# Patient Record
Sex: MALE | Race: WHITE | Employment: OTHER | URBAN - METROPOLITAN AREA
[De-identification: names, ages, dates, MRNs, and addresses within clinical notes are randomized per-mention and may not be internally consistent; named-entity substitution may affect disease eponyms.]

---

## 2017-01-07 RX ORDER — TAMSULOSIN HYDROCHLORIDE 0.4 MG/1
CAPSULE ORAL
Qty: 90 CAP | Refills: 3 | Status: SHIPPED | OUTPATIENT
Start: 2017-01-07 | End: 2017-11-13 | Stop reason: SDUPTHER

## 2017-02-20 ENCOUNTER — OFFICE VISIT (OUTPATIENT)
Dept: INTERNAL MEDICINE CLINIC | Age: 77
End: 2017-02-20

## 2017-02-20 ENCOUNTER — TELEPHONE (OUTPATIENT)
Dept: INTERNAL MEDICINE CLINIC | Age: 77
End: 2017-02-20

## 2017-02-20 VITALS
SYSTOLIC BLOOD PRESSURE: 148 MMHG | DIASTOLIC BLOOD PRESSURE: 74 MMHG | BODY MASS INDEX: 25.97 KG/M2 | TEMPERATURE: 98.9 F | WEIGHT: 181.4 LBS | HEIGHT: 70 IN | HEART RATE: 85 BPM | OXYGEN SATURATION: 94 %

## 2017-02-20 DIAGNOSIS — J40 BRONCHITIS: ICD-10-CM

## 2017-02-20 DIAGNOSIS — J84.10 POSTINFLAMMATORY PULMONARY FIBROSIS (HCC): Primary | ICD-10-CM

## 2017-02-20 DIAGNOSIS — J84.10 INTERSTITIAL PULMONARY FIBROSIS (HCC): Primary | ICD-10-CM

## 2017-02-20 DIAGNOSIS — I10 ESSENTIAL HYPERTENSION: ICD-10-CM

## 2017-02-20 DIAGNOSIS — J40 BRONCHITIS, NOT SPECIFIED AS ACUTE OR CHRONIC: ICD-10-CM

## 2017-02-20 RX ORDER — LEVOFLOXACIN 750 MG/1
750 TABLET ORAL DAILY
Qty: 7 TAB | Refills: 0 | Status: SHIPPED | OUTPATIENT
Start: 2017-02-20 | End: 2017-02-27

## 2017-02-20 RX ORDER — BENZONATATE 200 MG/1
200 CAPSULE ORAL
Qty: 30 CAP | Refills: 0 | Status: SHIPPED | OUTPATIENT
Start: 2017-02-20 | End: 2017-03-10 | Stop reason: SDUPTHER

## 2017-02-20 RX ORDER — PIRFENIDONE 267 MG/1
CAPSULE ORAL
COMMUNITY
Start: 2017-02-17 | End: 2018-05-14

## 2017-02-20 RX ORDER — FLUTICASONE PROPIONATE AND SALMETEROL 50; 500 UG/1; UG/1
POWDER RESPIRATORY (INHALATION)
COMMUNITY
Start: 2017-01-05 | End: 2017-07-10

## 2017-02-20 RX ORDER — AZELASTINE HCL 205.5 UG/1
2 SPRAY NASAL 2 TIMES DAILY
COMMUNITY
Start: 2016-12-03

## 2017-02-20 NOTE — MR AVS SNAPSHOT
Visit Information Date & Time Provider Department Dept. Phone Encounter #  
 2/20/2017 12:00 PM Verona Arciniega, [address] [place] [address],[address] [phone] [id number] Your Appointments 4/11/2017 11:30 AM  
ROUTINE CARE with Verona Arciniega MD  
[facility]) Appt Note: 6 mon f/u  
 [place] [address] [address]  
[address] [facility] [address] Upcoming Health Maintenance Date Due  
 MEDICARE YEARLY EXAM 10/13/2017 GLAUCOMA SCREENING Q2Y 7/14/2018 DTaP/Tdap/Td series (2 - Td) 9/7/2022 COLONOSCOPY 6/17/2024 Allergies as of 2/20/2017  Review Complete On: 2/20/2017 By: Verona Arciniega MD  
  
 Severity Noted Reaction Type Reactions Celexa [Citalopram]  03/19/2010   Side Effect Other (comments) agitation Demerol [Meperidine]  03/01/2010    Unknown (comments) Mold Extracts  03/01/2010    Unknown (comments) Niaspan [Niacin]  03/19/2010   Intolerance Nausea Only Other Medication  03/01/2010    Unknown (comments) Grass smut, standardized mite mix, weed mix, dogs, white pam, white hickory, red mulberry, barley, cottonseed, malt, rice, rye, black pepper, navy bean Percocet [Oxycodone-acetaminophen]  03/01/2010    Unknown (comments) Ragweed  10/24/2013    Runny Nose  
 Sulfa (Sulfonamide Antibiotics)  03/19/2010   Side Effect Nausea Only Current Immunizations  Reviewed on 10/12/2016 Name Date Hepatitis A Vaccine 6/10/1997 Influenza Vaccine 10/6/2014, 10/9/2013 Influenza Vaccine (Quad) PF 9/29/2016, 10/12/2015 Influenza Vaccine Split 10/25/2011 Influenza Vaccine Whole 10/5/2009 Pneumococcal Vaccine (Unspecified Type) 9/7/2012, 11/1/2006 TD Vaccine 7/20/2006 TDAP Vaccine 9/7/2012 Zoster 6/1/2008 Not reviewed this visit You Were Diagnosed With   
  
 Codes Comments Interstitial pulmonary fibrosis ([facility]    -  Primary ICD-10-CM: J84.10 ICD-9-CM: 294 Essential hypertension     ICD-10-CM: I10 
ICD-9-CM: 401.9 Bronchitis     ICD-10-CM: J40 ICD-9-CM: 580 Vitals BP Pulse Temp Height(growth percentile) Weight(growth percentile) SpO2  
 148/74 (BP 1 Location: Right arm, BP Patient Position: Sitting) 85 98.9 °F (37.2 °C) (Oral) 5' 10\" (1.778 m) 181 lb 6.4 oz (82.3 kg) 94% BMI Smoking Status 26.03 kg/m2 Former Smoker BMI and BSA Data Body Mass Index Body Surface Area 26.03 kg/m 2 2.02 m 2 Preferred Pharmacy Pharmacy Name Phone [address] [phone] Your Updated Medication List  
  
   
This list is accurate as of: 2/20/17  1:18 PM.  Always use your most recent med list.  
  
  
  
  
 acetaminophen 500 mg tablet Commonly known as:  TYLENOL Take 1,000 mg by mouth every six (6) hours as needed for Pain or Fever. ADVAIR DISKUS 500-50 mcg/dose diskus inhaler Generic drug:  fluticasone-salmeterol * albuterol 90 mcg/actuation inhaler Commonly known as:  PROVENTIL HFA, VENTOLIN HFA, PROAIR HFA Take 2 Puffs by inhalation every four (4) hours as needed for Wheezing. * albuterol 1.25 mg/3 mL Nebu Commonly known as:  ACCUNEB  
3 mL by Nebulization route every six (6) hours as needed. amoxicillin-clavulanate 875-125 mg per tablet Commonly known as:  AUGMENTIN Take 1 Tab by mouth every twelve (12) hours. aspirin delayed-release 81 mg tablet Take 81 mg by mouth daily. PT INSTRUCTED TO STOP TAKING  6-15-11 BEFORE DOS  
  
 atorvastatin 80 mg tablet Commonly known as:  LIPITOR Take 80 mg by mouth daily. Azelastine 0.15 % (205.5 mcg) nasal spray Commonly known as:  ASTEPRO  
  
 benzonatate 200 mg capsule Commonly known as:  TESSALON Take 1 Cap by mouth three (3) times daily as needed for Cough for up to 7 days.  
  
 cetirizine 10 mg tablet Commonly known as:  ZYRTEC Take 10 mg by mouth nightly. CULTURELLE PO Take 1 Cap by mouth daily. DYMISTA 137-50 mcg/spray [place] Generic drug:  azelastine-fluticasone 1 Spray by Nasal route two (2) times a day. ESBRIET 267 mg Cap capsule Generic drug:  pirfenidone  
  
 finasteride 5 mg tablet Commonly known as:  PROSCAR  
TAKE 1 TABLET EVERY DAY  
  
 glucosamine-chondroitin 750-600 mg Tab Take 1 Tab by mouth two (2) times a day. GRAPE SEED PO Take 650 mg by mouth daily. ibuprofen 200 mg tablet Commonly known as:  MOTRIN Take 200 mg by mouth daily as needed for Pain. * ketoconazole 2 % topical cream  
Commonly known as:  NIZORAL Apply  to affected area daily. Uses on skin lesions every couple of days * ketoconazole 2 % shampoo Commonly known as:  NIZORAL Apply  to affected area two (2) times a week. levoFLOXacin 750 mg tablet Commonly known as:  Tai Vernon Take 1 Tab by mouth daily for 7 days. metoprolol tartrate 25 mg tablet Commonly known as:  LOPRESSOR Take 25 mg by mouth two (2) times a day. PT INSTRUCTED TO TAKE AM DOS PER ANESTHESIA PROTOCOL  
  
 montelukast 10 mg tablet Commonly known as:  SINGULAIR Take 1 Tab by mouth daily (after dinner). NEBULIZER  
by Does Not Apply route. nitroglycerin 0.4 mg SL tablet Commonly known as:  Gerardo Pines 1 Tab by SubLINGual route as needed. omeprazole 40 mg capsule Commonly known as:  PRILOSEC  
TAKE ONE CAPSULE BY MOUTH TWICE A DAY One-A-Day Mens tablet Generic drug:  multivitamin Take 1 Tab by mouth daily. tamsulosin 0.4 mg capsule Commonly known as:  FLOMAX TAKE 1 CAPSULE EVERY NIGHT  
  
 VESIcare 5 mg tablet Generic drug:  solifenacin Take 5 mg by mouth daily. ZETIA 10 mg tablet Generic drug:  ezetimibe Take 10 mg by mouth every morning. * Notice: This list has 4 medication(s) that are the same as other medications prescribed for you. Read the directions carefully, and ask your doctor or other care provider to review them with you. Prescriptions Sent to Pharmacy Refills  
 levoFLOXacin (LEVAQUIN) 750 mg tablet 0 Sig: Take 1 Tab by mouth daily for 7 days. Class: Normal  
 Pharmacy: [facility] [address], [address] [place] [address] RD. Ph #: [phone] Route: Oral  
 benzonatate (TESSALON) 200 mg capsule 0 Sig: Take 1 Cap by mouth three (3) times daily as needed for Cough for up to 7 days. Class: Normal  
 Pharmacy: [facility] [address], [address] [place] [address] RD. Ph #: [phone] Route: Oral  
  
To-Do List   
 02/20/2017 Imaging:  XR CHEST PA LAT Patient Instructions As scheduled Introducing [facility] SERVICES! Dear Ezra Samson: Thank you for requesting a [employer] account. Our records indicate that you already have an active [employer] account. You can access your account anytime at [email or web address]. [email or web address] at work Did you know that you can access your hospital and ER discharge instructions at any time in [employer]? You can also review all of your test results from your hospital stay or ER visit. Additional Information If you have questions, please visit the Frequently Asked Questions section of the [employer] website at [email or web address]. [email or web address] at [email or web address] Remember, [employer] is NOT to be used for urgent needs. For medical emergencies, dial 911. Now available from your iPhone and Android! Please provide this summary of care documentation to your next provider. Your primary care clinician is listed as Shelly Pollock. If you have any questions after today's visit, please call [phone].

## 2017-02-20 NOTE — LETTER
2/20/2017 3:59 PM 
 
Mr. Franklin Reyesnicole [address] [id number] Dear Franklin Altman: 
 
Please find your most recent results below. Resulted Orders XR CHEST PA LAT Narrative EXAM:  XR CHEST PA LAT INDICATION:  Interstitial pulmonary fibrosis. Evaluate for pneumonia. COMPARISON: 5/10/2016. TECHNIQUE: Frontal and lateral chest views. FINDINGS: The cardiomediastinal contours are stable. Sternal wires and 
mediastinal clips are present. There are stable diffuse interstitial markings 
with a peripheral and basilar predominance. There is no new lung opacity or 
pleural effusion. The bones and upper abdomen are stable. Impression IMPRESSION:  
Stable exam with chronic interstitial markings in keeping with pulmonary 
fibrosis. No acute process. RECOMMENDATIONS: 
None. Keep up the good work! Please call me if you have any questions: [phone] Sincerely, 
 
 
Aakash Valencia MD

## 2017-02-20 NOTE — PROGRESS NOTES
HISTORY OF PRESENT ILLNESS  Lu Hammer is a 78 y.o. male. Fever    The history is provided by the patient and spouse. This is a new problem. The current episode started 2 days ago. The maximum temperature noted was 101 - 101.9 F. The temperature was taken using an oral thermometer. Associated symptoms include muscle aches and cough. Pertinent negatives include no chest pain and no shortness of breath.    of note, no fever today in clinic- 98.9 temp  At home has been running around 101.4, 101.9, 100.9, 100  His highest fevers have been around 5 AM   He has been taking tylenol with no improvement   Pt has some chills with this and some mild aches   Pt reports of pain to his R side and ribs with coughing and breathing   He has some chronic cough but this has been worsening with other sx  The cough is keeping him awake at night   Pt has been taking astelin which has improved some of his post nasal drip  Will run rapid flu testing to rule this out   Discussed cough syrup which he can take at night to help with sleep     Pt follows with Dr. Jin Sadler (pulm) for fibrosis  Reviewed notes from 1/05/17: idiopathic pulm fibrosis, dry cough, completed CT scan and reviewed results- consistent with pulmonary fibrosis, starting esbrit medication for treatment, will get PFTs  He plans to start his esbrit tomorrow     He would like to update his tdap at his f/u in April as his daughter is pregnant    Patient Active Problem List    Diagnosis Date Noted    Pneumonia 02/28/2016    Hyperglycemia 02/28/2016    ACP (advance care planning) 10/12/2015    ILD (interstitial lung disease) ([facility] 06/12/2014    Interstitial pulmonary fibrosis ([address] 10/09/2013    HTN (hypertension) 10/09/2013    BPH (benign prostatic hyperplasia) 10/09/2013    A-fib ([address] 10/09/2013    AAA (abdominal aortic aneurysm) ([address] 10/09/2013    BPH (benign prostatic hypertrophy)     GERD (gastroesophageal reflux disease)     CAD (coronary artery disease)     S/P CABG (coronary artery bypass graft) 06/20/2011    Degenerative arthritis of thumb 06/20/2011    Testosterone deficiency 03/24/2011    Hypercholesterolemia 09/29/2010    Chronic bronchitis ([facility] 03/01/2010    Asthma 03/01/2010    Depression 03/01/2010    Arthritis shoulders and spine 03/01/2010     Current Outpatient Prescriptions   Medication Sig Dispense Refill    tamsulosin (FLOMAX) 0.4 mg capsule TAKE 1 CAPSULE EVERY NIGHT 90 Cap 3    amoxicillin-clavulanate (AUGMENTIN) 875-125 mg per tablet Take 1 Tab by mouth every twelve (12) hours. 20 Tab 0    omeprazole (PRILOSEC) 40 mg capsule TAKE ONE CAPSULE BY MOUTH TWICE A  Cap 1    ibuprofen (MOTRIN) 200 mg tablet Take 200 mg by mouth daily as needed for Pain.  azelastine-fluticasone (DYMISTA) 137-50 mcg/spray spry 1 Spray by Nasal route two (2) times a day.  GLUCOSAMINE HCL/CHONDR MIRANDA A NA (GLUCOSAMINE-CHONDROITIN) 750-600 mg tab Take 1 Tab by mouth two (2) times a day.  cetirizine (ZYRTEC) 10 mg tablet Take 10 mg by mouth nightly.  ketoconazole (NIZORAL) 2 % shampoo Apply  to affected area two (2) times a week.  0    VESICARE 5 mg tablet Take 5 mg by mouth daily.  albuterol (ACCUNEB) 1.25 mg/3 mL nebu 3 mL by Nebulization route every six (6) hours as needed. 1 Each 1    GRAPE SEED EXTRACT (GRAPE SEED PO) Take 650 mg by mouth daily.  acetaminophen (TYLENOL) 500 mg tablet Take 1,000 mg by mouth every six (6) hours as needed for Pain or Fever.  ketoconazole (NIZORAL) 2 % topical cream Apply  to affected area daily. Uses on skin lesions every couple of days 15 g 1    finasteride (PROSCAR) 5 mg tablet TAKE 1 TABLET EVERY DAY 90 tablet 2    atorvastatin (LIPITOR) 80 mg tablet Take 80 mg by mouth daily.  montelukast (SINGULAIR) 10 mg tablet Take 1 Tab by mouth daily (after dinner). 90 Tab 3    NEBULIZER by Does Not Apply route.       nitroglycerin (NITROQUICK) 0.4 mg SL tablet 1 Tab by SubLINGual route as needed. 25 Tab prn    albuterol (PROVENTIL HFA, VENTOLIN HFA) 90 mcg/Actuation inhaler Take 2 Puffs by inhalation every four (4) hours as needed for Wheezing. 1 Inhaler 2    LACTOBACILLUS RHAMNOSUS (CULTURELLE PO) Take 1 Cap by mouth daily.  multivitamin (ONE-A-DAY MENS) tablet Take 1 Tab by mouth daily.  metoprolol (LOPRESSOR) 25 mg tablet Take 25 mg by mouth two (2) times a day. PT INSTRUCTED TO TAKE AM DOS PER ANESTHESIA PROTOCOL      ezetimibe (ZETIA) 10 mg tablet Take 10 mg by mouth every morning.  aspirin delayed-release 81 mg tablet Take 81 mg by mouth daily.  PT INSTRUCTED TO STOP TAKING  6-15-11 BEFORE DOS       Past Surgical History   Procedure Laterality Date    Hx tonsil and adenoidectomy  1949    Pr cabg, arterial, three  10/2003    Pr cardiac surg procedure unlist  10/2003     CABGx3 vessel    Hx septoplasty  1962     s/p broken nose 1946    Endoscopy, colon, diagnostic       Dr. Blanca Granados    Hx appendectomy      Hx urological  1990     vasectomy    Hx other surgical      Hx other surgical       hemorrhoids    Hx other surgical  8/2015     basal cell carcinoma     Hx cholecystectomy  11/22/2008     laparoscopic    Hx hernia repair  1948     right    Hx colectomy  12/29/07     sigmoid colectomy for volvulus in [address] Hx heent  while in 20's     submucus resection sinus    Hx tonsillectomy      Hx orthopaedic  June 20, 2011     right hand, thumb surgery, plate inserted    Hx orthopaedic  1946     broken nose    Hx orthopaedic  1962     sub mucous resection    Hx orthopaedic  02/05/15     right hand surgery, ligament repair    Hx colonoscopy  06/17/2014     Repeat in 3 years      Lab Results  Component Value Date/Time   WBC 7.3 06/13/2016 11:55 AM   HGB 12.7 06/13/2016 11:55 AM   HCT 38.5 06/13/2016 11:55 AM   PLATELET 601 78/42/6635 11:55 AM   MCV 94 06/13/2016 11:55 AM       Lab Results  Component Value Date/Time   Cholesterol, total 115 06/13/2016 11:55 AM   Cholesterol, Total 224 07/26/2013 10:58 AM   HDL Cholesterol 40 06/13/2016 11:55 AM   LDL, calculated 56 06/13/2016 11:55 AM   Triglyceride 93 06/13/2016 11:55 AM   CHOL/HDL Ratio 3.4 09/29/2010 08:28 AM       Lab Results  Component Value Date/Time   GFR est AA 74 06/13/2016 11:55 AM   GFR est non-AA 64 06/13/2016 11:55 AM   Creatinine 1.12 06/13/2016 11:55 AM   BUN 15 06/13/2016 11:55 AM   Sodium 140 06/13/2016 11:55 AM   Potassium 5.3 06/13/2016 11:55 AM   Chloride 100 06/13/2016 11:55 AM   CO2 26 06/13/2016 11:55 AM         Review of Systems   Constitutional: Positive for fever. Respiratory: Positive for cough. Negative for shortness of breath. Cardiovascular: Negative for chest pain. Physical Exam   Constitutional: He is oriented to person, place, and time. He appears well-developed and well-nourished. No distress. HENT:   Head: Normocephalic and atraumatic. Right Ear: External ear normal.   Left Ear: External ear normal.   Mouth/Throat: Oropharynx is clear and moist. No oropharyngeal exudate. Eyes: Conjunctivae and EOM are normal. Right eye exhibits no discharge. Left eye exhibits no discharge. Neck: Normal range of motion. Neck supple. Cardiovascular: Normal rate, regular rhythm, normal heart sounds and intact distal pulses. Exam reveals no gallop and no friction rub. No murmur heard. Pulmonary/Chest: Effort normal. No respiratory distress. He has wheezes. He has no rales. He exhibits no tenderness. Course breath sounds, primarily on R side, anteriorly   Musculoskeletal: Normal range of motion. He exhibits no edema, tenderness or deformity. Lymphadenopathy:     He has no cervical adenopathy. Neurological: He is alert and oriented to person, place, and time. He has normal reflexes. Coordination normal.   Skin: Skin is warm and dry. No rash noted. He is not diaphoretic. No erythema. No pallor. Psychiatric: He has a normal mood and affect.  His behavior is normal. ASSESSMENT and PLAN    ICD-10-CM ICD-9-CM    1. Interstitial pulmonary fibrosis ([address]    Follows with pulmonary, Dr. Jefry Barahona for this, will be starting esbriet, most likely tomorrow to help treat this. He is high risk for decompensation with infection   J84.10 515 XR CHEST PA LAT   2. Essential hypertension    Adequately controlled on metoprolol 25mg BID   I10 401.9    3. Bronchitis    Pt is high risk for decompensation and for developing PNA, check chest XR to r/o PNA, will treat with levaquin, tessalon for cough, may need prednisone taper if not making turn for the better. J40 490 XR CHEST PA LAT          Written by Nicole Dougherty, as dictated by Dorita Grajeda MD.    Current diagnosis and concerns discussed with pt at length. Understands risks and benefits or current treatment plan and medications and accepts the treatment and medication with any possible risks.   Pt asks appropriate questions which were answered.   Pt instructed to call with any concerns or problems.

## 2017-02-20 NOTE — TELEPHONE ENCOUNTER
Spoke to [employer] ([employer]). Feliciano Boyd states that pt has been having some consistent fevers reaching up to 101.9. Feliciano Boyd states that pt took tylenol w/ no alleviation. Feliciano Boyd states that pt developed a cough and c/o of umbilical and R flank discomfort. Feliciano Boyd offered and accepted appt for 2/20/17 1200. Feliciano Boyd verbalized understanding of information discussed w/ no further questions at this time.

## 2017-02-20 NOTE — TELEPHONE ENCOUNTER
Indio Maldonado- on [employer]IV[employer] called and states that she is needing a call back in regards to getting an appt for the pt for today as the pt has been running a fever of 101.6 since saturday. Indio Maldonado states that the pt gets a 101 temperature at night and in the morning but then goes down throughout the day. Please call Indio Maldonado to advise if pt can be seen today by Dr. Autumn Franco.

## 2017-03-07 ENCOUNTER — TELEPHONE (OUTPATIENT)
Dept: INTERNAL MEDICINE CLINIC | Age: 77
End: 2017-03-07

## 2017-03-07 RX ORDER — PREDNISONE 20 MG/1
TABLET ORAL
Qty: 12 TAB | Refills: 0 | Status: SHIPPED | OUTPATIENT
Start: 2017-03-07 | End: 2017-07-10

## 2017-03-07 NOTE — TELEPHONE ENCOUNTER
#[phone] pt's wife states pt isn't much better and is requesting the prednisone that was discussed at visit recently. She also states pt needs a refill on the Teslon Pearls? I couldn't find this in chart.   Please call Brittany with any questions

## 2017-03-10 RX ORDER — BENZONATATE 200 MG/1
CAPSULE ORAL
Qty: 30 CAP | Refills: 0 | Status: SHIPPED | OUTPATIENT
Start: 2017-03-10 | End: 2017-07-10

## 2017-03-24 ENCOUNTER — DOCUMENTATION ONLY (OUTPATIENT)
Dept: INTERNAL MEDICINE CLINIC | Age: 77
End: 2017-03-24

## 2017-03-24 NOTE — PROGRESS NOTES
Copy of patient's Advance Medical Directive received and scanned into record. ACP flowsheet updated.

## 2017-03-26 ENCOUNTER — TELEPHONE (OUTPATIENT)
Dept: INTERNAL MEDICINE CLINIC | Age: 77
End: 2017-03-26

## 2017-03-26 NOTE — TELEPHONE ENCOUNTER
Returned page, Saturday march 25 at 20:20. Pt not feeling well, lots of sinus and uri symptoms. Wife recently sick with same. Has pulm fibrosis. Wife requests zpak and prednisone taper for him.   Sent to [facility], [phone]

## 2017-04-11 ENCOUNTER — OFFICE VISIT (OUTPATIENT)
Dept: INTERNAL MEDICINE CLINIC | Age: 77
End: 2017-04-11

## 2017-04-11 VITALS
WEIGHT: 180.4 LBS | HEIGHT: 70 IN | TEMPERATURE: 97.3 F | OXYGEN SATURATION: 95 % | DIASTOLIC BLOOD PRESSURE: 71 MMHG | SYSTOLIC BLOOD PRESSURE: 123 MMHG | HEART RATE: 86 BPM | BODY MASS INDEX: 25.83 KG/M2 | RESPIRATION RATE: 18 BRPM

## 2017-04-11 DIAGNOSIS — I10 ESSENTIAL HYPERTENSION: ICD-10-CM

## 2017-04-11 DIAGNOSIS — J84.10 INTERSTITIAL PULMONARY FIBROSIS (HCC): ICD-10-CM

## 2017-04-11 DIAGNOSIS — I25.10 CORONARY ARTERY DISEASE INVOLVING NATIVE CORONARY ARTERY OF NATIVE HEART WITHOUT ANGINA PECTORIS: ICD-10-CM

## 2017-04-11 DIAGNOSIS — R74.01 ELEVATED ALT MEASUREMENT: ICD-10-CM

## 2017-04-11 DIAGNOSIS — R73.01 IFG (IMPAIRED FASTING GLUCOSE): ICD-10-CM

## 2017-04-11 DIAGNOSIS — I48.91 ATRIAL FIBRILLATION, UNSPECIFIED TYPE (HCC): ICD-10-CM

## 2017-04-11 DIAGNOSIS — N40.0 BENIGN NON-NODULAR PROSTATIC HYPERPLASIA WITHOUT LOWER URINARY TRACT SYMPTOMS: ICD-10-CM

## 2017-04-11 DIAGNOSIS — I71.40 ABDOMINAL AORTIC ANEURYSM (AAA) WITHOUT RUPTURE: ICD-10-CM

## 2017-04-11 DIAGNOSIS — K21.9 GASTROESOPHAGEAL REFLUX DISEASE WITHOUT ESOPHAGITIS: ICD-10-CM

## 2017-04-11 DIAGNOSIS — R73.03 PREDIABETES: ICD-10-CM

## 2017-04-11 DIAGNOSIS — R13.11 ORAL PHASE DYSPHAGIA: ICD-10-CM

## 2017-04-11 DIAGNOSIS — E78.00 HYPERCHOLESTEROLEMIA: Primary | ICD-10-CM

## 2017-04-11 RX ORDER — CEFDINIR 300 MG/1
300 CAPSULE ORAL 2 TIMES DAILY
COMMUNITY
Start: 2017-04-05 | End: 2017-07-10

## 2017-04-11 NOTE — MR AVS SNAPSHOT
Visit Information Date & Time Provider Department Dept. Phone Encounter #  
 4/11/2017 11:30 AM Taty Pineda, Yolanda [address] [place] [address],[address] [phone] [id number] Follow-up Instructions Return in about 6 months (around 10/16/2017). Upcoming Health Maintenance Date Due  
 MEDICARE YEARLY EXAM 10/13/2017 GLAUCOMA SCREENING Q2Y 7/14/2018 DTaP/Tdap/Td series (2 - Td) 9/7/2022 COLONOSCOPY 6/17/2024 Allergies as of 4/11/2017  Review Complete On: 4/11/2017 By: Toñito Lund Severity Noted Reaction Type Reactions Celexa [Citalopram]  03/19/2010   Side Effect Other (comments) agitation Demerol [Meperidine]  03/01/2010    Unknown (comments) Mold Extracts  03/01/2010    Unknown (comments) Niaspan [Niacin]  03/19/2010   Intolerance Nausea Only Other Medication  03/01/2010    Unknown (comments) Grass smut, standardized mite mix, weed mix, dogs, white pam, white hickory, red mulberry, barley, cottonseed, malt, rice, rye, black pepper, navy bean Percocet [Oxycodone-acetaminophen]  03/01/2010    Unknown (comments) Ragweed  10/24/2013    Runny Nose  
 Sulfa (Sulfonamide Antibiotics)  03/19/2010   Side Effect Nausea Only Current Immunizations  Reviewed on 10/12/2016 Name Date Hepatitis A Vaccine 6/10/1997 Influenza Vaccine 10/6/2014, 10/9/2013 Influenza Vaccine (Quad) PF 9/29/2016, 10/12/2015 Influenza Vaccine Split 10/25/2011 Influenza Vaccine Whole 10/5/2009 Pneumococcal Vaccine (Unspecified Type) 9/7/2012, 11/1/2006 TD Vaccine 7/20/2006 TDAP Vaccine 9/7/2012 Zoster 6/1/2008 Not reviewed this visit You Were Diagnosed With   
  
 Codes Comments Hypercholesterolemia    -  Primary ICD-10-CM: E78.00 ICD-9-CM: 272.0 Abdominal aortic aneurysm (AAA) without rupture (HCC)     ICD-10-CM: I71.4 ICD-9-CM: 441.4 Interstitial pulmonary fibrosis (HCC)     ICD-10-CM: J84.10 ICD-9-CM: 809 IFG (impaired fasting glucose)     ICD-10-CM: R73.01 
ICD-9-CM: 790.21 Gastroesophageal reflux disease without esophagitis     ICD-10-CM: K21.9 ICD-9-CM: 530.81 Essential hypertension     ICD-10-CM: I10 
ICD-9-CM: 401.9 Coronary artery disease involving native coronary artery of native heart without angina pectoris     ICD-10-CM: I25.10 ICD-9-CM: 414.01 Benign non-nodular prostatic hyperplasia without lower urinary tract symptoms     ICD-10-CM: N40.0 ICD-9-CM: 600.90 Oral phase dysphagia     ICD-10-CM: R13.11 ICD-9-CM: 787.21 Vitals BP Pulse Temp Resp Height(growth percentile) Weight(growth percentile) 143/77 (BP 1 Location: Left arm, BP Patient Position: Sitting) 86 97.3 °F (36.3 °C) (Oral) 18 5' 10\" (1.778 m) 180 lb 6.4 oz (81.8 kg) SpO2 BMI Smoking Status 95% 25.88 kg/m2 Former Smoker Vitals History BMI and BSA Data Body Mass Index Body Surface Area  
 25.88 kg/m 2 2.01 m 2 Preferred Pharmacy Pharmacy Name Phone [facility] [address]. [phone] Your Updated Medication List  
  
   
This list is accurate as of: 4/11/17 12:07 PM.  Always use your most recent med list.  
  
  
  
  
 acetaminophen 500 mg tablet Commonly known as:  TYLENOL Take 1,000 mg by mouth every six (6) hours as needed for Pain or Fever. ADVAIR DISKUS 500-50 mcg/dose diskus inhaler Generic drug:  fluticasone-salmeterol * albuterol 90 mcg/actuation inhaler Commonly known as:  PROVENTIL HFA, VENTOLIN HFA, PROAIR HFA Take 2 Puffs by inhalation every four (4) hours as needed for Wheezing. * albuterol 1.25 mg/3 mL Nebu Commonly known as:  ACCUNEB  
3 mL by Nebulization route every six (6) hours as needed. amoxicillin-clavulanate 875-125 mg per tablet Commonly known as:  AUGMENTIN Take 1 Tab by mouth every twelve (12) hours. aspirin delayed-release 81 mg tablet Take 81 mg by mouth daily. PT INSTRUCTED TO STOP TAKING  6-15-11 BEFORE DOS  
  
 atorvastatin 80 mg tablet Commonly known as:  LIPITOR Take 80 mg by mouth daily. Azelastine 0.15 % (205.5 mcg) nasal spray Commonly known as:  ASTEPRO  
  
 benzonatate 200 mg capsule Commonly known as:  TESSALON  
TAKE ONE CAPSULE BY MOUTH THREE TIMES A DAY AS NEEDED FOR COUGH  
  
 cefdinir 300 mg capsule Commonly known as:  OMNICEF Take 300 mg by mouth two (2) times a day. cetirizine 10 mg tablet Commonly known as:  ZYRTEC Take 10 mg by mouth nightly. CULTURELLE PO Take 1 Cap by mouth daily. DYMISTA 137-50 mcg/spray Dawson Generic drug:  azelastine-fluticasone 1 Spray by Nasal route two (2) times a day. ESBRIET 267 mg Cap capsule Generic drug:  pirfenidone  
  
 finasteride 5 mg tablet Commonly known as:  PROSCAR  
TAKE 1 TABLET EVERY DAY  
  
 glucosamine-chondroitin 750-600 mg Tab Take 1 Tab by mouth two (2) times a day. GRAPE SEED PO Take 650 mg by mouth daily. ibuprofen 200 mg tablet Commonly known as:  MOTRIN Take 200 mg by mouth daily as needed for Pain. * ketoconazole 2 % topical cream  
Commonly known as:  NIZORAL Apply  to affected area daily. Uses on skin lesions every couple of days * ketoconazole 2 % shampoo Commonly known as:  NIZORAL Apply  to affected area two (2) times a week. metoprolol tartrate 25 mg tablet Commonly known as:  LOPRESSOR Take 25 mg by mouth two (2) times a day. PT INSTRUCTED TO TAKE AM DOS PER ANESTHESIA PROTOCOL  
  
 montelukast 10 mg tablet Commonly known as:  SINGULAIR Take 1 Tab by mouth daily (after dinner). NEBULIZER  
by Does Not Apply route. nitroglycerin 0.4 mg SL tablet Commonly known as:  [place] 1 Tab by SubLINGual route as needed. omeprazole 40 mg capsule Commonly known as:  PRILOSEC  
TAKE ONE CAPSULE BY MOUTH TWICE A DAY One-A-Day Mens tablet Generic drug:  multivitamin Take 1 Tab by mouth daily. predniSONE 20 mg tablet Commonly known as:  Jenae Zeenat 60mg po daily for2 d, 40mg po daily for 2 days, 20mg po daily for 2 days then stop SYMBICORT IN Take  by inhalation. tamsulosin 0.4 mg capsule Commonly known as:  FLOMAX TAKE 1 CAPSULE EVERY NIGHT  
  
 VESIcare 5 mg tablet Generic drug:  solifenacin Take 5 mg by mouth daily. ZETIA 10 mg tablet Generic drug:  ezetimibe Take 10 mg by mouth every morning. * Notice: This list has 4 medication(s) that are the same as other medications prescribed for you. Read the directions carefully, and ask your doctor or other care provider to review them with you. Follow-up Instructions Return in about 6 months (around 10/16/2017). Introducing [facility]! Dear Rian Flores: Thank you for requesting a [facility] account. Our records indicate that you already have an active [facility] account. You can access your account anytime at [email or web address] Did you know that you can access your hospital and ER discharge instructions at any time in [facility]? You can also review all of your test results from your hospital stay or ER visit. Additional Information If you have questions, please visit the Frequently Asked Questions section of the [facility] website at [email or web address] Remember, [facility] is NOT to be used for urgent needs. For medical emergencies, dial 911. Now available from your iPhone and Android! Please provide this summary of care documentation to your next provider. Your primary care clinician is listed as Jovita Lombard. If you have any questions after today's visit, please call [phone].

## 2017-04-11 NOTE — PROGRESS NOTES
HISTORY OF PRESENT ILLNESS  Brett Rojas is a 78 y.o. male. HPI   Last here 2/20/17. Pt is here to f/u on chronic conditions    BP today is 143/77, will repeat this  He is not monitoring his BP at home  Continues metoprolol 25mg BID     Pt previously followed with Dr. Milly Amaya (pulm)  He is now seeing Dr. Louis Blandon (pulm), last saw him 4/05/17  Several changes were made to his medication regimen   Pt completed extended prednisone taper per him, finished last week   Pt is on omnicef currently, 10 day course, will complete this Friday   He is now on astepro and esbriet two tablets TID, no longer on advair  His cough and sleep has been improving with changes  Pt wonders about my recommendations about esbriet-addressed with him  Pt has f/u pending with Dr. Lul Duke proscar, vesicare, and flomax per uro  He follows annually with Dr. Jonnie Phillip ([facility]) in August  He is following pt's PSA levels    Reviewed last labs 6/16  Repeat labs today     Pt has appt pending tomorrow with Dr. Nick Riley (vasc surg)  He follows pt's AAA     Continues prilosec 40mg BID for reflux, works well overall  He tried tapering down but had significant sx with lower dose   He has been having some increased dysphagia and sticking to his throat  Pt has EGD pending with Dr. Iram Tang is stable since last visit     Pt follows with Dr. Nina Trotter (cardio)   Last saw her earlier in 2/17, will get notes     Continues lipitor 80mg, max dose, daily for cholesterol   He also takes zetia daily for this        PREVENTIVE:    Colonoscopy: 6/17/14, Dr. Jordyn Cutler.  repeat 3 years with 2 day prep- due 6/17, will [facility] with [facility], he is not sure he wants this to be completed again together d/t last time  EGD: pending with Dr. Jordyn Cutler  PSA: per Dr. Jonnie Phillip  AAA screen: CT 7/12- 3.2mm, Nick Riley follows, repeat 10/13 and 2/15   Tdap: will check cost at pharmacy  Pneumovax: 9/07/2012  Vkkamko81: 10/13/2015  Zostavax: 6/01/2008  Flu shot: 9/29/2016  A1c: 10/13 6.1, 1/14 5.8, 5/14 5.9, 10/14 6.1, 2/16 6.2, 6/16 6.1    Eye exam: Dr. Charlene Negro, 6/16, cataracts  Lipids: 6/16 LDL 56       Patient Active Problem List    Diagnosis Date Noted    Pneumonia 02/28/2016    Hyperglycemia 02/28/2016    ACP (advance care planning) 10/12/2015    ILD (interstitial lung disease) ([facility] 06/12/2014    Interstitial pulmonary fibrosis ([facility] 10/09/2013    HTN (hypertension) 10/09/2013    BPH (benign prostatic hyperplasia) 10/09/2013    A-fib ([facility] 10/09/2013    AAA (abdominal aortic aneurysm) ([facility] 10/09/2013    BPH (benign prostatic hypertrophy)     GERD (gastroesophageal reflux disease)     CAD (coronary artery disease)     S/P CABG (coronary artery bypass graft) 06/20/2011    Degenerative arthritis of thumb 06/20/2011    Testosterone deficiency 03/24/2011    Hypercholesterolemia 09/29/2010    Chronic bronchitis ([facility] 03/01/2010    Asthma 03/01/2010    Depression 03/01/2010    Arthritis shoulders and spine 03/01/2010     Current Outpatient Prescriptions   Medication Sig Dispense Refill    cefdinir (OMNICEF) 300 mg capsule Take 300 mg by mouth two (2) times a day.  BUDESONIDE/FORMOTEROL FUMARATE (SYMBICORT IN) Take  by inhalation.  benzonatate (TESSALON) 200 mg capsule TAKE ONE CAPSULE BY MOUTH THREE TIMES A DAY AS NEEDED FOR COUGH 30 Cap 0    Azelastine (ASTEPRO) 0.15 % (205.5 mcg) nasal spray       ESBRIET 267 mg cap capsule       tamsulosin (FLOMAX) 0.4 mg capsule TAKE 1 CAPSULE EVERY NIGHT 90 Cap 3    omeprazole (PRILOSEC) 40 mg capsule TAKE ONE CAPSULE BY MOUTH TWICE A  Cap 1    ibuprofen (MOTRIN) 200 mg tablet Take 200 mg by mouth daily as needed for Pain.  GLUCOSAMINE HCL/CHONDR MIRANDA A NA (GLUCOSAMINE-CHONDROITIN) 750-600 mg tab Take 1 Tab by mouth two (2) times a day.  ketoconazole (NIZORAL) 2 % shampoo Apply  to affected area two (2) times a week.  0    VESICARE 5 mg tablet Take 5 mg by mouth daily.       albuterol (ACCUNEB) 1.25 mg/3 mL nebu 3 mL by Nebulization route every six (6) hours as needed. 1 Each 1    GRAPE SEED EXTRACT (GRAPE SEED PO) Take 650 mg by mouth daily.  acetaminophen (TYLENOL) 500 mg tablet Take 1,000 mg by mouth every six (6) hours as needed for Pain or Fever.  ketoconazole (NIZORAL) 2 % topical cream Apply  to affected area daily. Uses on skin lesions every couple of days 15 g 1    finasteride (PROSCAR) 5 mg tablet TAKE 1 TABLET EVERY DAY 90 tablet 2    atorvastatin (LIPITOR) 80 mg tablet Take 80 mg by mouth daily.  montelukast (SINGULAIR) 10 mg tablet Take 1 Tab by mouth daily (after dinner). 90 Tab 3    NEBULIZER by Does Not Apply route.  nitroglycerin (NITROQUICK) 0.4 mg SL tablet 1 Tab by SubLINGual route as needed. 25 Tab prn    albuterol (PROVENTIL HFA, VENTOLIN HFA) 90 mcg/Actuation inhaler Take 2 Puffs by inhalation every four (4) hours as needed for Wheezing. 1 Inhaler 2    LACTOBACILLUS RHAMNOSUS (CULTURELLE PO) Take 1 Cap by mouth daily.  multivitamin (ONE-A-DAY MENS) tablet Take 1 Tab by mouth daily.  metoprolol (LOPRESSOR) 25 mg tablet Take 25 mg by mouth two (2) times a day. PT INSTRUCTED TO TAKE AM DOS PER ANESTHESIA PROTOCOL      ezetimibe (ZETIA) 10 mg tablet Take 10 mg by mouth every morning.  predniSONE (DELTASONE) 20 mg tablet 60mg po daily for2 d, 40mg po daily for 2 days, 20mg po daily for 2 days then stop 12 Tab 0    ADVAIR DISKUS 500-50 mcg/dose diskus inhaler       amoxicillin-clavulanate (AUGMENTIN) 875-125 mg per tablet Take 1 Tab by mouth every twelve (12) hours. 20 Tab 0    azelastine-fluticasone (DYMISTA) 137-50 mcg/spray spry 1 Spray by Nasal route two (2) times a day.  cetirizine (ZYRTEC) 10 mg tablet Take 10 mg by mouth nightly.  aspirin delayed-release 81 mg tablet Take 81 mg by mouth daily.  PT INSTRUCTED TO STOP TAKING  6-15-11 BEFORE DOS       Past Surgical History:   Procedure Laterality Date    CABG, ARTERIAL, THREE  10/2003  CARDIAC SURG PROCEDURE UNLIST  10/2003    CABGx3 vessel    ENDOSCOPY, COLON, DIAGNOSTIC      Dr. Kang Abt    HX APPENDECTOMY      HX CHOLECYSTECTOMY  11/22/2008    laparoscopic    HX COLECTOMY  12/29/07    sigmoid colectomy for volvulus in [address] HX COLONOSCOPY  06/17/2014    Repeat in 3 years    HX HEENT  while in 20's    submucus resection sinus    [address]    right    HX ORTHOPAEDIC  June 20, 2011    right hand, thumb surgery, plate inserted    HX ORTHOPAEDIC  1946    broken nose    HX ORTHOPAEDIC  1962    sub mucous resection    HX ORTHOPAEDIC  02/05/15    right hand surgery, ligament repair    HX OTHER SURGICAL      HX OTHER SURGICAL      hemorrhoids    HX OTHER SURGICAL  8/2015    basal cell carcinoma     HX SEPTOPLASTY  1962    s/p broken nose 1946    HX TONSIL AND ADENOIDECTOMY  1949    HX TONSILLECTOMY      HX UROLOGICAL  1990    vasectomy      Lab Results  Component Value Date/Time   WBC 7.3 06/13/2016 11:55 AM   HGB 12.7 06/13/2016 11:55 AM   HCT 38.5 06/13/2016 11:55 AM   PLATELET 657 15/91/9966 11:55 AM   MCV 94 06/13/2016 11:55 AM       Lab Results  Component Value Date/Time   Cholesterol, total 115 06/13/2016 11:55 AM   HDL Cholesterol 40 06/13/2016 11:55 AM   LDL, calculated 56 06/13/2016 11:55 AM   Triglyceride 93 06/13/2016 11:55 AM   CHOL/HDL Ratio 3.4 09/29/2010 08:28 AM       Lab Results  Component Value Date/Time   GFR est AA 74 06/13/2016 11:55 AM   GFR est non-AA 64 06/13/2016 11:55 AM   Creatinine 1.12 06/13/2016 11:55 AM   BUN 15 06/13/2016 11:55 AM   Sodium 140 06/13/2016 11:55 AM   Potassium 5.3 06/13/2016 11:55 AM   Chloride 100 06/13/2016 11:55 AM   CO2 26 06/13/2016 11:55 AM         Review of Systems   Respiratory: Negative for shortness of breath. Cardiovascular: Negative for chest pain. Physical Exam   Constitutional: He is oriented to person, place, and time. He appears well-developed and well-nourished. No distress.    HENT: Head: Normocephalic and atraumatic. Eyes: Conjunctivae and EOM are normal. Right eye exhibits no discharge. Left eye exhibits no discharge. Neck: Normal range of motion. Neck supple. Cardiovascular: Normal rate, regular rhythm, normal heart sounds and intact distal pulses. Exam reveals no gallop and no friction rub. No murmur heard. Pulmonary/Chest: Effort normal. No respiratory distress. He has no wheezes. He has no rales. He exhibits no tenderness. Basilar crackles, baseline from pulmonary fibrosis   Musculoskeletal: He exhibits no edema, tenderness or deformity. Lymphadenopathy:     He has no cervical adenopathy. Neurological: He is alert and oriented to person, place, and time. He has normal reflexes. Coordination normal.   Skin: Skin is warm and dry. No rash noted. He is not diaphoretic. No erythema. No pallor. Psychiatric: He has a normal mood and affect. His behavior is normal.       ASSESSMENT and PLAN    ICD-10-CM ICD-9-CM    1. Hypercholesterolemia    Due for lipids check, ordered. On maximum dose lipitor 80mg daily. E78.00 272.0 HEMOGLOBIN A1C WITH EAG      METABOLIC PANEL, COMPREHENSIVE      LIPID PANEL      TSH 3RD GENERATION   2. Abdominal aortic aneurysm (AAA) without rupture (HCC)    UTD with Dr. Edvin Castaneda I71.4 441.4 HEMOGLOBIN A1C WITH EAG      METABOLIC PANEL, COMPREHENSIVE      LIPID PANEL      TSH 3RD GENERATION   3. Interstitial pulmonary fibrosis ([facility]    Pt is now seeing Dr. Boy Casanova for this, will get notes for review, he is on esbriet and is finishing prednisone taper, currently improved from last visit. J84.10 515 HEMOGLOBIN A1C WITH EAG      METABOLIC PANEL, COMPREHENSIVE      LIPID PANEL      TSH 3RD GENERATION   4. IFG (impaired fasting glucose)    Check a1c today, diet controlled, wt is stable from last visit. R73.01 790.21    5.  Gastroesophageal reflux disease without esophagitis    Takes prilosec 40mg BID, was unable to taper down, has EGD pending with  [facility] for issues with dysphagia K21.9 530.81 HEMOGLOBIN A1C WITH EAG      METABOLIC PANEL, COMPREHENSIVE      LIPID PANEL      TSH 3RD GENERATION   6. Essential hypertension    Controlled with toprol  I10 401.9 HEMOGLOBIN A1C WITH EAG      METABOLIC PANEL, COMPREHENSIVE      LIPID PANEL      TSH 3RD GENERATION   7. Coronary artery disease involving native coronary artery of native heart without angina pectoris    Saw French Burch in February, will get her notes for review. I25.10 414.01 HEMOGLOBIN A1C WITH EAG      METABOLIC PANEL, COMPREHENSIVE      LIPID PANEL      TSH 3RD GENERATION   8. Benign non-nodular prostatic hyperplasia without lower urinary tract symptoms    Follows with Dr. Herve Rodriguez annually in August   N40.0 600.90    9. Oral phase dysphagia    EGD scheduled   R13.11 787.21    10. Atrial fibrillation, unspecified type [facility])    Follows with Dr. Tari Young, in [place] currently, asymptomatic. I48.91 427.31 HEMOGLOBIN A1C WITH EAG      METABOLIC PANEL, COMPREHENSIVE      LIPID PANEL      TSH 3RD GENERATION   11. Prediabetes    See above R73.03 790.29 HEMOGLOBIN A1C WITH EAG      METABOLIC PANEL, COMPREHENSIVE      LIPID PANEL      TSH 3RD GENERATION          Written by Isabel Burger, as dictated by Carol Gonzales MD.    Current diagnosis and concerns discussed with pt at length. Understands risks and benefits or current treatment plan and medications and accepts the treatment and medication with any possible risks.   Pt asks appropriate questions which were answered.   Pt instructed to call with any concerns or problems. This note will not be viewable in [address]

## 2017-04-11 NOTE — PROGRESS NOTES
1. Have you been to the ER, urgent care clinic since your last visit? Hospitalized since your last visit?no    2. Have you seen or consulted any other health care providers outside of the [address] [place] [address] since your last visit? Include any pap smears or colon screening.  no

## 2017-04-12 LAB
ALBUMIN SERPL-MCNC: 3.3 G/DL (ref 3.5–4.8)
ALBUMIN/GLOB SERPL: 0.9 {RATIO} (ref 1.2–2.2)
ALP SERPL-CCNC: 107 IU/L (ref 39–117)
ALT SERPL-CCNC: 46 IU/L (ref 0–44)
AST SERPL-CCNC: 24 IU/L (ref 0–40)
BILIRUB SERPL-MCNC: 0.4 MG/DL (ref 0–1.2)
BUN SERPL-MCNC: 16 MG/DL (ref 8–27)
BUN/CREAT SERPL: 16 (ref 10–24)
CALCIUM SERPL-MCNC: 8.9 MG/DL (ref 8.6–10.2)
CHLORIDE SERPL-SCNC: 98 MMOL/L (ref 96–106)
CHOLEST SERPL-MCNC: 123 MG/DL (ref 100–199)
CO2 SERPL-SCNC: 24 MMOL/L (ref 18–29)
CREAT SERPL-MCNC: 0.97 MG/DL (ref 0.76–1.27)
EST. AVERAGE GLUCOSE BLD GHB EST-MCNC: 148 MG/DL
GLOBULIN SER CALC-MCNC: 3.6 G/DL (ref 1.5–4.5)
GLUCOSE SERPL-MCNC: 93 MG/DL (ref 65–99)
HBA1C MFR BLD: 6.8 % (ref 4.8–5.6)
HDLC SERPL-MCNC: 39 MG/DL
LDLC SERPL CALC-MCNC: 58 MG/DL (ref 0–99)
POTASSIUM SERPL-SCNC: 4.5 MMOL/L (ref 3.5–5.2)
PROT SERPL-MCNC: 6.9 G/DL (ref 6–8.5)
SODIUM SERPL-SCNC: 138 MMOL/L (ref 134–144)
TRIGL SERPL-MCNC: 128 MG/DL (ref 0–149)
TSH SERPL DL<=0.005 MIU/L-ACNC: 2.39 UIU/ML (ref 0.45–4.5)
VLDLC SERPL CALC-MCNC: 26 MG/DL (ref 5–40)

## 2017-04-12 NOTE — PROGRESS NOTES
a1c up in DM range, previously prediabetic    He has been on steroids a lot which may be playing a role here    Lets give him glucometer, monitor glucose 3 times per week    Repeat a1c, cmp, acute hepatitis panel in 3 months--CHANGE F/U TO 3 MONTHS to eval if truly dm and if needs meds

## 2017-04-13 RX ORDER — LANCETS 28 GAUGE
EACH MISCELLANEOUS
Qty: 50 LANCET | Refills: 2 | Status: SHIPPED | OUTPATIENT
Start: 2017-04-13 | End: 2018-09-17

## 2017-04-13 NOTE — PROGRESS NOTES
Called, spoke to pt. Two pt identifiers confirmed. Pt informed per Dr. Africa Jerome a1c up in DM range, previously prediabetic. Pt informed per Dr. Africa Jerome having been on steroids a lot which may be playing a role here. Pt informed that there will be a glucometer here in the office and for pt to check blood sugar x3wk. Pt states he is familiar w/ the process. Pt would like strips and lancets sent to [facility]. Pt informed to repeat labs and return in 3mo. Pt offered and accepted appt for 7/10/17 1000. Pt verbalized understanding of information discussed w/ no further questions at this time.

## 2017-05-07 RX ORDER — OMEPRAZOLE 40 MG/1
CAPSULE, DELAYED RELEASE ORAL
Qty: 180 CAP | Refills: 0 | Status: SHIPPED | OUTPATIENT
Start: 2017-05-07 | End: 2017-08-05 | Stop reason: SDUPTHER

## 2017-05-09 NOTE — TELEPHONE ENCOUNTER
It looks like when here for his visit you told him 6 months f/u but once labs came back Lavinia told him 3 months. Do you want to keep the October appt if he is coming in every 3 months now?

## 2017-07-03 ENCOUNTER — LAB ONLY (OUTPATIENT)
Dept: INTERNAL MEDICINE CLINIC | Age: 77
End: 2017-07-03

## 2017-07-03 DIAGNOSIS — R74.01 ELEVATED ALT MEASUREMENT: ICD-10-CM

## 2017-07-03 DIAGNOSIS — R73.01 IFG (IMPAIRED FASTING GLUCOSE): ICD-10-CM

## 2017-07-04 LAB
ALBUMIN SERPL-MCNC: 3.8 G/DL (ref 3.5–4.8)
ALBUMIN/GLOB SERPL: 1.1 {RATIO} (ref 1.2–2.2)
ALP SERPL-CCNC: 78 IU/L (ref 39–117)
ALT SERPL-CCNC: 22 IU/L (ref 0–44)
AST SERPL-CCNC: 25 IU/L (ref 0–40)
BILIRUB SERPL-MCNC: 0.5 MG/DL (ref 0–1.2)
BUN SERPL-MCNC: 15 MG/DL (ref 8–27)
BUN/CREAT SERPL: 13 (ref 10–24)
CALCIUM SERPL-MCNC: 9.4 MG/DL (ref 8.6–10.2)
CHLORIDE SERPL-SCNC: 101 MMOL/L (ref 96–106)
CO2 SERPL-SCNC: 26 MMOL/L (ref 18–29)
CREAT SERPL-MCNC: 1.14 MG/DL (ref 0.76–1.27)
EST. AVERAGE GLUCOSE BLD GHB EST-MCNC: 131 MG/DL
GLOBULIN SER CALC-MCNC: 3.4 G/DL (ref 1.5–4.5)
GLUCOSE SERPL-MCNC: 97 MG/DL (ref 65–99)
HAV IGM SERPL QL IA: NEGATIVE
HBA1C MFR BLD: 6.2 % (ref 4.8–5.6)
HBV CORE IGM SERPL QL IA: NEGATIVE
HBV SURFACE AG SERPL QL IA: NEGATIVE
HCV AB S/CO SERPL IA: <0.1 S/CO RATIO (ref 0–0.9)
POTASSIUM SERPL-SCNC: 4.6 MMOL/L (ref 3.5–5.2)
PROT SERPL-MCNC: 7.2 G/DL (ref 6–8.5)
SODIUM SERPL-SCNC: 140 MMOL/L (ref 134–144)

## 2017-07-10 ENCOUNTER — TELEPHONE (OUTPATIENT)
Dept: INTERNAL MEDICINE CLINIC | Age: 77
End: 2017-07-10

## 2017-07-10 ENCOUNTER — OFFICE VISIT (OUTPATIENT)
Dept: INTERNAL MEDICINE CLINIC | Age: 77
End: 2017-07-10

## 2017-07-10 VITALS
SYSTOLIC BLOOD PRESSURE: 124 MMHG | DIASTOLIC BLOOD PRESSURE: 80 MMHG | OXYGEN SATURATION: 95 % | RESPIRATION RATE: 16 BRPM | TEMPERATURE: 98.2 F | BODY MASS INDEX: 26.92 KG/M2 | HEART RATE: 68 BPM | WEIGHT: 188 LBS | HEIGHT: 70 IN

## 2017-07-10 DIAGNOSIS — I71.40 ABDOMINAL AORTIC ANEURYSM (AAA) WITHOUT RUPTURE: ICD-10-CM

## 2017-07-10 DIAGNOSIS — R73.01 IFG (IMPAIRED FASTING GLUCOSE): Primary | ICD-10-CM

## 2017-07-10 DIAGNOSIS — E78.00 HYPERCHOLESTEROLEMIA: ICD-10-CM

## 2017-07-10 DIAGNOSIS — I10 ESSENTIAL HYPERTENSION: ICD-10-CM

## 2017-07-10 DIAGNOSIS — J84.9 ILD (INTERSTITIAL LUNG DISEASE) (HCC): ICD-10-CM

## 2017-07-10 DIAGNOSIS — I25.10 CORONARY ARTERY DISEASE INVOLVING NATIVE CORONARY ARTERY OF NATIVE HEART WITHOUT ANGINA PECTORIS: ICD-10-CM

## 2017-07-10 DIAGNOSIS — I48.91 ATRIAL FIBRILLATION, UNSPECIFIED TYPE (HCC): ICD-10-CM

## 2017-07-10 DIAGNOSIS — K21.9 GASTROESOPHAGEAL REFLUX DISEASE WITHOUT ESOPHAGITIS: ICD-10-CM

## 2017-07-10 DIAGNOSIS — N40.0 BENIGN NON-NODULAR PROSTATIC HYPERPLASIA WITHOUT LOWER URINARY TRACT SYMPTOMS: ICD-10-CM

## 2017-07-10 NOTE — TELEPHONE ENCOUNTER
Called, spoke to pt. Two pt identifiers confirmed. Pt advised to have labs done prior NOV. Pt verbalized understanding of information discussed w/ no further questions at this time.

## 2017-07-10 NOTE — MR AVS SNAPSHOT
Visit Information Date & Time Provider Department Dept. Phone Encounter #  
 7/10/2017 10:00 AM Shania Bajwa, [address]  [id number] Follow-up Instructions Return in about 4 months (around 11/10/2017). Your Appointments 10/16/2017 10:15 AM  
ROUTINE CARE with Shania Bajwa, [address] [facility]) Appt Note: 6 month follow up  
 [facility] [phone] [id number]  
[address] [facility] [address] [place] [address] Upcoming Health Maintenance Date Due INFLUENZA AGE 9 TO ADULT 8/1/2017 MEDICARE YEARLY EXAM 10/13/2017 GLAUCOMA SCREENING Q2Y 7/14/2018 DTaP/Tdap/Td series (2 - Td) 9/7/2022 COLONOSCOPY 6/17/2024 Allergies as of 7/10/2017  Review Complete On: 7/10/2017 By: Shania Bajwa MD  
  
 Severity Noted Reaction Type Reactions Celexa [Citalopram]  03/19/2010   Side Effect Other (comments) agitation Demerol [Meperidine]  03/01/2010    Unknown (comments) Mold Extracts  03/01/2010    Unknown (comments) Niaspan [Niacin]  03/19/2010   Intolerance Nausea Only Other Medication  03/01/2010    Unknown (comments) Grass smut, standardized mite mix, weed mix, dogs, white pam, white hickory, red mulberry, barley, cottonseed, malt, rice, rye, black pepper, navy bean Percocet [Oxycodone-acetaminophen]  03/01/2010    Unknown (comments) Ragweed  10/24/2013    Runny Nose  
 Sulfa (Sulfonamide Antibiotics)  03/19/2010   Side Effect Nausea Only Current Immunizations  Reviewed on 10/12/2016 Name Date Hepatitis A Vaccine 6/10/1997 Influenza Vaccine 10/6/2014, 10/9/2013 Influenza Vaccine (Quad) PF 9/29/2016, 10/12/2015 Influenza Vaccine Split 10/25/2011 Influenza Vaccine Whole 10/5/2009 Pneumococcal Vaccine (Unspecified Type) 9/7/2012, 11/1/2006 TD Vaccine 7/20/2006 TDAP Vaccine 9/7/2012 Zoster 6/1/2008 Not reviewed this visit You Were Diagnosed With   
  
 Codes Comments IFG (impaired fasting glucose)    -  Primary ICD-10-CM: R73.01 
ICD-9-CM: 790.21 Abdominal aortic aneurysm (AAA) without rupture (HCC)     ICD-10-CM: I71.4 ICD-9-CM: 441.4 ILD (interstitial lung disease) ([facility]     ICD-10-CM: J84.9 ICD-9-CM: 949 Atrial fibrillation, unspecified type ([facility]     ICD-10-CM: I48.91 
ICD-9-CM: 427.31 Essential hypertension     ICD-10-CM: I10 
ICD-9-CM: 401.9 Hypercholesterolemia     ICD-10-CM: E78.00 ICD-9-CM: 272.0 Coronary artery disease involving native coronary artery of native heart without angina pectoris     ICD-10-CM: I25.10 ICD-9-CM: 414.01 Benign non-nodular prostatic hyperplasia without lower urinary tract symptoms     ICD-10-CM: N40.0 ICD-9-CM: 600.90 Gastroesophageal reflux disease without esophagitis     ICD-10-CM: K21.9 ICD-9-CM: 530.81 Vitals Smoking Status Former Smoker Preferred Pharmacy Pharmacy Name Phone [facility] [address] [place], [address] [place] [address]. [phone] Your Updated Medication List  
  
   
This list is accurate as of: 7/10/17 10:17 AM.  Always use your most recent med list.  
  
  
  
  
 acetaminophen 500 mg tablet Commonly known as:  TYLENOL Take 1,000 mg by mouth every six (6) hours as needed for Pain or Fever. * albuterol 90 mcg/actuation inhaler Commonly known as:  PROVENTIL HFA, VENTOLIN HFA, PROAIR HFA Take 2 Puffs by inhalation every four (4) hours as needed for Wheezing. * albuterol 1.25 mg/3 mL Nebu Commonly known as:  ACCUNEB  
3 mL by Nebulization route every six (6) hours as needed. aspirin delayed-release 81 mg tablet Take 81 mg by mouth daily. PT INSTRUCTED TO STOP TAKING  6-15-11 BEFORE DOS  
  
 atorvastatin 80 mg tablet Commonly known as:  LIPITOR Take 80 mg by mouth daily. Azelastine 0.15 % (205.5 mcg) nasal spray Commonly known as:  ASTEPRO  
  
 CULTURELLE PO Take 1 Cap by mouth daily. ESBRIET 267 mg Cap capsule Generic drug:  pirfenidone  
  
 finasteride 5 mg tablet Commonly known as:  PROSCAR  
TAKE 1 TABLET EVERY DAY  
  
 glucosamine-chondroitin 750-600 mg Tab Take 1 Tab by mouth two (2) times a day. glucose blood VI test strips strip Commonly known as:  FREESTYLE LITE STRIPS Check blood sugar 3 times weekly. GRAPE SEED PO Take 650 mg by mouth daily. ibuprofen 200 mg tablet Commonly known as:  MOTRIN Take 200 mg by mouth daily as needed for Pain. * ketoconazole 2 % topical cream  
Commonly known as:  NIZORAL Apply  to affected area daily. Uses on skin lesions every couple of days * ketoconazole 2 % shampoo Commonly known as:  NIZORAL Apply  to affected area two (2) times a week. lancets 28 gauge Misc Commonly known as:  FREESTYLE LANCETS Check blood sugar 3 times weekly. metoprolol tartrate 25 mg tablet Commonly known as:  LOPRESSOR Take 25 mg by mouth two (2) times a day. PT INSTRUCTED TO TAKE AM DOS PER ANESTHESIA PROTOCOL  
  
 montelukast 10 mg tablet Commonly known as:  SINGULAIR Take 1 Tab by mouth daily (after dinner). NEBULIZER  
by Does Not Apply route. nitroglycerin 0.4 mg SL tablet Commonly known as:  Muddy Meiers 1 Tab by SubLINGual route as needed. omeprazole 40 mg capsule Commonly known as:  PRILOSEC  
TAKE ONE CAPSULE BY MOUTH TWICE A DAY One-A-Day Mens tablet Generic drug:  multivitamin Take 1 Tab by mouth daily. SYMBICORT IN Take  by inhalation. tamsulosin 0.4 mg capsule Commonly known as:  FLOMAX TAKE 1 CAPSULE EVERY NIGHT  
  
 VESIcare 5 mg tablet Generic drug:  solifenacin Take 5 mg by mouth daily. ZETIA 10 mg tablet Generic drug:  ezetimibe Take 10 mg by mouth every morning. * Notice: This list has 4 medication(s) that are the same as other medications prescribed for you. Read the directions carefully, and ask your doctor or other care provider to review them with you. We Performed the Following AMB POC URINE, MICROALBUMIN, SEMIQUANT (3 RESULTS) [01268 CPT(R)]  DIABETES FOOT EXAM [7 Custom] Follow-up Instructions Return in about 4 months (around 11/10/2017). To-Do List   
 07/10/2017 Lab:  CBC W/O DIFF   
  
 07/11/2017 Lab:  HEMOGLOBIN A1C WITH EAG   
  
 07/11/2017 Lab:  METABOLIC PANEL, BASIC Patient Instructions Set up appointment for eye exam due to blood sugars. Labs prior to follow up Introducing [facility]! Dear Elodia Sapp: Thank you for requesting a Paltalk account. Our records indicate that you already have an active Paltalk account. You can access your account anytime at [email or web address] Did you know that you can access your hospital and ER discharge instructions at any time in Paltalk? You can also review all of your test results from your hospital stay or ER visit. Additional Information If you have questions, please visit the Frequently Asked Questions section of the Paltalk website at [email or web address] Remember, Paltalk is NOT to be used for urgent needs. For medical emergencies, dial 911. Now available from your iPhone and Android! Please provide this summary of care documentation to your next provider. Your primary care clinician is listed as Edith Jiang. If you have any questions after today's visit, please call [phone].

## 2017-07-10 NOTE — PROGRESS NOTES
HISTORY OF PRESENT ILLNESS  Margarito Cohen is a 78 y.o. male. HPI   Last here 4/11/17.  Pt is here to f/u on chronic conditions    BP today is good  He is not monitoring his BP at home  Continues metoprolol 25mg BID     Since last visit, pt has been checking his BS at home  He had been on steroids recurrently which caused his a1c to rise to 6.8  BS at home running around 90s-120s both before and after eating  Discussed if he is placed on steroids to let me know  Advised continuing to monitor this    Pt follows closely with Dr. Shaneka Padron (pulm)  Reviewed most recent notes 6/02/17: f/u on chronic sob and pulmonary fibrosis, increasing esprit to 3 tablets TID, symbicort samples, ordered prescription  Last saw him x 2 weeks, is seeing him q monthly currently  Continues esprit for pulmonary fibrosis per this physician  The esprit causes some burning in his throat and some diarrhea  He also has symbicort daily for breathing and albuterol prn  Breathing is stable, not currently on any abx  Pt has singulair for allergies and the astepro-controlled   Has not seen Dr. Santacruz (allergy) recently     Continues proscar, vesicare, and flomax per uro  He follows annually with Dr. Yumiko Laughlin ([facility]) in August  He is following pt's PSA levels    Wt is up 8 lbs since last visit  Pt tried to gain a little weight  Discussed diet and weight loss   Advised losing a few lbs- 180 to 185 is okay     Reviewed last labs 4/17  Kidney nl, a1c 6.8- diabetes, LFT elevation, lipids good, thyroid nl  Repeat labs reviewed 7/17  LFT normal, hep panel negative, a1c prediabetes     Continues prilosec 40mg BID for reflux, works well overall  He tried tapering down but had significant sx with lower dose   He has been having some increased dysphagia and sticking to his throat  Pt has EGD pending with Dr. Salas Ill      Reviewed notes from Dr. Curt Crawley ([facility]) 4/12/17  Following AAA-stable, ABIs normal in legs, BP good 134/74, come back in one year      Pt follows with Dr. Max Caballero (cardio)   Last saw her earlier in 2/17, will get notes   He has f/u pending in 8/17 with her      Continues lipitor 80mg, max dose, daily for cholesterol   He also takes zetia daily for this        PREVENTIVE:    Colonoscopy: 6/17/14, Dr. William Shaw, repeat 3 years, pending 7/14/17 with Navid  EGD: pending with Dr. William Shaw 7/14/17  PSA: per Dr. Jj Human  AAA screen: CT 7/12- 3.2mm, Cassandra Lorenz follows, repeat 10/13 and 2/15   Tdap: will check cost at pharmacy  Pneumovax: 9/07/2012  [id number]: 10/13/2015  Zostavax: 6/01/2008  Flu shot: 9/29/2016  Microalbumin: 7/17 ordered  Foot exam: 7/10/17  A1c: 10/13 6.1, 1/14 5.8, 5/14 5.9, 10/14 6.1, 2/16 6.2, 6/16 6.1, 4/17 6.8, 7/17 6.2  Eye exam: Dr. Simi Quintero, 6/16, cataracts, due, will schedule  Hepatitis C screen: 4/17 negative  Lipids: 4/17 LDL 58            Patient Active Problem List    Diagnosis Date Noted    Pneumonia 02/28/2016    Hyperglycemia 02/28/2016    ACP (advance care planning) 10/12/2015    ILD (interstitial lung disease) ([facility] 06/12/2014    Interstitial pulmonary fibrosis ([facility] 10/09/2013    HTN (hypertension) 10/09/2013    BPH (benign prostatic hyperplasia) 10/09/2013    A-fib ([address] 10/09/2013    AAA (abdominal aortic aneurysm) ([facility] 10/09/2013    BPH (benign prostatic hypertrophy)     GERD (gastroesophageal reflux disease)     CAD (coronary artery disease)     S/P CABG (coronary artery bypass graft) 06/20/2011    Degenerative arthritis of thumb 06/20/2011    Testosterone deficiency 03/24/2011    Hypercholesterolemia 09/29/2010    Chronic bronchitis ([address] 03/01/2010    Asthma 03/01/2010    Depression 03/01/2010    Arthritis shoulders and spine 03/01/2010     Current Outpatient Prescriptions   Medication Sig Dispense Refill    omeprazole (PRILOSEC) 40 mg capsule TAKE ONE CAPSULE BY MOUTH TWICE A  Cap 0    glucose blood VI test strips (FREESTYLE LITE STRIPS) strip Check blood sugar 3 times weekly.  50 Strip 2    lancets (FREESTYLE LANCETS) 28 gauge misc Check blood sugar 3 times weekly. 50 Lancet 2    cefdinir (OMNICEF) 300 mg capsule Take 300 mg by mouth two (2) times a day.  BUDESONIDE/FORMOTEROL FUMARATE (SYMBICORT IN) Take  by inhalation.  benzonatate (TESSALON) 200 mg capsule TAKE ONE CAPSULE BY MOUTH THREE TIMES A DAY AS NEEDED FOR COUGH 30 Cap 0    predniSONE (DELTASONE) 20 mg tablet 60mg po daily for2 d, 40mg po daily for 2 days, 20mg po daily for 2 days then stop 12 Tab 0    Azelastine (ASTEPRO) 0.15 % (205.5 mcg) nasal spray       ADVAIR DISKUS 500-50 mcg/dose diskus inhaler       ESBRIET 267 mg cap capsule       tamsulosin (FLOMAX) 0.4 mg capsule TAKE 1 CAPSULE EVERY NIGHT 90 Cap 3    amoxicillin-clavulanate (AUGMENTIN) 875-125 mg per tablet Take 1 Tab by mouth every twelve (12) hours. 20 Tab 0    ibuprofen (MOTRIN) 200 mg tablet Take 200 mg by mouth daily as needed for Pain.  azelastine-fluticasone (DYMISTA) 137-50 mcg/spray spry 1 Spray by Nasal route two (2) times a day.  GLUCOSAMINE HCL/CHONDR MIRANDA A NA (GLUCOSAMINE-CHONDROITIN) 750-600 mg tab Take 1 Tab by mouth two (2) times a day.  cetirizine (ZYRTEC) 10 mg tablet Take 10 mg by mouth nightly.  ketoconazole (NIZORAL) 2 % shampoo Apply  to affected area two (2) times a week.  0    VESICARE 5 mg tablet Take 5 mg by mouth daily.  albuterol (ACCUNEB) 1.25 mg/3 mL nebu 3 mL by Nebulization route every six (6) hours as needed. 1 Each 1    GRAPE SEED EXTRACT (GRAPE SEED PO) Take 650 mg by mouth daily.  acetaminophen (TYLENOL) 500 mg tablet Take 1,000 mg by mouth every six (6) hours as needed for Pain or Fever.  ketoconazole (NIZORAL) 2 % topical cream Apply  to affected area daily. Uses on skin lesions every couple of days 15 g 1    finasteride (PROSCAR) 5 mg tablet TAKE 1 TABLET EVERY DAY 90 tablet 2    atorvastatin (LIPITOR) 80 mg tablet Take 80 mg by mouth daily.       montelukast (SINGULAIR) 10 mg tablet Take 1 Tab by mouth daily (after dinner). 90 Tab 3    NEBULIZER by Does Not Apply route.  nitroglycerin (NITROQUICK) 0.4 mg SL tablet 1 Tab by SubLINGual route as needed. 25 Tab prn    albuterol (PROVENTIL HFA, VENTOLIN HFA) 90 mcg/Actuation inhaler Take 2 Puffs by inhalation every four (4) hours as needed for Wheezing. 1 Inhaler 2    LACTOBACILLUS RHAMNOSUS (CULTURELLE PO) Take 1 Cap by mouth daily.  multivitamin (ONE-A-DAY MENS) tablet Take 1 Tab by mouth daily.  metoprolol (LOPRESSOR) 25 mg tablet Take 25 mg by mouth two (2) times a day. PT INSTRUCTED TO TAKE AM DOS PER ANESTHESIA PROTOCOL      ezetimibe (ZETIA) 10 mg tablet Take 10 mg by mouth every morning.  aspirin delayed-release 81 mg tablet Take 81 mg by mouth daily.  PT INSTRUCTED TO STOP TAKING  6-15-11 BEFORE DOS       Past Surgical History:   Procedure Laterality Date    CABG, ARTERIAL, THREE  10/2003    CARDIAC SURG PROCEDURE UNLIST  10/2003    CABGx3 vessel    ENDOSCOPY, COLON, DIAGNOSTIC      Dr. Refugio Ocampo    HX APPENDECTOMY      HX CHOLECYSTECTOMY  11/22/2008    laparoscopic    HX COLECTOMY  12/29/07    sigmoid colectomy for volvulus in [address] HX COLONOSCOPY  06/17/2014    Repeat in 3 years    HX HEENT  while in 20's    submucus resection sinus    [address]    right    HX ORTHOPAEDIC  June 20, 2011    right hand, thumb surgery, plate inserted    HX ORTHOPAEDIC  1946    broken nose    HX ORTHOPAEDIC  1962    sub mucous resection    HX ORTHOPAEDIC  02/05/15    right hand surgery, ligament repair    HX OTHER SURGICAL      HX OTHER SURGICAL      hemorrhoids    HX OTHER SURGICAL  8/2015    basal cell carcinoma     HX SEPTOPLASTY  1962    s/p broken nose 1946    HX TONSIL AND ADENOIDECTOMY  1949    HX TONSILLECTOMY      HX UROLOGICAL  1990    vasectomy      Lab Results  Component Value Date/Time   WBC 7.3 06/13/2016 11:55 AM   HGB 12.7 06/13/2016 11:55 AM   HCT 38.5 06/13/2016 11:55 AM PLATELET 623 54/67/5083 11:55 AM   MCV 94 06/13/2016 11:55 AM     Lab Results  Component Value Date/Time   Cholesterol, total 123 04/11/2017 12:20 PM   HDL Cholesterol 39 04/11/2017 12:20 PM   LDL, calculated 58 04/11/2017 12:20 PM   Triglyceride 128 04/11/2017 12:20 PM   CHOL/HDL Ratio 3.4 09/29/2010 08:28 AM     Lab Results  Component Value Date/Time   GFR est non-AA 63 07/03/2017 08:23 AM   GFR est AA 72 07/03/2017 08:23 AM   Creatinine 1.14 07/03/2017 08:23 AM   BUN 15 07/03/2017 08:23 AM   Sodium 140 07/03/2017 08:23 AM   Potassium 4.6 07/03/2017 08:23 AM   Chloride 101 07/03/2017 08:23 AM   CO2 26 07/03/2017 08:23 AM   Magnesium 2.0 03/01/2016 04:23 AM          Review of Systems   Respiratory: Negative for shortness of breath. Cardiovascular: Negative for chest pain. Physical Exam   Constitutional: He is oriented to person, place, and time. He appears well-developed and well-nourished. No distress. HENT:   Head: Normocephalic and atraumatic. Mouth/Throat: No oropharyngeal exudate. Eyes: Conjunctivae and EOM are normal. Right eye exhibits no discharge. Left eye exhibits no discharge. Neck: Normal range of motion. Neck supple. Cardiovascular: Normal rate, regular rhythm, normal heart sounds and intact distal pulses. Exam reveals no gallop and no friction rub. No murmur heard. Pulmonary/Chest: Effort normal. No respiratory distress. He has no wheezes. He has no rales. He exhibits no tenderness. Faint crackles in bases of lungs   Musculoskeletal: Normal range of motion. He exhibits edema (trace BLE). He exhibits no tenderness or deformity. Lymphadenopathy:     He has no cervical adenopathy. Neurological: He is alert and oriented to person, place, and time. He has normal reflexes. Coordination normal.   Monofilament nl BLE, good peripheral pulses, no ulcers     Skin: Skin is warm and dry. No rash noted. He is not diaphoretic. No erythema. No pallor.    Varicose veins BLE Psychiatric: He has a normal mood and affect. His behavior is normal.       ASSESSMENT and PLAN    ICD-10-CM ICD-9-CM    1. IFG (impaired fasting glucose)    a1c improved, back in prediabetes range, no longer on prednisone which was likely contributing to elevated BS. Continue to monitor glucose, check microalbumin today, get eye exam completed, no medications for now. Discussed to contact office if on long course of prednisone in the future. R73.01 790.21  DIABETES FOOT EXAM      HEMOGLOBIN A1C WITH EAG      METABOLIC PANEL, BASIC      AMB POC URINE, MICROALBUMIN, SEMIQUANT (3 RESULTS)      CBC W/O DIFF   2. Abdominal aortic aneurysm (AAA) without rupture (HCC)    UTD with Dr. Yehuda Arceo   I71.4 441.4 CBC W/O DIFF   3. ILD (interstitial lung disease) ([facility]    Now on esprit, doing well, closely follows with Dr. Shelby Interiano, symbicort replaced advair and is working well    J84.9 515 CBC W/O DIFF   4. Atrial fibrillation, unspecified type [facility])    Follows with Dr. Lakisha Johnson routinely, has appointment pending in August   I48.91 427.31 CBC W/O DIFF   5. Essential hypertension    Controlled on metoprolol   I10 401.9 CBC W/O DIFF   6. Hypercholesterolemia    On max dose lipitor and zetia   E78.00 272.0 CBC W/O DIFF   7. Coronary artery disease involving native coronary artery of native heart without angina pectoris    Will be seeing Dr. Lakisha Johnson in August   I25.10 414.01 CBC W/O DIFF   8. Benign non-nodular prostatic hyperplasia without lower urinary tract symptoms    Stable on vesicare, proscar, and flomax per Dr. Jaramillo Sridhar   N40.0 600.90 CBC W/O DIFF   9. Gastroesophageal reflux disease without esophagitis    On prilosec BID, has EGD pending for later this week K21.9 530.81 CBC W/O DIFF          Written by Iliana Hermosillo, as dictated by Mamie Deleon MD.    Current diagnosis and concerns discussed with pt at length.  Understands risks and benefits or current treatment plan and medications and accepts the treatment and medication with any possible risks.   Pt asks appropriate questions which were answered.   Pt instructed to call with any concerns or problems.

## 2017-07-10 NOTE — TELEPHONE ENCOUNTER
Patient states he needs a call back in reference to lab orders he received today being dated that he needs to get them done now or just by his next appt in November. Please call to advise.  Thank you

## 2017-07-13 ENCOUNTER — ANESTHESIA EVENT (OUTPATIENT)
Dept: ENDOSCOPY | Age: 77
End: 2017-07-13
Payer: MEDICARE

## 2017-07-13 NOTE — ANESTHESIA PREPROCEDURE EVALUATION
Anesthetic History   No history of anesthetic complications            Review of Systems / Medical History  Patient summary reviewed, nursing notes reviewed and pertinent labs reviewed    Pulmonary    COPD (Pulm fibrosis): moderate        Asthma     Comments: Stable Chronic Pulmonary Fibrosis on CXR 2-20-17   Neuro/Psych         Psychiatric history (Depression)     Cardiovascular    Hypertension        Dysrhythmias : atrial fibrillation  CAD (Fair daily activity), CABG (3 vessel in 2003) and hyperlipidemia    Exercise tolerance: >4 METS  Comments: Small AAA    2-2016 EKG: NSR, LAE   GI/Hepatic/Renal     GERD: well controlled          Comments: GERD, Dysphagia      Hx of sigmoid colectomy for volvulus  Hx of hemorrhoids Endo/Other        Arthritis     Other Findings   Comments: BPH  Testosterone defic         Physical Exam    Airway  Mallampati: II  TM Distance: > 6 cm  Neck ROM: normal range of motion   Mouth opening: Normal     Cardiovascular  Regular rate and rhythm,  S1 and S2 normal,  no murmur, click, rub, or gallop  Rhythm: regular  Rate: normal         Dental    Dentition: Bridges and Caps/crowns     Pulmonary  Breath sounds clear to auscultation               Abdominal  GI exam deferred       Other Findings            Anesthetic Plan    ASA: 3  Anesthesia type: MAC          Induction: Intravenous  Anesthetic plan and risks discussed with: Patient

## 2017-07-14 ENCOUNTER — HOSPITAL ENCOUNTER (OUTPATIENT)
Age: 77
Setting detail: OUTPATIENT SURGERY
Discharge: HOME | End: 2017-07-14
Attending: SPECIALIST | Admitting: SPECIALIST
Payer: MEDICARE

## 2017-07-14 ENCOUNTER — ANESTHESIA (OUTPATIENT)
Dept: ENDOSCOPY | Age: 77
End: 2017-07-14
Payer: MEDICARE

## 2017-07-14 VITALS
HEART RATE: 52 BPM | RESPIRATION RATE: 26 BRPM | WEIGHT: 184 LBS | BODY MASS INDEX: 26.34 KG/M2 | DIASTOLIC BLOOD PRESSURE: 80 MMHG | HEIGHT: 70 IN | OXYGEN SATURATION: 96 % | TEMPERATURE: 97.6 F | SYSTOLIC BLOOD PRESSURE: 168 MMHG

## 2017-07-14 PROCEDURE — 74011000250 HC RX REV CODE- 250

## 2017-07-14 PROCEDURE — 88305 TISSUE EXAM BY PATHOLOGIST: CPT | Performed by: SPECIALIST

## 2017-07-14 PROCEDURE — 77030018712 HC DEV BLLN INFL BSC -B: Performed by: SPECIALIST

## 2017-07-14 PROCEDURE — 88342 IMHCHEM/IMCYTCHM 1ST ANTB: CPT | Performed by: SPECIALIST

## 2017-07-14 PROCEDURE — 77030009426 HC FCPS BIOP ENDOSC BSC -B: Performed by: SPECIALIST

## 2017-07-14 PROCEDURE — 76040000007: Performed by: SPECIALIST

## 2017-07-14 PROCEDURE — 77030019988 HC FCPS ENDOSC DISP BSC -B: Performed by: SPECIALIST

## 2017-07-14 PROCEDURE — 74011250636 HC RX REV CODE- 250/636: Performed by: SPECIALIST

## 2017-07-14 PROCEDURE — 76060000032 HC ANESTHESIA 0.5 TO 1 HR: Performed by: SPECIALIST

## 2017-07-14 PROCEDURE — 74011250636 HC RX REV CODE- 250/636

## 2017-07-14 RX ORDER — LIDOCAINE HYDROCHLORIDE 20 MG/ML
INJECTION, SOLUTION EPIDURAL; INFILTRATION; INTRACAUDAL; PERINEURAL AS NEEDED
Status: DISCONTINUED | OUTPATIENT
Start: 2017-07-14 | End: 2017-07-14 | Stop reason: HOSPADM

## 2017-07-14 RX ORDER — MIDAZOLAM HYDROCHLORIDE 1 MG/ML
1-2 INJECTION, SOLUTION INTRAMUSCULAR; INTRAVENOUS
Status: DISCONTINUED | OUTPATIENT
Start: 2017-07-14 | End: 2017-07-14 | Stop reason: HOSPADM

## 2017-07-14 RX ORDER — SODIUM CHLORIDE 0.9 % (FLUSH) 0.9 %
5-10 SYRINGE (ML) INJECTION EVERY 8 HOURS
Status: DISCONTINUED | OUTPATIENT
Start: 2017-07-14 | End: 2017-07-14 | Stop reason: HOSPADM

## 2017-07-14 RX ORDER — SODIUM CHLORIDE 0.9 % (FLUSH) 0.9 %
5-10 SYRINGE (ML) INJECTION AS NEEDED
Status: DISCONTINUED | OUTPATIENT
Start: 2017-07-14 | End: 2017-07-14 | Stop reason: HOSPADM

## 2017-07-14 RX ORDER — SODIUM CHLORIDE 9 MG/ML
50 INJECTION, SOLUTION INTRAVENOUS CONTINUOUS
Status: DISCONTINUED | OUTPATIENT
Start: 2017-07-14 | End: 2017-07-14 | Stop reason: HOSPADM

## 2017-07-14 RX ORDER — PROPOFOL 10 MG/ML
INJECTION, EMULSION INTRAVENOUS AS NEEDED
Status: DISCONTINUED | OUTPATIENT
Start: 2017-07-14 | End: 2017-07-14 | Stop reason: HOSPADM

## 2017-07-14 RX ORDER — DEXTROMETHORPHAN/PSEUDOEPHED 2.5-7.5/.8
1.2 DROPS ORAL
Status: DISCONTINUED | OUTPATIENT
Start: 2017-07-14 | End: 2017-07-14 | Stop reason: HOSPADM

## 2017-07-14 RX ADMIN — PROPOFOL 30 MG: 10 INJECTION, EMULSION INTRAVENOUS at 07:51

## 2017-07-14 RX ADMIN — PROPOFOL 30 MG: 10 INJECTION, EMULSION INTRAVENOUS at 08:15

## 2017-07-14 RX ADMIN — PROPOFOL 30 MG: 10 INJECTION, EMULSION INTRAVENOUS at 08:06

## 2017-07-14 RX ADMIN — SODIUM CHLORIDE 50 ML/HR: 900 INJECTION, SOLUTION INTRAVENOUS at 07:43

## 2017-07-14 RX ADMIN — PROPOFOL 30 MG: 10 INJECTION, EMULSION INTRAVENOUS at 08:09

## 2017-07-14 RX ADMIN — PROPOFOL 30 MG: 10 INJECTION, EMULSION INTRAVENOUS at 07:53

## 2017-07-14 RX ADMIN — PROPOFOL 30 MG: 10 INJECTION, EMULSION INTRAVENOUS at 08:04

## 2017-07-14 RX ADMIN — PROPOFOL 30 MG: 10 INJECTION, EMULSION INTRAVENOUS at 08:01

## 2017-07-14 RX ADMIN — PROPOFOL 30 MG: 10 INJECTION, EMULSION INTRAVENOUS at 07:49

## 2017-07-14 RX ADMIN — PROPOFOL 30 MG: 10 INJECTION, EMULSION INTRAVENOUS at 07:56

## 2017-07-14 RX ADMIN — PROPOFOL 30 MG: 10 INJECTION, EMULSION INTRAVENOUS at 08:02

## 2017-07-14 RX ADMIN — PROPOFOL 30 MG: 10 INJECTION, EMULSION INTRAVENOUS at 08:12

## 2017-07-14 RX ADMIN — PROPOFOL 30 MG: 10 INJECTION, EMULSION INTRAVENOUS at 07:52

## 2017-07-14 RX ADMIN — PROPOFOL 30 MG: 10 INJECTION, EMULSION INTRAVENOUS at 07:57

## 2017-07-14 RX ADMIN — SODIUM CHLORIDE 50 ML/HR: 900 INJECTION, SOLUTION INTRAVENOUS at 08:51

## 2017-07-14 RX ADMIN — PROPOFOL 30 MG: 10 INJECTION, EMULSION INTRAVENOUS at 07:55

## 2017-07-14 RX ADMIN — PROPOFOL 30 MG: 10 INJECTION, EMULSION INTRAVENOUS at 08:17

## 2017-07-14 RX ADMIN — LIDOCAINE HYDROCHLORIDE 20 MG: 20 INJECTION, SOLUTION EPIDURAL; INFILTRATION; INTRACAUDAL; PERINEURAL at 07:49

## 2017-07-14 RX ADMIN — PROPOFOL 30 MG: 10 INJECTION, EMULSION INTRAVENOUS at 08:08

## 2017-07-14 RX ADMIN — PROPOFOL 30 MG: 10 INJECTION, EMULSION INTRAVENOUS at 07:54

## 2017-07-14 RX ADMIN — PROPOFOL 30 MG: 10 INJECTION, EMULSION INTRAVENOUS at 07:50

## 2017-07-14 NOTE — DISCHARGE INSTRUCTIONS
Ilia Marshal  [id number]  1940    COLON / EGD DISCHARGE INSTRUCTIONS  Discomfort:  Sore throat- throat lozenges or warm salt water gargle  Redness at IV site- apply warm compress to area; if redness or soreness persist- contact your physician  There may be a slight amount of blood passed from the rectum  Gaseous discomfort- walking, belching will help relieve any discomfort  You may operate a vehicle for 12 hours  You may engage in an occupation involving machinery or appliances for rest of today  You may drink alcoholic beverages for at least 12 hours  Avoid making any critical decisions for at least 24 hour  DIET:   Regular diet. - however -  remember your colon is empty and a heavy meal will produce gas. Avoid these foods:  vegetables, fried / greasy foods, carbonated drinks for today     ACTIVITY:  You may  resume your normal daily activities it is recommended that you spend the remainder of the day resting -  avoid any strenuous activity. CALL M.D. ANY SIGN OF:   Increasing pain, nausea, vomiting  Abdominal distension (swelling)  New increased bleeding (oral or rectal)  Fever (chills)  Pain in chest area  Bloody discharge from nose or mouth  Shortness of breath    Tylenol as needed for pain. Follow-up Instructions:   Call Dr. Nathanael Bernheim for results of procedure / biopsy in 7 days at telephone #  [phone]  Additional instructions: repeat colonoscopy in 5yrs                  Ilia Araya  [id number]  1940        DISCHARGE SUMMARY from Nurse    The following personal items collected during your admission are returned to you:   Dental Appliance: Dental Appliances: None  Vision: Visual Aid: Glasses (per pt \"in bag\")  Hearing Aid:    Jewelry:    Clothing:    Other Valuables:    Valuables sent to safe:      PATIENT INSTRUCTIONS:    Take Home Medications:      MyChart Activation    Thank you for requesting access to [facility].  Please follow the instructions below to securely access and download your online medical record. [facility] allows you to send messages to your doctor, view your test results, renew your prescriptions, schedule appointments, and more. How Do I Sign Up? 1. In your internet browser, go to [email or web address]  2. Click on the First Time User? Click Here link in the Sign In box. You will be redirect to the New Member Sign Up page. 3. Enter your [facility] Access Code exactly as it appears below. You will not need to use this code after youve completed the sign-up process. If you do not sign up before the expiration date, you must request a new code. [facility] Access Code: Activation code not generated  Current [facility] Status: Active (This is the date your [facility] access code will )    4. Enter the last four digits of your Social Security Number (xxxx) and Date of Birth (mm/dd/yyyy) as indicated and click Submit. You will be taken to the next sign-up page. 5. Create a [facility] ID. This will be your [facility] login ID and cannot be changed, so think of one that is secure and easy to remember. 6. Create a [facility] password. You can change your password at any time. 7. Enter your Password Reset Question and Answer. This can be used at a later time if you forget your password. 8. Enter your e-mail address. You will receive e-mail notification when new information is available in [address] 9. Click Sign Up. You can now view and download portions of your medical record. 10. Click the Download Summary menu link to download a portable copy of your medical information. Additional Information    If you have questions, please visit the Frequently Asked Questions section of the [employer] website at [email or web address] Remember, [facility] is NOT to be used for urgent needs. For medical emergencies, dial 911.

## 2017-07-14 NOTE — PROCEDURES
Colonoscopy Procedure Note    Indications:   Screening colonoscopy    Referring Physician: Elliot Lazaro MD  Anesthesia/Sedation: MAC anesthesia Propofol  Endoscopist:  Dr. Pierre Davis    Procedure in Detail:  Informed consent was obtained for the procedure, including sedation. Risks of perforation, hemorrhage, adverse drug reaction, and aspiration were discussed. The patient was placed in the left lateral decubitus position. Based on the pre-procedure assessment, including review of the patient's medical history, medications, allergies, and review of systems, he had been deemed to be an appropriate candidate for moderate sedation; he was therefore sedated with the medications listed above. The patient was monitored continuously with ECG tracing, pulse oximetry, blood pressure monitoring, and direct observations. A rectal examination was performed. The [id number] was inserted into the rectum and advanced under direct vision to the cecum, which was identified by the ileocecal valve and appendiceal orifice. The quality of the colonic preparation was adequate. A careful inspection was made as the colonoscope was withdrawn, including a retroflexed view of the rectum; findings and interventions are described below. Appropriate photodocumentation was obtained. Findings:     1. Scope advanced to the cecum. 2.  Preparation was adequate. 3.  3 mm polyp in the ascending colon s/p cold forceps removal.  4.  There was normal mucosa otherwise. 5.  Grade 2 internal hemorrhoids. Therapies:  As above    Specimen:  Specimens were collected as described above and sent to pathology. Complications: None were encountered during the procedure. EBL: < 10 ml.     Recommendations:   -f/u path  -repeat colonoscopy in 5 years  Signed By: Tamanna Zeng MD                        July 14, 2017

## 2017-07-14 NOTE — PROGRESS NOTES
The risks and benefits of the bite block have been explained to patient. Patient verbalizes understanding.

## 2017-07-14 NOTE — H&P
Gastroenterology Outpatient History and Physical    Patient: Niya Carson    Physician: Rhett Paloimno MD    Vital Signs: Blood pressure (!) 180/97, pulse 64, temperature 98.1 °F (36.7 °C), resp. rate 20, height 5' 10\" (1.778 m), weight 83.5 kg (184 lb), SpO2 99 %. Allergies: Allergies   Allergen Reactions    Celexa [Citalopram] Other (comments)     agitation    Demerol [Meperidine] Unknown (comments)    Mold Extracts Unknown (comments)    Niaspan [Niacin] Nausea Only    Other Medication Unknown (comments)     Grass smut, standardized mite mix, weed mix, dogs, white pam, white hickory, red mulberry, barley, cottonseed, malt, rice, rye, black pepper, navy bean    Percocet [Oxycodone-Acetaminophen] Unknown (comments)    Ragweed Runny Nose    Sulfa (Sulfonamide Antibiotics) Nausea Only       Chief Complaint: GERD with dysphagia;screening    History of Present Illness: 79 yo WM with persistent GERD with intermittent solid dysphagia and screening.     Justification for Procedure: above    History:  Past Medical History:   Diagnosis Date    [facility]     [facility]    Arthritis shoulders and spine 3/1/2010    Asthma 3/1/2010    Dr. Alvino Marie,  Call    BPH (benign prostatic hypertrophy)     Broken nose 3/1/2010    Bronchitis 02/2017    CAD [facility]onary artery [facility]    basal cell chest    Chronic bronchitis ([facility] 3/1/2010    Contact dermatitis and other eczema, due to unspecified cause     Dr. Elie Zendejas    COPD     Depression 3/1/2010    GERD (gastroesophageal reflux disease)     Hypercholesterolemia 9/29/2010    Hypertension     Pneumonia 3/1/2010    Testosterone deficiency 3/24/2011      Past Surgical History:   Procedure Laterality Date    CABG, ARTERIAL, THREE  10/2003    CARDIAC SURG PROCEDURE UNLIST  10/2003    CABGx3 vessel    ENDOSCOPY, COLON, DIAGNOSTIC      Dr. White Apt 11/22/2008    laparoscopic    HX COLECTOMY  12/29/07    sigmoid colectomy for volvulus [address] HX COLONOSCOPY  06/17/2014    Repeat in 3 years    HX HEENT  while in 19's    submucus resection sinus    [address]    right    HX ORTHOPAEDIC  June 20, 2011    right hand, thumb surgery, plate inserted    HX ORTHOPAEDIC  1946    broken nose    HX ORTHOPAEDIC  1962    sub mucous resection    HX ORTHOPAEDIC  02/05/15    right hand surgery, ligament repair    HX OTHER SURGICAL      HX OTHER SURGICAL      hemorrhoids    HX OTHER SURGICAL  8/2015    basal cell carcinoma     HX SEPTOPLASTY  1962    s/p broken nose 1946    HX TONSIL AND ADENOIDECTOMY  1949    HX TONSILLECTOMY      HX UROLOGICAL  1990    vasectomy      Social History     Social History    Marital status:      Spouse name: N/A    Number of children: N/A    Years of education: N/A     Occupational History    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     Retired [employer]       Social History Main Topics    Smoking status: Former Smoker     Packs/day: 1.00     Years: 15.00     Quit date: 1/1/1970    Smokeless tobacco: Never Used    Alcohol use 1.2 oz/week     2 Glasses of wine per week      Comment: maybe a glass of wine 1-2 a week    Drug use: No    Sexual activity: Yes     Partners: Female     Other Topics Concern    Caffeine Concern No     2 servings a day    Exercise No     Social History Narrative      Family History   Problem Relation Age of Onset    Cancer Mother      spine or abdomen    Stroke Father     Heart Attack Father      46s    Cancer Brother      CLL, lung    Diabetes Brother     Cancer Brother      lung    Cancer Brother      lung    Cancer Brother      lung    Heart Disease Brother     Heart Disease Brother 58     CABG    Other Brother      shingles    Lung Disease Sister     High Cholesterol Son     Lung Disease Daughter      Overactive Airways    No Known Problems Daughter        Medications:   Prior to Admission medications    Medication Sig Start Date End Date Taking? Authorizing Provider   omeprazole (PRILOSEC) 40 mg capsule TAKE ONE CAPSULE BY MOUTH TWICE A DAY 5/7/17  Yes Daisy Schwab MD   BUDESONIDE/FORMOTEROL FUMARATE (SYMBICORT IN) Take  by inhalation. Yes Historical Provider   Azelastine (ASTEPRO) 0.15 % (205.5 mcg) nasal spray  12/3/16  Yes Historical Provider   tamsulosin (FLOMAX) 0.4 mg capsule TAKE 1 CAPSULE EVERY NIGHT 1/7/17  Yes Daisy Schwab MD   ibuprofen (MOTRIN) 200 mg tablet Take 200 mg by mouth daily as needed for Pain. Yes Historical Provider   ketoconazole (NIZORAL) 2 % shampoo Apply  to affected area two (2) times a week. 1/6/16  Yes Historical Provider   VESICARE 5 mg tablet Take 5 mg by mouth daily. 1/2/16  Yes Historical Provider   finasteride (PROSCAR) 5 mg tablet TAKE 1 TABLET EVERY DAY 12/29/14  Yes Daisy Schwab MD   atorvastatin (LIPITOR) 80 mg tablet Take 80 mg by mouth daily. Yes Historical Provider   montelukast (SINGULAIR) 10 mg tablet Take 1 Tab by mouth daily (after dinner). 8/2/12  Yes Taty Garcia MD   albuterol (PROVENTIL HFA, VENTOLIN HFA) 90 mcg/Actuation inhaler Take 2 Puffs by inhalation every four (4) hours as needed for Wheezing. 2/26/11  Yes Edilma Anderson MD   LACTOBACILLUS RHAMNOSUS (CULTURELLE PO) Take 1 Cap by mouth daily. 3/19/10  Yes Historical Provider   multivitamin (ONE-A-DAY MENS) tablet Take 1 Tab by mouth daily. Yes Historical Provider   metoprolol (LOPRESSOR) 25 mg tablet Take 25 mg by mouth two (2) times a day. PT INSTRUCTED TO TAKE AM DOS PER ANESTHESIA PROTOCOL   Yes Historical Provider   ezetimibe (ZETIA) 10 mg tablet Take 10 mg by mouth every morning. 3/1/10  Yes Historical Provider   glucose blood VI test strips (FREESTYLE LITE STRIPS) strip Check blood sugar 3 times weekly. 4/13/17   Daisy Schwab MD   lancets (FREESTYLE LANCETS) 28 gauge misc Check blood sugar 3 times weekly.  4/13/17   MD MARY Leon mg cap capsule  2/17/17   Historical Provider   GLUCOSAMINE HCL/CHONDR MIRANDA A NA (GLUCOSAMINE-CHONDROITIN) 750-600 mg tab Take 1 Tab by mouth two (2) times a day. Historical Provider   albuterol (ACCUNEB) 1.25 mg/3 mL nebu 3 mL by Nebulization route every six (6) hours as needed. 3/4/16   Tory Nevarez MD   GRAPE SEED EXTRACT (GRAPE SEED PO) Take 650 mg by mouth daily. Historical Provider   acetaminophen (TYLENOL) 500 mg tablet Take 1,000 mg by mouth every six (6) hours as needed for Pain or Fever. Historical Provider   ketoconazole (NIZORAL) 2 % topical cream Apply  to affected area daily. Uses on skin lesions every couple of days 10/12/15   Tory Nevarez MD   NEBULIZER by Does Not Apply route. Historical Provider   nitroglycerin (NITROQUICK) 0.4 mg SL tablet 1 Tab by SubLINGual route as needed. 3/25/11   Rufus Reyes MD       Physical Exam:   General: alert, no distress   HEENT: Head: Normocephalic, no lesions, without obvious abnormality.    Heart: regular rate and rhythm, S1, S2 normal, no murmur, click, rub or gallop   Lungs: chest clear, no wheezing, rales, normal symmetric air entry   Abdominal: soft, NT/ND + BS   Neurological: Grossly normal   Extremities: extremities normal, atraumatic, no cyanosis or edema     Findings/Diagnosis: GERD with dysphagia; Screening    Plan of Care/Planned Procedure: EGD with possible dilation and Colonoscopy     Signed By: Rachel Breaux MD     July 14, 2017

## 2017-07-14 NOTE — PROGRESS NOTES
Garima Grewal  1940  [id number]    Situation:  Verbal report received from: Jan Wilson RN   Procedure: Procedure(s):  COLONOSCOPY  ESOPHAGOGASTRODUODENOSCOPY (EGD)  ESOPHAGOGASTRODUODENAL (EGD) BIOPSY  ESOPHAGEAL DILATION  COLON BIOPSY    Background:    Preoperative diagnosis: GERD,DYSPHAGIA  Postoperative diagnosis: EGD - Gastritis, Esophageal Stricture  COLON - colon polyp, hemorrhoids    :  Dr. Kavitha Ewing  Assistant(s): Endoscopy Technician-1: Tori Ziegler  Endoscopy RN-1: Patricia Walls RN    Specimens:   ID Type Source Tests Collected by Time Destination   1 : Gastric bx Preservative Gastric  Page Ziegler MD 7/14/2017 0758 Pathology   2 : Mid Esophagus bx Preservative   Page Ziegler MD 7/14/2017 0759 Pathology   3 : Ascending Colon Polyp bx Preservative Colon, Ascending  Page Ziegler MD 7/14/2017 1901 Pathology     H. Pylori  no    Assessment:  Intra-procedure medications       Anesthesia gave intra-procedure sedation and medications, see anesthesia flow sheet   yes    Intravenous fluids: NS@ KVO     Vital signs stable  yes    Abdominal assessment: round and soft   yes    Recommendation:  Discharge patient per MD order  yes  Return to floor  Na  Family or Friend   yes  Permission to share finding with family or friend  yes

## 2017-07-14 NOTE — PROCEDURES
Esophagogastroduodenoscopy Procedure Note      Margarito Cohen  1940  [id number]    Indication:GERD wtih dysphagia    Endoscopist: Pavel Billy MD    Referring Provider:  Eliane Tang MD    Sedation:  MAC anesthesia Propofol    Procedure Details:  After infomed consent was obtained for the procedure, with all risks and benefits of procedure explained the patient was taken to the endoscopy suite and placed in the left lateral decubitus position. Following sequential administration of sedation as per above, the endoscope was inserted into the mouth and advanced under direct vision to second portion of the duodenum. A careful inspection was made as the gastroscope was withdrawn, including a retroflexed view of the proximal stomach; findings and interventions are described below. Findings:     Esophagus:   + There was normal esophageal mucosa throughout esophagus. There was no Andrews's. Z line normal at 40 cm.  + S/P Bx of mid esophagus to r/o EoE.  + Resistance at EG junction s/p dilation with 47 FR Savary over wire      Stomach:   - There was diffuse erythema of moderate severity with bile s/p Bx, c/w bile gastritis. Duodenum:   - The bulb and post bulbar mucosa is normal in appearance to the second portion. The duodenal folds appeared normal.       Therapies:  As above    Specimen: Specimens were collected as described and send to the laboratory. Complications:   None were encountered during the procedure. EBL:  < 10 ml.           Recommendations:   -f/u path  -continue acid suppression      Pavel Billy MD  7/14/2017  8:26 AM

## 2017-07-14 NOTE — IP AVS SNAPSHOT
[address] [facility] 
[phone] Patient: Piero Clements MRN: [id number] CIS:[id number] You are allergic to the following Allergen Reactions Celexa (Citalopram) Other (comments) agitation Demerol (Meperidine) Unknown (comments) Mold Extracts Unknown (comments) Niaspan (Niacin) Nausea Only Other Medication Unknown (comments) Grass smut, standardized mite mix, weed mix, dogs, white pam, white hickory, red mulberry, barley, cottonseed, malt, rice, rye, black pepper, navy bean Percocet (Oxycodone-Acetaminophen) Unknown (comments) Ragweed Runny Nose  
    
 Sulfa (Sulfonamide Antibiotics) Nausea Only Recent Documentation Height Weight BMI Smoking Status 1.778 m 83.5 kg 26.4 kg/m2 Former Smoker Emergency Contacts Name Discharge Info Relation Home Work Mobile Eitan Lin  Spouse [3] [phone] About your hospitalization You were admitted on:  July 14, 2017 You last received care in the:  [facility] ENDOSCOPY You were discharged on:  July 14, 2017 Unit phone number:  [phone] Why you were hospitalized Your primary diagnosis was:  Not on File Providers Seen During Your Hospitalizations Provider Role Specialty Primary office phone Linnea Pond MD Attending Provider Gastroenterology [phone] Your Primary Care Physician (PCP) Primary Care Physician Office Phone Office Fax Gearld Host [phone] Follow-up Information None Current Discharge Medication List  
  
CONTINUE these medications which have NOT CHANGED Dose & Instructions Dispensing Information Comments Morning Noon Evening Bedtime  
 acetaminophen 500 mg tablet Commonly known as:  TYLENOL Your last dose was: Your next dose is: Dose:  1000 mg Take 1,000 mg by mouth every six (6) hours as needed for Pain or Fever. Refills:  0  
     
   
   
   
  
 * albuterol 90 mcg/actuation inhaler Commonly known as:  PROVENTIL HFA, VENTOLIN HFA, PROAIR HFA Your last dose was: Your next dose is:    
   
   
 Dose:  2 Puff Take 2 Puffs by inhalation every four (4) hours as needed for Wheezing. Quantity:  1 Inhaler Refills:  2  
     
   
   
   
  
 * albuterol 1.25 mg/3 mL Nebu Commonly known as:  Catie Dawson Your last dose was: Your next dose is:    
   
   
 Dose:  1.25 mg  
3 mL by Nebulization route every six (6) hours as needed. Quantity:  1 Each Refills:  1  
     
   
   
   
  
 atorvastatin 80 mg tablet Commonly known as:  LIPITOR Your last dose was: Your next dose is:    
   
   
 Dose:  80 mg Take 80 mg by mouth daily. Refills:  0 Azelastine 0.15 % (205.5 mcg) nasal spray Commonly known as:  ASTEPRO Your last dose was: Your next dose is:    
   
   
  Refills:  0  
     
   
   
   
  
 CULTURELLE PO Your last dose was: Your next dose is:    
   
   
 Dose:  1 Cap Take 1 Cap by mouth daily. Refills:  0  
     
   
   
   
  
 ESBRIET 267 mg Cap capsule Generic drug:  pirfenidone Your last dose was: Your next dose is:    
   
   
  Refills:  0  
     
   
   
   
  
 finasteride 5 mg tablet Commonly known as:  PROSCAR Your last dose was: Your next dose is: TAKE 1 TABLET EVERY DAY Quantity:  90 tablet Refills:  2  
     
   
   
   
  
 glucosamine-chondroitin 750-600 mg Tab Your last dose was: Your next dose is:    
   
   
 Dose:  1 Tab Take 1 Tab by mouth two (2) times a day. Refills:  0  
     
   
   
   
  
 glucose blood VI test strips strip Commonly known as:  FREESTYLE LITE STRIPS Your last dose was: Your next dose is: Check blood sugar 3 times weekly. Quantity:  50 Strip Refills:  2 GRAPE SEED PO Your last dose was: Your next dose is:    
   
   
 Dose:  650 mg Take 650 mg by mouth daily. Refills:  0  
     
   
   
   
  
 ibuprofen 200 mg tablet Commonly known as:  MOTRIN Your last dose was: Your next dose is:    
   
   
 Dose:  200 mg Take 200 mg by mouth daily as needed for Pain. Refills:  0  
     
   
   
   
  
 * ketoconazole 2 % topical cream  
Commonly known as:  NIZORAL Your last dose was: Your next dose is:    
   
   
 Apply  to affected area daily. Uses on skin lesions every couple of days Quantity:  15 g Refills:  1  
     
   
   
   
  
 * ketoconazole 2 % shampoo Commonly known as:  NIZORAL Your last dose was: Your next dose is:    
   
   
 Apply  to affected area two (2) times a week. Refills:  0  
     
   
   
   
  
 lancets 28 gauge Misc Commonly known as:  FREESTYLE LANCETS Your last dose was: Your next dose is:    
   
   
 Check blood sugar 3 times weekly. Quantity:  50 Lancet Refills:  2  
     
   
   
   
  
 metoprolol tartrate 25 mg tablet Commonly known as:  LOPRESSOR Your last dose was: Your next dose is:    
   
   
 Dose:  25 mg Take 25 mg by mouth two (2) times a day. PT INSTRUCTED TO TAKE AM DOS PER ANESTHESIA PROTOCOL Refills:  0  
     
   
   
   
  
 montelukast 10 mg tablet Commonly known as:  SINGULAIR Your last dose was: Your next dose is:    
   
   
 Dose:  10 mg Take 1 Tab by mouth daily (after dinner). Quantity:  90 Tab Refills:  3 NEBULIZER Your last dose was: Your next dose is:    
   
   
 by Does Not Apply route. Refills:  0  
     
   
   
   
  
 nitroglycerin 0.4 mg SL tablet Commonly known as:  Dinora Cotto Your last dose was: Your next dose is:    
   
   
 Dose:  0.4 mg  
1 Tab by SubLINGual route as needed. Quantity:  25 Tab Refills:  prn  
     
   
   
   
  
 omeprazole 40 mg capsule Commonly known as:  PRILOSEC Your last dose was: Your next dose is: TAKE ONE CAPSULE BY MOUTH TWICE A DAY Quantity:  180 Cap Refills:  0 One-A-Day Mens tablet Generic drug:  multivitamin Your last dose was: Your next dose is:    
   
   
 Dose:  1 Tab Take 1 Tab by mouth daily. Refills:  0  
     
   
   
   
  
 SYMBICORT IN Your last dose was: Your next dose is: Take  by inhalation. Refills:  0  
     
   
   
   
  
 tamsulosin 0.4 mg capsule Commonly known as:  FLOMAX Your last dose was: Your next dose is: TAKE 1 CAPSULE EVERY NIGHT Quantity:  90 Cap Refills:  3 VESIcare 5 mg tablet Generic drug:  solifenacin Your last dose was: Your next dose is:    
   
   
 Dose:  5 mg Take 5 mg by mouth daily. Refills:  0 ZETIA 10 mg tablet Generic drug:  ezetimibe Your last dose was: Your next dose is:    
   
   
 Dose:  10 mg Take 10 mg by mouth every morning. Refills:  0  
     
   
   
   
  
 * Notice: This list has 4 medication(s) that are the same as other medications prescribed for you. Read the directions carefully, and ask your doctor or other care provider to review them with you. Discharge Instructions Andres Fernandes [phone] 
1940 COLON / EGD DISCHARGE INSTRUCTIONS Discomfort: 
Sore throat- throat lozenges or warm salt water gargle Redness at IV site- apply warm compress to area; if redness or soreness persist- contact your physician There may be a slight amount of blood passed from the rectum Gaseous discomfort- walking, belching will help relieve any discomfort You may operate a vehicle for 12 hours You may engage in an occupation involving machinery or appliances for rest of today You may drink alcoholic beverages for at least 12 hours Avoid making any critical decisions for at least 24 hour DIET: 
 Regular diet.  however -  remember your colon is empty and a heavy meal will produce gas. Avoid these foods:  vegetables, fried / greasy foods, carbonated drinks for today ACTIVITY: 
You may  resume your normal daily activities it is recommended that you spend the remainder of the day resting -  avoid any strenuous activity. CALL M.D. ANY SIGN OF: Increasing pain, nausea, vomiting Abdominal distension (swelling) New increased bleeding (oral or rectal) Fever (chills) Pain in chest area Bloody discharge from nose or mouth Shortness of breath Tylenol as needed for pain. Follow-up Instructions: 
 Call Dr. Elaina Osorio for results of procedure / biopsy in 7 days at telephone #  [phone] Additional instructions: repeat colonoscopy in 5yrs Rocio العراقي [id number] 
1940 DISCHARGE SUMMARY from Nurse The following personal items collected during your admission are returned to you:  
Dental Appliance: Dental Appliances: None Vision: Visual Aid: Glasses (per pt \"in bag\") Hearing Aid:   
Jewelry:   
Clothing:   
Other Valuables:   
Valuables sent to safe:   
 
PATIENT INSTRUCTIONS: 
 
Take Home Medications: MyChart Activation Thank you for requesting access to [employer]. Please follow the instructions below to securely access and download your online medical record. [employer] allows you to send messages to your doctor, view your test results, renew your prescriptions, schedule appointments, and more. How Do I Sign Up? 1. In your internet browser, go to [email or web address] 
2. Click on the First Time User? Click Here link in the Sign In box. You will be redirect to the New Member Sign Up page. 3. Enter your Kog[employer] Access Code exactly as it appears below. You will not need to use this code after youve completed the sign-up process. If you do not sign up before the expiration date, you must request a new code. Lutonixt Access Code: Activation code not generated Current Kogeto Status: Active (This is the date your Kogeto access code will ) 4. Enter the last four digits of your Social Security Number (xxxx) and Date of Birth (mm/dd/yyyy) as indicated and click Submit. You will be taken to the next sign-up page. 5. Create a Lutonixt ID. This will be your Kogeto login ID and cannot be changed, so think of one that is secure and easy to remember. 6. Create a Kogeto password. You can change your password at any time. 7. Enter your Password Reset Question and Answer. This can be used at a later time if you forget your password. 8. Enter your e-mail address. You will receive e-mail notification when new information is available in [address] 9. Click Sign Up. You can now view and download portions of your medical record. 10. Click the Download Summary menu link to download a portable copy of your medical information. Additional Information If you have questions, please visit the Frequently Asked Questions section of the [employer] website at [email or web address] Remember, K[employer]eto is NOT to be used for urgent needs. For medical emergencies, dial 911. Discharge Orders None Introducing [facility]! Dear Jitendra Borrero: Thank you for requesting a Kogeto account. Our records indicate that you already have an active Kogeto account. You can access your account anytime at [email or web address] Did you know that you can access your hospital and ER discharge instructions at any time in K[employer]? You can also review all of your test results from your hospital stay or ER visit. Additional Information If you have questions, please visit the Frequently Asked Questions section of the MyChart website at [email or web address] Remember, MyChart is NOT to be used for urgent needs. For medical emergencies, dial 911. Now available from your iPhone and Android! General Information Please provide this summary of care documentation to your next provider. Patient Signature:  ____________________________________________________________ Date:  ____________________________________________________________  
  
Edmund Lu Provider Signature:  ____________________________________________________________ Date:  ____________________________________________________________

## 2017-07-14 NOTE — ANESTHESIA POSTPROCEDURE EVALUATION
Post-Anesthesia Evaluation and Assessment    Patient: Margarito Cohen MRN: [id number]  SSN: [id number]    YOB: 1940  Age: 78 y.o. Sex: male       Cardiovascular Function/Vital Signs  Visit Vitals    BP (!) 163/93    Pulse (!) 58    Temp 36.4 °C (97.6 °F)    Resp 16    Ht 5' 10\" (1.778 m)    Wt 83.5 kg (184 lb)    SpO2 96%    BMI 26.4 kg/m2       Patient is status post general, total IV anesthesia anesthesia for Procedure(s):  COLONOSCOPY  ESOPHAGOGASTRODUODENOSCOPY (EGD)  ESOPHAGOGASTRODUODENAL (EGD) BIOPSY  ESOPHAGEAL DILATION  COLON BIOPSY. Nausea/Vomiting: None    Postoperative hydration reviewed and adequate. Pain:  Pain Scale 1: Numeric (0 - 10) (07/14/17 0855)  Pain Intensity 1: 0 (07/14/17 0855)   Managed    Neurological Status: At baseline    Mental Status and Level of Consciousness: Arousable    Pulmonary Status:   O2 Device: Room air (07/14/17 0855)   Adequate oxygenation and airway patent    Complications related to anesthesia: None    Post-anesthesia assessment completed.  No concerns    Signed By: Perlita Lopez MD     July 14, 2017

## 2017-08-06 RX ORDER — OMEPRAZOLE 40 MG/1
CAPSULE, DELAYED RELEASE ORAL
Qty: 180 CAP | Refills: 0 | Status: SHIPPED | OUTPATIENT
Start: 2017-08-06 | End: 2017-10-28 | Stop reason: SDUPTHER

## 2017-09-28 ENCOUNTER — CLINICAL SUPPORT (OUTPATIENT)
Dept: INTERNAL MEDICINE CLINIC | Age: 77
End: 2017-09-28

## 2017-09-28 VITALS — TEMPERATURE: 97.8 F

## 2017-09-28 DIAGNOSIS — Z23 ENCOUNTER FOR IMMUNIZATION: Primary | ICD-10-CM

## 2017-09-28 NOTE — PROGRESS NOTES
flu vaccine given per pt request after obtaining the consent form. 0.5ml injected in the L deltoid muscle intramuscularly. Administered by Semaj Yang LPN. VIS given.

## 2017-10-27 ENCOUNTER — DOCUMENTATION ONLY (OUTPATIENT)
Dept: INTERNAL MEDICINE CLINIC | Age: 77
End: 2017-10-27

## 2017-10-27 NOTE — PROGRESS NOTES
Medicare Part B Preventive Services Limitations Recommendation Scheduled   Bone Mass Measurement  (age 72 & older, biennial) Requires diagnosis related to osteoporosis or estrogen deficiency. Biennial benefit unless patient has history of long-term glucocorticoid tx or baseline is needed because initial test was by other method N/A N/A   Cardiovascular Screening Blood Tests (every 5 years)  Total cholesterol, HDL, Triglycerides Order as a panel if possible Completed 4/12/17     Recommended annually Due 4/2018   Colorectal Cancer Screening  -Fecal occult blood test (annual)  -Flexible sigmoidoscopy (5y)  -Screening colonoscopy (10y)  -Barium Enema   Completed 7/14/17     Repeat in 5 years Due 2022   Counseling to Prevent Tobacco Use (up to 8 sessions per year)  - Counseling greater than 3 and up to 10 minutes  - Counseling greater than 10 minutes Patients must be asymptomatic of tobacco-related conditions to receive as preventive service N/A N/A   Diabetes Screening Tests (at least every 3 years, Medicare covers annually or at 6-month intervals for prediabetic patients)     Fasting blood sugar (FBS) or glucose tolerance test (GTT) Patient must be diagnosed with one of the following:  -Hypertension, Dyslipidemia, obesity, previous impaired FBS or GTT  Or any two of the following: overweight, FH of diabetes, age ? 72, history of gestational diabetes, birth of baby weighing more than 9 pounds Completed 7/4/17 with A1C 6.2     Recommended every 3-6 months for pre-diabetics     Due 10/2017-1/2018   Diabetes Self-Management Training (DSMT) (no USPSTF recommendation) Requires referral by treating physician for patient with diabetes or renal disease. 10 hours of initial DSMT session of no less than 30 minutes each in a continuous 12-month period. 2 hours of follow-up DSMT in subsequent years.  N/A N/A   Glaucoma Screening (no USPSTF recommendation) Diabetes mellitus, family history, , age 48 or over,  American, age 72 or over Completed 7/14/16 at [facility]     Recommended annually  Due now   Human Immunodeficiency Virus (HIV) Screening (annually for increased risk patients)  HIV-1 and HIV-2 by EIA, JORGE, rapid antibody test, or oral mucosa transudate Patient must be at increased risk for HIV infection per USPSTF guidelines or pregnant. Tests covered annually for patients at increased risk. Pregnant patients may receive up to 3 test during pregnancy. N/A N/A   Medical Nutrition Therapy (MNT) (for diabetes or renal disease not recommended schedule) Requires referral by treating physician for patient with diabetes or renal disease. Can be provided in same year as diabetes self-management training (DSMT), and CMS recommends medical nutrition therapy take place after DSMT. Up to 3 hours for initial year and 2 hours in subsequent years. N/A  N/A   Prostate Cancer Screening (annually up to age 76)  - Digital rectal exam (PAUL)  - Prostate specific antigen (PSA) Annually (age 48 or over), PAUL not paid separately when covered E/M service is provided on same date Completed 1/2015 per Dr. Marilyn Barksdale Due per Dr. Delgado Madie Vaccination (annually)   Completed 9/2016     Recommended every Fall Due Fall 2017   Shingles Vaccination A shingles vaccine is also recommended once in a lifetime after age 61  Completed 6/1/08 Completed   Pneumococcal Vaccination (once after 72)   Pneumococcal 23 - 2012    Prevar 13 - never completed    Both recommended once over the age of 72 You are due for a Prevnar 15. You can get this at your local pharmacy. Hepatitis B Vaccinations (if medium/high risk) Medium/high risk factors:  End-stage renal disease,  Hemophiliacs who received Factor VIII or IX concentrates, Clients of institutions for the mentally retarded, Persons who live in the same house as a HepB virus carrier, Homosexual men, Illicit injectable drug abusers.  N/A N/A   Ultrasound Screening for Abdominal Aortic Aneurysm (AAA) (once) Patient must be referred through [facility] and not have had a screening for abdominal aortic aneurysm before under Medicare.   Limited to patients who meet one of the following criteria:  - Men who are 73-68 years old and have smoked more than 100 cigarettes in their lifetime.  -Anyone with a FH of AAA  -Anyone recommended for screening by USPSTF N/A N/A

## 2017-10-28 RX ORDER — OMEPRAZOLE 40 MG/1
CAPSULE, DELAYED RELEASE ORAL
Qty: 180 CAP | Refills: 0 | Status: SHIPPED | OUTPATIENT
Start: 2017-10-28 | End: 2018-02-03 | Stop reason: SDUPTHER

## 2017-11-02 ENCOUNTER — LAB ONLY (OUTPATIENT)
Dept: INTERNAL MEDICINE CLINIC | Age: 77
End: 2017-11-02

## 2017-11-02 DIAGNOSIS — I10 ESSENTIAL HYPERTENSION: ICD-10-CM

## 2017-11-02 DIAGNOSIS — I25.10 CORONARY ARTERY DISEASE INVOLVING NATIVE CORONARY ARTERY OF NATIVE HEART WITHOUT ANGINA PECTORIS: ICD-10-CM

## 2017-11-02 DIAGNOSIS — I71.40 ABDOMINAL AORTIC ANEURYSM (AAA) WITHOUT RUPTURE: ICD-10-CM

## 2017-11-02 DIAGNOSIS — R73.01 IFG (IMPAIRED FASTING GLUCOSE): ICD-10-CM

## 2017-11-02 DIAGNOSIS — J84.9 ILD (INTERSTITIAL LUNG DISEASE) (HCC): ICD-10-CM

## 2017-11-02 DIAGNOSIS — K21.9 GASTROESOPHAGEAL REFLUX DISEASE WITHOUT ESOPHAGITIS: ICD-10-CM

## 2017-11-02 DIAGNOSIS — N40.0 BENIGN NON-NODULAR PROSTATIC HYPERPLASIA WITHOUT LOWER URINARY TRACT SYMPTOMS: ICD-10-CM

## 2017-11-02 DIAGNOSIS — E78.00 HYPERCHOLESTEROLEMIA: ICD-10-CM

## 2017-11-02 DIAGNOSIS — I48.91 ATRIAL FIBRILLATION, UNSPECIFIED TYPE (HCC): ICD-10-CM

## 2017-11-03 LAB
BUN SERPL-MCNC: 14 MG/DL (ref 8–27)
BUN/CREAT SERPL: 13 (ref 10–24)
CALCIUM SERPL-MCNC: 9.5 MG/DL (ref 8.6–10.2)
CHLORIDE SERPL-SCNC: 102 MMOL/L (ref 96–106)
CO2 SERPL-SCNC: 27 MMOL/L (ref 18–29)
CREAT SERPL-MCNC: 1.12 MG/DL (ref 0.76–1.27)
ERYTHROCYTE [DISTWIDTH] IN BLOOD BY AUTOMATED COUNT: 14.4 % (ref 12.3–15.4)
EST. AVERAGE GLUCOSE BLD GHB EST-MCNC: 126 MG/DL
GFR SERPLBLD CREATININE-BSD FMLA CKD-EPI: 64 ML/MIN/1.73
GFR SERPLBLD CREATININE-BSD FMLA CKD-EPI: 74 ML/MIN/1.73
GLUCOSE SERPL-MCNC: 108 MG/DL (ref 65–99)
HBA1C MFR BLD: 6 % (ref 4.8–5.6)
HCT VFR BLD AUTO: 39.7 % (ref 37.5–51)
HGB BLD-MCNC: 13.2 G/DL (ref 12.6–17.7)
MCH RBC QN AUTO: 32 PG (ref 26.6–33)
MCHC RBC AUTO-ENTMCNC: 33.2 G/DL (ref 31.5–35.7)
MCV RBC AUTO: 96 FL (ref 79–97)
PLATELET # BLD AUTO: 218 X10E3/UL (ref 150–379)
POTASSIUM SERPL-SCNC: 4.7 MMOL/L (ref 3.5–5.2)
RBC # BLD AUTO: 4.13 X10E6/UL (ref 4.14–5.8)
SODIUM SERPL-SCNC: 141 MMOL/L (ref 134–144)
WBC # BLD AUTO: 7.9 X10E3/UL (ref 3.4–10.8)

## 2017-11-08 ENCOUNTER — DOCUMENTATION ONLY (OUTPATIENT)
Dept: INTERNAL MEDICINE CLINIC | Age: 77
End: 2017-11-08

## 2017-11-08 NOTE — PROGRESS NOTES
Medicare Part B Preventive Services Limitations Recommendation Scheduled   Bone Mass Measurement  (age 72 & older, biennial) Requires diagnosis related to osteoporosis or estrogen deficiency. Biennial benefit unless patient has history of long-term glucocorticoid tx or baseline is needed because initial test was by other method N/A N/A   Cardiovascular Screening Blood Tests (every 5 years)  Total cholesterol, HDL, Triglycerides Order as a panel if possible Completed 4/12/17     Recommended annually Due 4/2018   Colorectal Cancer Screening  -Fecal occult blood test (annual)  -Flexible sigmoidoscopy (5y)  -Screening colonoscopy (10y)  -Barium Enema   Completed 7/14/17 with Dr. Arsalan Okeefe     Repeat in 5 years Due 7/2022   Counseling to Prevent Tobacco Use (up to 8 sessions per year)  - Counseling greater than 3 and up to 10 minutes  - Counseling greater than 10 minutes Patients must be asymptomatic of tobacco-related conditions to receive as preventive service N/A N/A   Diabetes Screening Tests (at least every 3 years, Medicare covers annually or at 6-month intervals for prediabetic patients)     Fasting blood sugar (FBS) or glucose tolerance test (GTT) Patient must be diagnosed with one of the following:  -Hypertension, Dyslipidemia, obesity, previous impaired FBS or GTT  Or any two of the following: overweight, FH of diabetes, age ? 72, history of gestational diabetes, birth of baby weighing more than 9 pounds Completed 11/3/17 with A1C 6.0     Recommended every 3-6 months for pre-diabetics     Completed 2/2018-5/2018   Diabetes Self-Management Training (DSMT) (no USPSTF recommendation) Requires referral by treating physician for patient with diabetes or renal disease. 10 hours of initial DSMT session of no less than 30 minutes each in a continuous 12-month period. 2 hours of follow-up DSMT in subsequent years.  N/A N/A   Glaucoma Screening (no USPSTF recommendation) Diabetes mellitus, family history, , age 48 or over,  American, age 72 or over Completed 7/14/16 at [facility]     Recommended annually Due now   Human Immunodeficiency Virus (HIV) Screening (annually for increased risk patients)  HIV-1 and HIV-2 by EIA, JORGE, rapid antibody test, or oral mucosa transudate Patient must be at increased risk for HIV infection per USPSTF guidelines or pregnant. Tests covered annually for patients at increased risk. Pregnant patients may receive up to 3 test during pregnancy. N/A N/A   Medical Nutrition Therapy (MNT) (for diabetes or renal disease not recommended schedule) Requires referral by treating physician for patient with diabetes or renal disease. Can be provided in same year as diabetes self-management training (DSMT), and CMS recommends medical nutrition therapy take place after DSMT. Up to 3 hours for initial year and 2 hours in subsequent years. N/A  N/A   Prostate Cancer Screening (annually up to age 76)  - Digital rectal exam (PAUL)  - Prostate specific antigen (PSA) Annually (age 48 or over), PAUL not paid separately when covered E/M service is provided on same date Completed 1/2015 Due as directed by Dr. Anil Jimenez Vaccination (annually)   Completed 9/28/17     Recommended annually Due Fall 2018   Shingles Vaccination A shingles vaccine is also recommended once in a lifetime after age 61  Completed 6/01/08 Completed   Pneumococcal Vaccination (once after 72)   Pneumococcal 23 - 9/7/12    Prevnar 13 - 10/13/15    Both recommended once over the age of 72 Completed      Completed   Hepatitis B Vaccinations (if medium/high risk) Medium/high risk factors:  End-stage renal disease,  Hemophiliacs who received Factor VIII or IX concentrates, Clients of institutions for the mentally retarded, Persons who live in the same house as a HepB virus carrier, Homosexual men, Illicit injectable drug abusers.  N/A N/A   Ultrasound Screening for Abdominal Aortic Aneurysm (AAA) (once) Patient must be referred through [facility] and not have had a screening for abdominal aortic aneurysm before under Medicare.   Limited to patients who meet one of the following criteria:  - Men who are 73-68 years old and have smoked more than 100 cigarettes in their lifetime.  -Anyone with a FH of AAA  -Anyone recommended for screening by USPSTF N/A N/A

## 2017-11-10 ENCOUNTER — OFFICE VISIT (OUTPATIENT)
Dept: INTERNAL MEDICINE CLINIC | Age: 77
End: 2017-11-10

## 2017-11-10 VITALS
HEIGHT: 70 IN | TEMPERATURE: 97.9 F | BODY MASS INDEX: 27.77 KG/M2 | RESPIRATION RATE: 16 BRPM | OXYGEN SATURATION: 93 % | DIASTOLIC BLOOD PRESSURE: 85 MMHG | HEART RATE: 80 BPM | SYSTOLIC BLOOD PRESSURE: 149 MMHG | WEIGHT: 194 LBS

## 2017-11-10 DIAGNOSIS — J84.9 ILD (INTERSTITIAL LUNG DISEASE) (HCC): ICD-10-CM

## 2017-11-10 DIAGNOSIS — E78.00 HYPERCHOLESTEROLEMIA: ICD-10-CM

## 2017-11-10 DIAGNOSIS — L21.9 SEBORRHEIC DERMATITIS: ICD-10-CM

## 2017-11-10 DIAGNOSIS — E11.9 DIABETES MELLITUS WITHOUT COMPLICATION (HCC): ICD-10-CM

## 2017-11-10 DIAGNOSIS — I71.40 ABDOMINAL AORTIC ANEURYSM (AAA) WITHOUT RUPTURE: ICD-10-CM

## 2017-11-10 DIAGNOSIS — Z00.00 MEDICARE ANNUAL WELLNESS VISIT, SUBSEQUENT: ICD-10-CM

## 2017-11-10 DIAGNOSIS — R42 DIZZINESS: Primary | ICD-10-CM

## 2017-11-10 DIAGNOSIS — I48.91 ATRIAL FIBRILLATION, UNSPECIFIED TYPE (HCC): ICD-10-CM

## 2017-11-10 DIAGNOSIS — K21.9 GASTROESOPHAGEAL REFLUX DISEASE WITHOUT ESOPHAGITIS: ICD-10-CM

## 2017-11-10 LAB
ALBUMIN UR QL STRIP: 30 MG/L
CREATININE, URINE POC: 200 MG/DL
MICROALBUMIN/CREAT RATIO POC: <30 MG/G

## 2017-11-10 RX ORDER — KETOCONAZOLE 20 MG/G
CREAM TOPICAL DAILY
Qty: 15 G | Refills: 1 | Status: SHIPPED | OUTPATIENT
Start: 2017-11-10 | End: 2019-03-06 | Stop reason: SDUPTHER

## 2017-11-10 RX ORDER — MECLIZINE HYDROCHLORIDE 25 MG/1
25 TABLET ORAL
Qty: 20 TAB | Refills: 0 | Status: SHIPPED | OUTPATIENT
Start: 2017-11-10 | End: 2017-11-20

## 2017-11-10 NOTE — MR AVS SNAPSHOT
Visit Information Date & Time Provider Department Dept. Phone Encounter #  
 11/10/2017 12:00 PM Axel Hampton, [address] [id number] Follow-up Instructions Return in about 3 months (around 2/10/2018). Upcoming Health Maintenance Date Due  
 MEDICARE YEARLY EXAM 10/13/2017 GLAUCOMA SCREENING Q2Y 7/14/2018 DTaP/Tdap/Td series (2 - Td) 9/7/2022 COLONOSCOPY 7/14/2027 Allergies as of 11/10/2017  Review Complete On: 11/10/2017 By: Axel Hampton MD  
  
 Severity Noted Reaction Type Reactions Celexa [Citalopram]  03/19/2010   Side Effect Other (comments) agitation Demerol [Meperidine]  03/01/2010    Unknown (comments) Mold Extracts  03/01/2010    Unknown (comments) Niaspan [Niacin]  03/19/2010   Intolerance Nausea Only Other Medication  03/01/2010    Unknown (comments) Grass smut, standardized mite mix, weed mix, dogs, white pam, white hickory, red mulberry, barley, cottonseed, malt, rice, rye, black pepper, navy bean Percocet [Oxycodone-acetaminophen]  03/01/2010    Unknown (comments) Ragweed  10/24/2013    Runny Nose  
 Sulfa (Sulfonamide Antibiotics)  03/19/2010   Side Effect Nausea Only Current Immunizations  Reviewed on 10/12/2016 Name Date Hepatitis A Vaccine 6/10/1997 Influenza High Dose Vaccine PF 9/28/2017 Influenza Vaccine 10/6/2014, 10/9/2013 Influenza Vaccine (Quad) PF 9/29/2016, 10/12/2015 Influenza Vaccine Split 10/25/2011 Influenza Vaccine Whole 10/5/2009 TD Vaccine 7/20/2006 TDAP Vaccine 9/7/2012 ZZZ-RETIRED (DO NOT USE) Pneumococcal Vaccine (Unspecified Type) 9/7/2012, 11/1/2006 Zoster 6/1/2008 Not reviewed this visit You Were Diagnosed With   
  
 Codes Comments Dizziness    -  Primary ICD-10-CM: K78 ICD-9-CM: 780.4 Medicare annual wellness visit, subsequent     ICD-10-CM: Z00.00 ICD-9-CM: V70.0 Diabetes mellitus without complication ([facility]     JUO-73-AC: E11.9 ICD-9-CM: 250.00 ILD (interstitial lung disease) ([facility].)     ICD-10-CM: J84.9 ICD-9-CM: 737 Abdominal aortic aneurysm (AAA) without rupture (HCC)     ICD-10-CM: I71.4 ICD-9-CM: 457. 4 Atrial fibrillation, unspecified type ([facility]     ICD-10-CM: I48.91 
ICD-9-CM: 427.31 Gastroesophageal reflux disease without esophagitis     ICD-10-CM: K21.9 ICD-9-CM: 530.81 Hypercholesterolemia     ICD-10-CM: E78.00 ICD-9-CM: 272.0 Seborrheic dermatitis     ICD-10-CM: L21.9 ICD-9-CM: 690.10 Vitals BP Pulse Temp Resp Height(growth percentile) Weight(growth percentile) 151/78 (BP 1 Location: Left arm, BP Patient Position: Sitting) 80 97.9 °F (36.6 °C) (Oral) 16 5' 10\" (1.778 m) 194 lb (88 kg) SpO2 BMI Smoking Status 93% 27.84 kg/m2 Former Smoker Vitals History BMI and BSA Data Body Mass Index Body Surface Area  
 27.84 kg/m 2 2.08 m 2 Preferred Pharmacy Pharmacy Name Phone [address] [place], [address] [place] [address]. [phone] Your Updated Medication List  
  
   
This list is accurate as of: 11/10/17 12:49 PM.  Always use your most recent med list.  
  
  
  
  
 acetaminophen 500 mg tablet Commonly known as:  TYLENOL Take 1,000 mg by mouth every six (6) hours as needed for Pain or Fever. * albuterol 90 mcg/actuation inhaler Commonly known as:  PROVENTIL HFA, VENTOLIN HFA, PROAIR HFA Take 2 Puffs by inhalation every four (4) hours as needed for Wheezing. * albuterol 1.25 mg/3 mL Nebu Commonly known as:  ACCUNEB  
3 mL by Nebulization route every six (6) hours as needed. atorvastatin 80 mg tablet Commonly known as:  LIPITOR Take 80 mg by mouth daily. Azelastine 0.15 % (205.5 mcg) nasal spray Commonly known as:  ASTEPRO  
  
 CULTURELLE PO Take 1 Cap by mouth daily. ESBRIET 267 mg Cap capsule Generic drug:  pirfenidone  
  
 finasteride 5 mg tablet Commonly known as:  PROSCAR  
TAKE 1 TABLET EVERY DAY  
  
 glucosamine-chondroitin 750-600 mg Tab Take 1 Tab by mouth two (2) times a day. glucose blood VI test strips strip Commonly known as:  FREESTYLE LITE STRIPS Check blood sugar 3 times weekly. GRAPE SEED PO Take 650 mg by mouth daily. ibuprofen 200 mg tablet Commonly known as:  MOTRIN Take 200 mg by mouth daily as needed for Pain. * ketoconazole 2 % shampoo Commonly known as:  NIZORAL Apply  to affected area two (2) times a week. * ketoconazole 2 % topical cream  
Commonly known as:  NIZORAL Apply  to affected area daily. Uses on skin lesions every couple of days  
  
 lancets 28 gauge Misc Commonly known as:  FREESTYLE LANCETS Check blood sugar 3 times weekly. meclizine 25 mg tablet Commonly known as:  ANTIVERT Take 1 Tab by mouth three (3) times daily as needed for up to 10 days. metoprolol tartrate 25 mg tablet Commonly known as:  LOPRESSOR Take 25 mg by mouth two (2) times a day. PT INSTRUCTED TO TAKE AM DOS PER ANESTHESIA PROTOCOL  
  
 montelukast 10 mg tablet Commonly known as:  SINGULAIR Take 1 Tab by mouth daily (after dinner). NEBULIZER  
by Does Not Apply route. nitroglycerin 0.4 mg SL tablet Commonly known as:  Castaneda Rafter 1 Tab by SubLINGual route as needed. omeprazole 40 mg capsule Commonly known as:  PRILOSEC  
TAKE ONE CAPSULE BY MOUTH TWICE A DAY One-A-Day Mens tablet Generic drug:  multivitamin Take 1 Tab by mouth daily. SYMBICORT IN Take  by inhalation. tamsulosin 0.4 mg capsule Commonly known as:  FLOMAX TAKE 1 CAPSULE EVERY NIGHT  
  
 VESIcare 5 mg tablet Generic drug:  solifenacin Take 5 mg by mouth daily. ZETIA 10 mg tablet Generic drug:  ezetimibe Take 10 mg by mouth every morning. * Notice: This list has 4 medication(s) that are the same as other medications prescribed for you. Read the directions carefully, and ask your doctor or other care provider to review them with you. Prescriptions Sent to Pharmacy Refills  
 ketoconazole (NIZORAL) 2 % topical cream 1 Sig: Apply  to affected area daily. Uses on skin lesions every couple of days Class: Normal  
 Pharmacy: [facility] [address] [place] [address] RD. Ph #: [phone] Route: Topical  
 meclizine (ANTIVERT) 25 mg tablet 0 Sig: Take 1 Tab by mouth three (3) times daily as needed for up to 10 days. Class: Normal  
 Pharmacy: [address] Ph #: [phone] Route: Oral  
  
We Performed the Following AMB POC EKG ROUTINE W/ 12 LEADS, INTER & REP [17470 CPT(R)] Follow-up Instructions Return in about 3 months (around 2/10/2018). To-Do List   
 11/13/2017 Lab:  HEMOGLOBIN A1C WITH EAG   
  
 11/13/2017 Lab:  LIPID PANEL   
  
 11/13/2017 Lab:  METABOLIC PANEL, COMPREHENSIVE   
  
 11/13/2017 Lab:  MICROALBUMIN, UR, RAND W/ MICROALBUMIN/CREA RATIO Patient Instructions Medicare Part B Preventive Services Limitations Recommendation Scheduled Bone Mass Measurement 
(age 72 & older, biennial) Requires diagnosis related to osteoporosis or estrogen deficiency. Biennial benefit unless patient has history of long-term glucocorticoid tx or baseline is needed because initial test was by other method N/A N/A Cardiovascular Screening Blood Tests (every 5 years) Total cholesterol, HDL, Triglycerides Order as a panel if possible Completed 4/12/17 
   
Recommended annually Due 4/2018 Colorectal Cancer Screening 
-Fecal occult blood test (annual) -Flexible sigmoidoscopy (5y) 
-Screening colonoscopy (10y) -Barium Enema    Completed 7/14/17 with Dr. Stacy Martins 
Repeat in 5 years Due 7/2022 Counseling to Prevent Tobacco Use (up to 8 sessions per year) - Counseling greater than 3 and up to 10 minutes - Counseling greater than 10 minutes Patients must be asymptomatic of tobacco-related conditions to receive as preventive service N/A N/A Diabetes Screening Tests (at least every 3 years, Medicare covers annually or at 6-month intervals for prediabetic patients) 
   
Fasting blood sugar (FBS) or glucose tolerance test (GTT) Patient must be diagnosed with one of the following: 
-Hypertension, Dyslipidemia, obesity, previous impaired FBS or GTT 
Or any two of the following: overweight, FH of diabetes, age ? 72, history of gestational diabetes, birth of baby weighing more than 9 pounds Completed 11/3/17 with A1C 6.0 
   
Recommended every 3-6 months for pre-diabetics  
   Completed 2/2018-5/2018 Diabetes Self-Management Training (DSMT) (no USPSTF recommendation) Requires referral by treating physician for patient with diabetes or renal disease. 10 hours of initial DSMT session of no less than 30 minutes each in a continuous 12-month period.  2 hours of follow-up DSMT in subsequent years. N/A N/A Glaucoma Screening (no USPSTF recommendation) Diabetes mellitus, family history, , age 48 or over,  American, age 72 or over [facility] 
Recommended annually Due 11/18 Human Immunodeficiency Virus (HIV) Screening (annually for increased risk patients) HIV-1 and HIV-2 by EIA, JORGE, rapid antibody test, or oral mucosa transudate Patient must be at increased risk for HIV infection per USPSTF guidelines or pregnant.  Tests covered annually for patients at increased risk.  Pregnant patients may receive up to 3 test during pregnancy. N/A N/A Medical Nutrition Therapy (MNT) (for diabetes or renal disease not recommended schedule) Requires referral by treating physician for patient with diabetes or renal disease.  Can be provided in same year as diabetes self-management training (DSMT), and CMS recommends medical nutrition therapy take place after DSMT.  Up to 3 hours for initial year and 2 hours in subsequent years. N/A  N/A Prostate Cancer Screening (annually up to age 76) - Digital rectal exam (PAUL) - Prostate specific antigen (PSA) Annually (age 48 or over), PAUL not paid separately when covered E/M service is provided on same date Completed 1/2015 Due as directed by Dr. Rachel Osorio Seasonal Influenza Vaccination (annually)    Completed 9/28/17 
   
Recommended annually Due Fall 2018 Shingles Vaccination A shingles vaccine is also recommended once in a lifetime after age 61  Completed 6/01/08 Completed Pneumococcal Vaccination (once after 65)    Pneumococcal 23 - 9/7/12 
  
Prevnar 13 - 10/13/15 
  
Both recommended once over the age of 72 Completed 
  
  
Completed Hepatitis B Vaccinations (if medium/high risk) Medium/high risk factors:  End-stage renal disease, Hemophiliacs who received Factor VIII or IX concentrates, Clients of institutions for the mentally retarded, Persons who live in the same house as a HepB virus carrier, Homosexual men, Illicit injectable drug abusers. N/A N/A Ultrasound Screening for Abdominal Aortic Aneurysm (AAA) (once) Patient must be referred through IPPE and not have had a screening for abdominal aortic aneurysm before under Medicare.  Limited to patients who meet one of the following criteria: 
- Men who are 73-68 years old and have smoked more than 100 cigarettes in their lifetime. 
-Anyone with a FH of AAA 
-Anyone recommended for screening by USPSTF N/A N/A  
  
 
 
Medicare Wellness Visit, Male The best way to live healthy is to have a healthy lifestyle by eating a well-balanced diet, exercising regularly, limiting alcohol and stopping smoking. Regular physical exams and screening tests are another way to keep healthy.   
Preventive exams provided by your health care provider can find health problems before they become diseases or illnesses. Preventive services including immunizations, screening tests, monitoring and exams can help you take care of your own health. All people over age 72 should have a pneumovax  and and a prevnar shot to prevent pneumonia. These are once in a lifetime unless you and your provider decide differently. All people over 65 should have a yearly flu shot and a tetanus vaccine every 10 years. Screening for diabetes mellitus with a blood sugar test should be done every year. Glaucoma is a disease of the eye due to increased ocular pressure that can lead to blindness and it should be done every year by an eye professional. 
 
Cardiovascular screening tests that check for elevated lipids (fatty part of blood) which can lead to heart disease and strokes should be done every 5 years. Colorectal screening that evaluates for blood or polyps in your colon should be done yearly as a stool test or every five years as a flexible sigmoidoscope or every 10 years as a colonoscopy up to age 76. Men up to age 76 may need a screening blood test for prostate cancer at certain intervals, depending on their personal and family history. This decision is between the patient and his provider. If you have been a smoker or had family history of abdominal aortic aneurysms, you and your provider may decide to schedule an ultrasound test of your aorta. Hepatitis C screening is also recommended for anyone born between [birth year] through [address]. A shingles vaccine is also recommended once in a lifetime after age 61. Your Medicare Wellness Exam is recommended annually. Here is a list of your current Health Maintenance items with a due date: 
Health Maintenance Due Topic Date Due  
 Annual Well Visit  10/13/2017 Introducing [facility]! Dear Madison Giron: Thank you for requesting a [employer] account.   Our records indicate that you already have an active [employer] account. You can access your account anytime at [email or web address]. [email or web address] Did you know that you can access your hospital and ER discharge instructions at any time in [employer]? You can also review all of your test results from your hospital stay or ER visit. Additional Information If you have questions, please visit the Frequently Asked Questions section of the [employer] website at [email or web address] Remember, [employer] is NOT to be used for urgent needs. For medical emergencies, dial 911. Now available from your iPhone and Android! Please provide this summary of care documentation to your next provider. Your primary care clinician is listed as Cindra Duty. If you have any questions after today's visit, please call [phone].

## 2017-11-10 NOTE — PATIENT INSTRUCTIONS
Medicare Part B Preventive Services Limitations Recommendation Scheduled   Bone Mass Measurement  (age 72 & older, biennial) Requires diagnosis related to osteoporosis or estrogen deficiency. Biennial benefit unless patient has history of long-term glucocorticoid tx or baseline is needed because initial test was by other method N/A N/A   Cardiovascular Screening Blood Tests (every 5 years)  Total cholesterol, HDL, Triglycerides Order as a panel if possible Completed 4/12/17      Recommended annually Due 4/2018   Colorectal Cancer Screening  -Fecal occult blood test (annual)  -Flexible sigmoidoscopy (5y)  -Screening colonoscopy (10y)  -Barium Enema    Completed 7/14/17 with Dr. Adithya Juan  Repeat in 5 years Due 7/2022   Counseling to Prevent Tobacco Use (up to 8 sessions per year)  - Counseling greater than 3 and up to 10 minutes  - Counseling greater than 10 minutes Patients must be asymptomatic of tobacco-related conditions to receive as preventive service N/A N/A   Diabetes Screening Tests (at least every 3 years, Medicare covers annually or at 6-month intervals for prediabetic patients)      Fasting blood sugar (FBS) or glucose tolerance test (GTT) Patient must be diagnosed with one of the following:  -Hypertension, Dyslipidemia, obesity, previous impaired FBS or GTT  Or any two of the following: overweight, FH of diabetes, age ? 72, history of gestational diabetes, birth of baby weighing more than 9 pounds Completed 11/3/17 with A1C 6.0      Recommended every 3-6 months for pre-diabetics      Completed 2/2018-5/2018   Diabetes Self-Management Training (DSMT) (no USPSTF recommendation) Requires referral by treating physician for patient with diabetes or renal disease. 10 hours of initial DSMT session of no less than 30 minutes each in a continuous 12-month period.  2 hours of follow-up DSMT in subsequent years.  N/A N/A   Glaucoma Screening (no USPSTF recommendation) Diabetes mellitus, family history,  American, age 48 or over,  American, age 72 or over Completed 11/17 at [facility]  Recommended annually Due 11/18   Human Immunodeficiency Virus (HIV) Screening (annually for increased risk patients)  HIV-1 and HIV-2 by EIA, JORGE, rapid antibody test, or oral mucosa transudate Patient must be at increased risk for HIV infection per USPSTF guidelines or pregnant.  Tests covered annually for patients at increased risk.  Pregnant patients may receive up to 3 test during pregnancy. N/A N/A   Medical Nutrition Therapy (MNT) (for diabetes or renal disease not recommended schedule) Requires referral by treating physician for patient with diabetes or renal disease.  Can be provided in same year as diabetes self-management training (DSMT), and CMS recommends medical nutrition therapy take place after DSMT.  Up to 3 hours for initial year and 2 hours in subsequent years. N/A  N/A   Prostate Cancer Screening (annually up to age 76)  - Digital rectal exam (PAUL)  - Prostate specific antigen (PSA) Annually (age 48 or over), PAUL not paid separately when covered E/M service is provided on same date Completed 1/2015 Due as directed by Dr. Robin Guzman Vaccination (annually)    Completed 9/28/17      Recommended annually Due Fall 2018   Shingles Vaccination A shingles vaccine is also recommended once in a lifetime after age 61  Completed 6/01/08 Completed   Pneumococcal Vaccination (once after 72)    Pneumococcal 23 - 9/7/12     Prevnar 13 - 10/13/15     Both recommended once over the age of 72 Completed        Completed   Hepatitis B Vaccinations (if medium/high risk) Medium/high risk factors:  End-stage renal disease,  Hemophiliacs who received Factor VIII or IX concentrates, Clients of institutions for the mentally retarded, Persons who live in the same house as a HepB virus carrier, Homosexual men, Illicit injectable drug abusers.  N/A N/A   Ultrasound Screening for Abdominal Aortic Aneurysm (AAA) (once) Patient must be referred through IPPE and not have had a screening for abdominal aortic aneurysm before under Medicare.  Limited to patients who meet one of the following criteria:  - Men who are 73-68 years old and have smoked more than 100 cigarettes in their lifetime.  -Anyone with a FH of AAA  -Anyone recommended for screening by USPSTF N/A N/A          Medicare Wellness Visit, Male    The best way to live healthy is to have a healthy lifestyle by eating a well-balanced diet, exercising regularly, limiting alcohol and stopping smoking. Regular physical exams and screening tests are another way to keep healthy. Preventive exams provided by your health care provider can find health problems before they become diseases or illnesses. Preventive services including immunizations, screening tests, monitoring and exams can help you take care of your own health. All people over age 72 should have a pneumovax  and and a prevnar shot to prevent pneumonia. These are once in a lifetime unless you and your provider decide differently. All people over 65 should have a yearly flu shot and a tetanus vaccine every 10 years. Screening for diabetes mellitus with a blood sugar test should be done every year. Glaucoma is a disease of the eye due to increased ocular pressure that can lead to blindness and it should be done every year by an eye professional.    Cardiovascular screening tests that check for elevated lipids (fatty part of blood) which can lead to heart disease and strokes should be done every 5 years. Colorectal screening that evaluates for blood or polyps in your colon should be done yearly as a stool test or every five years as a flexible sigmoidoscope or every 10 years as a colonoscopy up to age 76. Men up to age 76 may need a screening blood test for prostate cancer at certain intervals, depending on their personal and family history. This decision is between the patient and his provider.     If you have been a smoker or had family history of abdominal aortic aneurysms, you and your provider may decide to schedule an ultrasound test of your aorta. Hepatitis C screening is also recommended for anyone born between [birth year] through [address]. A shingles vaccine is also recommended once in a lifetime after age 61. Your Medicare Wellness Exam is recommended annually.     Here is a list of your current Health Maintenance items with a due date:  Health Maintenance Due   Topic Date Due    Annual Well Visit  10/13/2017

## 2017-11-10 NOTE — PROGRESS NOTES
This is a Subsequent Medicare Annual Wellness Exam (AWV) (Performed 12 months after IPPE or effective date of Medicare Part B enrollment)    I have reviewed the patient's medical history in detail and updated the computerized patient record.      History     Past Medical History:   Diagnosis Date    Aneurysm ([facility] [address]     small AAA    Arthritis shoulders and spine 3/1/2010    Asthma 3/1/2010    Dr. Krystyna Patterson, Dr. Santacruz    BPH (benign prostatic hypertrophy)     Broken nose 3/1/2010    Bronchitis 02/2017    CAD (coronary artery disease)     Dr. Seymour [facility]) 2000    basal cell chest    Chronic bronchitis ([facility] [address] 3/1/2010    Contact dermatitis and other eczema, due to unspecified cause     Dr. Roxana Castellon Depression 3/1/2010    GERD (gastroesophageal reflux disease)     Hypercholesterolemia 9/29/2010    Hypertension     Pneumonia 3/1/2010    Testosterone deficiency 3/24/2011      Past Surgical History:   Procedure Laterality Date    CABG, ARTERIAL, THREE  10/2003    CARDIAC SURG PROCEDURE UNLIST  10/2003    CABGx3 vessel    COLONOSCOPY N/A 7/14/2017    [facility] ENDOSCOPY    ENDOSCOPY, [place]      Dr. Lashon Small HX APPENDECTOMY      HX CHOLECYSTECTOMY  11/22/2008    laparoscopic    HX COLECTOMY  12/29/07    sigmoid colectomy for volvulus in [address] [facility] HX COLONOSCOPY  06/17/2014    Repeat in 3 years    HX HEENT  while in 20's    submucus resection sinus    [address]    right    HX ORTHOPAEDIC  June 20, 2011    right hand, thumb surgery, plate inserted    HX ORTHOPAEDIC  1946    broken nose    HX ORTHOPAEDIC  1962    sub mucous resection    HX ORTHOPAEDIC  02/05/15    right hand surgery, ligament repair    HX OTHER SURGICAL      HX OTHER SURGICAL      hemorrhoids    HX OTHER SURGICAL  8/2015    basal cell carcinoma     HX SEPTOPLASTY  1962    s/p broken nose 1946    HX TONSIL AND ADENOIDECTOMY 1949    HX TONSILLECTOMY      HX UROLOGICAL  1990    vasectomy     Current Outpatient Prescriptions   Medication Sig Dispense Refill    omeprazole (PRILOSEC) 40 mg capsule TAKE ONE CAPSULE BY MOUTH TWICE A  Cap 0    glucose blood VI test strips (FREESTYLE LITE STRIPS) strip Check blood sugar 3 times weekly. 50 Strip 2    lancets (FREESTYLE LANCETS) 28 gauge misc Check blood sugar 3 times weekly. 50 Lancet 2    BUDESONIDE/FORMOTEROL FUMARATE (SYMBICORT IN) Take  by inhalation.  Azelastine (ASTEPRO) 0.15 % (205.5 mcg) nasal spray       ESBRIET 267 mg cap capsule       tamsulosin (FLOMAX) 0.4 mg capsule TAKE 1 CAPSULE EVERY NIGHT 90 Cap 3    ibuprofen (MOTRIN) 200 mg tablet Take 200 mg by mouth daily as needed for Pain.  GLUCOSAMINE HCL/CHONDR MIRANDA A NA (GLUCOSAMINE-CHONDROITIN) 750-600 mg tab Take 1 Tab by mouth two (2) times a day.  ketoconazole (NIZORAL) 2 % shampoo Apply  to affected area two (2) times a week.  0    VESICARE 5 mg tablet Take 5 mg by mouth daily.  albuterol (ACCUNEB) 1.25 mg/3 mL nebu 3 mL by Nebulization route every six (6) hours as needed. 1 Each 1    GRAPE SEED EXTRACT (GRAPE SEED PO) Take 650 mg by mouth daily.  acetaminophen (TYLENOL) 500 mg tablet Take 1,000 mg by mouth every six (6) hours as needed for Pain or Fever.  ketoconazole (NIZORAL) 2 % topical cream Apply  to affected area daily. Uses on skin lesions every couple of days 15 g 1    finasteride (PROSCAR) 5 mg tablet TAKE 1 TABLET EVERY DAY 90 tablet 2    atorvastatin (LIPITOR) 80 mg tablet Take 80 mg by mouth daily.  montelukast (SINGULAIR) 10 mg tablet Take 1 Tab by mouth daily (after dinner). 90 Tab 3    NEBULIZER by Does Not Apply route.  nitroglycerin (NITROQUICK) 0.4 mg SL tablet 1 Tab by SubLINGual route as needed. 25 Tab prn    albuterol (PROVENTIL HFA, VENTOLIN HFA) 90 mcg/Actuation inhaler Take 2 Puffs by inhalation every four (4) hours as needed for Wheezing.  1 Inhaler 2    LACTOBACILLUS RHAMNOSUS (CULTURELLE PO) Take 1 Cap by mouth daily.  multivitamin (ONE-A-DAY MENS) tablet Take 1 Tab by mouth daily.  metoprolol (LOPRESSOR) 25 mg tablet Take 25 mg by mouth two (2) times a day. PT INSTRUCTED TO TAKE AM DOS PER ANESTHESIA PROTOCOL      ezetimibe (ZETIA) 10 mg tablet Take 10 mg by mouth every morning.        Allergies   Allergen Reactions    Celexa [Citalopram] Other (comments)     agitation    Demerol [Meperidine] Unknown (comments)    Mold Extracts Unknown (comments)    Niaspan [Niacin] Nausea Only    Other Medication Unknown (comments)     Grass smut, standardized mite mix, weed mix, dogs, white pam, white hickory, red mulberry, barley, cottonseed, malt, rice, rye, black pepper, navy bean    Percocet [Oxycodone-Acetaminophen] Unknown (comments)    Ragweed Runny Nose    Sulfa (Sulfonamide Antibiotics) Nausea Only     Family History   Problem Relation Age of Onset    Cancer Mother      spine or abdomen    Stroke Father     Heart Attack Father      46s    Cancer Brother      CLL, lung    Diabetes Brother     Cancer Brother      lung    Cancer Brother      lung    Cancer Brother      lung    Heart Disease Brother     Heart Disease Brother 58     CABG    Other Brother      shingles    Lung Disease Sister     High Cholesterol Son     Lung Disease Daughter      Overactive Airways    No Known Problems Daughter      Social History   Substance Use Topics    Smoking status: Former Smoker     Packs/day: 1.00     Years: 15.00     Quit date: 1/1/1970    Smokeless tobacco: Never Used    Alcohol use 1.2 oz/week     2 Glasses of wine per week      Comment: maybe a glass of wine 1-2 a week     Patient Active Problem List   Diagnosis Code    Chronic bronchitis (HCC) J42    Asthma J45.909    Depression F32.9    Arthritis shoulders and spine M19.90    Hypercholesterolemia E78.00    Testosterone deficiency E34.9    S/P CABG (coronary artery bypass graft) Z95.1    Degenerative arthritis of thumb M18.10    GERD (gastroesophageal reflux disease) K21.9    CAD (coronary artery disease) I25.10    BPH (benign prostatic hypertrophy) N40.0    Interstitial pulmonary fibrosis (HCC) J84.10    HTN (hypertension) I10    BPH (benign prostatic hyperplasia) N40.0    A-fib (HCC) I48.91    AAA (abdominal aortic aneurysm) (HCC) I71.4    ILD (interstitial lung disease) (HCC) J84.9    ACP (advance care planning) Z71.89    Pneumonia J18.9    Hyperglycemia R73.9       Depression Risk Factor Screening:     PHQ over the last two weeks 11/10/2017   Little interest or pleasure in doing things Not at all   Feeling down, depressed or hopeless Not at all   Total Score PHQ 2 0   none  Alcohol Risk Factor Screening: You do not drink alcohol or very rarely. occasionaly glass wine    Functional Ability and Level of Safety:   Hearing Loss  Hearing is good. Activities of Daily Living  The home contains: no safety equipment. Patient does total self care    Fall RiskFall Risk Assessment, last 12 mths 11/10/2017   Able to walk? Yes   Fall in past 12 months?  No   none    Abuse Screen  Patient is not abused   Lives with wife and daughter, works well     Cognitive Screening   Evaluation of Cognitive Function:  Has your family/caregiver stated any concerns about your memory: no  Normal    Patient Care Team   Patient Care Team:  Joce Bhatt MD as PCP - General (Internal Medicine)  Lei Fields MD (Pulmonary Disease)  Yusuf Ravi RN  Rogelio Cole MD (Cardiology)  Guy Shin MD (Dermatology)  Florie Lanes, MD (Gastroenterology)  Shantelle Berry RN as Ambulatory Care Navigator  Stu Balderas MD (Urology)  call (Allergy)  Merle Bejarano MD (Ophthalmology)     updated    Assessment/Plan   Education and counseling provided:  Are appropriate based on today's review and evaluation  End-of-Life planning (with patient's consent)  Cardiovascular screening blood test  Screening for glaucoma  Diabetes screening test    Diagnoses and all orders for this visit:    1. Medicare annual wellness visit, subsequent      Health Maintenance Due   Topic Date Due    MEDICARE YEARLY EXAM  10/13/2017       Discussed with patient about advance medical directive on file, sdm is wife.      Colonoscopy: 17,  repeat in 5 years, Dr. Aysha Guevara  AAA screen: CT - 3.2mm, Keo Lasso follows, repeat 10/13 and 2/15     Tdap: declines  Pneumovax: 2012  Bjnalpa16: 10/13/2015  Zostavax: 2008  Flu shot:     Eye exam: Dr. Celia Fonseca, 17, some cataracts     A1c:   6.0 q3 months   Hepatitis C screen: , negative  Lipids:  LDL 58  Annual   PSA: per Dr. Collin Sharp    EK/10/17     Medication reconciliation completed by MA and reviewed by me. Medical/surgical/social/family history reviewed and updated by me. Patient provided AVS and preventative screening table. Patient verbalized understanding of all information discussed.

## 2017-11-10 NOTE — PROGRESS NOTES
HISTORY OF PRESENT ILLNESS  Cori Burroughs is a 78 y.o. male. HPI  Last here 7/10/17.  Pt is here to f/u on chronic conditions.     BP is 151/78  BP at other offices running around 140/80  Continues on metoprolol 25mg BID      BS at home running around 104, 107, 93, 125, 222 in the AM fasting  BS at home running around 117, 108, 174, 150s in the PM  Dm is diet controlled     Wt is up 6 lbs since last visit  Discussed diet and weight loss       Reviewed last labs 11/17: blood counts nl  Will get labs today     Pt reports having orthostatic sx recently x 2-3 weeks  Pt c/o constant dizziness, pressure in head (\"swimmy-headed\") occurring once a week or less  Pt also c/o mild nausea and tingling in his upper lip  Pt may have taken tamsulosin in the AM, instead of in the PM  Pt did not eat and had a SBP of 159 at that time   Pt has had similar sx d/t airborne allergies in the past  Pt wonders if tamsulosin is contributing to his sx - addressed this today   Discussed sx being indicative of vertigo, breathing/chronic medical problems, sinus congestion  Ordered meclizine tablets to have on hand  Will get EKG today  If sx progress, will have him complete MRI brain and send him to cardio    Pt follows with Dr. Sheldon Valentin (pulm) every quarter of a year  Reviewed notes from SHANNAN Llanos (pulm) 9/28/17: going to do esprit at 3 tabs, continue symbicort  Per pt, no changes were made to medications  Pt is not currently on prednisone  Pt states that pulmonary function tests were completed - overall stable  Continues on symbicort daily and esprit 3 tabs TID for pulmonary fibrosis  His breathing is stable overall    Pt follows with Dr. Monica Olivas (pulm) annually in August  Last visit was 8/17  Per pt, no changes were made to meds  This physician follows his PSAs    Pt follows with Dr. Jessy Brunner (cardio)  Last visit was 8/17  Per pt, no changes were made to medications and an EKG was completed - will get notes for review    Pt follows with  Call (allergy)  Pt has singulair for allergies and astepro - controlled     Pt follows with Dr. Kiki Lefort (vasc)   Last visit was 17     Continues on proscar, vesicare, and flomax per uro      Continues on lipitor 80mg, max dose, daily for cholesterol   He also takes zetia daily    Reviewed EGD and COLO 17: repeat in 5 years  Pt tried carafate for 1 week, but stopped this medication d/t SE  Continues on prilosec 40mg BID for reflux - works well overall          PREVENTIVE:    Colonoscopy: 17,  repeat in 5 years, Dr. Kristin Sweeney  EGD: 17, Dr. Kristin Sweeney   PSA: per Dr. Gerald Thorpe  AAA screen: CT - 3.2mm, Kiki Lefort follows, repeat 10/13 and 2/15   Tdap: will check cost at pharmacy  Pneumovax: 2012  Vpwpbgn36: 10/13/2015  Zostavax: 2008  Flu shot:   Microalbumin:  ordered  Foot exam: 7/10/17  A1c: 10/13 6.1,  5.8,  5.9, 10/14 6.1,  6.2,  6.1,  6.8,  6.2,  6.0  Eye exam: Dr. Khari Santizo, 17, some cataracts   Hepatitis C screen: , negative  Lipids:  LDL 58  EK/10/17      Patient Active Problem List    Diagnosis Date Noted    Pneumonia 2016    Hyperglycemia 2016    ACP (advance care planning) 10/12/2015    ILD (interstitial lung disease) ([facility] [address] 2014    Interstitial pulmonary fibrosis ([facility] [address] 10/09/2013    HTN (hypertension) 10/09/2013    BPH (benign prostatic hyperplasia) 10/09/2013    A-fib ([facility] [address] 10/09/2013    AAA (abdominal aortic aneurysm) ([facility] [address] 10/09/2013    BPH (benign prostatic hypertrophy)     GERD (gastroesophageal reflux disease)     CAD (coronary artery disease)     S/P CABG (coronary artery bypass graft) 2011    Degenerative arthritis of thumb 2011    Testosterone deficiency 2011    Hypercholesterolemia 2010    Chronic bronchitis ([facility] [address] 2010    Asthma 2010    Depression 2010    Arthritis shoulders and spine 2010     Current Outpatient Prescriptions   Medication Sig Dispense Refill    omeprazole (PRILOSEC) 40 mg capsule TAKE ONE CAPSULE BY MOUTH TWICE A  Cap 0    glucose blood VI test strips (FREESTYLE LITE STRIPS) strip Check blood sugar 3 times weekly. 50 Strip 2    lancets (FREESTYLE LANCETS) 28 gauge misc Check blood sugar 3 times weekly. 50 Lancet 2    BUDESONIDE/FORMOTEROL FUMARATE (SYMBICORT IN) Take  by inhalation.  Azelastine (ASTEPRO) 0.15 % (205.5 mcg) nasal spray       ESBRIET 267 mg cap capsule       tamsulosin (FLOMAX) 0.4 mg capsule TAKE 1 CAPSULE EVERY NIGHT 90 Cap 3    ibuprofen (MOTRIN) 200 mg tablet Take 200 mg by mouth daily as needed for Pain.  GLUCOSAMINE HCL/CHONDR MIRANDA A NA (GLUCOSAMINE-CHONDROITIN) 750-600 mg tab Take 1 Tab by mouth two (2) times a day.  ketoconazole (NIZORAL) 2 % shampoo Apply  to affected area two (2) times a week.  0    VESICARE 5 mg tablet Take 5 mg by mouth daily.  albuterol (ACCUNEB) 1.25 mg/3 mL nebu 3 mL by Nebulization route every six (6) hours as needed. 1 Each 1    GRAPE SEED EXTRACT (GRAPE SEED PO) Take 650 mg by mouth daily.  acetaminophen (TYLENOL) 500 mg tablet Take 1,000 mg by mouth every six (6) hours as needed for Pain or Fever.  ketoconazole (NIZORAL) 2 % topical cream Apply  to affected area daily. Uses on skin lesions every couple of days 15 g 1    finasteride (PROSCAR) 5 mg tablet TAKE 1 TABLET EVERY DAY 90 tablet 2    atorvastatin (LIPITOR) 80 mg tablet Take 80 mg by mouth daily.  montelukast (SINGULAIR) 10 mg tablet Take 1 Tab by mouth daily (after dinner). 90 Tab 3    NEBULIZER by Does Not Apply route.  nitroglycerin (NITROQUICK) 0.4 mg SL tablet 1 Tab by SubLINGual route as needed. 25 Tab prn    albuterol (PROVENTIL HFA, VENTOLIN HFA) 90 mcg/Actuation inhaler Take 2 Puffs by inhalation every four (4) hours as needed for Wheezing. 1 Inhaler 2    LACTOBACILLUS RHAMNOSUS (CULTURELLE PO) Take 1 Cap by mouth daily.       multivitamin (ONE-A-DAY MENS) tablet Take 1 Tab by mouth daily.  metoprolol (LOPRESSOR) 25 mg tablet Take 25 mg by mouth two (2) times a day. PT INSTRUCTED TO TAKE AM DOS PER ANESTHESIA PROTOCOL      ezetimibe (ZETIA) 10 mg tablet Take 10 mg by mouth every morning.        Past Surgical History:   Procedure Laterality Date    CABG, ARTERIAL, THREE  10/2003    CARDIAC SURG PROCEDURE UNLIST  10/2003    CABGx3 vessel    COLONOSCOPY N/A 7/14/2017    [facility] at [facility] ENDOSCOPY    ENDOSCOPY, [facility]    [address] [facility] HX CHOLECYSTECTOMY  11/22/2008    laparoscopic    HX COLECTOMY  12/29/07    sigmoid colectomy for volvulus in [address] [facility] [address] HX COLONOSCOPY  06/17/2014    Repeat in 3 years    HX HEENT  while in 20's    submucus resection sinus    [address] [facility]    right    HX ORTHOPAEDIC  June 20, 2011    right hand, thumb surgery, plate inserted    HX ORTHOPAEDIC  1946    broken nose    HX ORTHOPAEDIC  1962    sub mucous resection    HX ORTHOPAEDIC  02/05/15    right hand surgery, ligament repair    HX OTHER SURGICAL      HX OTHER SURGICAL      hemorrhoids    HX OTHER SURGICAL  8/2015    basal cell carcinoma     HX SEPTOPLASTY  1962    s/p broken nose 1946    HX TONSIL AND ADENOIDECTOMY  1949    HX TONSILLECTOMY      HX UROLOGICAL  1990    vasectomy      Lab Results  Component Value Date/Time   WBC 7.9 11/02/2017 08:04 AM   HGB 13.2 11/02/2017 08:04 AM   HCT 39.7 11/02/2017 08:04 AM   PLATELET 258 31/09/8347 08:04 AM   MCV 96 11/02/2017 08:04 AM     Lab Results  Component Value Date/Time   Cholesterol, total 123 04/11/2017 12:20 PM   HDL Cholesterol 39 04/11/2017 12:20 PM   LDL, calculated 58 04/11/2017 12:20 PM   Triglyceride 128 04/11/2017 12:20 PM   CHOL/HDL Ratio 3.4 09/29/2010 08:28 AM     Lab Results  Component Value Date/Time   GFR est non-AA 64 11/02/2017 08:04 AM   GFR est AA 74 11/02/2017 08:04 AM   Creatinine 1.12 11/02/2017 08:04 AM BUN 14 11/02/2017 08:04 AM   Sodium 141 11/02/2017 08:04 AM   Potassium 4.7 11/02/2017 08:04 AM   Chloride 102 11/02/2017 08:04 AM   CO2 27 11/02/2017 08:04 AM   Magnesium 2.0 03/01/2016 04:23 AM          Review of Systems   HENT: Negative for hearing loss. Respiratory: Negative for shortness of breath. Cardiovascular: Negative for chest pain. Musculoskeletal: Negative for falls. Neurological: Positive for dizziness. Psychiatric/Behavioral: Negative for depression and memory loss. Physical Exam   Constitutional: He is oriented to person, place, and time. He appears well-developed and well-nourished. No distress. HENT:   Head: Normocephalic and atraumatic. Right Ear: External ear normal.   Left Ear: External ear normal.   Mouth/Throat: Oropharynx is clear and moist. No oropharyngeal exudate. Eyes: Conjunctivae and EOM are normal. Right eye exhibits no discharge. Left eye exhibits no discharge. Neck: Normal range of motion. Neck supple. No thyromegaly present. No carotid bruits    Cardiovascular: Normal rate, regular rhythm and intact distal pulses. Exam reveals no gallop and no friction rub. Murmur (1/6) heard. Pulmonary/Chest: Effort normal. No respiratory distress. He has no wheezes. He has no rales. He exhibits no tenderness. Chronic faint crackles in lower lobes   Abdominal: Soft. He exhibits no distension. There is no tenderness. Musculoskeletal: He exhibits no edema, tenderness or deformity. Lymphadenopathy:     He has no cervical adenopathy. Neurological: He is alert and oriented to person, place, and time. He has normal reflexes. He exhibits normal muscle tone. Coordination normal.   Skin: Skin is warm and dry. No rash noted. He is not diaphoretic. No erythema. No pallor. Psychiatric: He has a normal mood and affect.  His behavior is normal.       ASSESSMENT and PLAN    ICD-10-CM ICD-9-CM    1. Dizziness    Pt has had 2 spells of dizziness which sound vertiginous in nature, EKG was nl, labs were unrevealing, given brief and limited episodes of dizziness and that BP is high not low, will not adjust BP meds, may be d/t difficulty breathing in light of lung disease, if sx become progressive will evaluate further with MRI and may need to potentially f/u with Dr. Rohit Porras for cardiac w/u such as ECHO and holter monitor, for now he will monitor sx as they have been isolated and brief in nature   R42 780.4 LIPID PANEL      METABOLIC PANEL, COMPREHENSIVE      HEMOGLOBIN A1C WITH EAG      MICROALBUMIN, UR, RAND W/ MICROALBUMIN/CREA RATIO      AMB POC EKG ROUTINE W/ 12 LEADS, INTER & REP   2. Medicare annual wellness visit, subsequent Z00.00 V70.0 LIPID PANEL      METABOLIC PANEL, COMPREHENSIVE      HEMOGLOBIN A1C WITH EAG      MICROALBUMIN, UR, RAND W/ MICROALBUMIN/CREA RATIO   3. Diabetes mellitus without complication ([facility] [address]    Diet-controled for now, a1cs have improved, they were only elevated when he was on prednisone, for the most part BS are good, he has had a few elevated readings related to dietary indiscretion and may need meds if placed on steroids again, for now continue on diet   E11.9 250.00 LIPID PANEL      METABOLIC PANEL, COMPREHENSIVE      HEMOGLOBIN A1C WITH EAG      MICROALBUMIN, UR, RAND W/ MICROALBUMIN/CREA RATIO   4. ILD (interstitial lung disease) ([facility] [address]    Follows with Dr. Devonte Martines, continues on symbicort and esprit   J84.9 515 LIPID PANEL      METABOLIC PANEL, COMPREHENSIVE      HEMOGLOBIN A1C WITH EAG      MICROALBUMIN, UR, RAND W/ MICROALBUMIN/CREA RATIO   5. Abdominal aortic aneurysm (AAA) without rupture [facility])    UTD with Dr. Valentin Ayala, sees him annually in April   I71.4 441.4 LIPID PANEL      METABOLIC PANEL, COMPREHENSIVE      HEMOGLOBIN A1C WITH EAG      MICROALBUMIN, UR, RAND W/ MICROALBUMIN/CREA RATIO   6.  Atrial fibrillation, unspecified type [facility])    Recently saw Dr. Rohit Porras over the summer in 8/17, on ASA for anticoagulation only   I48.91 427.31 LIPID PANEL METABOLIC PANEL, COMPREHENSIVE      HEMOGLOBIN A1C WITH EAG      MICROALBUMIN, UR, RAND W/ MICROALBUMIN/CREA RATIO      AMB POC EKG ROUTINE W/ 12 LEADS, INTER & REP   7. Gastroesophageal reflux disease without esophagitis    Pt was given carafate but did not tolerate this well, had an endoscopy recently in July with stretching, he is tolerating better, continues on prilosec BID per Dr. Kerry Banegas   K21.9 530.81 LIPID PANEL      METABOLIC PANEL, COMPREHENSIVE      HEMOGLOBIN A1C WITH EAG      MICROALBUMIN, UR, RAND W/ MICROALBUMIN/CREA RATIO   8. Hypercholesterolemia    Pt continues on lipitor and zetia, at goal in April   E78.00 272.0 LIPID PANEL      METABOLIC PANEL, COMPREHENSIVE      HEMOGLOBIN A1C WITH EAG      MICROALBUMIN, UR, RAND W/ MICROALBUMIN/CREA RATIO   9. Seborrheic dermatitis    Continue keto-conazole to treat   L21.9 690.10 LIPID PANEL      METABOLIC PANEL, COMPREHENSIVE      HEMOGLOBIN A1C WITH EAG      MICROALBUMIN, UR, RAND W/ MICROALBUMIN/CREA RATIO        Scribed by [address], as dictated by Dr. Meryle Pai. Current diagnosis and concerns discussed with pt at length. Pt understands risks and benefits or current treatment plan and medications, and accepts the treatment and medication with any possible risks. Pt asks appropriate questions, which were answered. Pt was instructed to call with any concerns or problems. This note will not be viewable in [address]

## 2017-11-13 RX ORDER — TAMSULOSIN HYDROCHLORIDE 0.4 MG/1
CAPSULE ORAL
Qty: 90 CAP | Refills: 3 | Status: SHIPPED | OUTPATIENT
Start: 2017-11-13 | End: 2018-12-05 | Stop reason: SDUPTHER

## 2018-02-04 RX ORDER — OMEPRAZOLE 40 MG/1
CAPSULE, DELAYED RELEASE ORAL
Qty: 180 CAP | Refills: 0 | Status: SHIPPED | OUTPATIENT
Start: 2018-02-04 | End: 2018-05-12 | Stop reason: SDUPTHER

## 2018-02-12 ENCOUNTER — APPOINTMENT (OUTPATIENT)
Dept: INTERNAL MEDICINE CLINIC | Age: 78
End: 2018-02-12

## 2018-02-12 DIAGNOSIS — Z00.00 MEDICARE ANNUAL WELLNESS VISIT, SUBSEQUENT: ICD-10-CM

## 2018-02-12 DIAGNOSIS — E78.00 HYPERCHOLESTEROLEMIA: ICD-10-CM

## 2018-02-12 DIAGNOSIS — L21.9 SEBORRHEIC DERMATITIS: ICD-10-CM

## 2018-02-12 DIAGNOSIS — I48.91 ATRIAL FIBRILLATION, UNSPECIFIED TYPE (HCC): ICD-10-CM

## 2018-02-12 DIAGNOSIS — I71.40 ABDOMINAL AORTIC ANEURYSM (AAA) WITHOUT RUPTURE: ICD-10-CM

## 2018-02-12 DIAGNOSIS — J84.9 ILD (INTERSTITIAL LUNG DISEASE) (HCC): ICD-10-CM

## 2018-02-12 DIAGNOSIS — E11.9 DIABETES MELLITUS WITHOUT COMPLICATION (HCC): ICD-10-CM

## 2018-02-12 DIAGNOSIS — R42 DIZZINESS: ICD-10-CM

## 2018-02-12 DIAGNOSIS — K21.9 GASTROESOPHAGEAL REFLUX DISEASE WITHOUT ESOPHAGITIS: ICD-10-CM

## 2018-02-13 ENCOUNTER — OFFICE VISIT (OUTPATIENT)
Dept: INTERNAL MEDICINE CLINIC | Age: 78
End: 2018-02-13

## 2018-02-13 VITALS
SYSTOLIC BLOOD PRESSURE: 150 MMHG | TEMPERATURE: 97.8 F | RESPIRATION RATE: 16 BRPM | BODY MASS INDEX: 27.63 KG/M2 | OXYGEN SATURATION: 96 % | HEIGHT: 70 IN | HEART RATE: 78 BPM | DIASTOLIC BLOOD PRESSURE: 76 MMHG | WEIGHT: 193 LBS

## 2018-02-13 DIAGNOSIS — F32.9 REACTIVE DEPRESSION: Chronic | ICD-10-CM

## 2018-02-13 DIAGNOSIS — I10 ESSENTIAL HYPERTENSION: Primary | ICD-10-CM

## 2018-02-13 DIAGNOSIS — E78.00 HYPERCHOLESTEROLEMIA: ICD-10-CM

## 2018-02-13 DIAGNOSIS — I25.10 CORONARY ARTERY DISEASE INVOLVING NATIVE CORONARY ARTERY OF NATIVE HEART WITHOUT ANGINA PECTORIS: ICD-10-CM

## 2018-02-13 DIAGNOSIS — I71.40 ABDOMINAL AORTIC ANEURYSM (AAA) WITHOUT RUPTURE: ICD-10-CM

## 2018-02-13 DIAGNOSIS — J84.9 ILD (INTERSTITIAL LUNG DISEASE) (HCC): ICD-10-CM

## 2018-02-13 DIAGNOSIS — E11.9 DIABETES MELLITUS WITHOUT COMPLICATION (HCC): ICD-10-CM

## 2018-02-13 LAB
ALBUMIN SERPL-MCNC: 3.6 G/DL (ref 3.5–4.8)
ALBUMIN/CREAT UR: 10.1 MG/G CREAT (ref 0–30)
ALBUMIN/GLOB SERPL: 1.2 {RATIO} (ref 1.2–2.2)
ALP SERPL-CCNC: 83 IU/L (ref 39–117)
ALT SERPL-CCNC: 18 IU/L (ref 0–44)
AST SERPL-CCNC: 16 IU/L (ref 0–40)
BILIRUB SERPL-MCNC: 0.9 MG/DL (ref 0–1.2)
BUN SERPL-MCNC: 13 MG/DL (ref 8–27)
BUN/CREAT SERPL: 12 (ref 10–24)
CALCIUM SERPL-MCNC: 9.2 MG/DL (ref 8.6–10.2)
CHLORIDE SERPL-SCNC: 102 MMOL/L (ref 96–106)
CHOLEST SERPL-MCNC: 132 MG/DL (ref 100–199)
CO2 SERPL-SCNC: 27 MMOL/L (ref 18–29)
CREAT SERPL-MCNC: 1.08 MG/DL (ref 0.76–1.27)
CREAT UR-MCNC: 46.6 MG/DL
EST. AVERAGE GLUCOSE BLD GHB EST-MCNC: 134 MG/DL
GFR SERPLBLD CREATININE-BSD FMLA CKD-EPI: 67 ML/MIN/1.73
GFR SERPLBLD CREATININE-BSD FMLA CKD-EPI: 77 ML/MIN/1.73
GLOBULIN SER CALC-MCNC: 2.9 G/DL (ref 1.5–4.5)
GLUCOSE SERPL-MCNC: 97 MG/DL (ref 65–99)
HBA1C MFR BLD: 6.3 % (ref 4.8–5.6)
HDLC SERPL-MCNC: 46 MG/DL
LDLC SERPL CALC-MCNC: 65 MG/DL (ref 0–99)
MICROALBUMIN UR-MCNC: 4.7 UG/ML
POTASSIUM SERPL-SCNC: 4.3 MMOL/L (ref 3.5–5.2)
PROT SERPL-MCNC: 6.5 G/DL (ref 6–8.5)
SODIUM SERPL-SCNC: 142 MMOL/L (ref 134–144)
TRIGL SERPL-MCNC: 105 MG/DL (ref 0–149)
VLDLC SERPL CALC-MCNC: 21 MG/DL (ref 5–40)

## 2018-02-13 RX ORDER — SERTRALINE HYDROCHLORIDE 50 MG/1
50 TABLET, FILM COATED ORAL DAILY
Qty: 30 TAB | Refills: 6 | Status: SHIPPED | OUTPATIENT
Start: 2018-02-13 | End: 2018-03-07 | Stop reason: ALTCHOICE

## 2018-02-13 NOTE — MR AVS SNAPSHOT
[address] [facility] [address] [facility]. 
[phone] Patient: Lars Alvarado MRN: [id number]:[id number] Visit Information Date & Time Provider Department Dept. Phone Encounter #  
 2/13/2018  1:15 PM Raquel Stoner, [address] [id number] Follow-up Instructions Return in about 3 months (around 5/13/2018). Upcoming Health Maintenance Date Due  
 GLAUCOMA SCREENING Q2Y 7/14/2018 MEDICARE YEARLY EXAM 11/11/2018 DTaP/Tdap/Td series (2 - Td) 9/7/2022 COLONOSCOPY 7/14/2027 Allergies as of 2/13/2018  Review Complete On: 2/13/2018 By: Yanick Villalobos LPN Severity Noted Reaction Type Reactions Celexa [Citalopram]  03/19/2010   Side Effect Other (comments) agitation Demerol [Meperidine]  03/01/2010    Unknown (comments) Mold Extracts  03/01/2010    Unknown (comments) Niaspan [Niacin]  03/19/2010   Intolerance Nausea Only Other Medication  03/01/2010    Unknown (comments) Grass smut, standardized mite mix, weed mix, dogs, white pam, white hickory, red mulberry, barley, cottonseed, malt, rice, rye, black pepper, navy bean Percocet [Oxycodone-acetaminophen]  03/01/2010    Unknown (comments) Ragweed  10/24/2013    Runny Nose  
 Sulfa (Sulfonamide Antibiotics)  03/19/2010   Side Effect Nausea Only Current Immunizations  Reviewed on 10/12/2016 Name Date Hepatitis A Vaccine 6/10/1997 Influenza High Dose Vaccine PF 9/28/2017 Influenza Vaccine 10/6/2014, 10/9/2013 Influenza Vaccine (Quad) PF 9/29/2016, 10/12/2015 Influenza Vaccine Split 10/25/2011 Influenza Vaccine Whole 10/5/2009 TD Vaccine 7/20/2006 TDAP Vaccine 9/7/2012 ZZZ-RETIRED (DO NOT USE) Pneumococcal Vaccine (Unspecified Type) 9/7/2012, 11/1/2006 Zoster 6/1/2008 Not reviewed this visit You Were Diagnosed With   
  
 Codes Comments Essential hypertension    -  Primary ICD-10-CM: I10 
ICD-9-CM: 401.9 ILD (interstitial lung disease) ([facility]     ICD-10-CM: J84.9 ICD-9-CM: 457 Abdominal aortic aneurysm (AAA) without rupture (HCC)     ICD-10-CM: I71.4 ICD-9-CM: 441.4 Reactive depression     ICD-10-CM: F32.9 ICD-9-CM: 300.4 Coronary artery disease involving native coronary artery of native heart without angina pectoris     ICD-10-CM: I25.10 ICD-9-CM: 414.01 Hypercholesterolemia     ICD-10-CM: E78.00 ICD-9-CM: 272.0 Diabetes mellitus without complication ([facility]     EEX-02-BB: E11.9 ICD-9-CM: 250.00 Vitals BP Pulse Temp Resp Height(growth percentile) Weight(growth percentile) 150/76 (BP 1 Location: Left arm, BP Patient Position: Sitting) 78 97.8 °F (36.6 °C) (Oral) 16 5' 10\" (1.778 m) 193 lb (87.5 kg) SpO2 BMI Smoking Status 96% 27.69 kg/m2 Former Smoker Vitals History BMI and BSA Data Body Mass Index Body Surface Area  
 27.69 kg/m 2 2.08 m 2 Preferred Pharmacy Pharmacy Name Phone [address] [place], [address] [place] [address]. [phone] Your Updated Medication List  
  
   
This list is accurate as of: 2/13/18  2:00 PM.  Always use your most recent med list.  
  
  
  
  
 acetaminophen 500 mg tablet Commonly known as:  TYLENOL Take 1,000 mg by mouth every six (6) hours as needed for Pain or Fever. * albuterol 90 mcg/actuation inhaler Commonly known as:  PROVENTIL HFA, VENTOLIN HFA, PROAIR HFA Take 2 Puffs by inhalation every four (4) hours as needed for Wheezing. * albuterol 1.25 mg/3 mL Nebu Commonly known as:  ACCUNEB  
3 mL by Nebulization route every six (6) hours as needed. atorvastatin 80 mg tablet Commonly known as:  LIPITOR Take 80 mg by mouth daily. Azelastine 0.15 % (205.5 mcg) nasal spray Commonly known as:  ASTEPRO  
  
 CULTURELLE PO Take 1 Cap by mouth daily. ESBRIET 267 mg Cap capsule Generic drug:  pirfenidone  
  
 finasteride 5 mg tablet Commonly known as:  PROSCAR  
TAKE 1 TABLET EVERY DAY  
  
 glucosamine-chondroitin 750-600 mg Tab Take 1 Tab by mouth two (2) times a day. glucose blood VI test strips strip Commonly known as:  FREESTYLE LITE STRIPS Check blood sugar 3 times weekly. GRAPE SEED PO Take 650 mg by mouth daily. ibuprofen 200 mg tablet Commonly known as:  MOTRIN Take 200 mg by mouth daily as needed for Pain. * ketoconazole 2 % shampoo Commonly known as:  NIZORAL Apply  to affected area two (2) times a week. * ketoconazole 2 % topical cream  
Commonly known as:  NIZORAL Apply  to affected area daily. Uses on skin lesions every couple of days  
  
 lancets 28 gauge Misc Commonly known as:  FREESTYLE LANCETS Check blood sugar 3 times weekly. metoprolol tartrate 25 mg tablet Commonly known as:  LOPRESSOR Take 25 mg by mouth two (2) times a day. PT INSTRUCTED TO TAKE AM DOS PER ANESTHESIA PROTOCOL  
  
 montelukast 10 mg tablet Commonly known as:  SINGULAIR Take 1 Tab by mouth daily (after dinner). NEBULIZER  
by Does Not Apply route. nitroglycerin 0.4 mg SL tablet Commonly known as:  Merla Moise 1 Tab by SubLINGual route as needed. omeprazole 40 mg capsule Commonly known as:  PRILOSEC  
TAKE ONE CAPSULE BY MOUTH TWICE A DAY One-A-Day Mens tablet Generic drug:  multivitamin Take 1 Tab by mouth daily. sertraline 50 mg tablet Commonly known as:  ZOLOFT Take 1 Tab by mouth daily. SYMBICORT IN Take  by inhalation. tamsulosin 0.4 mg capsule Commonly known as:  FLOMAX TAKE 1 CAPSULE EVERY NIGHT  
  
 VESIcare 5 mg tablet Generic drug:  solifenacin Take 5 mg by mouth daily. ZETIA 10 mg tablet Generic drug:  ezetimibe Take 10 mg by mouth every morning. * Notice: This list has 4 medication(s) that are the same as other medications prescribed for you. Read the directions carefully, and ask your doctor or other care provider to review them with you. Prescriptions Sent to Pharmacy Refills  
 sertraline (ZOLOFT) 50 mg tablet 6 Sig: Take 1 Tab by mouth daily. Class: Normal  
 Pharmacy: [facility], [address] [place] [address].  #: [phone] Route: Oral  
  
Follow-up Instructions Return in about 3 months (around 5/13/2018). To-Do List   
 02/14/2018 Lab:  HEMOGLOBIN A1C WITH EAG   
  
 02/14/2018 Lab:  METABOLIC PANEL, COMPREHENSIVE [facility]! Dear Mony Chin: Thank you for requesting a [employer] account. Our records indicate that you already have an active [employer] account. You can access your account anytime at [email or web address]. [employer] Did you know that you can access your hospital and ER discharge instructions at any time in [employer]? You can also review all of your test results from your hospital stay or ER visit. Additional Information If you have questions, please visit the Frequently Asked Questions section of the [employer] website at [email or web address] [email or web address]/. Remember, [employer] is NOT to be used for urgent needs. For medical emergencies, dial 911. Now available from your iPhone and Android! Please provide this summary of care documentation to your next provider. Your primary care clinician is listed as Francisco Panda. If you have any questions after today's visit, please call [phone].

## 2018-02-13 NOTE — PROGRESS NOTES
HISTORY OF PRESENT ILLNESS  Omero Pendleton is a 78 y.o. male. HPI   Last here 11/10/17. Pt is here to f/u on chronic conditions. Pt presents with his daughter who provides some of the hx.      BP is 150/76  Continues on metoprolol 25mg BID       BS were  in the AM fasting in 12/17  Pt has not been monitoring his BS x several weeks  His DM is diet-controlled      Wt is stable since last visit  Discussed diet and weight loss       Reviewed last labs 2/18    Pt had sx of vomiting, diarrhea and lack of appetite in 1/18  His sx lasted for 12 hours and have since resolved    Pt had a cold in 1/18  Pt went to his allergist and was provided with a prednisone taper  Pt finished this prednisone taper on 2/7/18    Pt feels \"foggy\" after eating brownies  Discussed diabetes contributing to his sx    Pt c/o being irritable and short-tempered, sudhir.  after eating sweets  His daughter wonders if there is a link between his sx and sweets  Discussed this being coincidental    Pt c/o feeling tired, even though he sleeps well at night  Pt states that he wakes up to go to the restroom [facility]  Pt experiences sx even when there is a lot going around him    Pt c/o lack of motivation  Pt states that he becomes depressed during the winter, sudhir. since his mother passed away in the winter  His daughter wonders if his medical hx is discouraging him  His daughter wonders if he should be started on an antidepressant  Discussed his sx being indicative of depression  Discussed starting zoloft and its SE  Ordered zoloft 50mg daily  Recall celexa caused irritability in the past     Pt follows with Dr. Erlinda Ceja (pulm) every quarter of a year  Reviewed notes 1/12/18: reduction of total lung capacity, less lung capacity in the last 1.5 years d/t pulmonary fibrosis, using esprit to improve his sx, increasing to 3 tabs TID, f/u in 3 months  Continues on symbicort daily and esprit 3 tabs TID for pulmonary fibrosis  Overall, his breathing is stable    Pt follows with Dr. Lissy Vazquez (pulm) annually in August  Last visit was   This physician follows his PSAs     Pt follows with Dr. Natanael Alas (cardio)  Last visit was   Next visit scheduled for 18     Pt follows with Dr. Santacruz (allergy)  Pt has singulair for allergies and astepro - controlled      Pt follows with Dr. Azeb Guerrero (vasc)   Last visit was 17    Continues claritin for allergies, which works well  [employer] on proscar, vesicare and flomax per [employer] on lipitor 80mg, max dose, daily for cholesterol   He also takes zetia daily    Continues on prilosec 40mg BID for reflux, which works well   Pt also takes mylanta 2-3 hours after taking colestid    After completing COLO, pt was told that bile was being backed-up in to his small intestine  Pt is taking colestid qhs for his sx       PREVENTIVE:    Colonoscopy: 17,  repeat in 5 years, Dr. Martin Oswald  EGD: 17, Dr. Martin Oswald   PSA: per Dr. Lissy Vazquez  AAA screen: CT  - 3.2mm, Azeb Guerrero follows, repeat in 10/13 and 2/15   Tdap: will check cost at pharmacy  Pneumovax: 2012  Wdltmle34: 10/13/2015  Zostavax: 2008  Flu shot:   Microalbumin:    Foot exam: 7/10/17  A1c: 10/13 6.1,  5.8,  5.9, 10/14 6.1,  6.2,  6.1,  6.8,  6.2,  6.0,  6.3  Eye exam: Dr. Dante Mora, 17, some cataracts   Hepatitis C screen: , negative  Lipids:  LDL 65  EK/10/17     Patient Active Problem List    Diagnosis Date Noted    Pneumonia 2016    Hyperglycemia 2016    ACP (advance care planning) 10/12/2015    ILD (interstitial lung disease) ([facility] 2014    Interstitial pulmonary fibrosis ([facility] 10/09/2013    HTN (hypertension) 10/09/2013    BPH (benign prostatic hyperplasia) 10/09/2013    A-fib ([facility] 10/09/2013    AAA (abdominal aortic aneurysm) ([facility] 10/09/2013    BPH (benign prostatic hypertrophy)     GERD (gastroesophageal reflux disease)     CAD (coronary artery disease)     S/P CABG (coronary artery bypass graft) 06/20/2011    Degenerative arthritis of thumb 06/20/2011    Testosterone deficiency 03/24/2011    Hypercholesterolemia 09/29/2010    Chronic bronchitis ([facility] 03/01/2010    Asthma 03/01/2010    Depression 03/01/2010    Arthritis shoulders and spine 03/01/2010     Current Outpatient Prescriptions   Medication Sig Dispense Refill    omeprazole (PRILOSEC) 40 mg capsule TAKE ONE CAPSULE BY MOUTH TWICE A  Cap 0    tamsulosin (FLOMAX) 0.4 mg capsule TAKE 1 CAPSULE EVERY NIGHT 90 Cap 3    ketoconazole (NIZORAL) 2 % topical cream Apply  to affected area daily. Uses on skin lesions every couple of days 15 g 1    glucose blood VI test strips (FREESTYLE LITE STRIPS) strip Check blood sugar 3 times weekly. 50 Strip 2    lancets (FREESTYLE LANCETS) 28 gauge misc Check blood sugar 3 times weekly. 50 Lancet 2    BUDESONIDE/FORMOTEROL FUMARATE (SYMBICORT IN) Take  by inhalation.  Azelastine (ASTEPRO) 0.15 % (205.5 mcg) nasal spray       ESBRIET 267 mg cap capsule       ibuprofen (MOTRIN) 200 mg tablet Take 200 mg by mouth daily as needed for Pain.  GLUCOSAMINE HCL/CHONDR MIRANDA A NA (GLUCOSAMINE-CHONDROITIN) 750-600 mg tab Take 1 Tab by mouth two (2) times a day.  ketoconazole (NIZORAL) 2 % shampoo Apply  to affected area two (2) times a week.  0    VESICARE 5 mg tablet Take 5 mg by mouth daily.  albuterol (ACCUNEB) 1.25 mg/3 mL nebu 3 mL by Nebulization route every six (6) hours as needed. 1 Each 1    GRAPE SEED EXTRACT (GRAPE SEED PO) Take 650 mg by mouth daily.  acetaminophen (TYLENOL) 500 mg tablet Take 1,000 mg by mouth every six (6) hours as needed for Pain or Fever.  finasteride (PROSCAR) 5 mg tablet TAKE 1 TABLET EVERY DAY 90 tablet 2    atorvastatin (LIPITOR) 80 mg tablet Take 80 mg by mouth daily.  montelukast (SINGULAIR) 10 mg tablet Take 1 Tab by mouth daily (after dinner). 90 Tab 3    NEBULIZER by Does Not Apply route.       nitroglycerin (NITROQUICK) 0.4 mg SL tablet 1 Tab by SubLINGual route as needed. 25 Tab prn    albuterol (PROVENTIL HFA, VENTOLIN HFA) 90 mcg/Actuation inhaler Take 2 Puffs by inhalation every four (4) hours as needed for Wheezing. 1 Inhaler 2    LACTOBACILLUS RHAMNOSUS (CULTURELLE PO) Take 1 Cap by mouth daily.  multivitamin (ONE-A-DAY MENS) tablet Take 1 Tab by mouth daily.  metoprolol (LOPRESSOR) 25 mg tablet Take 25 mg by mouth two (2) times a day. PT INSTRUCTED TO TAKE AM DOS PER ANESTHESIA PROTOCOL      ezetimibe (ZETIA) 10 mg tablet Take 10 mg by mouth every morning.        Past Surgical History:   Procedure Laterality Date    CABG, ARTERIAL, THREE  10/2003    CARDIAC SURG PROCEDURE UNLIST  10/2003    CABGx3 vessel    COLONOSCOPY N/A 7/14/2017    COLONOSCOPY performed by [facility]    ENDOSCOPY, Rajeev Cardenas    [address] [facility] HX CHOLECYSTECTOMY  11/22/2008    laparoscopic    HX COLECTOMY  12/29/07    sigmoid colectomy for volvulus in [address] [facility] HX COLONOSCOPY  06/17/2014    Repeat in 3 years    HX HEENT  while in 20's    submucus resection sinus    [address]    right    HX ORTHOPAEDIC  June 20, 2011    right hand, thumb surgery, plate inserted    HX ORTHOPAEDIC  1946    broken nose    HX ORTHOPAEDIC  1962    sub mucous resection    HX ORTHOPAEDIC  02/05/15    right hand surgery, ligament repair    HX OTHER SURGICAL      HX OTHER SURGICAL      hemorrhoids    HX OTHER SURGICAL  8/2015    basal cell carcinoma     HX SEPTOPLASTY  1962    s/p broken nose 1946    HX TONSIL AND ADENOIDECTOMY  1949    HX TONSILLECTOMY      HX UROLOGICAL  1990    vasectomy      Lab Results  Component Value Date/Time   WBC 7.9 11/02/2017 08:04 AM   HGB 13.2 11/02/2017 08:04 AM   HCT 39.7 11/02/2017 08:04 AM   PLATELET 519 71/35/9732 08:04 AM   MCV 96 11/02/2017 08:04 AM     Lab Results  Component Value Date/Time Cholesterol, total 132 02/12/2018 08:25 AM   HDL Cholesterol 46 02/12/2018 08:25 AM   LDL, calculated 65 02/12/2018 08:25 AM   Triglyceride 105 02/12/2018 08:25 AM   CHOL/HDL Ratio 3.4 09/29/2010 08:28 AM     Lab Results  Component Value Date/Time   GFR est non-AA 67 02/12/2018 08:25 AM   GFR est AA 77 02/12/2018 08:25 AM   Creatinine 1.08 02/12/2018 08:25 AM   BUN 13 02/12/2018 08:25 AM   Sodium 142 02/12/2018 08:25 AM   Potassium 4.3 02/12/2018 08:25 AM   Chloride 102 02/12/2018 08:25 AM   CO2 27 02/12/2018 08:25 AM   Magnesium 2.0 03/01/2016 04:23 AM        Review of Systems   Constitutional: Positive for malaise/fatigue. Respiratory: Negative for shortness of breath. Cardiovascular: Negative for chest pain. Gastrointestinal: Negative for diarrhea and vomiting. Psychiatric/Behavioral: Positive for depression. Physical Exam   Constitutional: He is oriented to person, place, and time. He appears well-developed and well-nourished. No distress. HENT:   Head: Normocephalic and atraumatic. Mouth/Throat: Oropharynx is clear and moist. No oropharyngeal exudate. Eyes: Conjunctivae and EOM are normal. Right eye exhibits no discharge. Left eye exhibits no discharge. Neck: Normal range of motion. Neck supple. No thyromegaly present. Cardiovascular: Normal rate and regular rhythm. Exam reveals no gallop and no friction rub. Murmur (1/6) heard. Pulmonary/Chest: Effort normal. No respiratory distress. He has no wheezes. He has no rales. He exhibits no tenderness. Chronic crackles   Musculoskeletal: Normal range of motion. He exhibits no edema, tenderness or deformity. Lymphadenopathy:     He has no cervical adenopathy. Neurological: He is alert and oriented to person, place, and time. He has normal reflexes. He exhibits normal muscle tone. Coordination normal.   Skin: Skin is warm and dry. No rash noted. He is not diaphoretic. No erythema. No pallor.    Psychiatric: He has a normal mood and affect. His behavior is normal.       ASSESSMENT and PLAN    ICD-10-CM ICD-9-CM    1. Essential hypertension    BP controled on metoprolol 25mg BID, continue   P95 484.4 METABOLIC PANEL, COMPREHENSIVE      HEMOGLOBIN A1C WITH EAG   2. ILD (interstitial lung disease) ([facility]    Progressively declining, follows closely with pulm clinic Dr. Evangelista Chamberlain, currently on esprit 3 tabs TID, continues on symbicort daily, will follow q3 months   L20.2 509 METABOLIC PANEL, COMPREHENSIVE      HEMOGLOBIN A1C WITH EAG   3. Abdominal aortic aneurysm (AAA) without rupture ([facility]    Follows with Dr. Judith Mcdowell annually, will see him next in April   C94.3 559.0 METABOLIC PANEL, COMPREHENSIVE      HEMOGLOBIN A1C WITH EAG   4. Reactive depression    Will start zoloft 5mg daily   K57.4 523.0 METABOLIC PANEL, COMPREHENSIVE      HEMOGLOBIN A1C WITH EAG   5. Coronary artery disease involving native coronary artery of native heart without angina pectoris    Will be seeing Dr. Marcus Seaman his cardio tomorrow, stable   P04.70 977.62 METABOLIC PANEL, COMPREHENSIVE      HEMOGLOBIN A1C WITH EAG   6. Hypercholesterolemia    On lipitor and zetia, controled   Q15.15 125.0 METABOLIC PANEL, COMPREHENSIVE      HEMOGLOBIN A1C WITH EAG   7. Diabetes mellitus without complication ([facility]    Currently diet-controled, a1c at goal   Y73.6 870.88 METABOLIC PANEL, COMPREHENSIVE      HEMOGLOBIN A1C WITH EAG        Scribed by Samuel Helm of [facility], as dictated by Dr. Karyle Norway. Current diagnosis and concerns discussed with pt at length. Pt understands risks and benefits or current treatment plan and medications, and accepts the treatment and medication with any possible risks. Pt asks appropriate questions, which were answered. Pt was instructed to call with any concerns or problems. This note will not be viewable in [address]

## 2018-03-05 ENCOUNTER — APPOINTMENT (OUTPATIENT)
Dept: GENERAL RADIOLOGY | Age: 78
End: 2018-03-05
Payer: MEDICARE

## 2018-03-05 ENCOUNTER — HOSPITAL ENCOUNTER (EMERGENCY)
Age: 78
Discharge: HOME | End: 2018-03-05
Attending: EMERGENCY MEDICINE
Payer: MEDICARE

## 2018-03-05 VITALS
BODY MASS INDEX: 28.15 KG/M2 | HEIGHT: 70 IN | SYSTOLIC BLOOD PRESSURE: 199 MMHG | RESPIRATION RATE: 16 BRPM | DIASTOLIC BLOOD PRESSURE: 100 MMHG | OXYGEN SATURATION: 97 % | WEIGHT: 196.65 LBS | HEART RATE: 71 BPM | TEMPERATURE: 97.9 F

## 2018-03-05 DIAGNOSIS — R10.13 ABDOMINAL PAIN, EPIGASTRIC: Primary | ICD-10-CM

## 2018-03-05 DIAGNOSIS — I10 ESSENTIAL HYPERTENSION: ICD-10-CM

## 2018-03-05 LAB
ALBUMIN SERPL-MCNC: 3.6 G/DL (ref 3.5–5)
ALBUMIN/GLOB SERPL: 0.9 {RATIO} (ref 1.1–2.2)
ALP SERPL-CCNC: 97 U/L (ref 45–117)
ALT SERPL-CCNC: 26 U/L (ref 12–78)
ANION GAP SERPL CALC-SCNC: 5 MMOL/L (ref 5–15)
AST SERPL-CCNC: 22 U/L (ref 15–37)
BASOPHILS # BLD: 0 K/UL (ref 0–0.1)
BASOPHILS NFR BLD: 0 % (ref 0–1)
BILIRUB SERPL-MCNC: 0.6 MG/DL (ref 0.2–1)
BUN SERPL-MCNC: 15 MG/DL (ref 6–20)
BUN/CREAT SERPL: 14 (ref 12–20)
CALCIUM SERPL-MCNC: 8.9 MG/DL (ref 8.5–10.1)
CHLORIDE SERPL-SCNC: 105 MMOL/L (ref 97–108)
CK SERPL-CCNC: 123 U/L (ref 39–308)
CO2 SERPL-SCNC: 29 MMOL/L (ref 21–32)
CREAT SERPL-MCNC: 1.1 MG/DL (ref 0.7–1.3)
DIFFERENTIAL METHOD BLD: NORMAL
EOSINOPHIL # BLD: 0.1 K/UL (ref 0–0.4)
EOSINOPHIL NFR BLD: 2 % (ref 0–7)
ERYTHROCYTE [DISTWIDTH] IN BLOOD BY AUTOMATED COUNT: 13.8 % (ref 11.5–14.5)
GLOBULIN SER CALC-MCNC: 4.2 G/DL (ref 2–4)
GLUCOSE SERPL-MCNC: 121 MG/DL (ref 65–100)
HCT VFR BLD AUTO: 40.6 % (ref 36.6–50.3)
HGB BLD-MCNC: 13.6 G/DL (ref 12.1–17)
IMM GRANULOCYTES # BLD: 0 K/UL (ref 0–0.04)
IMM GRANULOCYTES NFR BLD AUTO: 0 % (ref 0–0.5)
LYMPHOCYTES # BLD: 2.3 K/UL (ref 0.8–3.5)
LYMPHOCYTES NFR BLD: 28 % (ref 12–49)
MCH RBC QN AUTO: 32.3 PG (ref 26–34)
MCHC RBC AUTO-ENTMCNC: 33.5 G/DL (ref 30–36.5)
MCV RBC AUTO: 96.4 FL (ref 80–99)
MONOCYTES # BLD: 0.5 K/UL (ref 0–1)
MONOCYTES NFR BLD: 7 % (ref 5–13)
NEUTS SEG # BLD: 5.1 K/UL (ref 1.8–8)
NEUTS SEG NFR BLD: 63 % (ref 32–75)
NRBC # BLD: 0 K/UL (ref 0–0.01)
NRBC BLD-RTO: 0 PER 100 WBC
PLATELET # BLD AUTO: 239 K/UL (ref 150–400)
PMV BLD AUTO: 10.1 FL (ref 8.9–12.9)
POTASSIUM SERPL-SCNC: 4 MMOL/L (ref 3.5–5.1)
PROT SERPL-MCNC: 7.8 G/DL (ref 6.4–8.2)
RBC # BLD AUTO: 4.21 M/UL (ref 4.1–5.7)
SODIUM SERPL-SCNC: 139 MMOL/L (ref 136–145)
TROPONIN I SERPL-MCNC: <0.04 NG/ML
WBC # BLD AUTO: 8.1 K/UL (ref 4.1–11.1)

## 2018-03-05 PROCEDURE — 99283 EMERGENCY DEPT VISIT LOW MDM: CPT

## 2018-03-05 PROCEDURE — 82550 ASSAY OF CK (CPK): CPT | Performed by: EMERGENCY MEDICINE

## 2018-03-05 PROCEDURE — 85025 COMPLETE CBC W/AUTO DIFF WBC: CPT | Performed by: EMERGENCY MEDICINE

## 2018-03-05 PROCEDURE — 74011250637 HC RX REV CODE- 250/637: Performed by: PHYSICIAN ASSISTANT

## 2018-03-05 PROCEDURE — 93005 ELECTROCARDIOGRAM TRACING: CPT

## 2018-03-05 PROCEDURE — 74011000250 HC RX REV CODE- 250: Performed by: PHYSICIAN ASSISTANT

## 2018-03-05 PROCEDURE — 36415 COLL VENOUS BLD VENIPUNCTURE: CPT | Performed by: EMERGENCY MEDICINE

## 2018-03-05 PROCEDURE — 80053 COMPREHEN METABOLIC PANEL: CPT | Performed by: EMERGENCY MEDICINE

## 2018-03-05 PROCEDURE — 84484 ASSAY OF TROPONIN QUANT: CPT | Performed by: EMERGENCY MEDICINE

## 2018-03-05 PROCEDURE — 71046 X-RAY EXAM CHEST 2 VIEWS: CPT

## 2018-03-05 RX ADMIN — LIDOCAINE HYDROCHLORIDE 40 ML: 20 SOLUTION ORAL; TOPICAL at 17:03

## 2018-03-05 NOTE — ED PROVIDER NOTES
EMERGENCY DEPARTMENT HISTORY AND PHYSICAL EXAM      Date: 3/5/2018  Patient Name: Fela Lambert    History of Presenting Illness     Chief Complaint   Patient presents with    Chest Pain     patient c/o epigastric pain since last night. Hx of acid reflux issues. History Provided By: Patient    HPI: Fela Lambert, 70 y.o. male with PMHx significant for bronchitis, depression, pneumonia, asthma, hypercholesterolemia, basal cell chest, COPD, HTN, GERD, CAD, BPH, aneurysm, and testosterone deficiency, presents ambulatory to the ED with cc of mild epigastric pain which started last night. He states his epigastric pain feels similar to his previous GERD. Pt also c/o an elevated blood pressure reading today. He explains he was advised by his cardiologist to check his blood pressure every day over the past 2 weeks. Today his blood pressure was 186/98 PTA. Pt states he \"feels fine\" and reports no complaints with his elevated blood pressure. He last took his HTN medications this morning. Pt denies chest pain and fever. He is closely followed by Cardiology for his aneurysm and states he has yearly follow up appointments. PCP: Samantha Rivera MD  Cardiology: Sheldon CAMPBELL- [facility]    There are no other complaints, changes, or physical findings at this time. Current Outpatient Prescriptions   Medication Sig Dispense Refill    GRAPE SEED EXTRACT (GRAPE SEED PO) Take 650 mg by mouth daily.  atorvastatin (LIPITOR) 80 mg tablet Take 80 mg by mouth daily.  metoprolol (LOPRESSOR) 25 mg tablet Take 25 mg by mouth two (2) times a day. PT INSTRUCTED TO TAKE AM DOS PER ANESTHESIA PROTOCOL      sertraline (ZOLOFT) 50 mg tablet Take 1 Tab by mouth daily. 30 Tab 6    omeprazole (PRILOSEC) 40 mg capsule TAKE ONE CAPSULE BY MOUTH TWICE A  Cap 0    tamsulosin (FLOMAX) 0.4 mg capsule TAKE 1 CAPSULE EVERY NIGHT 90 Cap 3    ketoconazole (NIZORAL) 2 % topical cream Apply  to affected area daily.  Uses on skin lesions every couple of days 15 g 1    glucose blood VI test strips (FREESTYLE LITE STRIPS) strip Check blood sugar 3 times weekly. 50 Strip 2    lancets (FREESTYLE LANCETS) 28 gauge misc Check blood sugar 3 times weekly. 50 Lancet 2    BUDESONIDE/FORMOTEROL FUMARATE (SYMBICORT IN) Take  by inhalation.  Azelastine (ASTEPRO) 0.15 % (205.5 mcg) nasal spray       ESBRIET 267 mg cap capsule       ibuprofen (MOTRIN) 200 mg tablet Take 200 mg by mouth daily as needed for Pain.  GLUCOSAMINE HCL/CHONDR MIRANDA A NA (GLUCOSAMINE-CHONDROITIN) 750-600 mg tab Take 1 Tab by mouth two (2) times a day.  ketoconazole (NIZORAL) 2 % shampoo Apply  to affected area two (2) times a week.  0    VESICARE 5 mg tablet Take 5 mg by mouth daily.  albuterol (ACCUNEB) 1.25 mg/3 mL nebu 3 mL by Nebulization route every six (6) hours as needed. 1 Each 1    acetaminophen (TYLENOL) 500 mg tablet Take 1,000 mg by mouth every six (6) hours as needed for Pain or Fever.  finasteride (PROSCAR) 5 mg tablet TAKE 1 TABLET EVERY DAY 90 tablet 2    montelukast (SINGULAIR) 10 mg tablet Take 1 Tab by mouth daily (after dinner). 90 Tab 3    NEBULIZER by Does Not Apply route.  nitroglycerin (NITROQUICK) 0.4 mg SL tablet 1 Tab by SubLINGual route as needed. 25 Tab prn    albuterol (PROVENTIL HFA, VENTOLIN HFA) 90 mcg/Actuation inhaler Take 2 Puffs by inhalation every four (4) hours as needed for Wheezing. 1 Inhaler 2    LACTOBACILLUS RHAMNOSUS (CULTURELLE PO) Take 1 Cap by mouth daily.  multivitamin (ONE-A-DAY MENS) tablet Take 1 Tab by mouth daily.  ezetimibe (ZETIA) 10 mg tablet Take 10 mg by mouth every morning.          Past History     Past Medical History:  Past Medical History:   Diagnosis Date    Aneurysm ([address]     small AAA    Arthritis shoulders and spine 3/1/2010    Asthma 3/1/2010    Dr. Dolly Giron, Dr. Santacruz    BPH (benign prostatic hypertrophy)     Broken nose 3/1/2010    Bronchitis 02/2017    CAD (coronary artery disease)     Dr. Dallas [facility]) 2000    basal cell chest    Chronic bronchitis ([address] 3/1/2010    Contact dermatitis and other eczema, due to unspecified cause     Dr. Den Larkin    COPD     Depression 3/1/2010    GERD (gastroesophageal reflux disease)     Hypercholesterolemia 9/29/2010    Hypertension     Pneumonia 3/1/2010    Testosterone deficiency 3/24/2011       Past Surgical History:  Past Surgical History:   Procedure Laterality Date    CABG, ARTERIAL, THREE  10/2003    CARDIAC SURG PROCEDURE UNLIST  10/2003    CABGx3 vessel    COLONOSCOPY N/A 7/14/2017    COL[facility]j [facility] at [facility] ENDOSCOPY    ENDOSCOPY, Shivani Munoz    HX APPENDECTOMY      HX CHOLECYSTECTOMY  11/22/2008    laparoscopic    HX COLECTOMY  12/29/07    sigmoid colectomy for volvulus in [place]    HX COLONOSCOPY  06/17/2014    Repeat in 3 years    HX HEENT  while in 20's    submucus resection sinus    [address]    right    HX ORTHOPAEDIC  June 20, 2011    right hand, thumb surgery, plate inserted    HX ORTHOPAEDIC  1946    broken nose    HX ORTHOPAEDIC  1962    sub mucous resection    HX ORTHOPAEDIC  02/05/15    right hand surgery, ligament repair    HX OTHER SURGICAL      HX OTHER SURGICAL      hemorrhoids    HX OTHER SURGICAL  8/2015    basal cell carcinoma     HX SEPTOPLASTY  1962    s/p broken nose 1946    HX TONSIL AND ADENOIDECTOMY  1949    HX TONSILLECTOMY      HX UROLOGICAL  1990    vasectomy       Family History:  Family History   Problem Relation Age of Onset    Cancer Mother      spine or abdomen    Stroke Father     Heart Attack Father      46s    Cancer Brother      CLL, lung    Diabetes Brother     Cancer Brother      lung    Cancer Brother      lung    Cancer Brother      lung    Heart Disease Brother     Heart Disease Brother 58     CABG    Other Brother      shingles    Lung Disease Sister     High Cholesterol Son     Lung Disease Daughter      Overactive Airways    No Known Problems Daughter        Social History:  Social History   Substance Use Topics    Smoking status: Former Smoker     Packs/day: 1.00     Years: 15.00     Quit date: 1/1/1970    Smokeless tobacco: Never Used    Alcohol use 1.2 oz/week     2 Glasses of wine per week      Comment: maybe a glass of wine 1-2 a week       Allergies: Allergies   Allergen Reactions    Celexa [Citalopram] Other (comments)     agitation    Demerol [Meperidine] Unknown (comments)    Mold Extracts Unknown (comments)    Niaspan [Niacin] Nausea Only    Other Medication Unknown (comments)     Grass smut, standardized mite mix, weed mix, dogs, white pam, white hickory, red mulberry, barley, cottonseed, malt, rice, rye, black pepper, navy bean    Percocet [Oxycodone-Acetaminophen] Unknown (comments)    Ragweed Runny Nose    Sulfa (Sulfonamide Antibiotics) Nausea Only         Review of Systems   Review of Systems   Constitutional: Negative for chills and fever. HENT: Negative for ear pain and sore throat. Eyes: Negative for pain, redness and visual disturbance. Respiratory: Negative for chest tightness, shortness of breath and wheezing. Cardiovascular: Negative for chest pain and palpitations. Gastrointestinal: Positive for abdominal pain (epigastric). Negative for diarrhea, nausea and vomiting. Endocrine:        + elevated blood pressure   Genitourinary: Negative for dysuria, frequency and urgency. Musculoskeletal: Negative for arthralgias, back pain, myalgias and neck pain. Skin: Negative for rash and wound. Neurological: Negative for dizziness, weakness, numbness and headaches. Hematological: Negative for adenopathy. Psychiatric/Behavioral: Negative for dysphoric mood. The patient is not nervous/anxious. Physical Exam   Physical Exam   Constitutional: He is oriented to person, place, and time.  He appears well-developed and well-nourished. Non-toxic appearance. No distress. Patient sitting upright. Good historian. HENT:   Head: Normocephalic and atraumatic. Head is without right periorbital erythema and without left periorbital erythema. Right Ear: External ear normal.   Left Ear: External ear normal.   Nose: Nose normal.   Mouth/Throat: Uvula is midline. No trismus in the jaw. Eyes: Conjunctivae and EOM are normal. Pupils are equal, round, and reactive to light. No scleral icterus. Neck: Normal range of motion and full passive range of motion without pain. Cardiovascular: Normal rate, regular rhythm and normal heart sounds. Pulmonary/Chest: Effort normal and breath sounds normal. No accessory muscle usage. No tachypnea. No respiratory distress. He has no decreased breath sounds. He has no wheezes. Breathing unlabored. Lungs are clear. Abdominal: Soft. He exhibits no distension. There is no tenderness. There is no rigidity and no guarding. No pulsatile mass. Musculoskeletal: Normal range of motion. Neurological: He is alert and oriented to person, place, and time. He is not disoriented. No cranial nerve deficit or sensory deficit. GCS eye subscore is 4. GCS verbal subscore is 5. GCS motor subscore is 6. Skin: Skin is intact. No rash noted. Psychiatric: He has a normal mood and affect. His speech is normal.   Nursing note and vitals reviewed.     Diagnostic Study Results     Labs -     Recent Results (from the past 12 hour(s))   EKG, 12 LEAD, INITIAL    Collection Time: 03/05/18  1:38 PM   Result Value Ref Range    Ventricular Rate 81 BPM    Atrial Rate 81 BPM    P-R Interval 198 ms    QRS Duration 100 ms    Q-T Interval 388 ms    QTC Calculation (Bezet) 450 ms    Calculated P Axis 49 degrees    Calculated R Axis 11 degrees    Calculated T Axis 19 degrees    Diagnosis       Sinus rhythm with premature atrial complexes  Otherwise normal ECG  When compared with ECG of 28-FEB-2016 18:30,  premature atrial complexes are now present     CBC WITH AUTOMATED DIFF    Collection Time: 03/05/18  1:54 PM   Result Value Ref Range    WBC 8.1 4.1 - 11.1 K/uL    RBC 4.21 4.10 - 5.70 M/uL    HGB 13.6 12.1 - 17.0 g/dL    HCT 40.6 36.6 - 50.3 %    MCV 96.4 80.0 - 99.0 FL    MCH 32.3 26.0 - 34.0 PG    MCHC 33.5 30.0 - 36.5 g/dL    RDW 13.8 11.5 - 14.5 %    PLATELET 813 814 - 105 K/uL    MPV 10.1 8.9 - 12.9 FL    NRBC 0.0 0  WBC    ABSOLUTE NRBC 0.00 0.00 - 0.01 K/uL    NEUTROPHILS 63 32 - 75 %    LYMPHOCYTES 28 12 - 49 %    MONOCYTES 7 5 - 13 %    EOSINOPHILS 2 0 - 7 %    BASOPHILS 0 0 - 1 %    IMMATURE GRANULOCYTES 0 0.0 - 0.5 %    ABS. NEUTROPHILS 5.1 1.8 - 8.0 K/UL    ABS. LYMPHOCYTES 2.3 0.8 - 3.5 K/UL    ABS. MONOCYTES 0.5 0.0 - 1.0 K/UL    ABS. EOSINOPHILS 0.1 0.0 - 0.4 K/UL    ABS. BASOPHILS 0.0 0.0 - 0.1 K/UL    ABS. IMM. GRANS. 0.0 0.00 - 0.04 K/UL    DF AUTOMATED     METABOLIC PANEL, COMPREHENSIVE    Collection Time: 03/05/18  1:54 PM   Result Value Ref Range    Sodium 139 136 - 145 mmol/L    Potassium 4.0 3.5 - 5.1 mmol/L    Chloride 105 97 - 108 mmol/L    CO2 29 21 - 32 mmol/L    Anion gap 5 5 - 15 mmol/L    Glucose 121 (H) 65 - 100 mg/dL    BUN 15 6 - 20 MG/DL    Creatinine 1.10 0.70 - 1.30 MG/DL    BUN/Creatinine ratio 14 12 - 20      GFR est AA >60 >60 ml/min/1.73m2    GFR est non-AA >60 >60 ml/min/1.73m2    Calcium 8.9 8.5 - 10.1 MG/DL    Bilirubin, total 0.6 0.2 - 1.0 MG/DL    ALT (SGPT) 26 12 - 78 U/L    AST (SGOT) 22 15 - 37 U/L    Alk.  phosphatase 97 45 - 117 U/L    Protein, total 7.8 6.4 - 8.2 g/dL    Albumin 3.6 3.5 - 5.0 g/dL    Globulin 4.2 (H) 2.0 - 4.0 g/dL    A-G Ratio 0.9 (L) 1.1 - 2.2     TROPONIN I    Collection Time: 03/05/18  1:54 PM   Result Value Ref Range    Troponin-I, Qt. <0.04 <0.05 ng/mL   CK W/ REFLX CKMB    Collection Time: 03/05/18  1:54 PM   Result Value Ref Range     39 - 308 U/L       Radiologic Studies -     CXR Results  (Last 48 hours) 03/05/18 1543  XR CHEST PA LAT Final result    Impression:  Impression:   No significant interval change. Narrative:  Chest PA and lateral       History: Chest pain. Epigastric pain. Reflux. Comparison: 2/20/2017       Findings:  The patient is status post median sternotomy and CABG. The lungs are   well expanded. Fibroid changes are again seen in the lungs related to   interstitial lung disease. No focal consolidation, pleural effusion, or   pneumothorax. The cardiomediastinal silhouette is unremarkable. Mild   multilevel spondylosis is seen in the spine. Medical Decision Making   I am the first provider for this patient. I reviewed the vital signs, available nursing notes, past medical history, past surgical history, family history and social history. Vital Signs-Reviewed the patient's vital signs. Patient Vitals for the past 12 hrs:   Pulse Resp BP SpO2   03/05/18 1715 71 16 (!) 199/100 97 %       EKG interpretation: (Preliminary) 1338  Rhythm: sinus rhythm with premature atrial complexes; and irregular. Rate (approx.): 81; Axis: normal; [place] interval: normal; QRS interval: normal ; ST/T wave: normal  Written by Sunny Taylor ED Scribe, as dictated by ER physician. Records Reviewed: Nursing Notes and Old Medical Records    Provider Notes (Medical Decision Making):   DDx: GERD, HTN, doubt ACS     ED Course:   Initial assessment performed. The patients presenting problems have been discussed, and they are in agreement with the care plan formulated and outlined with them. I have encouraged them to ask questions as they arise throughout their visit. Disposition:  DISCHARGE NOTE  5:23 PM  The patient has been re-evaluated and is ready for discharge. Reviewed available results with patient. Counseled patient on diagnosis and care plan. Patient has expressed understanding, and all questions have been answered.  Patient agrees with plan and agrees to follow up as recommended, or return to the ED if their symptoms worsen. Discharge instructions have been provided and explained to the patient, along with reasons to return to the ED. PLAN:  1. Discharge Medication List as of 3/5/2018  5:17 PM        2. Follow-up Information     Follow up With Details Comments [id number] Piero Hdez MD Schedule an appointment as soon as possible for a visit CARDIOLOGY: call to schedule follow up [phone]      Henrik Colon MD Schedule an appointment as soon as possible for a visit GASTROENTEROLOGY: call to schedule follow up [address] [place] [address] Rd  [phone]          Return to ED if worse     Diagnosis     Clinical Impression:   1. Abdominal pain, epigastric    2. Essential hypertension        Attestations:    Attestation Note:  This note is prepared by Miguel A [facility], acting as Scribe for Carlo Ac: The scribe's documentation has been prepared under my direction and personally reviewed by me in its entirety. I confirm that the note above accurately reflects all work, treatment, procedures, and medical decision making performed by me.

## 2018-03-05 NOTE — ED NOTES
ROGELIO Nair has reviewed discharge instructions with the patient. The patient verbalized understanding. Pt. A&Ox4, respirations even and unlabored. VS stable as noted in flowsheet. Declined wheelchair assist from department; paperwork in hand.

## 2018-03-05 NOTE — ED NOTES
Assumed care of patient from triage. Patient is A&Ox4, respirations even and unlabored. Pt. States he has been monitoring his BP at home per cardiology and noted hypertension this morning. Patient also reports acid reflux. Denies SOB or chest pain.

## 2018-03-06 LAB
ATRIAL RATE: 81 BPM
CALCULATED P AXIS, ECG09: 49 DEGREES
CALCULATED R AXIS, ECG10: 11 DEGREES
CALCULATED T AXIS, ECG11: 19 DEGREES
DIAGNOSIS, 93000: NORMAL
P-R INTERVAL, ECG05: 198 MS
Q-T INTERVAL, ECG07: 388 MS
QRS DURATION, ECG06: 100 MS
QTC CALCULATION (BEZET), ECG08: 450 MS
VENTRICULAR RATE, ECG03: 81 BPM

## 2018-03-07 ENCOUNTER — TELEPHONE (OUTPATIENT)
Dept: INTERNAL MEDICINE CLINIC | Age: 78
End: 2018-03-07

## 2018-03-07 RX ORDER — BUPROPION HYDROCHLORIDE 150 MG/1
150 TABLET ORAL
Qty: 30 TAB | Refills: 1 | Status: SHIPPED | OUTPATIENT
Start: 2018-03-07 | End: 2018-09-17

## 2018-03-07 NOTE — TELEPHONE ENCOUNTER
Called, spoke to pt. Two pt identifiers confirmed. Pt c/o elevated BP, insomnia, stomach pain-burning, feeling jittery/agitated. Pt has H/O acid reflux. Started about 2 days after starting the zoloft x2wks ago. Pt states sx BP and stomach pain were constant. Pt states 237/119 BP previously. Pt seen in ED for chest pain. Pt f/u w/ Card 3/9/18-doubling metorolol 50mg BID per Card recommendations. Pt states current BP averaging 160/109. Pt states after taking half tablet this morning and doesn't not feel any better that   Pt had taken a previous antidepressant but gave pt upset. Pt took wellbutrin with no SE's in previous. Pt informed Dr. Que Sinclair will be notified. Pt verbalized understanding of information discussed w/ no further questions at this time.

## 2018-03-07 NOTE — TELEPHONE ENCOUNTER
Called, spoke to pt. Two pt identifiers confirmed. Pt informed per Dr. Luzma Car to stop zoloft. Pt informed per Dr. Luzma Car trial of wellbutrin 150mg ordered to [facility]. Callback prn. Pt verbalized understanding of information discussed w/ no further questions at this time.

## 2018-03-07 NOTE — TELEPHONE ENCOUNTER
MD Samantha Cano LPN        Caller: Unspecified (Today,  8:01 AM)                     Can stop zoloft     Will give trial wellbutrin 150mg daily     ordered

## 2018-03-07 NOTE — TELEPHONE ENCOUNTER
Patient needs a call back to discuss patient's side effects from his Zoloft, including elevated Blood pressure, difficulty sleeping & stomach pain, jittery & agitation. Patient is cutting pill in half starting this morning. Patient would like a call back to discuss. Please call.  Thank you

## 2018-05-12 RX ORDER — OMEPRAZOLE 40 MG/1
CAPSULE, DELAYED RELEASE ORAL
Qty: 180 CAP | Refills: 0 | Status: SHIPPED | OUTPATIENT
Start: 2018-05-12 | End: 2018-06-14 | Stop reason: SDUPTHER

## 2018-05-14 ENCOUNTER — OFFICE VISIT (OUTPATIENT)
Dept: INTERNAL MEDICINE CLINIC | Age: 78
End: 2018-05-14

## 2018-05-14 ENCOUNTER — OFFICE VISIT (OUTPATIENT)
Dept: SURGERY | Age: 78
End: 2018-05-14

## 2018-05-14 VITALS
RESPIRATION RATE: 20 BRPM | OXYGEN SATURATION: 94 % | WEIGHT: 194.2 LBS | HEIGHT: 70 IN | DIASTOLIC BLOOD PRESSURE: 76 MMHG | HEART RATE: 60 BPM | SYSTOLIC BLOOD PRESSURE: 163 MMHG | BODY MASS INDEX: 27.8 KG/M2

## 2018-05-14 VITALS
TEMPERATURE: 98.1 F | BODY MASS INDEX: 27.77 KG/M2 | HEIGHT: 70 IN | RESPIRATION RATE: 16 BRPM | WEIGHT: 194 LBS | SYSTOLIC BLOOD PRESSURE: 127 MMHG | OXYGEN SATURATION: 95 % | HEART RATE: 67 BPM | DIASTOLIC BLOOD PRESSURE: 71 MMHG

## 2018-05-14 DIAGNOSIS — I71.40 ABDOMINAL AORTIC ANEURYSM (AAA) WITHOUT RUPTURE: ICD-10-CM

## 2018-05-14 DIAGNOSIS — E78.00 HYPERCHOLESTEROLEMIA: ICD-10-CM

## 2018-05-14 DIAGNOSIS — I10 ESSENTIAL HYPERTENSION: ICD-10-CM

## 2018-05-14 DIAGNOSIS — K21.9 GASTROESOPHAGEAL REFLUX DISEASE WITHOUT ESOPHAGITIS: ICD-10-CM

## 2018-05-14 DIAGNOSIS — J84.10 INTERSTITIAL PULMONARY FIBROSIS (HCC): ICD-10-CM

## 2018-05-14 DIAGNOSIS — I25.10 CORONARY ARTERY DISEASE INVOLVING NATIVE CORONARY ARTERY OF NATIVE HEART WITHOUT ANGINA PECTORIS: ICD-10-CM

## 2018-05-14 DIAGNOSIS — F32.9 REACTIVE DEPRESSION: Chronic | ICD-10-CM

## 2018-05-14 DIAGNOSIS — L02.91 ABSCESS: Primary | ICD-10-CM

## 2018-05-14 DIAGNOSIS — J84.9 ILD (INTERSTITIAL LUNG DISEASE) (HCC): ICD-10-CM

## 2018-05-14 DIAGNOSIS — I10 ESSENTIAL HYPERTENSION: Primary | ICD-10-CM

## 2018-05-14 DIAGNOSIS — L02.91 ABSCESS: ICD-10-CM

## 2018-05-14 DIAGNOSIS — E11.9 DIABETES MELLITUS WITHOUT COMPLICATION (HCC): ICD-10-CM

## 2018-05-14 DIAGNOSIS — I48.91 ATRIAL FIBRILLATION, UNSPECIFIED TYPE (HCC): ICD-10-CM

## 2018-05-14 DIAGNOSIS — R73.01 IFG (IMPAIRED FASTING GLUCOSE): ICD-10-CM

## 2018-05-14 RX ORDER — LISINOPRIL 10 MG/1
TABLET ORAL DAILY
COMMUNITY
End: 2018-09-17

## 2018-05-14 RX ORDER — AMLODIPINE BESYLATE 5 MG/1
5 TABLET ORAL DAILY
COMMUNITY
End: 2018-09-17

## 2018-05-14 RX ORDER — DOXYCYCLINE 100 MG/1
100 TABLET ORAL 2 TIMES DAILY
Qty: 14 TAB | Refills: 0 | Status: SHIPPED | OUTPATIENT
Start: 2018-05-14 | End: 2018-05-21

## 2018-05-14 RX ORDER — HYDROMORPHONE HYDROCHLORIDE 2 MG/1
1-2 TABLET ORAL
Qty: 30 TAB | Refills: 0 | Status: SHIPPED | OUTPATIENT
Start: 2018-05-14 | End: 2018-09-17

## 2018-05-14 NOTE — MR AVS SNAPSHOT
[address], [address], Suite [place] [address] [facility] 
[phone] Patient: Lars Vital MRN: [id number] QQF:[id number] Visit Information Date & Time Provider Department Dept. Phone Encounter #  
 5/14/2018  3:20 PM Kinga Pierre MD [facility] [id number] Your Appointments 5/29/2018 10:20 AM  
ESTABLISHED PATIENT with Kinga Pierre MD  
[facility]. Moshe De [facility] ([facility]) Appt Note: I&D [address]  
 [address] [facility], [address] [facility] [address] [id number]  
[address], [address], [address] [facility] [address] [address]  
  
    
 9/17/2018  8:30 AM  
ROUTINE CARE with Jazz Rutherford [address] [place] [address] Appt Note: 4 month follow up  
 [facility] [address] [id number]  
[address] [facility] [address] Upcoming Health Maintenance Date Due  
 EYE EXAM RETINAL OR DILATED Q1 7/14/2017 GLAUCOMA SCREENING Q2Y 7/14/2018 FOOT EXAM Q1 7/10/2018 Influenza Age 5 to Adult 8/1/2018 HEMOGLOBIN A1C Q6M 8/12/2018 MEDICARE YEARLY EXAM 11/11/2018 MICROALBUMIN Q1 2/12/2019 LIPID PANEL Q1 2/12/2019 DTaP/Tdap/Td series (2 - Td) 9/7/2022 COLONOSCOPY 7/14/2027 Allergies as of 5/14/2018  Review Complete On: 5/14/2018 By: Kinga Pierre MD  
  
 Severity Noted Reaction Type Reactions Celexa [Citalopram]  03/19/2010   Side Effect Other (comments) agitation Demerol [Meperidine]  03/01/2010    Unknown (comments) Mold Extracts  03/01/2010    Unknown (comments) Niaspan [Niacin]  03/19/2010   Intolerance Nausea Only Other Medication  03/01/2010    Unknown (comments)  Grass smut, standardized mite mix, weed mix, dogs, white pam, white hickory, red mulberry, barley, cottonseed, malt, rice, rye, black pepper, navy bean Percocet [Oxycodone-acetaminophen]  03/01/2010    Unknown (comments) Ragweed  10/24/2013    Runny Nose  
 Sulfa (Sulfonamide Antibiotics)  03/19/2010   Side Effect Nausea Only Current Immunizations  Reviewed on 10/12/2016 Name Date Hepatitis A Vaccine 6/10/1997 Influenza High Dose Vaccine PF 9/28/2017 Influenza Vaccine 10/6/2014, 10/9/2013 Influenza Vaccine (Quad) PF 9/29/2016, 10/12/2015 Influenza Vaccine Split 10/25/2011 Influenza Vaccine Whole 10/5/2009 TD Vaccine 7/20/2006 TDAP Vaccine 9/7/2012 ZZZ-RETIRED (DO NOT USE) Pneumococcal Vaccine (Unspecified Type) 9/7/2012, 11/1/2006 Zoster 6/1/2008 Not reviewed this visit You Were Diagnosed With   
  
 Codes Comments Abscess    -  Primary ICD-10-CM: L02.91 
ICD-9-CM: 284. 9 Vitals BP Pulse Resp Height(growth percentile) Weight(growth percentile) SpO2  
 163/76 (BP 1 Location: Left arm, BP Patient Position: Sitting) 60 20 5' 10\" (1.778 m) 194 lb 3.2 oz (88.1 kg) 94% BMI Smoking Status 27.86 kg/m2 Former Smoker BMI and BSA Data Body Mass Index Body Surface Area  
 27.86 kg/m 2 2.09 m 2 Preferred Pharmacy Pharmacy Name Phone [facility] [address] [phone] Your Updated Medication List  
  
   
This list is accurate as of 5/14/18  3:48 PM.  Always use your most recent med list.  
  
  
  
  
 acetaminophen 500 mg tablet Commonly known as:  TYLENOL Take 1,000 mg by mouth every six (6) hours as needed for Pain or Fever. * albuterol 90 mcg/actuation inhaler Commonly known as:  PROVENTIL HFA, VENTOLIN HFA, PROAIR HFA Take 2 Puffs by inhalation every four (4) hours as needed for Wheezing. * albuterol 1.25 mg/3 mL Nebu Commonly known as:  ACCUNEB  
3 mL by Nebulization route every six (6) hours as needed. atorvastatin 80 mg tablet Commonly known as:  LIPITOR Take 80 mg by mouth daily. Azelastine 0.15 % (205.5 mcg) nasal spray Commonly known as:  ASTEPRO  
  
 buPROPion  mg tablet Commonly known as:  Darren Gretel Take 1 Tab by mouth every morning. CULTURELLE PO Take 1 Cap by mouth daily. doxycycline 100 mg tablet Commonly known as:  ADOXA Take 1 Tab by mouth two (2) times a day for 7 days. finasteride 5 mg tablet Commonly known as:  PROSCAR  
TAKE 1 TABLET EVERY DAY  
  
 glucosamine-chondroitin 750-600 mg Tab Take 1 Tab by mouth two (2) times a day. glucose blood VI test strips strip Commonly known as:  FREESTYLE LITE STRIPS Check blood sugar 3 times weekly. GRAPE SEED PO Take 650 mg by mouth daily. HYDROmorphone 2 mg tablet Commonly known as:  DILAUDID Take 0.5-1 Tabs by mouth every four (4) hours as needed for Pain. Max Daily Amount: 12 mg.  
  
 ibuprofen 200 mg tablet Commonly known as:  MOTRIN Take 200 mg by mouth daily as needed for Pain. * ketoconazole 2 % shampoo Commonly known as:  NIZORAL Apply  to affected area two (2) times a week. * ketoconazole 2 % topical cream  
Commonly known as:  NIZORAL Apply  to affected area daily. Uses on skin lesions every couple of days  
  
 lancets 28 gauge Misc Commonly known as:  FREESTYLE LANCETS Check blood sugar 3 times weekly. lisinopril 10 mg tablet Commonly known as:  Kenna Marietta Take  by mouth daily. metoprolol tartrate 25 mg tablet Commonly known as:  LOPRESSOR Take 25 mg by mouth two (2) times a day. PT INSTRUCTED TO TAKE AM DOS PER ANESTHESIA PROTOCOL  
  
 montelukast 10 mg tablet Commonly known as:  SINGULAIR Take 1 Tab by mouth daily (after dinner). NEBULIZER  
by Does Not Apply route. nitroglycerin 0.4 mg SL tablet Commonly known as:  Daniel Riggers 1 Tab by SubLINGual route as needed. NORVASC 5 mg tablet Generic drug:  amLODIPine Take 5 mg by mouth daily. omeprazole 40 mg capsule Commonly known as:  PRILOSEC  
TAKE ONE CAPSULE BY MOUTH TWICE A DAY One-A-Day Mens tablet Generic drug:  multivitamin Take 1 Tab by mouth daily. SYMBICORT IN Take  by inhalation. tamsulosin 0.4 mg capsule Commonly known as:  FLOMAX TAKE 1 CAPSULE EVERY NIGHT  
  
 varicella-zoster recombinant (PF) 50 mcg/0.5 mL Susr injection Commonly known as:  SHINGRIX (PF)  
0.5 mL by IntraMUSCular route once for 1 dose. VESIcare 5 mg tablet Generic drug:  solifenacin Take 5 mg by mouth daily. ZETIA 10 mg tablet Generic drug:  ezetimibe Take 10 mg by mouth every morning. * Notice: This list has 4 medication(s) that are the same as other medications prescribed for you. Read the directions carefully, and ask your doctor or other care provider to review them with you. Prescriptions Printed Refills HYDROmorphone (DILAUDID) 2 mg tablet 0 Sig: Take 0.5-1 Tabs by mouth every four (4) hours as needed for Pain. Max Daily Amount: 12 mg. Class: Print Route: Oral  
  
Introducing \[facility] [place] [facility]\"" & HEALTH SERVICES! Dear Morgan Rodriges: Thank you for requesting a [employer] account. Our records indicate that you already have an active [employer] account. You can access your account anytime at [email or web address] Did you know that you can access your hospital and ER discharge instructions at any time in [employer]? You can also review all of your test results from your hospital stay or ER visit. Additional Information If you have questions, please visit the Frequently Asked Questions section of the [employer] website at [email or web address] Remember, [employer] is NOT to be used for urgent needs. For medical emergencies, dial 911. Now available from your iPhone and Android! Please provide this summary of care documentation to your next provider. Your primary care clinician is listed as Nessa Velez. If you have any questions after today's visit, please call [phone].

## 2018-05-14 NOTE — PROGRESS NOTES
HISTORY OF PRESENT ILLNESS  Wilda Alejandre is a 70 y.o. male. HPI   Last here 2/13/18. Pt is here to f/u on chronic conditions.   Pt presents with his daughter who provides some of the hx.      BP is 127/71  BPs were 200s/100s at home  BP was 137/82 with Dr. Digna Montana (pulm)  Advised him to bring in his cuff to check for accuracy   Pt was started on norvasc 5mg and then, 10mg daily per cardio  However, this med caused edema   Pt will be stopping norvasc and starting lisinopril 10mg daily per cardio    His metoprolol was increased to 50mg BID per cardio  Recall norvasc caused edema       Pt has not been monitoring his BS at home  His DM is diet-controlled       Wt is stable x lov   Discussed diet and w/l       Reviewed last labs 2/18  Will get labs today     Pt c/o painful red boil on his lower back x 2 days - enlarging   Pt applied ointment and epsom salts to this area with no improvement to sx  Pt has an appt with a derm for 5/21/18  Provided referral for a surg  Ordered doxycycline BID for 1 week    Lov, pt c/o being irritable and short-tempered, and having a lack of motivation   Lov, I ordered zoloft 50mg daily  He took this for a month or 2  Pt thought that he had abd pain on this med so he stopped it  I changed zoloft to wellbutrin over the phone which he requested he thought he took this in the past with no problem   However, he never started this med  Today, he states that he still has a depressed mood  He will consider starting this med once his BP is controled  Recall celexa caused irritability in the past. Pt tolerated wellbutrin in the past.    Pt follows with Dr. Royer Maria (derm)  Pt had a basal cell carcinoma removed from his R arm  Next visit scheduled for 5/21/18      Pt follows with Dr. Aparna Sandoval (pulm) every quarter of a year  Reviewed notes 3/14/18: had been in ER d/t abd pain, increasing esprit to 3 tabs, gave symbicort samples   Pt is no longer taking esprit d/t abd pain   Continues on symbicort daily and albuterol prn (not daily)  Overall, his breathing is stable   Recall esprit caused GI upset       Pt follows with Dr. Daya Bazan ([facility]) annually in August  Last visit was 8/17  Continues on proscar, vesicare and flomax per uro      Pt follows with Dr. Barb De (cardio)  Reviewed notes 3/9/18: metoprolol increased to 50mg BID, BPS were 140-150s/90, added norvasc  norvasc casued swelling  Next visit scheduled for 6/14/18      Pt follows with Dr. Santacruz (allergy)  Blue Garvey for allergies - controlled       Pt follows with Dr. Devika Golden (vasc surg)   Last visit was 4/12/17  Next visit scheduled for 7/18    Pt follows with Dr. Ann Parks (GI)  Reviewed notes 3/12/18: heartburn and abd pain, continue prilosec BID, f/u in 4 months, will change to dexilant prn   Continues on prilosec 40mg BID for reflux, which works well for the most part  Pt was started on sucralfate QID  However, he still has some GI upset  If his sx progress, he will be started on dexilant per GI     Continues claritin for allergies, which works well      Continues on lipitor 80mg, max dose, daily for cholesterol   He also takes zetia daily     Pt had a zostavax vaccine in 2008  Discussed shingrix being a dead vaccine   Discussed it being more effective than zostavax (92% effective)  Discussed it being a 2 part series  Ordered 05/14/18        PREVENTIVE:    Colonoscopy: 7/14/17,  repeat in 5 years, Dr. Ann Parks  EGD: 7/14/17, Dr. Ann Parks   PSA: per Dr. Daya Bazan  AAA screen: CT 7/12 - 3.2mm, Devika Golden follows, repeat in 10/13 and 2/15   Tdap: will check cost at pharmacy, advised to get at his pharmacy   Pneumovax: 9/07/2012  [id number]: 10/13/2015  Zostavax: 6/01/2008  Shingrix: ordered 05/14/18   Flu shot: 9/17  Microalbumin: 2/18   Foot exam: 7/10/17  A1c: 10/13 6.1, 1/14 5.8, 5/14 5.9, 10/14 6.1, 2/16 6.2, 6/16 6.1, 4/17 6.8, 7/17 6.2, 11/17 6.0, 2/18 6.3  Eye exam: [facility] Nasreen Rae [facility], 11/9/17, some cataracts, will get notes for review, appt pending for   Lipids:  LDL 65  EK/10/17   Hepatitis C screen: , negative    Patient Active Problem List    Diagnosis Date Noted    Pneumonia 2016    Hyperglycemia 2016    ACP (advance care planning) 10/12/2015    ILD (interstitial lung disease) ([facility] 2014    Interstitial pulmonary fibrosis ([facility] 10/09/2013    HTN (hypertension) 10/09/2013    BPH (benign prostatic hyperplasia) 10/09/2013    A-fib ([facility] 10/09/2013    AAA (abdominal aortic aneurysm) ([facility] 10/09/2013    BPH (benign prostatic hypertrophy)     GERD (gastroesophageal reflux disease)     CAD (coronary artery disease)     S/P CABG (coronary artery bypass graft) 2011    Degenerative arthritis of thumb 2011    Testosterone deficiency 2011    Hypercholesterolemia 2010    Chronic bronchitis ([facility] 2010    Asthma 2010    Depression 2010    Arthritis shoulders and spine 2010     Current Outpatient Prescriptions   Medication Sig Dispense Refill    omeprazole (PRILOSEC) 40 mg capsule TAKE ONE CAPSULE BY MOUTH TWICE A  Cap 0    tamsulosin (FLOMAX) 0.4 mg capsule TAKE 1 CAPSULE EVERY NIGHT 90 Cap 3    ketoconazole (NIZORAL) 2 % topical cream Apply  to affected area daily. Uses on skin lesions every couple of days 15 g 1    glucose blood VI test strips (FREESTYLE LITE STRIPS) strip Check blood sugar 3 times weekly. 50 Strip 2    lancets (FREESTYLE LANCETS) 28 gauge misc Check blood sugar 3 times weekly. 50 Lancet 2    BUDESONIDE/FORMOTEROL FUMARATE (SYMBICORT IN) Take  by inhalation.  Azelastine (ASTEPRO) 0.15 % (205.5 mcg) nasal spray       GLUCOSAMINE HCL/CHONDR MIRANDA A NA (GLUCOSAMINE-CHONDROITIN) 750-600 mg tab Take 1 Tab by mouth two (2) times a day.  ketoconazole (NIZORAL) 2 % shampoo Apply  to affected area two (2) times a week.  0    VESICARE 5 mg tablet Take 5 mg by mouth daily.       albuterol (ACCUNEB) 1.25 mg/3 mL nebu 3 mL by Nebulization route every six (6) hours as needed. 1 Each 1    GRAPE SEED EXTRACT (GRAPE SEED PO) Take 650 mg by mouth daily.  finasteride (PROSCAR) 5 mg tablet TAKE 1 TABLET EVERY DAY 90 tablet 2    montelukast (SINGULAIR) 10 mg tablet Take 1 Tab by mouth daily (after dinner). 90 Tab 3    NEBULIZER by Does Not Apply route.  nitroglycerin (NITROQUICK) 0.4 mg SL tablet 1 Tab by SubLINGual route as needed. 25 Tab prn    albuterol (PROVENTIL HFA, VENTOLIN HFA) 90 mcg/Actuation inhaler Take 2 Puffs by inhalation every four (4) hours as needed for Wheezing. 1 Inhaler 2    LACTOBACILLUS RHAMNOSUS (CULTURELLE PO) Take 1 Cap by mouth daily.  multivitamin (ONE-A-DAY MENS) tablet Take 1 Tab by mouth daily.  metoprolol (LOPRESSOR) 25 mg tablet Take 25 mg by mouth two (2) times a day. PT INSTRUCTED TO TAKE AM DOS PER ANESTHESIA PROTOCOL      ezetimibe (ZETIA) 10 mg tablet Take 10 mg by mouth every morning.  buPROPion XL (WELLBUTRIN XL) 150 mg tablet Take 1 Tab by mouth every morning. 30 Tab 1    ESBRIET 267 mg cap capsule       ibuprofen (MOTRIN) 200 mg tablet Take 200 mg by mouth daily as needed for Pain.  acetaminophen (TYLENOL) 500 mg tablet Take 1,000 mg by mouth every six (6) hours as needed for Pain or Fever.  atorvastatin (LIPITOR) 80 mg tablet Take 80 mg by mouth daily.        Past Surgical History:   Procedure Laterality Date    CABG, ARTERIAL, THREE  10/2003    CARDIAC SURG PROCEDURE UNLIST  10/2003    CABGx3 vessel    COLONOSCOPY N/A 7/14/2017    COL[facility]OSCOPY performed by [facility] at [facility] ENDOSCOPY    ENDOSCOPY, [facility]    HX APPENDECTOMY      HX CHOLECYSTECTOMY  11/22/2008    laparoscopic    HX COLECTOMY  12/29/07    sigmoid colectomy for volvulus in [address] HX COLONOSCOPY  06/17/2014    Repeat in 3 years    HX HEENT  while in 20's    submucus resection sinus    [address]    right    [facility]  June 20, 2011 right hand, thumb surgery, plate inserted    HX ORTHOPAEDIC  1946    broken nose    HX ORTHOPAEDIC  1962    sub mucous resection    HX ORTHOPAEDIC  02/05/15    right hand surgery, ligament repair    HX OTHER SURGICAL      HX OTHER SURGICAL      hemorrhoids    HX OTHER SURGICAL  8/2015    basal cell carcinoma     HX SEPTOPLASTY  1962    s/p broken nose 1946    HX TONSIL AND ADENOIDECTOMY  1949    HX TONSILLECTOMY      HX UROLOGICAL  1990    vasectomy      Lab Results  Component Value Date/Time   WBC 8.1 03/05/2018 01:54 PM   HGB 13.6 03/05/2018 01:54 PM   HCT 40.6 03/05/2018 01:54 PM   PLATELET 937 01/94/7211 01:54 PM   MCV 96.4 03/05/2018 01:54 PM     Lab Results  Component Value Date/Time   Cholesterol, total 132 02/12/2018 08:25 AM   HDL Cholesterol 46 02/12/2018 08:25 AM   LDL, calculated 65 02/12/2018 08:25 AM   Triglyceride 105 02/12/2018 08:25 AM   CHOL/HDL Ratio 3.4 09/29/2010 08:28 AM     Lab Results  Component Value Date/Time   GFR est non-AA >60 03/05/2018 01:54 PM   GFR est AA >60 03/05/2018 01:54 PM   Creatinine 1.10 03/05/2018 01:54 PM   BUN 15 03/05/2018 01:54 PM   Sodium 139 03/05/2018 01:54 PM   Potassium 4.0 03/05/2018 01:54 PM   Chloride 105 03/05/2018 01:54 PM   CO2 29 03/05/2018 01:54 PM   Magnesium 2.0 03/01/2016 04:23 AM        Review of Systems   Respiratory: Negative for shortness of breath. Cardiovascular: Negative for chest pain. Psychiatric/Behavioral: Positive for depression. Physical Exam   Constitutional: He is oriented to person, place, and time. He appears well-developed and well-nourished. No distress. HENT:   Head: Normocephalic and atraumatic. Mouth/Throat: Oropharynx is clear and moist. No oropharyngeal exudate. Eyes: Conjunctivae and EOM are normal. Right eye exhibits no discharge. Left eye exhibits no discharge. Neck: Normal range of motion. Neck supple. No thyromegaly present. Cardiovascular: Normal rate, regular rhythm and normal heart sounds. Exam reveals no gallop and no friction rub. No murmur heard. Pulmonary/Chest: Effort normal. No respiratory distress. He has no wheezes. He has rales (Chronic). He exhibits no tenderness. Musculoskeletal: Normal range of motion. He exhibits edema (1+ pitting BLE). He exhibits no tenderness or deformity. Lymphadenopathy:     He has no cervical adenopathy. Neurological: He is alert and oriented to person, place, and time. He has normal reflexes. He exhibits normal muscle tone. Coordination normal.   Skin: Skin is warm and dry. No rash noted. He is not diaphoretic. No erythema. No pallor. 1.5x1\" diameter, indurated area   Psychiatric: He has a normal mood and affect. His behavior is normal.       ASSESSMENT and PLAN    ICD-10-CM ICD-9-CM    1. Essential hypertension    Well-controled on current regimen, however norvasc is causing edema so he will be changing to lisinopril 10mg daily, continues metoprolol 50mg BID   I10 401.9    2. Abdominal aortic aneurysm (AAA) without rupture [facility])    Has f/u with Dr. Tushar Munroe pending for 7/18   I71.4 441.4    3. Interstitial pulmonary fibrosis (HCC)    Chronic condition, no longer on esprit d/t SE, continues on symbicort, breathing is stable   J84.10 515    4. Atrial fibrillation, unspecified type ([facility]    UTD with Dr. Erika Mckeon, rate controled on metoprolol, remains in nsr, continues on ASA   I48.91 427.31    5. Reactive depression    Had potential SE with zoloft so he stopped the med, still with some depressive features, apparently he had been on wellbutrin in the past and tolerated it well, this has been called to his pharm, for right now he wants to avoid behavioral meds, if depressive mood progresses he will continue starting the med   F32.9 300.4    6. ILD (interstitial lung disease) ([facility]    See above   J84.9 515    7. IFG (impaired fasting glucose)    Check a1c today   R73.01 790.21    8.  Gastroesophageal reflux disease without esophagitis    Pt is currently on prilosec BID and sucralfate was recently added QID by Dr. Rikki Gutierrez, still with some GI upset, if sx do not continue to resolve he will change to dexilant next   K21.9 530.81    9. Abscess    Will set up with surg for I&D, will treat with doxycyline until appt occurs, pt is allergic to sulfa drugs    L02.91 682.9 REFERRAL TO GENERAL SURGERY        Scribed by [address], as dictated by Dr. Emeka Boyce. Current diagnosis and concerns discussed with pt at length. Pt understands risks and benefits or current treatment plan and medications, and accepts the treatment and medication with any possible risks. Pt asks appropriate questions, which were answered. Pt was instructed to call with any concerns or problems. This note will not be viewable in [address]

## 2018-05-14 NOTE — PROGRESS NOTES
Surgery Consult:  Back abscess  Requesting physician:  Dr. Srinath Esquivel    Subjective:   Patient 70 y.o.  male presents with a small lump in his right lower back for 3 weeks. Patient reportedly squeeze it but didn't get anything out. Since then, patient noted painful erythematous swelling over the area. Last 2-3 days has been the worst.  No F/C/S. No drainage. Saw PCP today and was given Rx for doxycyline which he hasn't filled it yet.       Past Medical & Surgical History:  Past Medical History:   Diagnosis Date    Aneurysm ([facility]     small AAA    Arthritis shoulders and spine 3/1/2010    Asthma 3/1/2010    Dr. Marti Heath Dr. Call    BPH (benign prostatic hypertrophy)     Broken nose 3/1/2010    Bronchitis 02/2017    CAD (coronary artery disease)     Dr. Kennedi Slaughter [facility]) 2000    basal cell chest    Chronic bronchitis ([facility] 3/1/2010    Contact dermatitis and other eczema, due to unspecified cause     Dr. Stacy Glasgow Depression 3/1/2010    GERD (gastroesophageal reflux disease)     Hypercholesterolemia 9/29/2010    Hypertension     IPF (idiopathic pulmonary fibrosis) ([facility] 2007    Pneumonia 3/1/2010    Testosterone deficiency 3/24/2011      Past Surgical History:   Procedure Laterality Date    CABG, ARTERIAL, THREE  10/2003    CARDIAC SURG PROCEDURE UNLIST  10/2003    CABGx3 vessel    COLONOSCOPY N/A 7/14/2017    COL[facility]y [facility] ENDOSCOPY    ENDOSCOPY, Renetta Smaller      Dr. Rock Brown HX APPENDECTOMY      HX CHOLECYSTECTOMY  11/22/2008    laparoscopic    HX COLECTOMY  12/29/07    sigmoid colectomy for volvulus in [address] HX COLONOSCOPY  06/17/2014    Repeat in 3 years    HX HEENT  while in 20's  1948    submucus resection sinus//resection sub mucus 1962    [address]    right    HX ORTHOPAEDIC  June 20, 2011    right hand, thumb surgery, plate inserted    HX ORTHOPAEDIC  1946    broken nose    HX ORTHOPAEDIC  1962    sub mucous resection    HX ORTHOPAEDIC  02/05/15    right hand surgery, ligament repair    HX OTHER SURGICAL      HX OTHER SURGICAL      hemorrhoids    HX OTHER SURGICAL  8/2015    basal cell carcinoma     HX SEPTOPLASTY  1962    s/p broken nose 1946    HX TONSIL AND ADENOIDECTOMY  1949    HX TONSILLECTOMY  1949    HX UROLOGICAL  1990    vasectomy       Social History:  Social History     Social History    Marital status:      Spouse name: N/A    Number of children: N/A    Years of education: N/A     Occupational History    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     Retired HR       Social History Main Topics    Smoking status: Former Smoker     Packs/day: 1.00     Years: 15.00     Quit date: 1/1/1970    Smokeless tobacco: Never Used    Alcohol use 1.2 oz/week     2 Glasses of wine per week      Comment: rarely    Drug use: No    Sexual activity: Yes     Partners: Female     Other Topics Concern    Caffeine Concern No     2 servings a day    Exercise No     Social History Narrative        Family History:  Family History   Problem Relation Age of Onset    Cancer Mother      spine or abdomen    Stroke Father     Heart Attack Father      46s    Heart Disease Father     Cancer Brother      CLL, lung    Diabetes Brother     Cancer Brother      lung    Cancer Brother      lung    Cancer Brother      lung    Heart Disease Brother     Heart Disease Brother 58     CABG    Other Brother      shingles    Lung Disease Sister     High Cholesterol Son     Lung Disease Daughter      Overactive Airways    No Known Problems Daughter         Medications:  Current Outpatient Prescriptions   Medication Sig    lisinopril (PRINIVIL, ZESTRIL) 10 mg tablet Take  by mouth daily.  varicella-zoster recombinant, PF, (SHINGRIX, PF,) 50 mcg/0.5 mL susr injection 0.5 mL by IntraMUSCular route once for 1 dose.     doxycycline (ADOXA) 100 mg tablet Take 1 Tab by mouth two (2) times a day for 7 days.    HYDROmorphone (DILAUDID) 2 mg tablet Take 0.5-1 Tabs by mouth every four (4) hours as needed for Pain. Max Daily Amount: 12 mg.    omeprazole (PRILOSEC) 40 mg capsule TAKE ONE CAPSULE BY MOUTH TWICE A DAY    buPROPion XL (WELLBUTRIN XL) 150 mg tablet Take 1 Tab by mouth every morning.  tamsulosin (FLOMAX) 0.4 mg capsule TAKE 1 CAPSULE EVERY NIGHT    ketoconazole (NIZORAL) 2 % topical cream Apply  to affected area daily. Uses on skin lesions every couple of days    glucose blood VI test strips (FREESTYLE LITE STRIPS) strip Check blood sugar 3 times weekly.  lancets (FREESTYLE LANCETS) 28 gauge misc Check blood sugar 3 times weekly.  BUDESONIDE/FORMOTEROL FUMARATE (SYMBICORT IN) Take  by inhalation.  Azelastine (ASTEPRO) 0.15 % (205.5 mcg) nasal spray     ibuprofen (MOTRIN) 200 mg tablet Take 200 mg by mouth daily as needed for Pain.  GLUCOSAMINE HCL/CHONDR MIRANDA A NA (GLUCOSAMINE-CHONDROITIN) 750-600 mg tab Take 1 Tab by mouth two (2) times a day.  ketoconazole (NIZORAL) 2 % shampoo Apply  to affected area two (2) times a week.  VESICARE 5 mg tablet Take 5 mg by mouth daily.  albuterol (ACCUNEB) 1.25 mg/3 mL nebu 3 mL by Nebulization route every six (6) hours as needed.  GRAPE SEED EXTRACT (GRAPE SEED PO) Take 650 mg by mouth daily.  acetaminophen (TYLENOL) 500 mg tablet Take 1,000 mg by mouth every six (6) hours as needed for Pain or Fever.  finasteride (PROSCAR) 5 mg tablet TAKE 1 TABLET EVERY DAY    atorvastatin (LIPITOR) 80 mg tablet Take 80 mg by mouth daily.  montelukast (SINGULAIR) 10 mg tablet Take 1 Tab by mouth daily (after dinner).  NEBULIZER by Does Not Apply route.  nitroglycerin (NITROQUICK) 0.4 mg SL tablet 1 Tab by SubLINGual route as needed.  albuterol (PROVENTIL HFA, VENTOLIN HFA) 90 mcg/Actuation inhaler Take 2 Puffs by inhalation every four (4) hours as needed for Wheezing.     LACTOBACILLUS RHAMNOSUS (CULTURELLE PO) Take 1 Cap by mouth daily.    multivitamin (ONE-A-DAY MENS) tablet Take 1 Tab by mouth daily.  metoprolol (LOPRESSOR) 25 mg tablet Take 25 mg by mouth two (2) times a day. PT INSTRUCTED TO TAKE AM DOS PER ANESTHESIA PROTOCOL    ezetimibe (ZETIA) 10 mg tablet Take 10 mg by mouth every morning.  amLODIPine (NORVASC) 5 mg tablet Take 5 mg by mouth daily. No current facility-administered medications for this visit. Allergies: Allergies   Allergen Reactions    Celexa [Citalopram] Other (comments)     agitation    Demerol [Meperidine] Unknown (comments)    Mold Extracts Unknown (comments)    Niaspan [Niacin] Nausea Only    Other Medication Unknown (comments)     Grass smut, standardized mite mix, weed mix, dogs, white pam, white hickory, red mulberry, barley, cottonseed, malt, rice, rye, black pepper, navy bean    Percocet [Oxycodone-Acetaminophen] Unknown (comments)    Ragweed Runny Nose    Sulfa (Sulfonamide Antibiotics) Nausea Only       Review of Systems  A comprehensive review of systems was negative except for that written in the HPI. Objective:     Exam:    Visit Vitals    /76 (BP 1 Location: Left arm, BP Patient Position: Sitting)    Pulse 60    Resp 20    Ht 5' 10\" (1.778 m)    Wt 88.1 kg (194 lb 3.2 oz)    SpO2 94%    BMI 27.86 kg/m2     General appearance: alert, cooperative, no distress, appears stated age  Lungs: clear to auscultation bilaterally  Heart: regular rate and rhythm  Skin: Skin color, texture, turgor normal. Right lower back with 3 x 4 cm erythematous swelling with induration and central fluctuance. No drainage. Neurologic: Grossly normal      Procedure Note:  I&D right lower back abscess    After informed consent and time out were performed, right lower back was prepped and draped in standard surgical fashion. Local was injected in the abscess with central fluctuance and an incision was made. Pus and waxy material were drainage. Purulent drainage was sent for culture. Abscess cavity was explored and it measured 2 x 2 cm. Abscess cavity was then irrigated with hydrogen peroxide followed by packing. Hemostasis was good. Dry dressing was then applied. Patient tolerated the procedure well.       Assessment:     Back abscess    Plan:     I&D done  Continue antibiotics  Check culture  Wound care daily  RTC in 2 weeks

## 2018-05-14 NOTE — MR AVS SNAPSHOT
Th[facility]in [place] [address] [facility] [address] [facility] 
[phone] Patient: Gladis Perez MRN: [id number] IIP:[id number] Visit Information Date & Time Provider Department Dept. Phone Encounter #  
 5/14/2018  1:45 PM Rosalinda Felix, [address] [place] [address],[address] [phone] [id number] Follow-up Instructions Return in about 4 months (around 9/14/2018). Upcoming Health Maintenance Date Due  
 EYE EXAM RETINAL OR DILATED Q1 7/14/2017 GLAUCOMA SCREENING Q2Y 7/14/2018 FOOT EXAM Q1 7/10/2018 Influenza Age 5 to Adult 8/1/2018 HEMOGLOBIN A1C Q6M 8/12/2018 MEDICARE YEARLY EXAM 11/11/2018 MICROALBUMIN Q1 2/12/2019 LIPID PANEL Q1 2/12/2019 DTaP/Tdap/Td series (2 - Td) 9/7/2022 COLONOSCOPY 7/14/2027 Allergies as of 5/14/2018  Review Complete On: 5/14/2018 By: Rosalinda Felix MD  
  
 Severity Noted Reaction Type Reactions Celexa [Citalopram]  03/19/2010   Side Effect Other (comments) agitation Demerol [Meperidine]  03/01/2010    Unknown (comments) Mold Extracts  03/01/2010    Unknown (comments) Niaspan [Niacin]  03/19/2010   Intolerance Nausea Only Other Medication  03/01/2010    Unknown (comments) Grass smut, standardized mite mix, weed mix, dogs, white pam, white hickory, red mulberry, barley, cottonseed, malt, rice, rye, black pepper, navy bean Percocet [Oxycodone-acetaminophen]  03/01/2010    Unknown (comments) Ragweed  10/24/2013    Runny Nose  
 Sulfa (Sulfonamide Antibiotics)  03/19/2010   Side Effect Nausea Only Current Immunizations  Reviewed on 10/12/2016 Name Date Hepatitis A Vaccine 6/10/1997 Influenza High Dose Vaccine PF 9/28/2017 Influenza Vaccine 10/6/2014, 10/9/2013 Influenza Vaccine (Quad) PF 9/29/2016, 10/12/2015 Influenza Vaccine Split 10/25/2011 Influenza Vaccine Whole 10/5/2009 TD Vaccine 7/20/2006 TDAP Vaccine 9/7/2012 ZZZ-RETIRED (DO NOT USE) Pneumococcal Vaccine (Unspecified Type) 9/7/2012, 11/1/2006 Zoster 6/1/2008 Not reviewed this visit You Were Diagnosed With   
  
 Codes Comments Essential hypertension    -  Primary ICD-10-CM: I10 
ICD-9-CM: 401.9 Abdominal aortic aneurysm (AAA) without rupture (HCC)     ICD-10-CM: I71.4 ICD-9-CM: 441.4 Interstitial pulmonary fibrosis (HCC)     ICD-10-CM: J84.10 ICD-9-CM: 733 Atrial fibrillation, unspecified type ([facility]     ICD-10-CM: I48.91 
ICD-9-CM: 427.31 Reactive depression     ICD-10-CM: F32.9 ICD-9-CM: 300.4 ILD (interstitial lung disease) ([facility]     ICD-10-CM: J84.9 ICD-9-CM: 580 IFG (impaired fasting glucose)     ICD-10-CM: R73.01 
ICD-9-CM: 790.21 Gastroesophageal reflux disease without esophagitis     ICD-10-CM: K21.9 ICD-9-CM: 530.81 Abscess     ICD-10-CM: L02.91 
ICD-9-CM: 462. 9 Vitals BP Pulse Temp Resp Height(growth percentile) Weight(growth percentile) 127/71 (BP 1 Location: Left arm, BP Patient Position: Sitting) 67 98.1 °F (36.7 °C) (Oral) 16 5' 10\" (1.778 m) 194 lb (88 kg) SpO2 BMI Smoking Status 95% 27.84 kg/m2 Former Smoker Vitals History BMI and BSA Data Body Mass Index Body Surface Area  
 27.84 kg/m 2 2.08 m 2 Preferred Pharmacy Pharmacy Name Phone [address] [place], [address] [place] [address]. [phone] Your Updated Medication List  
  
   
This list is accurate as of 5/14/18  1:58 PM.  Always use your most recent med list.  
  
  
  
  
 acetaminophen 500 mg tablet Commonly known as:  TYLENOL Take 1,000 mg by mouth every six (6) hours as needed for Pain or Fever. * albuterol 90 mcg/actuation inhaler Commonly known as:  PROVENTIL HFA, VENTOLIN HFA, PROAIR HFA Take 2 Puffs by inhalation every four (4) hours as needed for Wheezing. * albuterol 1.25 mg/3 mL Nebu Commonly known as:  Js Cerise  
 3 mL by Nebulization route every six (6) hours as needed. atorvastatin 80 mg tablet Commonly known as:  LIPITOR Take 80 mg by mouth daily. Azelastine 0.15 % (205.5 mcg) nasal spray Commonly known as:  ASTEPRO  
  
 buPROPion  mg tablet Commonly known as:  Roosvelt Blazing Take 1 Tab by mouth every morning. CULTURELLE PO Take 1 Cap by mouth daily. doxycycline 100 mg tablet Commonly known as:  ADOXA Take 1 Tab by mouth two (2) times a day for 7 days. finasteride 5 mg tablet Commonly known as:  PROSCAR  
TAKE 1 TABLET EVERY DAY  
  
 glucosamine-chondroitin 750-600 mg Tab Take 1 Tab by mouth two (2) times a day. glucose blood VI test strips strip Commonly known as:  FREESTYLE LITE STRIPS Check blood sugar 3 times weekly. GRAPE SEED PO Take 650 mg by mouth daily. ibuprofen 200 mg tablet Commonly known as:  MOTRIN Take 200 mg by mouth daily as needed for Pain. * ketoconazole 2 % shampoo Commonly known as:  NIZORAL Apply  to affected area two (2) times a week. * ketoconazole 2 % topical cream  
Commonly known as:  NIZORAL Apply  to affected area daily. Uses on skin lesions every couple of days  
  
 lancets 28 gauge Misc Commonly known as:  FREESTYLE LANCETS Check blood sugar 3 times weekly. lisinopril 10 mg tablet Commonly known as:  Heather Dowdy Take  by mouth daily. metoprolol tartrate 25 mg tablet Commonly known as:  LOPRESSOR Take 25 mg by mouth two (2) times a day. PT INSTRUCTED TO TAKE AM DOS PER ANESTHESIA PROTOCOL  
  
 montelukast 10 mg tablet Commonly known as:  SINGULAIR Take 1 Tab by mouth daily (after dinner). NEBULIZER  
by Does Not Apply route. nitroglycerin 0.4 mg SL tablet Commonly known as:  Danis Sallies 1 Tab by SubLINGual route as needed. NORVASC 5 mg tablet Generic drug:  amLODIPine Take 5 mg by mouth daily. omeprazole 40 mg capsule Commonly known as:  PRILOSEC  
TAKE ONE CAPSULE BY MOUTH TWICE A DAY One-A-Day Mens tablet Generic drug:  multivitamin Take 1 Tab by mouth daily. SYMBICORT IN Take  by inhalation. tamsulosin 0.4 mg capsule Commonly known as:  FLOMAX TAKE 1 CAPSULE EVERY NIGHT  
  
 varicella-zoster recombinant (PF) 50 mcg/0.5 mL Susr injection Commonly known as:  SHINGRIX (PF)  
0.5 mL by IntraMUSCular route once for 1 dose. VESIcare 5 mg tablet Generic drug:  solifenacin Take 5 mg by mouth daily. ZETIA 10 mg tablet Generic drug:  ezetimibe Take 10 mg by mouth every morning. * Notice: This list has 4 medication(s) that are the same as other medications prescribed for you. Read the directions carefully, and ask your doctor or other care provider to review them with you. Prescriptions Printed Refills  
 varicella-zoster recombinant, PF, (SHINGRIX, PF,) 50 mcg/0.5 mL susr injection 1 Si.5 mL by IntraMUSCular route once for 1 dose. Class: Print Route: IntraMUSCular Prescriptions Sent to Pharmacy Refills  
 doxycycline (ADOXA) 100 mg tablet 0 Sig: Take 1 Tab by mouth two (2) times a day for 7 days. Class: Normal  
 Pharmacy: [address], [address] [place] [address] RD.  #: [phone] Route: Oral  
  
We Performed the Following REFERRAL TO GENERAL SURGERY [REF27 Custom] Comments:  
 abscess Follow-up Instructions Return in about 4 months (around 2018). Referral Information Referral ID Referred By Referred To  
  
 [id number] Fareed Rdz MD   
   [address] [place] [address] [address] [place] [address] Phone: [phone] Fax: [phone] Visits Status Start Date End Date 1 New Request 18  If your referral has a status of pending review or denied, additional information will be sent to support the outcome of this decision. Patient Instructions Please get tdap at your local pharmacy You can start wellbutrin for depression if desired Lab today Introducing [facility]! Dear Feliciano Rowland: Thank you for requesting a [employer] account. Our records indicate that you already have an active [employer] account. You can access your account anytime at [email or web address] Did you know that you can access your hospital and ER discharge instructions at any time in [employer]? You can also review all of your test results from your hospital stay or ER visit. Additional Information If you have questions, please visit the Frequently Asked Questions section of the [employer] website at [email or web address] Remember, [employer] is NOT to be used for urgent needs. For medical emergencies, dial 911. Now available from your iPhone and Android! Please provide this summary of care documentation to your next provider. Your primary care clinician is listed as Cayla Leblanc. If you have any questions after today's visit, please call [phone].

## 2018-05-14 NOTE — PATIENT INSTRUCTIONS
Please get tdap at your local pharmacy     You can start wellbutrin for depression if desired    Lab today

## 2018-05-14 NOTE — PROGRESS NOTES
[facility]  OFFICE PROCEDURE PROGRESS NOTE        Chart reviewed for the following:   Humberto Cooney, have reviewed the History, Physical and updated the Allergic reactions for [address] performed immediately prior to start of procedure:   Humberto Cooney, have performed the following reviews on Jatin Doty prior to the start of the procedure:            * Patient was identified by name and date of birth   * Agreement on procedure being performed was verified  * Risks and Benefits explained to the patient  * Procedure site verified and marked as necessary  * Patient was positioned for comfort  * Consent was signed and verified     Time: 3:20 PM      Date of procedure: 5/14/2018    Procedure performed by:  Antonetta Brittle, MD    Provider assisted by:  MA    Patient assisted by: self    How tolerated by patient: tolerated the procedure well with no complications    Post Procedural Pain Scale: 0 - No Hurt    Comments: none

## 2018-05-15 LAB
ALBUMIN SERPL-MCNC: 3.9 G/DL (ref 3.5–4.8)
ALBUMIN/GLOB SERPL: 1.3 {RATIO} (ref 1.2–2.2)
ALP SERPL-CCNC: 85 IU/L (ref 39–117)
ALT SERPL-CCNC: 20 IU/L (ref 0–44)
AST SERPL-CCNC: 21 IU/L (ref 0–40)
BILIRUB SERPL-MCNC: 0.9 MG/DL (ref 0–1.2)
BUN SERPL-MCNC: 18 MG/DL (ref 8–27)
BUN/CREAT SERPL: 16 (ref 10–24)
CALCIUM SERPL-MCNC: 9.6 MG/DL (ref 8.6–10.2)
CHLORIDE SERPL-SCNC: 103 MMOL/L (ref 96–106)
CO2 SERPL-SCNC: 27 MMOL/L (ref 18–29)
CREAT SERPL-MCNC: 1.16 MG/DL (ref 0.76–1.27)
EST. AVERAGE GLUCOSE BLD GHB EST-MCNC: 131 MG/DL
GFR SERPLBLD CREATININE-BSD FMLA CKD-EPI: 61 ML/MIN/1.73
GFR SERPLBLD CREATININE-BSD FMLA CKD-EPI: 70 ML/MIN/1.73
GLOBULIN SER CALC-MCNC: 2.9 G/DL (ref 1.5–4.5)
GLUCOSE SERPL-MCNC: 87 MG/DL (ref 65–99)
HBA1C MFR BLD: 6.2 % (ref 4.8–5.6)
POTASSIUM SERPL-SCNC: 5.2 MMOL/L (ref 3.5–5.2)
PROT SERPL-MCNC: 6.8 G/DL (ref 6–8.5)
SODIUM SERPL-SCNC: 140 MMOL/L (ref 134–144)

## 2018-05-18 LAB — BACTERIA SPEC AEROBE CULT: NORMAL

## 2018-05-24 ENCOUNTER — TELEPHONE (OUTPATIENT)
Dept: SURGERY | Age: 78
End: 2018-05-24

## 2018-05-24 NOTE — TELEPHONE ENCOUNTER
Patient called had questions on having some cottage cheese looking drainage. Instructed that was normal. Continue using saline to clean and packing once a day. Cover with dressing. Patient will call our office, if there is any change or any concerns.

## 2018-05-29 ENCOUNTER — OFFICE VISIT (OUTPATIENT)
Dept: SURGERY | Age: 78
End: 2018-05-29

## 2018-05-29 VITALS
BODY MASS INDEX: 27.11 KG/M2 | TEMPERATURE: 98.8 F | OXYGEN SATURATION: 95 % | WEIGHT: 189.4 LBS | SYSTOLIC BLOOD PRESSURE: 131 MMHG | HEIGHT: 70 IN | DIASTOLIC BLOOD PRESSURE: 81 MMHG | HEART RATE: 67 BPM | RESPIRATION RATE: 16 BRPM

## 2018-05-29 DIAGNOSIS — L02.91 ABSCESS: Primary | ICD-10-CM

## 2018-05-29 RX ORDER — BUDESONIDE AND FORMOTEROL FUMARATE DIHYDRATE 160; 4.5 UG/1; UG/1
2 AEROSOL RESPIRATORY (INHALATION) 2 TIMES DAILY
COMMUNITY

## 2018-05-29 RX ORDER — SUCRALFATE 1 G/1
1 TABLET ORAL AS NEEDED
COMMUNITY
Start: 2018-05-05

## 2018-05-29 NOTE — PROGRESS NOTES
Chief Complaint   Patient presents with    Surgical Follow-up     Incision and drain 5/14/18. 1. Have you been to the ER, urgent care clinic since your last visit? Hospitalized since your last visit? No    2. Have you seen or consulted any other health care providers outside of the [address] [place] [address] since your last visit? Include any pap smears or colon screening.  No

## 2018-05-29 NOTE — PROGRESS NOTES
Patient is here for follow-up. Patient underwent I&D back abscess on 5/14/18. Doing well. Wound has closed and hasn't been packing for 2 days. PE:  Visit Vitals    /81 (BP 1 Location: Left arm, BP Patient Position: Sitting)    Pulse 67    Temp 98.8 °F (37.1 °C) (Oral)    Resp 16    Ht 5' 10\" (1.778 m)    Wt 85.9 kg (189 lb 6.4 oz)    SpO2 95%    BMI 27.18 kg/m2     Alert. NAD. Skin:  Right lower back wound well healed. Small 0.5 x 0.5 cm cyst just inferior to the well healed I&D site. Assessment:    s/p  I&D back abscess  Back cyst    Plan:  -Possible excision vs observation discussed with the patient.   Possible recurrence also discussed.  -Observation for now, but instructed to RTC if he develops recurrent symptoms

## 2018-06-14 RX ORDER — OMEPRAZOLE 40 MG/1
CAPSULE, DELAYED RELEASE ORAL
Qty: 180 CAP | Refills: 0 | Status: SHIPPED | OUTPATIENT
Start: 2018-06-14 | End: 2018-10-11 | Stop reason: SDUPTHER

## 2018-06-20 ENCOUNTER — HOSPITAL ENCOUNTER (OUTPATIENT)
Dept: CT IMAGING | Age: 78
Discharge: HOME | End: 2018-06-20
Attending: SURGERY
Payer: MEDICARE

## 2018-06-20 DIAGNOSIS — I71.40 AAA (ABDOMINAL AORTIC ANEURYSM): ICD-10-CM

## 2018-06-20 PROCEDURE — 74011636320 HC RX REV CODE- 636/320: Performed by: SURGERY

## 2018-06-20 PROCEDURE — 74011250636 HC RX REV CODE- 250/636: Performed by: SURGERY

## 2018-06-20 PROCEDURE — 74174 CTA ABD&PLVS W/CONTRAST: CPT

## 2018-06-20 RX ORDER — SODIUM CHLORIDE 0.9 % (FLUSH) 0.9 %
10 SYRINGE (ML) INJECTION
Status: COMPLETED | OUTPATIENT
Start: 2018-06-20 | End: 2018-06-20

## 2018-06-20 RX ORDER — SODIUM CHLORIDE 9 MG/ML
50 INJECTION, SOLUTION INTRAVENOUS
Status: COMPLETED | OUTPATIENT
Start: 2018-06-20 | End: 2018-06-20

## 2018-06-20 RX ADMIN — Medication 10 ML: at 13:30

## 2018-06-20 RX ADMIN — IOPAMIDOL 100 ML: 755 INJECTION, SOLUTION INTRAVENOUS at 13:30

## 2018-06-20 RX ADMIN — SODIUM CHLORIDE 50 ML/HR: 900 INJECTION, SOLUTION INTRAVENOUS at 13:30

## 2018-07-23 ENCOUNTER — TELEPHONE (OUTPATIENT)
Dept: INTERNAL MEDICINE CLINIC | Age: 78
End: 2018-07-23

## 2018-07-23 NOTE — TELEPHONE ENCOUNTER
[facility](625) [id number]    Pt stated, he was given the written Rx for his 2nd Shingle vaccine in May, 2018.   Pt would like a call back advising which pharmacy filled his first Shingle vaccine.  Pt stated, he spoke with Alex Barragan this morning who advised there was no record.        Message received & copied from [facility]

## 2018-07-24 NOTE — TELEPHONE ENCOUNTER
Called, spoke to pt. Two pt identifiers confirmed. Pt informed that no shingles vaccine indicated complete. Pt states he has the Shingrix but has not completed that. Pt informed Shingrix is the new 2 step shingles vaccine he can have done at the pharmacy with his paper rx. Pt states bp is still elevated (mostly in morning). Pt inquiring on if any previous labs Endo system that would affect his BP. Pt has reports of recent readings:  173/97 95 @ 2000  171/90 69 @ 1720  126/67 @ 1000  Pt states yesterday 137/72 midday. 170/92 at night-3hrs after taking metoprolol and valsartan. Pt reports taking 50mg BID metoprolol and Valsartan 80mg daily at night. Pt states having watch sodium and caffeine intake in diet. Pt also does not report any discomfort that can be r/t elevated BP. Pt informed Dr. Bo Matias will be notified. Pt verbalized understanding of information discussed w/ no further questions at this time.

## 2018-07-25 RX ORDER — VALSARTAN 80 MG/1
80 TABLET ORAL DAILY
COMMUNITY
Start: 2018-07-07 | End: 2018-09-17

## 2018-07-25 NOTE — TELEPHONE ENCOUNTER
PT was returning Nurse Avie Anis call to the practice and no one answered.  Best contact number is [phone].        Message received & copied from [facility]

## 2018-07-25 NOTE — TELEPHONE ENCOUNTER
Called, spoke to pt. Two pt identifiers confirmed. Pt states that lisinopril was replaced by Dr. Rohit Porras (Card) for valsartan 80mg daily. Pt informed per Dr. Erin Cm to increase valsartan to 160mg. Pt specifically asking if there is any indication of his Endocrine system having any affects on making his BP more elevated; like labs. Pt is looking for the cause of BP being elevated. Pt understands as we age, BP is affected. Pt asking if he would need any labs done to check endo system. Pt reports last night's BP @ 2030-150/79. Noted, will inform PCP. Pt verbalized understanding of information discussed w/ no further questions at this time.

## 2018-07-25 NOTE — TELEPHONE ENCOUNTER
MD Pedro Warner LPN        Caller: Unspecified (2 days ago, 10:53 AM)                     I have that he is taking lisinopril 10mg NOT valsartan     Please verify bp meds and doses       MD Pedro Warner LPN        Caller: Unspecified (2 days ago, 10:53 AM)                     If he is taking valsartan 80mg and not lisinopril --see when this was changed     Would increase valsartan to 160mg (unless back ordered/recalled)--in which case would need to change to losartan 50mg

## 2018-07-26 NOTE — TELEPHONE ENCOUNTER
MD Baljeet Cebalols LPN        Caller: Unspecified (3 days ago, 10:53 AM)                     NO endocrine problem

## 2018-07-26 NOTE — TELEPHONE ENCOUNTER
Called, spoke to pt. Two pt identifiers confirmed. Pt informed per  that no endocrine problem related to blood pressure. Pt verbalized understanding of information discussed w/ no further questions at this time.

## 2018-09-12 ENCOUNTER — DOCUMENTATION ONLY (OUTPATIENT)
Dept: INTERNAL MEDICINE CLINIC | Age: 78
End: 2018-09-12

## 2018-09-12 NOTE — PROGRESS NOTES
Medicare Part B Preventive Services Limitations Recommendation Scheduled   Bone Mass Measurement  (age 72 & older, biennial) Requires diagnosis related to osteoporosis or estrogen deficiency. Biennial benefit unless patient has history of long-term glucocorticoid tx or baseline is needed because initial test was by other method N/A N/A   Cardiovascular Screening Blood Tests (every 5 years)  Total cholesterol, HDL, Triglycerides Order as a panel if possible Completed 2/2018      Recommended annually Due 2/2019   Colorectal Cancer Screening  -Fecal occult blood test (annual)  -Flexible sigmoidoscopy (5y)  -Screening colonoscopy (10y)  -Barium Enema    Completed 7/14/17 with Dr. Heather Johnson  Repeat in 5 years Due 7/2022   Counseling to Prevent Tobacco Use (up to 8 sessions per year)  - Counseling greater than 3 and up to 10 minutes  - Counseling greater than 10 minutes Patients must be asymptomatic of tobacco-related conditions to receive as preventive service N/A N/A   Diabetes Screening Tests (at least every 3 years, Medicare covers annually or at 6-month intervals for prediabetic patients)      Fasting blood sugar (FBS) or glucose tolerance test (GTT) Patient must be diagnosed with one of the following:  -Hypertension, Dyslipidemia, obesity, previous impaired FBS or GTT  Or any two of the following: overweight, FH of diabetes, age ? 72, history of gestational diabetes, birth of baby weighing more than 9 pounds Completed 5/2018 with A1C 6.2      Recommended every 3-6 months for pre-diabetics      Due 8/2018 - 11/2018   Diabetes Self-Management Training (DSMT) (no USPSTF recommendation) Requires referral by treating physician for patient with diabetes or renal disease. 10 hours of initial DSMT session of no less than 30 minutes each in a continuous 12-month period.  2 hours of follow-up DSMT in subsequent years.  N/A N/A   Glaucoma Screening (no USPSTF recommendation) Diabetes mellitus, family history,  American, age 48 or over,  American, age 72 or over Completed 11/2017      Recommended annually Due 11/2018   Human Immunodeficiency Virus (HIV) Screening (annually for increased risk patients)  HIV-1 and HIV-2 by EIA, JORGE, rapid antibody test, or oral mucosa transudate Patient must be at increased risk for HIV infection per USPSTF guidelines or pregnant.  Tests covered annually for patients at increased risk.  Pregnant patients may receive up to 3 test during pregnancy. N/A N/A   Medical Nutrition Therapy (MNT) (for diabetes or renal disease not recommended schedule) Requires referral by treating physician for patient with diabetes or renal disease.  Can be provided in same year as diabetes self-management training (DSMT), and CMS recommends medical nutrition therapy take place after DSMT.  Up to 3 hours for initial year and 2 hours in subsequent years. N/A  N/A   Prostate Cancer Screening (annually up to age 76)  - Digital rectal exam (PAUL)  - Prostate specific antigen (PSA) Annually (age 48 or over), PAUL not paid separately when covered E/M service is provided on same date Completed 1/2015 Due as directed by Dr. Anil Jimenez Vaccination (annually)    Completed 9/28/17      Recommended annually Due Fall 2018         Pneumococcal Vaccination (once after 72)    Pneumococcal 23 - 9/7/12        Prevnar 13 - 10/13/15      Both recommended once over the age of 72 Completed            Completed   Hepatitis B Vaccinations (if medium/high risk) Medium/high risk factors:  End-stage renal disease,  Hemophiliacs who received Factor VIII or IX concentrates, Clients of institutions for the mentally retarded, Persons who live in the same house as a HepB virus carrier, Homosexual men, Illicit injectable drug abusers.  N/A N/A   Ultrasound Screening for Abdominal Aortic Aneurysm (AAA) (once) Patient must be referred through IPPE and not have had a screening for abdominal aortic aneurysm before under Medicare.  Limited to patients who meet one of the following criteria:  - Men who are 73-68 years old and have smoked more than 100 cigarettes in their lifetime.  -Anyone with a FH of AAA  -Anyone recommended for screening by USPSTF Completed CT abd 6/2018 - 3.5cm Due as directed by Dr Rojo Goes

## 2018-09-17 ENCOUNTER — OFFICE VISIT (OUTPATIENT)
Dept: INTERNAL MEDICINE CLINIC | Age: 78
End: 2018-09-17

## 2018-09-17 VITALS
BODY MASS INDEX: 27.35 KG/M2 | SYSTOLIC BLOOD PRESSURE: 142 MMHG | HEART RATE: 74 BPM | TEMPERATURE: 98 F | RESPIRATION RATE: 16 BRPM | HEIGHT: 70 IN | OXYGEN SATURATION: 96 % | WEIGHT: 191 LBS | DIASTOLIC BLOOD PRESSURE: 75 MMHG

## 2018-09-17 DIAGNOSIS — E78.00 HYPERCHOLESTEROLEMIA: ICD-10-CM

## 2018-09-17 DIAGNOSIS — R73.9 HYPERGLYCEMIA: ICD-10-CM

## 2018-09-17 DIAGNOSIS — Z00.00 MEDICARE ANNUAL WELLNESS VISIT, SUBSEQUENT: ICD-10-CM

## 2018-09-17 DIAGNOSIS — K21.9 GASTROESOPHAGEAL REFLUX DISEASE WITHOUT ESOPHAGITIS: ICD-10-CM

## 2018-09-17 DIAGNOSIS — R25.1 TREMOR: ICD-10-CM

## 2018-09-17 DIAGNOSIS — Z23 ENCOUNTER FOR IMMUNIZATION: ICD-10-CM

## 2018-09-17 DIAGNOSIS — I25.10 CORONARY ARTERY DISEASE INVOLVING NATIVE CORONARY ARTERY OF NATIVE HEART WITHOUT ANGINA PECTORIS: ICD-10-CM

## 2018-09-17 DIAGNOSIS — N40.0 BENIGN PROSTATIC HYPERPLASIA WITHOUT LOWER URINARY TRACT SYMPTOMS: ICD-10-CM

## 2018-09-17 DIAGNOSIS — I10 ESSENTIAL HYPERTENSION: ICD-10-CM

## 2018-09-17 DIAGNOSIS — I48.91 ATRIAL FIBRILLATION, UNSPECIFIED TYPE (HCC): ICD-10-CM

## 2018-09-17 DIAGNOSIS — F32.9 REACTIVE DEPRESSION: ICD-10-CM

## 2018-09-17 DIAGNOSIS — J84.9 ILD (INTERSTITIAL LUNG DISEASE) (HCC): Primary | ICD-10-CM

## 2018-09-17 DIAGNOSIS — E66.3 OVERWEIGHT: ICD-10-CM

## 2018-09-17 RX ORDER — IRBESARTAN 150 MG/1
75 TABLET ORAL
COMMUNITY
End: 2020-02-28

## 2018-09-17 NOTE — PROGRESS NOTES
This is the Subsequent Medicare Annual Wellness Exam, performed 12 months or more after the Initial AWV or the last Subsequent AWV I have reviewed the patient's medical history in detail and updated the computerized patient record. History Past Medical History:  
Diagnosis Date  Aneurysm ([facility] small AAA  Arthritis shoulders and spine 3/1/2010  Asthma 3/1/2010 Dr. Caleb Pierce, Dr. Santacruz  BPH (benign prostatic hypertrophy)  Broken nose 3/1/2010  Bronchitis 02/2017  CAD (coronary artery disease) Dr. Vargas Gaherlinda  Cancer ([facility] [address] 2000  
 basal cell chest  
 Chronic bronchitis ([facility] [address] 3/1/2010  Contact dermatitis and other eczema, due to unspecified cause Dr. Camden De La Rosa  COPD  Depression 3/1/2010  GERD (gastroesophageal reflux disease)  Hypercholesterolemia 9/29/2010  Hypertension  IPF (idiopathic pulmonary fibrosis) ([facility] [address] 2007  Pneumonia 3/1/2010  Testosterone deficiency 3/24/2011 Past Surgical History:  
Procedure Laterality Date  CABG, ARTERIAL, THREE  10/2003  CARDIAC SURG PROCEDURE UNLIST  10/2003 CABGx3 vessel  COLONOSCOPY N/A 7/14/2017 COLONOSCOPY performed by Ministerio Wilkerson [facility] at [facility] ENDOSCOPY  ENDOSCOPY, COLON, DIAGNOSTIC Dr. Taj Cohen  HX APPENDECTOMY  HX CHOLECYSTECTOMY  11/22/2008  
 laparoscopic  HX COLECTOMY  12/29/07  
 sigmoid colectomy for volvulus in [place]  HX COLONOSCOPY  06/17/2014 Repeat in 3 years  HX HEENT  while in 20's 1948  
 submucus resection sinus//resection sub mucus 1962 [address]  
 right  HX ORTHOPAEDIC  June 20, 2011  
 right hand, thumb surgery, plate inserted  HX ORTHOPAEDIC  1946  
 broken nose  HX ORTHOPAEDIC  1962  
 sub mucous resection  HX ORTHOPAEDIC  02/05/15  
 right hand surgery, ligament repair  HX OTHER SURGICAL    
 HX OTHER SURGICAL    
 hemorrhoids  HX OTHER SURGICAL  8/2015  
 basal cell carcinoma  HX SEPTOPLASTY  1962  
 s/p broken nose 1946  
 HX TONSIL AND ADENOIDECTOMY  1949  
 HX TONSILLECTOMY  1949  
 HX UROLOGICAL  1990  
 vasectomy Current Outpatient Prescriptions Medication Sig Dispense Refill  valsartan (DIOVAN) 80 mg tablet Take 80 mg by mouth daily.  omeprazole (PRILOSEC) 40 mg capsule TAKE ONE CAPSULE BY MOUTH TWICE A  Cap 0  
 sucralfate (CARAFATE) 1 gram tablet Take 1 g by mouth four (4) times daily.  budesonide-formoterol (SYMBICORT) 160-4.5 mcg/actuation HFAA Take 2 Puffs by inhalation two (2) times a day.  lisinopril (PRINIVIL, ZESTRIL) 10 mg tablet Take  by mouth daily.  amLODIPine (NORVASC) 5 mg tablet Take 5 mg by mouth daily.  HYDROmorphone (DILAUDID) 2 mg tablet Take 0.5-1 Tabs by mouth every four (4) hours as needed for Pain. Max Daily Amount: 12 mg. 30 Tab 0  
 buPROPion XL (WELLBUTRIN XL) 150 mg tablet Take 1 Tab by mouth every morning. 30 Tab 1  
 tamsulosin (FLOMAX) 0.4 mg capsule TAKE 1 CAPSULE EVERY NIGHT 90 Cap 3  
 ketoconazole (NIZORAL) 2 % topical cream Apply  to affected area daily. Uses on skin lesions every couple of days 15 g 1  
 glucose blood VI test strips (FREESTYLE LITE STRIPS) strip Check blood sugar 3 times weekly. 50 Strip 2  
 lancets (FREESTYLE LANCETS) 28 gauge misc Check blood sugar 3 times weekly. 50 Lancet 2  
 BUDESONIDE/FORMOTEROL FUMARATE (SYMBICORT IN) Take 3 Puffs by inhalation two (2) times a day.  Azelastine (ASTEPRO) 0.15 % (205.5 mcg) nasal spray  ibuprofen (MOTRIN) 200 mg tablet Take 200 mg by mouth daily as needed for Pain.  GLUCOSAMINE HCL/CHONDR MIRANDA A NA (GLUCOSAMINE-CHONDROITIN) 750-600 mg tab Take 1 Tab by mouth two (2) times a day.  ketoconazole (NIZORAL) 2 % shampoo Apply  to affected area two (2) times a week.  0  
 VESICARE 5 mg tablet Take 5 mg by mouth daily.     
 albuterol (ACCUNEB) 1.25 mg/3 mL nebu 3 mL by Nebulization route every six (6) hours as needed. 1 Each 1  
 GRAPE SEED EXTRACT (GRAPE SEED PO) Take 650 mg by mouth daily.  acetaminophen (TYLENOL) 500 mg tablet Take 1,000 mg by mouth every six (6) hours as needed for Pain or Fever.  finasteride (PROSCAR) 5 mg tablet TAKE 1 TABLET EVERY DAY 90 tablet 2  
 atorvastatin (LIPITOR) 80 mg tablet Take 80 mg by mouth daily.  montelukast (SINGULAIR) 10 mg tablet Take 1 Tab by mouth daily (after dinner). 90 Tab 3  
 NEBULIZER by Does Not Apply route.  nitroglycerin (NITROQUICK) 0.4 mg SL tablet 1 Tab by SubLINGual route as needed. 25 Tab prn  albuterol (PROVENTIL HFA, VENTOLIN HFA) 90 mcg/Actuation inhaler Take 2 Puffs by inhalation every four (4) hours as needed for Wheezing. 1 Inhaler 2  
 LACTOBACILLUS RHAMNOSUS (CULTURELLE PO) Take 1 Cap by mouth daily.  multivitamin (ONE-A-DAY MENS) tablet Take 1 Tab by mouth daily.  metoprolol (LOPRESSOR) 25 mg tablet Take 25 mg by mouth two (2) times a day. PT INSTRUCTED TO TAKE AM DOS PER ANESTHESIA PROTOCOL  ezetimibe (ZETIA) 10 mg tablet Take 10 mg by mouth every morning. Allergies Allergen Reactions  Celexa [Citalopram] Other (comments) agitation  Cephalexin Other (comments) GI upset.  Demerol [Meperidine] Unknown (comments)  Mold Extracts Unknown (comments)  Niaspan [Niacin] Nausea Only  Other Medication Unknown (comments) Grass smut, standardized mite mix, weed mix, dogs, white pam, white hickory, red mulberry, barley, cottonseed, malt, rice, rye, black pepper, navy bean  Percocet [Oxycodone-Acetaminophen] Unknown (comments) [address]  Sulfa (Sulfonamide Antibiotics) Nausea Only Family History Problem Relation Age of Onset  Cancer Mother   
  spine or abdomen  Stroke Father  Heart Attack Father 46s  Heart Disease Father  Cancer Brother   
  CLL, lung  Diabetes Brother  Cancer Brother   
  lung  Cancer Brother lung  
 Cancer Brother   
  lung  Heart Disease Brother  Heart Disease Brother 58 CABG  
 Other Brother   
  shingles  Lung Disease Sister  High Cholesterol Son  Lung Disease Daughter Overactive Airways  No Known Problems Daughter Social History Substance Use Topics  Smoking status: Former Smoker Packs/day: 1.00 Years: 15.00 Quit date: 1/1/1970  Smokeless tobacco: Never Used  Alcohol use 1.2 oz/week 2 Glasses of wine per week Comment: rarely Patient Active Problem List  
Diagnosis Code  Chronic bronchitis ([facility] J42  Asthma J45.909  Depression F32.9  Arthritis shoulders and spine M19.90  
 Hypercholesterolemia E78.00  Testosterone deficiency E34.9  S/P CABG (coronary artery bypass graft) Z95.1  Degenerative arthritis of thumb M18.10  GERD (gastroesophageal reflux disease) K21.9  CAD (coronary artery disease) I25.10  BPH (benign prostatic hypertrophy) N40.0  Interstitial pulmonary fibrosis (HCC) J84.10  
 HTN (hypertension) I10  
 BPH (benign prostatic hyperplasia) N40.0  A-fib (HCC) I48.91  
 AAA (abdominal aortic aneurysm) (HCC) I71.4  
 ILD (interstitial lung disease) ([facility] J84.9  ACP (advance care planning) Z71.89  
 Pneumonia J18.9  Hyperglycemia R73.9 Depression Risk Factor Screening: PHQ over the last two weeks 9/17/2018 Little interest or pleasure in doing things Not at all Feeling down, depressed, irritable, or hopeless Not at all Total Score PHQ 2 0 Alcohol Risk Factor Screening: You do not drink alcohol or very rarely. Functional Ability and Level of Safety:  
Hearing Loss Hearing is good. Activities of Daily Living The home contains: no safety equipment. Patient does total self care Fall Risk Fall Risk Assessment, last 12 mths 9/17/2018 Able to walk? Yes Fall in past 12 months? No  
 
 
Abuse Screen Patient is not abused Cognitive Screening Evaluation of Cognitive Function: 
Has your family/caregiver stated any concerns about your memory: no 
Normal 
 
Patient Care Team  
Patient Care Team: 
Satish Peterson MD as PCP - General (Internal Medicine) Floridalma Miller MD (Pulmonary Disease) Ollie Briscoe, TENA Flower MD (Cardiology) Filomena Rao MD (Dermatology) Chaka Dooley MD (Gastroenterology) Carmela Sanon, TENA as Ambulatory Care Navigator Henry Razo MD (Urology) 
call (Allergy) Geneva Church MD (Ophthalmology) Hermes Zuluaga MD as Surgeon (General Surgery)  
 
updated Assessment/Plan Education and counseling provided: 
Are appropriate based on today's review and evaluation End-of-Life planning (with patient's consent) Influenza Vaccine Screening for glaucoma Diabetes screening test 
 
Diagnoses and all orders for this visit: 
 
1. ILD (interstitial lung disease) ([facility] [address] 2. Atrial fibrillation, unspecified type ([facility] [address] 3. Benign prostatic hyperplasia without lower urinary tract symptoms 4. Essential hypertension 5. Coronary artery disease involving native coronary artery of native heart without angina pectoris 6. Reactive depression 7. Hyperglycemia 8. Overweight 9. Medicare annual wellness visit, subsequent Other orders -     LIPID PANEL 
-     METABOLIC PANEL, COMPREHENSIVE 
-     HEMOGLOBIN A1C WITH EAG 
-     TSH 3RD GENERATION Health Maintenance Due Topic Date Due  
 EYE EXAM RETINAL OR DILATED Q1  07/14/2017  
 FOOT EXAM Q1  07/10/2018  GLAUCOMA SCREENING Q2Y  07/14/2018  Influenza Age 5 to Adult  08/01/2018 Discussed with patient about advance medical directive. Provided patient blank AMD and Your Right to Decide Booklet. Requested that if completed to provide a copy of AMD to office. Colonoscopy: 7/14/17,  repeat in 5 years, Dr. Bernadette Douglass AAA screen: CT 7/12 - 3.2mm, Breana kumar, repeat in 10/13 and 2/15, 7/18 Tdap: will check cost at pharmacy, advised to get at his pharmacy Pneumovax: 2012 [address]ju[address]i[facility]: 10/13/2015 Zostavax: 2008 Shingrix: ordered 18, was backordered Flu shot: 18 Eye exam: [facility]. Hellen Correa at [facility], 17, some cataracts A1c: ,  6.2 Lipids:  LDL 65 
PSA:  per Dr. Evelyn Painting Hepatitis C screen: , negative EK/10/17  
 
 
Medication reconciliation completed by MA and reviewed by me. Medical/surgical/social/family history reviewed and updated by me. Patient provided AVS and preventative screening table. Patient verbalized understanding of all information discussed.

## 2018-09-17 NOTE — PROGRESS NOTES
HISTORY OF PRESENT ILLNESS Roverto Cox is a 70 y.o. male. HPI Last here 5/14/18. Pt is here to f/u on chronic conditions. Pt presents with his wife who provides some of the hx. 
   
BP is 142/75, HR today is 74 BP 150s/80s, 138/78 (yesterday) at home Daughter states that pt's SBP can range 130-160 Pt is now on avapro 150mg, per cardio--replaced lisinopril which was replaced by valsartan Pt also takes metoprolol 50mg BID per cardio Pt thinks this needs to be reduced, because he has a resting HR in the 50s Discussed his BP and HR being good today Recall norvasc caused edema  
   
Pt has not been monitoring his BS at home His DM is diet-controlled  
   
Wt is stable x lov Discussed diet and w/l  
   
Reviewed last labs 5/18 Will get labs today Lov, pt c/o painful red boil on his lower back--resolved Pt saw Dr Geeta Gayle (surg) Reviewed note 5/29/18: -Possible excision vs observation discussed with the patient. Possible recurrence also discussed. 
-Observation for now, but instructed to RTC if he develops recurrent symptoms 
  
Pt follows with Dr. Tom Noel (derm) Pt had a basal cell carcinoma removed from his R arm Last visit was 5/21/18 
   
Pt follows with Dr. Hugh Weber (pulm) every quarter of a year Reviewed notes 3/14/18 Continues on symbicort daily and albuterol prn (not daily) Overall, his breathing is stable Recall esprit caused GI upset  
   
Pt follows with Dr. Eliane Gomez ([facility]pper) annually in August 
Last visit was 8/22/18 Per pt, they discussed him stopping vesicare, but pt was unable to do this b/c of uriantion Continues on proscar, vesicare and flomax per uro 
   
Pt follows with Dr. Valente Greco (cardio) Last visit was 8/13/18 - pt states he had been on valsartan but this was changed to avapro 150mg 
toprol was decreased d/t ray Norvasc caused swelling Pt follows with Dr. Santacruz (allergy) Continues singulair for allergies - controlled  
   
Pt follows with Dr. Duc Del Valle (vasc surg) Last visit was 7/18: f/u on AAA, stable, BP was 176/78, repeated US, f/u in 1 year, will do ABIs at that time, continue to monitor Reviewed CT abd 6/18: 1. Interval enlargement of fusiform infrarenal abdominal aortic aneurysm now measuring 3.5 cm, previously 3.0 cm. 
2. Pulmonary fibrosis 
  
Pt follows with Dr. Maria Del Rosario Trejo (GI) Last visit was 7/2/18: discussing burning inupper stomach despite prilosec 40mg BID, added sucralfate which improved sx, no trouble swallowing, discussed tapering off sucralfate and staying on BID prilosec, f/u 6 mo and discuss additional weaning at that time Continues on prilosec 40mg BID for reflux, which works well for the most part Pt still takes sucralfate sometimes which helps Pt saw Dr Amisha Jay (sleep) Reviewed notes NP Felicity 5/30/18: discussing if want to do polysomnigram  
Reviewed note 6/22/18: discussed that pt has sleep apnea, on CPAP 9cm water pressure, SEAN improved with CPAP, continue to work on diet and w/l Reviewed notes NP 7/18: follow up after sleep study, repeat PFTs after 4 months Pt is compliant with CPAP nightly This is helping his sleep Tolerating well In the past pt stopped zoloft d/t possible abd pain Ordered wellbutrin in the past but pt never tried this Overall pt is currently doing well without medication not sad or down Recall celexa caused irritability in the past. Pt tolerated wellbutrin in the past. 
   
Continues claritin for allergies, which works well 
   
Continues on lipitor 80mg, max dose, daily for cholesterol He also takes zetia daily Pt c/o resting tremor x 1 year, not improving Not taking anything for this Not intention tremor Pt states this is worse when his palm is facing upwards Provided referral for neuro 
   
Lives with his wife and daughter, they get along well Fully functional independently No significant memory concerns PREVENTIVE:   
Colonoscopy: 7/14/17,  repeat in 5 years, Dr. Maria Del Rosario Trejo EGD: 17, Dr. Beulah Rubio PSA:  per Dr. Alex Villavicencio AAA screen: CT  - 3.2mm, Breana follows, repeat in 10/13 and 2/15,  Tdap: will check cost at pharmacy, advised to get at his pharmacy Pneumovax: 2012 [id number]: 10/13/2015 Zostavax: 2008 Shingrix: ordered 18, was backordered Flu shot: 18 Microalbumin:  Foot exam: 7/10/17 A1c: 10/13 6.1,  5.8,  5.9, 10/14 6.1,  6.2,  6.1,  6.8,  6.2,  6.0,  6.3,  6.2 Eye exam: [facility]. Surya Low at [facility], 17, some cataracts Lipids:  LDL 65 EK/10/17  
Hepatitis C screen: , negative Patient Active Problem List  
 Diagnosis Date Noted  Pneumonia 2016  Hyperglycemia 2016  ACP (advance care planning) 10/12/2015  ILD (interstitial lung disease) ([facility] [address] 2014  Interstitial pulmonary fibrosis ([facility] [address] 10/09/2013  
 HTN (hypertension) 10/09/2013  BPH (benign prostatic hyperplasia) 10/09/2013  A-fib ([address] 10/09/2013  AAA (abdominal aortic aneurysm) ([facility] [address] 10/09/2013  BPH (benign prostatic hypertrophy)  GERD (gastroesophageal reflux disease)  CAD (coronary artery disease)  S/P CABG (coronary artery bypass graft) 2011  Degenerative arthritis of thumb 2011  Testosterone deficiency 2011  Hypercholesterolemia 2010  Chronic bronchitis ([address] 2010  Asthma 2010  Depression 2010  Arthritis shoulders and spine 2010 Current Outpatient Prescriptions Medication Sig Dispense Refill  valsartan (DIOVAN) 80 mg tablet Take 80 mg by mouth daily.  omeprazole (PRILOSEC) 40 mg capsule TAKE ONE CAPSULE BY MOUTH TWICE A  Cap 0  
 sucralfate (CARAFATE) 1 gram tablet Take 1 g by mouth four (4) times daily.  budesonide-formoterol (SYMBICORT) 160-4.5 mcg/actuation HFAA Take 2 Puffs by inhalation two (2) times a day.  lisinopril (PRINIVIL, ZESTRIL) 10 mg tablet Take  by mouth daily.  amLODIPine (NORVASC) 5 mg tablet Take 5 mg by mouth daily.  HYDROmorphone (DILAUDID) 2 mg tablet Take 0.5-1 Tabs by mouth every four (4) hours as needed for Pain. Max Daily Amount: 12 mg. 30 Tab 0  
 buPROPion XL (WELLBUTRIN XL) 150 mg tablet Take 1 Tab by mouth every morning. 30 Tab 1  
 tamsulosin (FLOMAX) 0.4 mg capsule TAKE 1 CAPSULE EVERY NIGHT 90 Cap 3  
 ketoconazole (NIZORAL) 2 % topical cream Apply  to affected area daily. Uses on skin lesions every couple of days 15 g 1  
 glucose blood VI test strips (FREESTYLE LITE STRIPS) strip Check blood sugar 3 times weekly. 50 Strip 2  
 lancets (FREESTYLE LANCETS) 28 gauge misc Check blood sugar 3 times weekly. 50 Lancet 2  
 BUDESONIDE/FORMOTEROL FUMARATE (SYMBICORT IN) Take 3 Puffs by inhalation two (2) times a day.  Azelastine (ASTEPRO) 0.15 % (205.5 mcg) nasal spray  ibuprofen (MOTRIN) 200 mg tablet Take 200 mg by mouth daily as needed for Pain.  GLUCOSAMINE HCL/CHONDR MIRANDA A NA (GLUCOSAMINE-CHONDROITIN) 750-600 mg tab Take 1 Tab by mouth two (2) times a day.  ketoconazole (NIZORAL) 2 % shampoo Apply  to affected area two (2) times a week.  0  
 VESICARE 5 mg tablet Take 5 mg by mouth daily.  albuterol (ACCUNEB) 1.25 mg/3 mL nebu 3 mL by Nebulization route every six (6) hours as needed. 1 Each 1  
 GRAPE SEED EXTRACT (GRAPE SEED PO) Take 650 mg by mouth daily.  acetaminophen (TYLENOL) 500 mg tablet Take 1,000 mg by mouth every six (6) hours as needed for Pain or Fever.  finasteride (PROSCAR) 5 mg tablet TAKE 1 TABLET EVERY DAY 90 tablet 2  
 atorvastatin (LIPITOR) 80 mg tablet Take 80 mg by mouth daily.  montelukast (SINGULAIR) 10 mg tablet Take 1 Tab by mouth daily (after dinner). 90 Tab 3  
 NEBULIZER by Does Not Apply route.  nitroglycerin (NITROQUICK) 0.4 mg SL tablet 1 Tab by SubLINGual route as needed. 25 Tab prn  albuterol (PROVENTIL HFA, VENTOLIN HFA) 90 mcg/Actuation inhaler Take 2 Puffs by inhalation every four (4) hours as needed for Wheezing. 1 Inhaler 2  
 LACTOBACILLUS RHAMNOSUS (CULTURELLE PO) Take 1 Cap by mouth daily.  multivitamin (ONE-A-DAY MENS) tablet Take 1 Tab by mouth daily.  metoprolol (LOPRESSOR) 25 mg tablet Take 25 mg by mouth two (2) times a day. PT INSTRUCTED TO TAKE AM DOS PER ANESTHESIA PROTOCOL  ezetimibe (ZETIA) 10 mg tablet Take 10 mg by mouth every morning. Past Surgical History:  
Procedure Laterality Date  CABG, ARTERIAL, THREE  10/2003  CARDIAC SURG PROCEDURE UNLIST  10/2003 CABGx3 vessel  COLONOSCOPY N/A 7/14/2017 COL[facility]COPY performed by [facility] at [facility] ENDOSCOPY  ENDOSCOPY, COLON, DIAGNOSTIC Dr. [facility]  HX APPENDECTOMY  HX CHOLECYSTECTOMY  11/22/2008  
 laparoscopic  HX COLECTOMY  12/29/07  
 sigmoid colectomy for volvulus in [place]  HX COLONOSCOPY  06/17/2014 Repeat in 3 years  HX HEENT  while in 20's 1948  
 submucus resection sinus//resection sub mucus 1962 [address]  
 right  HX ORTHOPAEDIC  June 20, 2011  
 right hand, thumb surgery, plate inserted  HX ORTHOPAEDIC  1946  
 broken nose  HX ORTHOPAEDIC  1962  
 sub mucous resection  HX ORTHOPAEDIC  02/05/15  
 right hand surgery, ligament repair  HX OTHER SURGICAL    
 HX OTHER SURGICAL    
 hemorrhoids  HX OTHER SURGICAL  8/2015  
 basal cell carcinoma  HX SEPTOPLASTY  1962  
 s/p broken nose 1946  
 HX TONSIL AND ADENOIDECTOMY  1949  
 HX TONSILLECTOMY  1949  
 HX UROLOGICAL  1990  
 vasectomy Lab Results Component Value Date/Time WBC 8.1 03/05/2018 01:54 PM  
HGB 13.6 03/05/2018 01:54 PM  
HCT 40.6 03/05/2018 01:54 PM  
PLATELET 392 26/39/9676 01:54 PM  
MCV 96.4 03/05/2018 01:54 PM  
 
Lab Results Component Value Date/Time Cholesterol, total 132 02/12/2018 08:25 AM  
 HDL Cholesterol 46 02/12/2018 08:25 AM  
LDL, calculated 65 02/12/2018 08:25 AM  
Triglyceride 105 02/12/2018 08:25 AM  
CHOL/HDL Ratio 3.4 09/29/2010 08:28 AM  
 
Lab Results Component Value Date/Time GFR est non-AA 61 05/14/2018 02:26 PM  
GFR est AA 70 05/14/2018 02:26 PM  
Creatinine 1.16 05/14/2018 02:26 PM  
BUN 18 05/14/2018 02:26 PM  
Sodium 140 05/14/2018 02:26 PM  
Potassium 5.2 05/14/2018 02:26 PM  
Chloride 103 05/14/2018 02:26 PM  
CO2 27 05/14/2018 02:26 PM  
Magnesium 2.0 03/01/2016 04:23 AM  
  
 
Review of Systems Respiratory: Negative for shortness of breath. Cardiovascular: Negative for chest pain. Physical Exam  
Constitutional: He is oriented to person, place, and time. He appears well-developed and well-nourished. No distress. HENT:  
Head: Normocephalic and atraumatic. Right Ear: External ear normal.  
Left Ear: External ear normal.  
Mouth/Throat: Oropharynx is clear and moist. No oropharyngeal exudate. Eyes: Conjunctivae and EOM are normal. Right eye exhibits no discharge. Left eye exhibits no discharge. Neck: Normal range of motion. Neck supple. No carotid bruits Cardiovascular: Normal rate, regular rhythm, normal heart sounds and intact distal pulses. Exam reveals no gallop and no friction rub. No murmur heard. Pulmonary/Chest: Effort normal and breath sounds normal. No respiratory distress. He has no wheezes. He has no rales. He exhibits no tenderness. Crackles Abdominal: Soft. He exhibits no distension and no mass. There is no tenderness. There is no rebound and no guarding. Musculoskeletal: Normal range of motion. He exhibits edema (Trace BLE). He exhibits no tenderness or deformity. Lymphadenopathy:  
  He has no cervical adenopathy. Neurological: He is alert and oriented to person, place, and time. He has normal reflexes. Coordination normal.  
Skin: Skin is warm and dry. No rash noted. He is not diaphoretic. No erythema. No pallor. Scattered SKs Psychiatric: He has a normal mood and affect. His behavior is normal.  
 
 
ASSESSMENT and PLAN 
  ICD-10-CM ICD-9-CM 1. ILD (interstitial lung disease) ([facility] [address] Pt follows closely with Dr Devonte Martines, no longer on espirit d/t GI upset, continues on symbicotrt J84.9 515 LIPID PANEL  
   METABOLIC PANEL, COMPREHENSIVE  
   HEMOGLOBIN A1C WITH EAG  
   TSH 3RD GENERATION 2. Atrial fibrillation, unspecified type ([facility] [address] Follows with Dr Rohit Porras, remains in nsr and is rate controlled on metoprolol, will get her most recent notes for review I48.91 427.31 LIPID PANEL  
   METABOLIC PANEL, COMPREHENSIVE  
   HEMOGLOBIN A1C WITH EAG  
   TSH 3RD GENERATION 3. Benign prostatic hyperplasia without lower urinary tract symptoms Follows with Dr King King annually in August, continues on proscar, vesicare and flomax, will get his notes for review Unable to taper off vesicare N40.0 600.00 LIPID PANEL  
   METABOLIC PANEL, COMPREHENSIVE  
   HEMOGLOBIN A1C WITH EAG  
   TSH 3RD GENERATION 4. Essential hypertension Adequately controlled on avapro and metoprolol HR is good not ray No further changes to dose today I10 401.9 LIPID PANEL  
   METABOLIC PANEL, COMPREHENSIVE  
   HEMOGLOBIN A1C WITH EAG  
   TSH 3RD GENERATION 5. Coronary artery disease involving native coronary artery of native heart without angina pectoris UTD with Dr King King, stable, medically managed Will get notes for review I25.10 414.01 LIPID PANEL  
   METABOLIC PANEL, COMPREHENSIVE  
   HEMOGLOBIN A1C WITH EAG  
   TSH 3RD GENERATION 6. Reactive depression Failed zoloft and celexa in the past, would consider doing wellbutrin in the future but currently doing well without medication F32.9 300.4 LIPID PANEL  
   METABOLIC PANEL, COMPREHENSIVE  
   HEMOGLOBIN A1C WITH EAG  
   TSH 3RD GENERATION 7. Hyperglycemia Check a1c today Diet controlled  R73.9 790.29 LIPID PANEL  
 METABOLIC PANEL, COMPREHENSIVE  
   HEMOGLOBIN A1C WITH EAG  
   TSH 3RD GENERATION 8. Overweight Discussed diet and w/l  
 E66.3 278.02 LIPID PANEL  
   METABOLIC PANEL, COMPREHENSIVE  
   HEMOGLOBIN A1C WITH EAG  
   TSH 3RD GENERATION 9. Medicare annual wellness visit, subsequent Z00.00 V70.0 LIPID PANEL  
   METABOLIC PANEL, COMPREHENSIVE  
   HEMOGLOBIN A1C WITH EAG  
   TSH 3RD GENERATION 10. Gastroesophageal reflux disease without esophagitis On prilosec BID, tapered off of sulcralfate just takes as needed now, follows with Dr Lorena Geurrero Sx improved off esprit K21.9 530.81 LIPID PANEL  
   METABOLIC PANEL, COMPREHENSIVE  
   HEMOGLOBIN A1C WITH EAG  
   TSH 3RD GENERATION 11. Hypercholesterolemia Controlled on zetia and lipitor E78.00 272.0 LIPID PANEL  
   METABOLIC PANEL, COMPREHENSIVE  
   HEMOGLOBIN A1C WITH EAG  
   TSH 3RD GENERATION 12. Tremor - refer to neurology, tremor is resting, r/o parkinsons Scribed by Lisha Pierre of [facility], as dictated by Dr. Kathleen Skiff. Current diagnosis and concerns discussed with pt at length. Pt understands risks and benefits or current treatment plan and medications, and accepts the treatment and medication with any possible risks. Pt asks appropriate questions, which were answered. Pt was instructed to call with any concerns or problems. This note will not be viewable in [address]

## 2018-09-17 NOTE — PATIENT INSTRUCTIONS
Medicare Wellness Visit, Male The best way to live healthy is to have a lifestyle where you eat a well-balanced diet, exercise regularly, limit alcohol use, and quit all forms of tobacco/nicotine, if applicable. Regular preventive services are another way to keep healthy. Preventive services (vaccines, screening tests, monitoring & exams) can help personalize your care plan, which helps you manage your own care. Screening tests can find health problems at the earliest stages, when they are easiest to treat. [address] [facility] follows the current, evidence-based guidelines published by the [facility] Lucrecia ([facility]) when recommending preventive services for our patients. Because we follow these guidelines, sometimes recommendations change over time as research supports it. (For example, a prostate screening blood test is no longer routinely recommended for men with no symptoms.) Of course, you and your doctor may decide to screen more often for some diseases, based on your risk and co-morbidities (chronic disease you are already diagnosed with). Preventive services for you include: - Medicare offers their members a free annual wellness visit, which is time for you and your primary care provider to discuss and plan for your preventive service needs. Take advantage of this benefit every year! 
-All adults over age 72 should receive the recommended pneumonia vaccines. Current USPSTF guidelines recommend a series of two vaccines for the best pneumonia protection.  
-All adults should have a flu vaccine yearly and an ECG.  All adults age 61 and older should receive a shingles vaccine once in their lifetime.   
-All adults age 38-68 who are overweight should have a diabetes screening test once every three years.  
-Other screening tests & preventive services for persons with diabetes include: an eye exam to screen for diabetic retinopathy, a kidney function test, a foot exam, and stricter control over your cholesterol.  
-Cardiovascular screening for adults with routine risk involves an electrocardiogram (ECG) at intervals determined by the provider.  
-Colorectal cancer screening should be done for adults age 54-65 with no increased risk factors for colorectal cancer. There are a number of acceptable methods of screening for this type of cancer. Each test has its own benefits and drawbacks. Discuss with your provider what is most appropriate for you during your annual wellness visit. The different tests include: colonoscopy (considered the best screening method), a fecal occult blood test, a fecal DNA test, and sigmoidoscopy. 
-All adults born between [facility] should be screened once for Hepatitis C. 
-An Abdominal Aortic Aneurysm (AAA) Screening is recommended for men age 73-68 who has ever smoked in their lifetime. Here is a list of your current Health Maintenance items (your personalized list of preventive services) with a due date: 
Health Maintenance Due Topic Date Due [facility] Eye Exam  07/14/2017 [facility] Diabetic Foot Care  07/10/2018  Glaucoma Screening   07/14/2018  Flu Vaccine  08/01/2018 Medicare Part B Preventive Services Limitations Recommendation Scheduled Bone Mass Measurement 
(age 72 & older, biennial) Requires diagnosis related to osteoporosis or estrogen deficiency. Biennial benefit unless patient has history of long-term glucocorticoid tx or baseline is needed because initial test was by other method N/A N/A Cardiovascular Screening Blood Tests (every 5 years) Total cholesterol, HDL, Triglycerides Order as a panel if possible Completed 2/2018 
   
Recommended annually Due 2/2019 Colorectal Cancer Screening 
-Fecal occult blood test (annual) -Flexible sigmoidoscopy (5y) 
-Screening colonoscopy (10y) -Barium Enema    Completed 7/14/17 with Dr. Lluvia Carbajal 
Repeat in 5 years Due 7/2022 Counseling to Prevent Tobacco Use (up to 8 sessions per year) - Counseling greater than 3 and up to 10 minutes - Counseling greater than 10 minutes Patients must be asymptomatic of tobacco-related conditions to receive as preventive service N/A N/A Diabetes Screening Tests (at least every 3 years, Medicare covers annually or at 6-month intervals for prediabetic patients) 
   
Fasting blood sugar (FBS) or glucose tolerance test (GTT) Patient must be diagnosed with one of the following: 
-Hypertension, Dyslipidemia, obesity, previous impaired FBS or GTT 
Or any two of the following: overweight, FH of diabetes, age ? 72, history of gestational diabetes, birth of baby weighing more than 9 pounds Completed 5/2018 with A1C 6.2 
   
Recommended every 3-6 months for pre-diabetics  
   Due 8/2018 - 11/2018 Diabetes Self-Management Training (DSMT) (no USPSTF recommendation) Requires referral by treating physician for patient with diabetes or renal disease. 10 hours of initial DSMT session of no less than 30 minutes each in a continuous 12-month period.  2 hours of follow-up DSMT in subsequent years. N/A N/A Glaucoma Screening (no USPSTF recommendation) Diabetes mellitus, family history, , age 48 or over,  American, age 72 or over Completed 11/2017 
   
Recommended annually Due 11/2018 Human Immunodeficiency Virus (HIV) Screening (annually for increased risk patients) HIV-1 and HIV-2 by EIA, JORGE, rapid antibody test, or oral mucosa transudate Patient must be at increased risk for HIV infection per USPSTF guidelines or pregnant.  Tests covered annually for patients at increased risk.  Pregnant patients may receive up to 3 test during pregnancy. N/A N/A Medical Nutrition Therapy (MNT) (for diabetes or renal disease not recommended schedule) Requires referral by treating physician for patient with diabetes or renal disease.  Can be provided in same year as diabetes self-management training (DSMT), and CMS recommends medical nutrition therapy take place after DSMT.  Up to 3 hours for initial year and 2 hours in subsequent years. N/A  N/A Prostate Cancer Screening (annually up to age 76) - Digital rectal exam (PAUL) - Prostate specific antigen (PSA) Annually (age 48 or over), PAUL not paid separately when covered E/M service is provided on same date Completed 8/18 Due as directed by Dr. Megan Fermin Seasonal Influenza Vaccination (annually)    Completed 9/18 
   
Recommended annually Due Fall 2019  
         
Pneumococcal Vaccination (once after 72)    Pneumococcal 23 - 9/7/12 
   
  
Prevnar 13 - 10/13/15 
   
Both recommended once over the age of 72 Completed    
   
  
Completed Hepatitis B Vaccinations (if medium/high risk) Medium/high risk factors:  End-stage renal disease, Hemophiliacs who received Factor VIII or IX concentrates, Clients of institutions for the mentally retarded, Persons who live in the same house as a HepB virus carrier, Homosexual men, Illicit injectable drug abusers. N/A N/A Ultrasound Screening for Abdominal Aortic Aneurysm (AAA) (once) Patient must be referred through IPPE and not have had a screening for abdominal aortic aneurysm before under Medicare.  Limited to patients who meet one of the following criteria: 
- Men who are 73-68 years old and have smoked more than 100 cigarettes in their lifetime. 
-Anyone with a FH of AAA 
-Anyone recommended for screening by USPSTF Completed CT abd 6/2018 - 3.5cm Due as directed by Dr Enrike Trotter

## 2018-09-17 NOTE — MR AVS SNAPSHOT
[address] 
[phone] Patient: Micheal Ruiz MRN: [id number] HCK:4/9/0897 Visit Information Date & Time Provider Department Dept. Phone Encounter #  
 9/17/2018  8:30 AM Griselda Simmer, [address] [place] [address],[address] [phone] [id number] Follow-up Instructions Return in about 6 months (around 3/17/2019). Upcoming Health Maintenance Date Due  
 EYE EXAM RETINAL OR DILATED Q1 7/14/2017 FOOT EXAM Q1 7/10/2018 GLAUCOMA SCREENING Q2Y 7/14/2018 Influenza Age 5 to Adult 8/1/2018 MEDICARE YEARLY EXAM 11/11/2018 HEMOGLOBIN A1C Q6M 11/14/2018 MICROALBUMIN Q1 2/12/2019 LIPID PANEL Q1 2/12/2019 DTaP/Tdap/Td series (2 - Td) 9/7/2022 COLONOSCOPY 7/14/2027 Allergies as of 9/17/2018  Review Complete On: 9/17/2018 By: Griselda Simmer, MD  
  
 Severity Noted Reaction Type Reactions Celexa [Citalopram]  03/19/2010   Side Effect Other (comments) agitation Cephalexin  02/23/2015    Other (comments) GI upset. Demerol [Meperidine]  03/01/2010    Unknown (comments) Mold Extracts  03/01/2010    Unknown (comments) Niaspan [Niacin]  03/19/2010   Intolerance Nausea Only Other Medication  03/01/2010    Unknown (comments) Grass smut, standardized mite mix, weed mix, dogs, white pam, white hickory, red mulberry, barley, cottonseed, malt, rice, rye, black pepper, navy bean Percocet [Oxycodone-acetaminophen]  03/01/2010    Unknown (comments) Ragweed  10/24/2013    Runny Nose  
 Sulfa (Sulfonamide Antibiotics)  03/19/2010   Side Effect Nausea Only Current Immunizations  Reviewed on 10/12/2016 Name Date Hepatitis A Vaccine 6/10/1997 Influenza High Dose Vaccine PF 9/28/2017 Influenza Vaccine 10/6/2014, 10/9/2013 Influenza Vaccine (Quad) PF 9/29/2016, 10/12/2015 Influenza Vaccine Split 10/25/2011 Influenza Vaccine Whole 10/5/2009 TD Vaccine 7/20/2006 TDAP Vaccine 9/7/2012 ZZZ-RETIRED (DO NOT USE) Pneumococcal Vaccine (Unspecified Type) 9/7/2012, 11/1/2006 Zoster 6/1/2008 Not reviewed this visit You Were Diagnosed With   
  
 Codes Comments ILD (interstitial lung disease) ([facility]    -  Primary ICD-10-CM: J84.9 ICD-9-CM: 857 Atrial fibrillation, unspecified type ([facility]     ICD-10-CM: I48.91 
ICD-9-CM: 427.31 Benign prostatic hyperplasia without lower urinary tract symptoms     ICD-10-CM: N40.0 ICD-9-CM: 600.00 Essential hypertension     ICD-10-CM: I10 
ICD-9-CM: 401.9 Coronary artery disease involving native coronary artery of native heart without angina pectoris     ICD-10-CM: I25.10 ICD-9-CM: 414.01 Reactive depression     ICD-10-CM: F32.9 ICD-9-CM: 300.4 Hyperglycemia     ICD-10-CM: R73.9 ICD-9-CM: 790.29 Overweight     ICD-10-CM: V91.1 ICD-9-CM: 278.02 Medicare annual wellness visit, subsequent     ICD-10-CM: Z00.00 ICD-9-CM: V70.0 Gastroesophageal reflux disease without esophagitis     ICD-10-CM: K21.9 ICD-9-CM: 530.81 Hypercholesterolemia     ICD-10-CM: E78.00 ICD-9-CM: 272.0 Tremor     ICD-10-CM: R25.1 ICD-9-CM: 516. 0 Vitals BP Pulse Temp Resp Height(growth percentile) Weight(growth percentile) 142/75 (BP 1 Location: Left arm, BP Patient Position: Sitting) 74 98 °F (36.7 °C) (Oral) 16 5' 10\" (1.778 m) 191 lb (86.6 kg) SpO2 BMI Smoking Status 96% 27.41 kg/m2 Former Smoker BMI and BSA Data Body Mass Index Body Surface Area  
 27.41 kg/m 2 2.07 m 2 Preferred Pharmacy Pharmacy Name Phone [address] [place], [address] [place] [address]. [phone] Your Updated Medication List  
  
   
This list is accurate as of 9/17/18  9:08 AM.  Always use your most recent med list.  
  
  
  
  
 * albuterol 90 mcg/actuation inhaler Commonly known as:  PROVENTIL HFA, VENTOLIN HFA, PROAIR HFA  
 Take 2 Puffs by inhalation every four (4) hours as needed for Wheezing. * albuterol 1.25 mg/3 mL Nebu Commonly known as:  ACCUNEB  
3 mL by Nebulization route every six (6) hours as needed. atorvastatin 80 mg tablet Commonly known as:  LIPITOR Take 80 mg by mouth daily. AVAPRO 150 mg tablet Generic drug:  irbesartan Take 150 mg by mouth nightly. Azelastine 0.15 % (205.5 mcg) nasal spray Commonly known as:  ASTEPRO  
  
 finasteride 5 mg tablet Commonly known as:  PROSCAR  
TAKE 1 TABLET EVERY DAY  
  
 * ketoconazole 2 % shampoo Commonly known as:  NIZORAL Apply  to affected area two (2) times a week. * ketoconazole 2 % topical cream  
Commonly known as:  NIZORAL Apply  to affected area daily. Uses on skin lesions every couple of days  
  
 metoprolol tartrate 25 mg tablet Commonly known as:  LOPRESSOR Take 25 mg by mouth two (2) times a day. PT INSTRUCTED TO TAKE AM DOS PER ANESTHESIA PROTOCOL  
  
 montelukast 10 mg tablet Commonly known as:  SINGULAIR Take 1 Tab by mouth daily (after dinner). NEBULIZER  
by Does Not Apply route. nitroglycerin 0.4 mg SL tablet Commonly known as:  Patsi Gals 1 Tab by SubLINGual route as needed. omeprazole 40 mg capsule Commonly known as:  PRILOSEC  
TAKE ONE CAPSULE BY MOUTH TWICE A DAY One-A-Day Mens tablet Generic drug:  multivitamin Take 1 Tab by mouth daily. sucralfate 1 gram tablet Commonly known as:  Alonzo Abner Take 1 g by mouth four (4) times daily. * SYMBICORT IN Take 3 Puffs by inhalation two (2) times a day. * SYMBICORT 160-4.5 mcg/actuation aa Generic drug:  budesonide-formoterol Take 2 Puffs by inhalation two (2) times a day. tamsulosin 0.4 mg capsule Commonly known as:  FLOMAX TAKE 1 CAPSULE EVERY NIGHT  
  
 VESIcare 5 mg tablet Generic drug:  solifenacin Take 5 mg by mouth daily. ZETIA 10 mg tablet Generic drug:  ezetimibe Take 10 mg by mouth every morning. * Notice: This list has 6 medication(s) that are the same as other medications prescribed for you. Read the directions carefully, and ask your doctor or other care provider to review them with you. We Performed the Following HEMOGLOBIN A1C WITH EAG [18919 CPT(R)] LIPID PANEL [05995 CPT(R)] METABOLIC PANEL, COMPREHENSIVE [55061 CPT(R)] REFERRAL TO NEUROLOGY [VRQ50 Custom] TSH 3RD GENERATION [50471 CPT(R)] Follow-up Instructions Return in about 6 months (around 3/17/2019). Referral Information Referral ID Referred By Referred To  
  
 [id number] Diana Lopez MD   
   [address] [place] [address] [address] [place], [address] Phone: [phone] Fax: [phone] Visits Status Start Date End Date 1 New Request 9/17/18 9/17/19 If your referral has a status of pending review or denied, additional information will be sent to support the outcome of this decision. Patient Instructions Medicare Wellness Visit, Male The best way to live healthy is to have a lifestyle where you eat a well-balanced diet, exercise regularly, limit alcohol use, and quit all forms of tobacco/nicotine, if applicable. Regular preventive services are another way to keep healthy. Preventive services (vaccines, screening tests, monitoring & exams) can help personalize your care plan, which helps you manage your own care. Screening tests can find health problems at the earliest stages, when they are easiest to treat. [facility] follows the current, evidence-based guidelines published by the [facility] States Wyatt Lucrecia (USPSTF) when recommending preventive services for our patients. Because we follow these guidelines, sometimes recommendations change over time as research supports it.  (For example, a prostate screening blood test is no longer routinely recommended for men with no symptoms.) Of course, you and your doctor may decide to screen more often for some diseases, based on your risk and co-morbidities (chronic disease you are already diagnosed with). Preventive services for you include: - Medicare offers their members a free annual wellness visit, which is time for you and your primary care provider to discuss and plan for your preventive service needs. Take advantage of this benefit every year! 
-All adults over age 72 should receive the recommended pneumonia vaccines. Current USPSTF guidelines recommend a series of two vaccines for the best pneumonia protection.  
-All adults should have a flu vaccine yearly and an ECG. All adults age 61 and older should receive a shingles vaccine once in their lifetime.   
-All adults age 38-68 who are overweight should have a diabetes screening test once every three years.  
-Other screening tests & preventive services for persons with diabetes include: an eye exam to screen for diabetic retinopathy, a kidney function test, a foot exam, and stricter control over your cholesterol.  
-Cardiovascular screening for adults with routine risk involves an electrocardiogram (ECG) at intervals determined by the provider.  
-Colorectal cancer screening should be done for adults age 54-65 with no increased risk factors for colorectal cancer. There are a number of acceptable methods of screening for this type of cancer. Each test has its own benefits and drawbacks. Discuss with your provider what is most appropriate for you during your annual wellness visit. The different tests include: colonoscopy (considered the best screening method), a fecal occult blood test, a fecal DNA test, and sigmoidoscopy. 
-All adults born between [facility] should be screened once for Hepatitis C. 
-An Abdominal Aortic Aneurysm (AAA) Screening is recommended for men age 73-68 who has ever smoked in their lifetime. Here is a list of your current Health Maintenance items (your personalized list of preventive services) with a due date: 
Health Maintenance Due Topic Date Due [facility] Eye Exam  07/14/2017 [facility] Diabetic Foot Care  07/10/2018  Glaucoma Screening   07/14/2018  Flu Vaccine  08/01/2018 Medicare Part B Preventive Services Limitations Recommendation Scheduled Bone Mass Measurement 
(age 72 & older, biennial) Requires diagnosis related to osteoporosis or estrogen deficiency. Biennial benefit unless patient has history of long-term glucocorticoid tx or baseline is needed because initial test was by other method N/A N/A Cardiovascular Screening Blood Tests (every 5 years) Total cholesterol, HDL, Triglycerides Order as a panel if possible Completed 2/2018 
   
Recommended annually Due 2/2019 Colorectal Cancer Screening 
-Fecal occult blood test (annual) -Flexible sigmoidoscopy (5y) 
-Screening colonoscopy (10y) -Barium Enema    Completed 7/14/17 with Dr. Ping Pang 
Repeat in 5 years Due 7/2022 Counseling to Prevent Tobacco Use (up to 8 sessions per year) - Counseling greater than 3 and up to 10 minutes - Counseling greater than 10 minutes Patients must be asymptomatic of tobacco-related conditions to receive as preventive service N/A N/A Diabetes Screening Tests (at least every 3 years, Medicare covers annually or at 6-month intervals for prediabetic patients) 
   
Fasting blood sugar (FBS) or glucose tolerance test (GTT) Patient must be diagnosed with one of the following: 
-Hypertension, Dyslipidemia, obesity, previous impaired FBS or GTT 
Or any two of the following: overweight, FH of diabetes, age ? 72, history of gestational diabetes, birth of baby weighing more than 9 pounds Completed 5/2018 with A1C 6.2 
   
Recommended every 3-6 months for pre-diabetics  
   Due 8/2018 - 11/2018 Diabetes Self-Management Training (DSMT) (no USPSTF recommendation) Requires referral by treating physician for patient with diabetes or renal disease. 10 hours of initial DSMT session of no less than 30 minutes each in a continuous 12-month period.  2 hours of follow-up DSMT in subsequent years. N/A N/A Glaucoma Screening (no USPSTF recommendation) Diabetes mellitus, family history, , age 48 or over,  American, age 72 or over Completed 11/2017 
   
Recommended annually Due 11/2018 Human Immunodeficiency Virus (HIV) Screening (annually for increased risk patients) HIV-1 and HIV-2 by EIA, JORGE, rapid antibody test, or oral mucosa transudate Patient must be at increased risk for HIV infection per USPSTF guidelines or pregnant.  Tests covered annually for patients at increased risk.  Pregnant patients may receive up to 3 test during pregnancy. N/A N/A Medical Nutrition Therapy (MNT) (for diabetes or renal disease not recommended schedule) Requires referral by treating physician for patient with diabetes or renal disease.  Can be provided in same year as diabetes self-management training (DSMT), and CMS recommends medical nutrition therapy take place after DSMT.  Up to 3 hours for initial year and 2 hours in subsequent years. N/A  N/A Prostate Cancer Screening (annually up to age 76) - Digital rectal exam (PAUL) - Prostate specific antigen (PSA) Annually (age 48 or over), PAUL not paid separately when covered E/M service is provided on same date Completed 8/18 Due as directed by Dr. Lizabeth Sellers Seasonal Influenza Vaccination (annually)    Completed 9/18 
   
Recommended annually Due Fall 2019  
         
Pneumococcal Vaccination (once after 72)    Pneumococcal 23 - 9/7/12 
   
  
Prevnar 13 - 10/13/15 
   
Both recommended once over the age of 72 Completed    
   
  
Completed Hepatitis B Vaccinations (if medium/high risk) Medium/high risk factors:  End-stage renal disease, Hemophiliacs who received Factor VIII or IX concentrates, Clients of institutions for the mentally retarded, Persons who live in the same house as a HepB virus carrier, Homosexual men, Illicit injectable drug abusers. N/A N/A Ultrasound Screening for Abdominal Aortic Aneurysm (AAA) (once) Patient must be referred through IPPE and not have had a screening for abdominal aortic aneurysm before under Medicare.  Limited to patients who meet one of the following criteria: 
- Men who are 73-68 years old and have smoked more than 100 cigarettes in their lifetime. 
-Anyone with a FH of AAA 
-Anyone recommended for screening by USPSTF Completed CT abd 6/2018 - 3.5cm Due as directed by Dr Phyllis Adams! Dear Chad Barbour: Thank you for requesting a [employer] account. Our records indicate that you already have an active [employer] account. You can access your account anytime at [email or web address] Did you know that you can access your hospital and ER discharge instructions at any time in [employer]? You can also review all of your test results from your hospital stay or ER visit. Additional Information If you have questions, please visit the Frequently Asked Questions section of the [employer] website at [email or web address] Remember, [employer] is NOT to be used for urgent needs. For medical emergencies, dial 911. Now available from your iPhone and Android! Please provide this summary of care documentation to your next provider. Your primary care clinician is listed as Kim Baer. If you have any questions after today's visit, please call [phone].

## 2018-09-18 LAB
ALBUMIN SERPL-MCNC: 3.8 G/DL (ref 3.5–4.8)
ALBUMIN/GLOB SERPL: 1.3 {RATIO} (ref 1.2–2.2)
ALP SERPL-CCNC: 111 IU/L (ref 39–117)
ALT SERPL-CCNC: 20 IU/L (ref 0–44)
AST SERPL-CCNC: 23 IU/L (ref 0–40)
BILIRUB SERPL-MCNC: 0.8 MG/DL (ref 0–1.2)
BUN SERPL-MCNC: 16 MG/DL (ref 8–27)
BUN/CREAT SERPL: 15 (ref 10–24)
CALCIUM SERPL-MCNC: 9.3 MG/DL (ref 8.6–10.2)
CHLORIDE SERPL-SCNC: 102 MMOL/L (ref 96–106)
CHOLEST SERPL-MCNC: 103 MG/DL (ref 100–199)
CO2 SERPL-SCNC: 26 MMOL/L (ref 20–29)
CREAT SERPL-MCNC: 1.1 MG/DL (ref 0.76–1.27)
EST. AVERAGE GLUCOSE BLD GHB EST-MCNC: 131 MG/DL
GLOBULIN SER CALC-MCNC: 3 G/DL (ref 1.5–4.5)
GLUCOSE SERPL-MCNC: 161 MG/DL (ref 65–99)
HBA1C MFR BLD: 6.2 % (ref 4.8–5.6)
HDLC SERPL-MCNC: 39 MG/DL
LDLC SERPL CALC-MCNC: 48 MG/DL (ref 0–99)
POTASSIUM SERPL-SCNC: 4.5 MMOL/L (ref 3.5–5.2)
PROT SERPL-MCNC: 6.8 G/DL (ref 6–8.5)
SODIUM SERPL-SCNC: 138 MMOL/L (ref 134–144)
TRIGL SERPL-MCNC: 79 MG/DL (ref 0–149)
TSH SERPL DL<=0.005 MIU/L-ACNC: 3.85 UIU/ML (ref 0.45–4.5)
VLDLC SERPL CALC-MCNC: 16 MG/DL (ref 5–40)

## 2018-10-12 RX ORDER — OMEPRAZOLE 40 MG/1
CAPSULE, DELAYED RELEASE ORAL
Qty: 180 CAP | Refills: 0 | Status: SHIPPED | OUTPATIENT
Start: 2018-10-12 | End: 2018-12-29 | Stop reason: SDUPTHER

## 2018-10-18 ENCOUNTER — OFFICE VISIT (OUTPATIENT)
Dept: NEUROLOGY | Age: 78
End: 2018-10-18

## 2018-10-18 VITALS
HEIGHT: 70 IN | SYSTOLIC BLOOD PRESSURE: 142 MMHG | DIASTOLIC BLOOD PRESSURE: 78 MMHG | OXYGEN SATURATION: 94 % | WEIGHT: 190 LBS | BODY MASS INDEX: 27.2 KG/M2 | HEART RATE: 96 BPM

## 2018-10-18 DIAGNOSIS — R29.90 ABNORMAL NEUROLOGICAL EXAM: Primary | ICD-10-CM

## 2018-10-18 DIAGNOSIS — R25.1 TREMOR: ICD-10-CM

## 2018-10-18 DIAGNOSIS — R29.2 BABINSKI SIGN: ICD-10-CM

## 2018-10-18 RX ORDER — BENZONATATE 200 MG/1
200 CAPSULE ORAL AS NEEDED
COMMUNITY
End: 2020-01-01

## 2018-10-18 NOTE — PROGRESS NOTES
HISTORY OF PRESENT ILLNESS  Ivy Kate is a 70 y.o. male. This patient is a 72-year-old  male who is here today for tremor. His wife notes he has some occasional tremor when sitting in his resting left hand. He denies action tremor, gait disturbance or memory loss. His wife is with him today. He has no family history of tremor or movement disorder. Apparently his primary care physician noticed some tremor when he was in the office getting his annual exam. He denies any gait problems, his wife has no noticed any gait or posture changes. There has not been significant memory changes. Review of Systems   Constitutional:        ROS positive for cough, mild urinary incontinance, mild ZELAYA and occasional stomach pain. complete review of systems done, otherwise negative except as noted above       Current Outpatient Medications on File Prior to Visit   Medication Sig Dispense Refill    benzonatate (TESSALON) 200 mg capsule Take 200 mg by mouth as needed for Cough.  omeprazole (PRILOSEC) 40 mg capsule TAKE ONE CAPSULE BY MOUTH TWICE A  Cap 0    irbesartan (AVAPRO) 150 mg tablet Take 150 mg by mouth nightly.  sucralfate (CARAFATE) 1 gram tablet Take 1 g by mouth four (4) times daily.  budesonide-formoterol (SYMBICORT) 160-4.5 mcg/actuation HFAA Take 2 Puffs by inhalation two (2) times a day.  tamsulosin (FLOMAX) 0.4 mg capsule TAKE 1 CAPSULE EVERY NIGHT 90 Cap 3    ketoconazole (NIZORAL) 2 % topical cream Apply  to affected area daily. Uses on skin lesions every couple of days 15 g 1    BUDESONIDE/FORMOTEROL FUMARATE (SYMBICORT IN) Take 3 Puffs by inhalation two (2) times a day.  Azelastine (ASTEPRO) 0.15 % (205.5 mcg) nasal spray       ketoconazole (NIZORAL) 2 % shampoo Apply  to affected area two (2) times a week.  0    VESICARE 5 mg tablet Take 5 mg by mouth daily.       albuterol (ACCUNEB) 1.25 mg/3 mL nebu 3 mL by Nebulization route every six (6) hours as needed. 1 Each 1    finasteride (PROSCAR) 5 mg tablet TAKE 1 TABLET EVERY DAY 90 tablet 2    atorvastatin (LIPITOR) 80 mg tablet Take 80 mg by mouth daily.  montelukast (SINGULAIR) 10 mg tablet Take 1 Tab by mouth daily (after dinner). 90 Tab 3    NEBULIZER by Does Not Apply route.  nitroglycerin (NITROQUICK) 0.4 mg SL tablet 1 Tab by SubLINGual route as needed. 25 Tab prn    multivitamin (ONE-A-DAY MENS) tablet Take 1 Tab by mouth daily.  metoprolol (LOPRESSOR) 25 mg tablet Take 25 mg by mouth two (2) times a day. PT INSTRUCTED TO TAKE AM DOS PER ANESTHESIA PROTOCOL      ezetimibe (ZETIA) 10 mg tablet Take 10 mg by mouth every morning.  albuterol (PROVENTIL HFA, VENTOLIN HFA) 90 mcg/Actuation inhaler Take 2 Puffs by inhalation every four (4) hours as needed for Wheezing. 1 Inhaler 2     No current facility-administered medications on file prior to visit.       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 Jose Carreon,  Call    BPH (benign prostatic hypertrophy)     Broken nose 3/1/2010    Bronchitis 02/2017    CAD (coronary artery disease)     Dr. Cabezas [facility]) 2000    basal cell chest    [facility] ([facility] 3/1/2010    Contact dermatitis and other eczema, due to unspecified cause     Dr. May Patient    COPD     Depression 3/1/2010    GERD (gastroesophageal reflux disease)     Hypercholesterolemia 9/29/2010    Hypertension     IPF (idiopathic pulmonary fibrosis) ([facility] 2007    Pneumonia 3/1/2010    Testosterone deficiency 3/24/2011     Family History   Problem Relation Age of Onset    Cancer Mother         spine or abdomen    Stroke Father     Heart Attack Father         46s    Heart Disease Father     Cancer Brother         CLL, lung    Diabetes Brother     Cancer Brother         lung    Cancer Brother         lung    Cancer Brother         lung    Heart Disease Brother     Heart Disease Brother 58        CABG    Other Brother         shingles    Lung Disease Sister     High Cholesterol Son     Lung Disease Daughter         Overactive Airways    No Known Problems Daughter      Social History     Tobacco Use    Smoking status: Former Smoker     Packs/day: 1.00     Years: 15.00     Pack years: 15.00     Last attempt to quit: 1970     Years since quittin.9    Smokeless tobacco: Never Used   Substance Use Topics    Alcohol use: Yes     Alcohol/week: 1.2 oz     Types: 2 Glasses of wine per week     Comment: rarely    Drug use: No     /78   Pulse 96   Ht 5' 10\" (1.778 m)   Wt 86.2 kg (190 lb)   SpO2 94%   BMI 27.26 kg/m²       Physical Exam  Constitutional: Oriented to person, place, and time, appears well-developed and well-nourished. No distress. HENT:   Head: Normocephalic and atraumatic. Mouth/Throat: Oropharynx is clear and moist. No oropharyngeal exudate. Eyes: Conjunctivae and EOM are normal. Pupils are equal, round, and reactive to light. No scleral icterus. Neck: Normal range of motion. Neck supple. No thyromegaly present. Cardiovascular: Normal rate, regular rhythm and normal heart sounds. Musculoskeletal: Normal range of motion, exhibits no edema, tenderness or deformity. Lymphadenopathy: no cervical adenopathy. Neurological: Alert and oriented to person, place, and time. Normal strength and normal reflexes. Displays no atrophy and no tremor. No cranial nerve deficit or sensory deficit. Exhibits normal muscle tone. Displays a negative Romberg sign, no seizure activity. Coordination normal, gait normal.   No Babinski's sign on the right side. +Babinski's sign on the left side. Speech, language and mentation are normal  Visual fields are full to confrontation, funduscopic exam reveals flat discs, the retina and vasculature are normal   Skin: Skin is warm and dry. No rash noted, not diaphoretic.  No erythema. Psychiatric: Normal mood and affect,  behavior is normal. Judgment and thought content normal.   Vitals reviewed. ASSESSMENT and PLAN  TREMOR  This patient hs mild essential tremor, I reassured him he did not have Parkinson's disease, he does have a left leg abnormal Babinski reflex, I will order a CT scan of hos brain w/o contrast to identify any abnormality to explain this, it may not result in action now but may help us in the future differentiate pertinent radiologic findings.    HYPERTENSION  Stroke risk, stable, managed by primary care  HYPERCHOLESTEROLEMIA  Stroke risk, managed by primary care

## 2018-10-18 NOTE — PATIENT INSTRUCTIONS
Office Policies  o Phone calls/patient messages:  Please allow up to 24 hours for someone in the office to contact you about your call or message. Be mindful your provider may be out of the office or your message may require further review. We encourage you to use [employer] for your messages as this is a faster, more efficient way to communicate with our office  o Medication Refills:  Prescription medications require up to 48 business hours to process. We encourage you to use [employer] for your refills. For controlled medications: Please allow up to 72 business hours to process. Certain medications may require you to  a written prescription at our office. NO narcotic/controlled medications will be prescribed after 4pm Monday through Friday or on weekends  o Form/Paperwork Completion:  Please note there is a $25 fee for all paperwork completed by our providers. We ask that you allow 7-14 business days. Pre-payment is due prior to picking up/faxing the completed form. You may also download your forms to [employer] to have your doctor print off.  o Test Results: In order for a patient to obtain test results, an appointment must be made with the physician to review the results. Test results cannot be discussed over the phone. Please be advised there is a $25 fee for all paperwork to be completed from our  providers. This is to be paid by the patient prior to picking up the completed forms.

## 2018-11-01 ENCOUNTER — HOSPITAL ENCOUNTER (OUTPATIENT)
Dept: CT IMAGING | Age: 78
Discharge: HOME | End: 2018-11-01
Attending: PSYCHIATRY & NEUROLOGY
Payer: MEDICARE

## 2018-11-01 DIAGNOSIS — R29.2 BABINSKI SIGN: ICD-10-CM

## 2018-11-01 PROCEDURE — 70450 CT HEAD/BRAIN W/O DYE: CPT

## 2018-12-05 RX ORDER — TAMSULOSIN HYDROCHLORIDE 0.4 MG/1
CAPSULE ORAL
Qty: 90 CAP | Refills: 3 | Status: SHIPPED | OUTPATIENT
Start: 2018-12-05 | End: 2019-11-19 | Stop reason: SDUPTHER

## 2018-12-30 RX ORDER — OMEPRAZOLE 40 MG/1
CAPSULE, DELAYED RELEASE ORAL
Qty: 180 CAP | Refills: 0 | Status: SHIPPED | OUTPATIENT
Start: 2018-12-30 | End: 2019-04-13 | Stop reason: SDUPTHER

## 2019-03-06 RX ORDER — KETOCONAZOLE 20 MG/G
CREAM TOPICAL
Qty: 1 TUBE | Refills: 0 | Status: SHIPPED | OUTPATIENT
Start: 2019-03-06

## 2019-03-15 ENCOUNTER — OFFICE VISIT (OUTPATIENT)
Dept: INTERNAL MEDICINE CLINIC | Age: 79
End: 2019-03-15

## 2019-03-15 VITALS
TEMPERATURE: 98 F | OXYGEN SATURATION: 95 % | DIASTOLIC BLOOD PRESSURE: 75 MMHG | BODY MASS INDEX: 27.92 KG/M2 | HEIGHT: 70 IN | WEIGHT: 195 LBS | RESPIRATION RATE: 16 BRPM | HEART RATE: 95 BPM | SYSTOLIC BLOOD PRESSURE: 115 MMHG

## 2019-03-15 DIAGNOSIS — R25.1 TREMOR: ICD-10-CM

## 2019-03-15 DIAGNOSIS — J84.9 ILD (INTERSTITIAL LUNG DISEASE) (HCC): ICD-10-CM

## 2019-03-15 DIAGNOSIS — E78.00 HYPERCHOLESTEROLEMIA: ICD-10-CM

## 2019-03-15 DIAGNOSIS — K21.9 GASTROESOPHAGEAL REFLUX DISEASE WITHOUT ESOPHAGITIS: ICD-10-CM

## 2019-03-15 DIAGNOSIS — I71.40 ABDOMINAL AORTIC ANEURYSM (AAA) WITHOUT RUPTURE: ICD-10-CM

## 2019-03-15 DIAGNOSIS — J06.9 URI, ACUTE: ICD-10-CM

## 2019-03-15 DIAGNOSIS — R73.01 IFG (IMPAIRED FASTING GLUCOSE): ICD-10-CM

## 2019-03-15 DIAGNOSIS — I48.91 ATRIAL FIBRILLATION, UNSPECIFIED TYPE (HCC): ICD-10-CM

## 2019-03-15 DIAGNOSIS — N40.0 BENIGN PROSTATIC HYPERPLASIA WITHOUT LOWER URINARY TRACT SYMPTOMS: ICD-10-CM

## 2019-03-15 DIAGNOSIS — I10 ESSENTIAL HYPERTENSION: Primary | ICD-10-CM

## 2019-03-15 PROBLEM — G47.33 OSA (OBSTRUCTIVE SLEEP APNEA): Status: ACTIVE | Noted: 2019-03-15

## 2019-03-15 NOTE — PROGRESS NOTES
HISTORY OF PRESENT ILLNESS  Philipp Solorzano is a 70 y.o. male. HPI  Last here 9/17/18. Pt is here to f/u on chronic conditions.   Pt presents with his wife who provides some of the hx.      BP is 142/75, HR today is 74  BP 140s/70s-80s  Pt's avapro was increased to 300mg daily (from 150) per cardio  Pt is taking decreased metoprolol 25mg BID  Recall norvasc caused edema       Pt has not been monitoring his BS at home  His DM is diet-controlled       Wt today is 195 lbs --up 4 lbs x lov  Discussed diet and w/l       Reviewed labs 9/18     Pt saw Dr Ingrid Mcintosh (surg) for a boil on his back  Last visit was 5/29/18     Pt follows with Dr. Ham Turpin (derm)  Pt had a basal cell carcinoma removed from his R arm  Last visit was 5/21/18      Pt follows with Dr. Marisela Seay (pulm) every quarter of a year  Last visit was ~2/19  Reviewed notes NP Latanya Shah 11/16/18: restarted ceftin for 10 days for URI, discussed sleep apnea, some anxiety with his mask, no other changes  Continues on symbicort daily and albuterol prn (not daily)  Pt is now on supplemental 2L O2 primarily when he exerts himself x several weeks  Recall esprit caused GI upset   Next visit scheduled for 5/30/19      Pt follows with Dr. Aline Diana ([facility]) annually in August  Last visit was 8/22/18   Continues on proscar, vesicare, and flomax per uro  vesicare expensive will address with uro      Pt follows with Dr. Lennox Boles (cardio)  Last visit was 2/18/19  Pt reports having a stress test d/t having SOB  His SOB has been relatively stable per pt  Norvasc caused swelling     Pt follows with Dr. Santacruz (allergy)  Continues singulair for allergies - controlled   Pt cancelled his last appointment, will only f/u prn      Pt follows with Dr. Robe So (vasc surg)   Last visit was 7/18     Pt follows with Dr. Lonnie Byrne (GI)  Last visit was 7/2/18  Continues on prilosec 40mg BID for reflux, which works well for the most part  Pt still takes sucralfate sometimes which helps not much      Pt saw Dr Cullen Nuñez (sleep)  Last visit was with NP 7/18  Pt is compliant with CPAP nightly  This is helping his sleep, tolerating well     Pt saw Dr Dallas Sears (neuro) for a tremor  Reviewed notes 10/18/18: TREMOR  This patient hs mild essential tremor, I reassured him he did not have Parkinson's disease, he does have a left leg abnormal Babinski reflex, I will order a CT scan of hos brain w/o contrast to identify any abnormality to explain this, it may not result in action now but may help us in the future differentiate pertinent radiologic findings.    HYPERTENSION  Stroke risk, stable, managed by primary care  HYPERCHOLESTEROLEMIA  Stroke risk, managed by primary care  Reviewed head CT 11/18: negative  Not bothering him too much     In the past pt stopped zoloft d/t possible abd pain  In the past ordered wellbutrin but pt never tried this  Overall pt is currently doing well without medication, not sad or down  3/19  Recall celexa caused irritability in the past. Pt tolerated wellbutrin in the past.      Continues claritin for allergies, which works well  [employer] on lipitor 80mg, max dose, daily for cholesterol   He also takes zetia daily    Pt had been worried about a sore throat  Several members have had strep throat  However pt never developed any further sx    Pt is interested in a handicap placard  Will assist with this     PREVENTIVE:    Colonoscopy: 7/14/17, [facility] in 5 years, Dr. Chad Gutiérrez  EGD: 7/14/17, Dr. Chad Gutiérrez   PSA: 8/18 per Dr. López Monday  AAA screen: CT 7/12 - 3.2mm, Marga Roberta follows, repeat in 10/13 and 2/15, 7/18  Tdap: will check cost at pharmacy, advised to get at his pharmacy   Pneumovax: 9/07/2012  Cxhuakw27: 10/13/2015  Zostavax: 6/01/2008  Shingrix: ordered 05/14/18, was backordered  Flu shot: 09/17/18   Microalbumin: 2/18   Foot exam: 7/10/17  A1c: 10/13 6.1, 1/14 5.8, 5/14 5.9, 10/14 6.1, 2/16 6.2, 6/16 6.1, 4/17 6.8, 7/17 6.2, 11/17 6.0, 2/18 6.3, 5/18 6.2, 9/18 6.2  [facility] exam: [facility] Cory Turner at [facility],   Lipids:  LDL 48  EK/10/17   Hepatitis C screen: , negative     Patient Active Problem List    Diagnosis Date Noted    Pneumonia 2016    Hyperglycemia 2016    ACP (advance care planning) 10/12/2015    ILD (interstitial lung disease) ([facility] 2014    Interstitial pulmonary fibrosis ([facility] 10/09/2013    HTN (hypertension) 10/09/2013    BPH (benign prostatic hyperplasia) 10/09/2013    A-fib ([facility] 10/09/2013    AAA (abdominal aortic aneurysm) ([facility] 10/09/2013    BPH (benign prostatic hypertrophy)     GERD (gastroesophageal reflux disease)     CAD (coronary artery disease)     S/P CABG (coronary artery bypass graft) 2011    Degenerative arthritis of thumb 2011    Testosterone deficiency 2011    Hypercholesterolemia 2010    Chronic bronchitis ([facility] 2010    Asthma 2010    Depression 2010    Arthritis shoulders and spine 2010     Current Outpatient Medications   Medication Sig Dispense Refill    ketoconazole (NIZORAL) 2 % topical cream APPLY TO AFFECTED AREA(S) DAILY. USE ON SKIN LESIONS EVERY COUPLE OF DAYS AS DIRECTED. 1 Tube 0    omeprazole (PRILOSEC) 40 mg capsule TAKE ONE CAPSULE BY MOUTH TWICE A  Cap 0    tamsulosin (FLOMAX) 0.4 mg capsule TAKE 1 CAPSULE EVERY NIGHT 90 Cap 3    benzonatate (TESSALON) 200 mg capsule Take 200 mg by mouth as needed for Cough.  irbesartan (AVAPRO) 150 mg tablet Take 150 mg by mouth nightly.  sucralfate (CARAFATE) 1 gram tablet Take 1 g by mouth four (4) times daily.  budesonide-formoterol (SYMBICORT) 160-4.5 mcg/actuation HFAA Take 2 Puffs by inhalation two (2) times a day.  BUDESONIDE/FORMOTEROL FUMARATE (SYMBICORT IN) Take 3 Puffs by inhalation two (2) times a day.       Azelastine (ASTEPRO) 0.15 % (205.5 mcg) nasal spray       ketoconazole (NIZORAL) 2 % shampoo Apply  to affected area two (2) times a week.  0    VESICARE 5 mg tablet Take 5 mg by mouth daily.      albuterol (ACCUNEB) 1.25 mg/3 mL nebu 3 mL by Nebulization route every six (6) hours as needed. 1 Each 1    finasteride (PROSCAR) 5 mg tablet TAKE 1 TABLET EVERY DAY 90 tablet 2    atorvastatin (LIPITOR) 80 mg tablet Take 80 mg by mouth daily.  montelukast (SINGULAIR) 10 mg tablet Take 1 Tab by mouth daily (after dinner). 90 Tab 3    NEBULIZER by Does Not Apply route.  nitroglycerin (NITROQUICK) 0.4 mg SL tablet 1 Tab by SubLINGual route as needed. 25 Tab prn    albuterol (PROVENTIL HFA, VENTOLIN HFA) 90 mcg/Actuation inhaler Take 2 Puffs by inhalation every four (4) hours as needed for Wheezing. 1 Inhaler 2    multivitamin (ONE-A-DAY MENS) tablet Take 1 Tab by mouth daily.  metoprolol (LOPRESSOR) 25 mg tablet Take 25 mg by mouth two (2) times a day. PT INSTRUCTED TO TAKE AM DOS PER ANESTHESIA PROTOCOL      ezetimibe (ZETIA) 10 mg tablet Take 10 mg by mouth every morning.        Past Surgical History:   Procedure Laterality Date    CABG, ARTERIAL, THREE  10/2003    CARDIAC SURG PROCEDURE UNLIST  10/2003    CABGx3 vessel    COLONOSCOPY N/A 7/14/2017    COL[facility]OSCOPY [facility]    ENDOSCOPY, Neela Even      Dr. Kevin Heath    HX APPENDECTOMY      HX CHOLECYSTECTOMY  11/22/2008    laparoscopic    HX COLECTOMY  12/29/07    sigmoid colectomy for volvulus in [address] [facility] HX COLONOSCOPY  06/17/2014    Repeat in 3 years    HX HEENT  while in 20's 1948    submucus resection sinus//resection sub mucus 1962  [address]    right    HX ORTHOPAEDIC  June 20, 2011    right hand, thumb surgery, plate inserted    HX ORTHOPAEDIC  1946    broken nose    HX ORTHOPAEDIC  1962    sub mucous resection    HX ORTHOPAEDIC  02/05/15    right hand surgery, ligament repair    HX OTHER SURGICAL      HX OTHER SURGICAL      hemorrhoids    HX OTHER SURGICAL  8/2015    basal cell carcinoma     HX SEPTOPLASTY  1962    s/p broken nose [facility] TONSILLECTOMY  1949     UROLOGICAL  1990    vasectomy      Lab Results   Component Value Date/Time    WBC 8.1 03/05/2018 01:54 PM    HGB 13.6 03/05/2018 01:54 PM    HCT 40.6 03/05/2018 01:54 PM    PLATELET 751 35/19/7068 01:54 PM    MCV 96.4 03/05/2018 01:54 PM     Lab Results   Component Value Date/Time    Cholesterol, total 103 09/17/2018 09:18 AM    HDL Cholesterol 39 (L) 09/17/2018 09:18 AM    LDL, calculated 48 09/17/2018 09:18 AM    Triglyceride 79 09/17/2018 09:18 AM    CHOL/HDL Ratio 3.4 09/29/2010 08:28 AM     Lab Results   Component Value Date/Time    GFR est non-AA 65 09/17/2018 09:18 AM    GFR est AA 75 09/17/2018 09:18 AM    Creatinine 1.10 09/17/2018 09:18 AM    BUN 16 09/17/2018 09:18 AM    Sodium 138 09/17/2018 09:18 AM    Potassium 4.5 09/17/2018 09:18 AM    Chloride 102 09/17/2018 09:18 AM    CO2 26 09/17/2018 09:18 AM    Magnesium 2.0 03/01/2016 04:23 AM        Review of Systems   Respiratory: Positive for shortness of breath. Cardiovascular: Negative for chest pain. Physical Exam   Constitutional: He is oriented to person, place, and time. He appears well-developed and well-nourished. No distress. HENT:   Head: Normocephalic and atraumatic. Mouth/Throat: Oropharynx is clear and moist. No oropharyngeal exudate. Eyes: Conjunctivae and EOM are normal. Right eye exhibits no discharge. Left eye exhibits no discharge. Neck: Normal range of motion. Neck supple. Cardiovascular: Normal rate, regular rhythm and normal heart sounds. Exam reveals no gallop and no friction rub. No murmur heard. Pulmonary/Chest: Effort normal. No respiratory distress. He has wheezes (Scattered expiratory wheezes). He has no rales. He exhibits no tenderness. Faint crackles, anterior as well   Musculoskeletal: Normal range of motion. He exhibits no edema, tenderness or deformity. Lymphadenopathy:     He has no cervical adenopathy.    Neurological: He is alert and oriented to person, place, and time. He has normal reflexes. Coordination normal.   Skin: Skin is warm and dry. No rash noted. He is not diaphoretic. No erythema. No pallor. Psychiatric: He has a normal mood and affect. His behavior is normal.       ASSESSMENT and PLAN    ICD-10-CM ICD-9-CM    1. URI, acute    Sore throat resolved, does not need abx   J06.9 465.9    2. ILD (interstitial lung disease) ([facility]    Follows closely with pulm Dr Keila Jc, had GI upset on esprit, continues on symbicort, UTD   J84.9 515    3. Essential hypertension    Controlled on avapro 300mg and metoprolol 25mg BID   I10 401.9    4. Atrial fibrillation, unspecified type ([facility]    Remains in nsr, follows with Dr Antwon Salas, medically managed   I48.91 427.31    5. Gastroesophageal reflux disease without esophagitis    Takes prilosec BID and sucralfate in addition prn   K21.9 530.81    6. Hypercholesterolemia    Controlled on lipitor and zetia   E78.00 272.0    7. Abdominal aortic aneurysm (AAA) without rupture [facility])    Follows with Dr Sara Winslow annually in July   I71.4 441.4    8. Tremor    Referred to neuro Dr Katarina Howe saw him in 10/18, said it was a benign essential tremor   R25.1 781.0    9. IFG (impaired fasting glucose)    Check a1c   R73.01 790.21    10. Benign prostatic hyperplasia without lower urinary tract symptoms    Controlled on proscar, vesicare, and flomax, follows with Dr Neftaly Ratliff annually in August   N40.0 600.00     11. Sleep apnea - compliant with CPAP    Depression screen neg    Scribed by [place] of [address] [place] [address] [address], as dictated by Dr. Darwin Rinaldi. Current diagnosis and concerns discussed with pt at length. Pt understands risks and benefits or current treatment plan and medications, and accepts the treatment and medication with any possible risks. Pt asks appropriate questions, which were answered. Pt was instructed to call with any concerns or problems.      I have reviewed the note documented by the scribe. The services provided are my own.   The documentation is accurate

## 2019-03-16 LAB
ALBUMIN SERPL-MCNC: 3.9 G/DL (ref 3.5–4.8)
ALBUMIN/CREAT UR: 15.2 MG/G CREAT (ref 0–30)
ALBUMIN/GLOB SERPL: 1.1 {RATIO} (ref 1.2–2.2)
ALP SERPL-CCNC: 87 IU/L (ref 39–117)
ALT SERPL-CCNC: 19 IU/L (ref 0–44)
AST SERPL-CCNC: 20 IU/L (ref 0–40)
BILIRUB SERPL-MCNC: 1.1 MG/DL (ref 0–1.2)
BUN SERPL-MCNC: 16 MG/DL (ref 8–27)
BUN/CREAT SERPL: 13 (ref 10–24)
CALCIUM SERPL-MCNC: 9.6 MG/DL (ref 8.6–10.2)
CHLORIDE SERPL-SCNC: 101 MMOL/L (ref 96–106)
CO2 SERPL-SCNC: 25 MMOL/L (ref 20–29)
CREAT SERPL-MCNC: 1.24 MG/DL (ref 0.76–1.27)
CREAT UR-MCNC: 88.6 MG/DL
EST. AVERAGE GLUCOSE BLD GHB EST-MCNC: 126 MG/DL
GLOBULIN SER CALC-MCNC: 3.4 G/DL (ref 1.5–4.5)
GLUCOSE SERPL-MCNC: 117 MG/DL (ref 65–99)
HBA1C MFR BLD: 6 % (ref 4.8–5.6)
MICROALBUMIN UR-MCNC: 13.5 UG/ML
POTASSIUM SERPL-SCNC: 4.2 MMOL/L (ref 3.5–5.2)
PROT SERPL-MCNC: 7.3 G/DL (ref 6–8.5)
SODIUM SERPL-SCNC: 139 MMOL/L (ref 134–144)
TSH SERPL DL<=0.005 MIU/L-ACNC: 3.63 UIU/ML (ref 0.45–4.5)

## 2019-04-13 RX ORDER — OMEPRAZOLE 40 MG/1
CAPSULE, DELAYED RELEASE ORAL
Qty: 180 CAP | Refills: 0 | Status: SHIPPED | OUTPATIENT
Start: 2019-04-13 | End: 2019-07-07 | Stop reason: SDUPTHER

## 2019-04-30 RX ORDER — BUPROPION HYDROCHLORIDE 150 MG/1
150 TABLET ORAL
Qty: 90 TAB | Refills: 1 | Status: SHIPPED | OUTPATIENT
Start: 2019-04-30 | End: 2019-09-19 | Stop reason: SDUPTHER

## 2019-04-30 NOTE — TELEPHONE ENCOUNTER
PCP: Veronica Ordonez MD    Last appt: 3/15/2019  Future Appointments   Date Time Provider David Romero   7/11/2019 12:00 PM Veronica Ordonez MD [facility]       Requested Prescriptions     Pending Prescriptions Disp Refills    buPROPion XL (WELLBUTRIN XL) 150 mg tablet 90 Tab 1     Sig: Take 1 Tab by mouth every morning.

## 2019-04-30 NOTE — TELEPHONE ENCOUNTER
Patient states he needs a New Prescription for his Bupropion medication. Patient states he decided to start taking medication but prescription has  & needs a New prescription for 90 day supplies sent to [facility]. Please call if any questions.  Thank you

## 2019-06-13 ENCOUNTER — HOSPITAL ENCOUNTER (OUTPATIENT)
Dept: CARDIAC REHAB | Age: 79
Discharge: HOME | End: 2019-06-13
Payer: MEDICARE

## 2019-06-13 VITALS — HEIGHT: 70 IN | WEIGHT: 198 LBS | BODY MASS INDEX: 28.35 KG/M2

## 2019-06-13 PROCEDURE — 94618 PULMONARY STRESS TESTING: CPT

## 2019-06-13 RX ORDER — OXYBUTYNIN CHLORIDE 5 MG/1
5 TABLET ORAL DAILY
COMMUNITY
End: 2020-01-01 | Stop reason: ALTCHOICE

## 2019-06-13 NOTE — CARDIO/PULMONARY
Cardiopulmonary Rehab Orientation: Met with Miguel A Jennifer Muhammad for the initial session. Mr Coby Julian is a 70year-old patient of Dr. Hugh Weber, who presents to rehab for cardiac conditioning and strengthening, S/P asthma and pulmonary fibrosis; LVEF not in connect care. History also includes CAD, HTN, interstitial lung disease. Allergies: Celexa, cephalexin, Percocet, sulfa, demerol, niaspan Immunization for influenza vaccine UTD. Pt is a former smoker. Lungs were CTA; productive cough, clear sputumn. No LE edema was present. Exercise at home includes none at this tome. Limitations: SOB. Patient was given an educational notebook and viewed the pulmonary rehab DVD. Psychosocial: Pt is  with a loving support sytem. He is hopeful to increase with activity tolerance and lower his O2 usage. He also wishes top learn how to properly use his O2, and how to increase flow or wean as needed. He admits to some depression, his MD is aware and has been tweaking medications. States his mood is better since starting prednisone. BMI 28.4 , based on height of 5'10\" and weight 198lbs. Patient's identified goals are:  
1. Increase stamina with walking (at least 20 mins) 2. Decrease O2 use (now on 2L) 3. Get comfortable with exercise routine and how much O2 is needed. Plan: Return for first exercise session on 6/18/19 COPD Assessment Tool (CAT)     0-5 I never cough/I cough all the time       Score: 3 I have no phlegm in my chest/ My chest is completely full of phlegm  Score: 3 My chest does not feel tight at all/ My chest feels very tight   Score: 2 When I walk up a hill or stairs I am bot breathless/ When I walk up a hill or stairs I am very breathless    Score: 4 I am not limited doing any activities at home/ 
 I am very limited doing activities at home     Score: 4 I am confident leaving my home despite my lung condition/ 
 I am not at all confident leaving my home because of my lung condition Score: 2 I sleep soundly/ I don't sleep because of my lung condition   Score: 1 I have lots of energy/ I have no energy at all     Score: 4            Total: 23

## 2019-06-18 ENCOUNTER — HOSPITAL ENCOUNTER (OUTPATIENT)
Dept: CARDIAC REHAB | Age: 79
Discharge: HOME | End: 2019-06-18
Payer: MEDICARE

## 2019-06-18 VITALS — BODY MASS INDEX: 28.24 KG/M2 | WEIGHT: 196.8 LBS

## 2019-06-18 PROCEDURE — G0237 THERAPEUTIC PROCD STRG ENDUR: HCPCS

## 2019-06-20 ENCOUNTER — HOSPITAL ENCOUNTER (OUTPATIENT)
Dept: CARDIAC REHAB | Age: 79
Discharge: HOME | End: 2019-06-20
Payer: MEDICARE

## 2019-06-20 VITALS — BODY MASS INDEX: 28.3 KG/M2 | WEIGHT: 197.2 LBS

## 2019-06-20 PROCEDURE — G0239 OTH RESP PROC, GROUP: HCPCS

## 2019-06-25 ENCOUNTER — HOSPITAL ENCOUNTER (OUTPATIENT)
Dept: CARDIAC REHAB | Age: 79
Discharge: HOME | End: 2019-06-25
Payer: MEDICARE

## 2019-06-25 VITALS — BODY MASS INDEX: 28.04 KG/M2 | WEIGHT: 195.4 LBS

## 2019-06-25 PROCEDURE — G0239 OTH RESP PROC, GROUP: HCPCS

## 2019-06-27 ENCOUNTER — HOSPITAL ENCOUNTER (OUTPATIENT)
Dept: CARDIAC REHAB | Age: 79
Discharge: HOME | End: 2019-06-27
Payer: MEDICARE

## 2019-06-27 VITALS — WEIGHT: 195.4 LBS | BODY MASS INDEX: 28.04 KG/M2

## 2019-06-27 PROCEDURE — G0239 OTH RESP PROC, GROUP: HCPCS

## 2019-07-08 RX ORDER — OMEPRAZOLE 40 MG/1
CAPSULE, DELAYED RELEASE ORAL
Qty: 180 CAP | Refills: 0 | Status: SHIPPED | OUTPATIENT
Start: 2019-07-08 | End: 2019-07-11 | Stop reason: SDUPTHER

## 2019-07-09 ENCOUNTER — TELEPHONE (OUTPATIENT)
Dept: INTERNAL MEDICINE CLINIC | Age: 79
End: 2019-07-09

## 2019-07-09 ENCOUNTER — HOSPITAL ENCOUNTER (OUTPATIENT)
Dept: CARDIAC REHAB | Age: 79
End: 2019-07-09

## 2019-07-09 ENCOUNTER — TELEPHONE (OUTPATIENT)
Dept: CARDIAC REHAB | Age: 79
End: 2019-07-09

## 2019-07-09 NOTE — TELEPHONE ENCOUNTER
Patient's wife, Michelle Durand states medication request can be cancelled as Pulmonary doctor did the prescription. No call back needed. Patient will be at appt this Thursday, 7/11/19.  Thank you

## 2019-07-09 NOTE — TELEPHONE ENCOUNTER
Patient's wife, Jeanie Weller states she needs a call back to see if Dr. Quincy Shell can call in a prescription for Prednisone Dose Noé for patient's symptoms that started yesterday evening & Pulmonary doctor is not responding. Patient has upcoming appt on 7/11/19. Please call to advise.  Thank you

## 2019-07-11 ENCOUNTER — APPOINTMENT (OUTPATIENT)
Dept: CARDIAC REHAB | Age: 79
End: 2019-07-11

## 2019-07-11 ENCOUNTER — OFFICE VISIT (OUTPATIENT)
Dept: INTERNAL MEDICINE CLINIC | Age: 79
End: 2019-07-11

## 2019-07-11 VITALS
SYSTOLIC BLOOD PRESSURE: 106 MMHG | RESPIRATION RATE: 20 BRPM | TEMPERATURE: 98.2 F | OXYGEN SATURATION: 92 % | HEIGHT: 70 IN | HEART RATE: 75 BPM | BODY MASS INDEX: 28.46 KG/M2 | WEIGHT: 198.8 LBS | DIASTOLIC BLOOD PRESSURE: 63 MMHG

## 2019-07-11 DIAGNOSIS — E78.00 HYPERCHOLESTEROLEMIA: ICD-10-CM

## 2019-07-11 DIAGNOSIS — I10 ESSENTIAL HYPERTENSION: ICD-10-CM

## 2019-07-11 DIAGNOSIS — F32.9 REACTIVE DEPRESSION: ICD-10-CM

## 2019-07-11 DIAGNOSIS — I25.10 CORONARY ARTERY DISEASE INVOLVING NATIVE CORONARY ARTERY OF NATIVE HEART WITHOUT ANGINA PECTORIS: ICD-10-CM

## 2019-07-11 DIAGNOSIS — K21.9 GASTROESOPHAGEAL REFLUX DISEASE WITHOUT ESOPHAGITIS: ICD-10-CM

## 2019-07-11 DIAGNOSIS — J84.10 INTERSTITIAL PULMONARY FIBROSIS (HCC): ICD-10-CM

## 2019-07-11 DIAGNOSIS — I71.40 ABDOMINAL AORTIC ANEURYSM (AAA) WITHOUT RUPTURE: ICD-10-CM

## 2019-07-11 DIAGNOSIS — J40 BRONCHITIS: ICD-10-CM

## 2019-07-11 DIAGNOSIS — J42 CHRONIC BRONCHITIS, UNSPECIFIED CHRONIC BRONCHITIS TYPE (HCC): ICD-10-CM

## 2019-07-11 DIAGNOSIS — G47.33 OSA (OBSTRUCTIVE SLEEP APNEA): ICD-10-CM

## 2019-07-11 DIAGNOSIS — I48.91 ATRIAL FIBRILLATION, UNSPECIFIED TYPE (HCC): ICD-10-CM

## 2019-07-11 DIAGNOSIS — E11.9 DIABETES MELLITUS WITHOUT COMPLICATION (HCC): Primary | ICD-10-CM

## 2019-07-11 DIAGNOSIS — J84.9 ILD (INTERSTITIAL LUNG DISEASE) (HCC): ICD-10-CM

## 2019-07-11 DIAGNOSIS — N40.0 BENIGN PROSTATIC HYPERPLASIA WITHOUT LOWER URINARY TRACT SYMPTOMS: ICD-10-CM

## 2019-07-11 LAB — HBA1C MFR BLD HPLC: 6.1 %

## 2019-07-11 RX ORDER — CEFDINIR 300 MG/1
300 CAPSULE ORAL 2 TIMES DAILY
COMMUNITY
Start: 2019-07-09

## 2019-07-11 RX ORDER — OMEPRAZOLE 40 MG/1
40 CAPSULE, DELAYED RELEASE ORAL DAILY
Qty: 180 CAP | Refills: 1 | Status: SHIPPED | OUTPATIENT
Start: 2019-07-11 | End: 2020-02-02

## 2019-07-11 RX ORDER — PREDNISONE 10 MG/1
TABLET ORAL
COMMUNITY
Start: 2019-07-09 | End: 2019-10-28

## 2019-07-11 NOTE — PROGRESS NOTES
HISTORY OF PRESENT ILLNESS Nilda Pérez is a 70 y.o. male. HPI Last here 3/15/19. Pt is here to f/u on chronic conditions. Pt presents with his wife who provides some of the hx. 
   
BP is 106/63 BP 140s/80s at home Pt's continues on avapro 300mg daily and metoprolol 25mg BID Discussed cutting avapro in half while he is sick if he starts to feel lightheaded Pt would like to cut avapro in half and take one half in the AM and one at dinner Discussed this is fine but may just complicate things Recall norvasc caused edema  
   
Pt has not been monitoring his BS at home His DM is diet-controlled  
   
Wt today is 198 lbs --up 3 lbs x lov Discussed wt is higher side of nl, discussed OK as long as he does not gain more wt Discussed low carb diet and w/l  
   
Reviewed labs 3/19 
  
  
Pt follows with Dr. Brigitte Dexter (derm) Pt had a basal cell carcinoma removed from his R arm Last visit was 6/19 and had a spot taken off  
   
Pt follows with Dr. Lennox Harness (pulm) routinely q3 months for ILD Last visit was 7/11/19 --today bp good there Was given prednisone, omnicef BID and cough syrup for bronchitis  He just picked this up last night He had a worsening cough on Monday Had fevers (102) and chills Follows with pul rehab --will be starting this soon thinks it will help Continues on symbicort daily and albuterol prn (not daily) Pt is regularly supplemental 2L O2-currently on 3L due to bronchitis Recall esprit caused GI upset  
   
Pt follows with Dr. Darrel Meehan ([facility]) annually in August 
Last visit was 8/22/18 Continues on proscar, vesicare, and flomax per uro Next visit scheduled 8/19  
 
   
Pt follows with Dr. Rachelle Bonilla (cardio) Last visit was 2/18/19 Had neg stress test in 2/19 Medically managed, no acute sx, follows q 6months Next visit due- pt will schedule  
  
Pt follows with Dr. Santacruz (allergy) Continues singulair for allergies - controlled Will f/u prn  
   
 Pt follows with Dr. See Goodman (vasc surg) for AAA Last visit was  Has an US scheduled for 7/15/19  Follows for AAA 
  
Pt follows with Dr. Alejandra Ventura (GI) for gerd Last visit was 18 Continues on prilosec 40mg BID for reflux, which works well for the most part Pt still takes sucralfate- took 3 times this week --pred upsts his stomach  
  
Pt saw Dr Amparo Fonseca (sleep) Last visit was with NP --had f/u later in  Pt is compliant with CPAP nightly This is helping his sleep, tolerating well  
Advised to f/u  
  
Pt saw Dr Nuno Echevarria (neuro) for a tremor Last visit was 10/18/18 Not bothering him too much  
  
Pt is on wellbutrin 150 mg-works well --restarted since lov, this helped a lot Mood improved, less down and irritable happy with dose Recall celexa caused irritability in the past. Pt tolerated wellbutrin in the past. 
   
Continues claritin for allergies, which works well 
   
Continues on lipitor 80mg, max dose, daily for cholesterol He also takes zetia daily 
  
  
PREVENTIVE:   
Colonoscopy: 17,  repeat in 5 years, Dr. Alejandra Ventura EGD: 17, Dr. Alejandra Ventura PSA:  per Dr. Mary Castanon AAA screen: CT  - 3.2mm, [facility] follows, repeat in 10/13 and 2/15,  Tdap: will check cost at pharmacy, advised to get at his pharmacy Pneumovax: 2012 [id number]: 10/13/2015 Zostavax: 2008 Shingrix: ordered 18, was backordered Flu shot: 18  
Microalbumin: 3/19 Foot exam: 19 A1c: 10/13 6.1,  5.8,  5.9, 10/14 6.1,  6.2,  6.1,  6.8,  6.2,  6.0,  6.3,  6.2,  6.2, 3/19 6.0,  POC 6.1 Eye exam: [facility]. Paul Vera at [facility],  Lipids:  LDL 48 EK/10/17  
Hepatitis C screen: , negative Patient Active Problem List  
 Diagnosis Date Noted  SEAN (obstructive sleep apnea) 03/15/2019  Pneumonia 2016  Hyperglycemia 2016  ACP (advance care planning) 10/12/2015  ILD (interstitial lung disease) ([facility] 06/12/2014  Interstitial pulmonary fibrosis ([facility] 10/09/2013  
 HTN (hypertension) 10/09/2013  BPH (benign prostatic hyperplasia) 10/09/2013  A-fib ([facility] 10/09/2013  AAA (abdominal aortic aneurysm) ([facility] 10/09/2013  Benign prostatic hyperplasia  GERD (gastroesophageal reflux disease)  CAD (coronary artery disease)  S/P CABG (coronary artery bypass graft) 06/20/2011  Degenerative arthritis of thumb 06/20/2011  Testosterone deficiency 03/24/2011  Hypercholesterolemia 09/29/2010  Chronic bronchitis ([facility] 03/01/2010  Asthma 03/01/2010  Depression 03/01/2010  Arthritis shoulders and spine 03/01/2010 Current Outpatient Medications Medication Sig Dispense Refill  albuterol (ACCUNEB) 1.25 mg/3 mL nebu 3 mL by Nebulization route every six (6) hours as needed. 1 Each 1  
 NEBULIZER by Does Not Apply route.  albuterol (PROVENTIL HFA, VENTOLIN HFA) 90 mcg/Actuation inhaler Take 2 Puffs by inhalation every four (4) hours as needed for Wheezing. 1 Inhaler 2  predniSONE (DELTASONE) 10 mg tablet  cefdinir (OMNICEF) 300 mg capsule  omeprazole (PRILOSEC) 40 mg capsule TAKE ONE CAPSULE BY MOUTH TWICE A  Cap 0  
 oxybutynin (DITROPAN) 5 mg tablet Take 5 mg by mouth daily.  buPROPion XL (WELLBUTRIN XL) 150 mg tablet Take 1 Tab by mouth every morning. 90 Tab 1  ketoconazole (NIZORAL) 2 % topical cream APPLY TO AFFECTED AREA(S) DAILY. USE ON SKIN LESIONS EVERY COUPLE OF DAYS AS DIRECTED. 1 Tube 0  
 tamsulosin (FLOMAX) 0.4 mg capsule TAKE 1 CAPSULE EVERY NIGHT 90 Cap 3  
 benzonatate (TESSALON) 200 mg capsule Take 200 mg by mouth as needed for Cough.  irbesartan (AVAPRO) 150 mg tablet Take 300 mg by mouth nightly.  sucralfate (CARAFATE) 1 gram tablet Take 1 g by mouth four (4) times daily.     
 budesonide-formoterol (SYMBICORT) 160-4.5 mcg/actuation HFAA Take 2 Puffs by inhalation two (2) times a day.  BUDESONIDE/FORMOTEROL FUMARATE (SYMBICORT IN) Take 3 Puffs by inhalation two (2) times a day.  Azelastine (ASTEPRO) 0.15 % (205.5 mcg) nasal spray  ketoconazole (NIZORAL) 2 % shampoo Apply  to affected area two (2) times a week.  0  
 finasteride (PROSCAR) 5 mg tablet TAKE 1 TABLET EVERY DAY 90 tablet 2  
 atorvastatin (LIPITOR) 80 mg tablet Take 80 mg by mouth daily.  montelukast (SINGULAIR) 10 mg tablet Take 1 Tab by mouth daily (after dinner). 90 Tab 3  
 nitroglycerin (NITROQUICK) 0.4 mg SL tablet 1 Tab by SubLINGual route as needed. 25 Tab prn  multivitamin (ONE-A-DAY MENS) tablet Take 1 Tab by mouth daily.  metoprolol (LOPRESSOR) 25 mg tablet Take 25 mg by mouth two (2) times a day. PT INSTRUCTED TO TAKE AM DOS PER ANESTHESIA PROTOCOL  ezetimibe (ZETIA) 10 mg tablet Take 10 mg by mouth every morning. Past Surgical History:  
Procedure Laterality Date  CABG, ARTERIAL, THREE  10/2003  CARDIAC SURG PROCEDURE UNLIST  10/2003 CABGx3 vessel  COLONOSCOPY N/A 7/14/2017 COLONOSCOPY performed by Lenny Jiemnez MD at [facility] ENDOSCOPY  ENDOSCOPY, COLON, DIAGNOSTIC Dr. Carolyn Schneider  HX APPENDECTOMY  HX CHOLECYSTECTOMY  11/22/2008  
 laparoscopic  HX COLECTOMY  12/29/07  
 sigmoid colectomy for volvulus in [place]  HX COLONOSCOPY  06/17/2014 Repeat in 3 years  HX HEENT  while in 20's 1948  
 submucus resection sinus//resection sub mucus 1962 [address]  
 right  HX ORTHOPAEDIC  June 20, 2011  
 right hand, thumb surgery, plate inserted  HX ORTHOPAEDIC  1946  
 broken nose  HX ORTHOPAEDIC  1962  
 sub mucous resection  HX ORTHOPAEDIC  02/05/15  
 right hand surgery, ligament repair  HX OTHER SURGICAL    
 HX OTHER SURGICAL    
 hemorrhoids  HX OTHER SURGICAL  8/2015  
 basal cell carcinoma  HX SEPTOPLASTY  [id number]  
 s/p broken nose 1946  HX TONSIL AND ADENOIDECTOMY  1949  
 HX TONSILLECTOMY  1949  
 [facility]  
 vasectomy Lab Results Component Value Date/Time WBC 8.1 03/05/2018 01:54 PM  
 HGB 13.6 03/05/2018 01:54 PM  
 HCT 40.6 03/05/2018 01:54 PM  
 PLATELET 800 27/05/3586 01:54 PM  
 MCV 96.4 03/05/2018 01:54 PM  
 
Lab Results Component Value Date/Time Cholesterol, total 103 09/17/2018 09:18 AM  
 HDL Cholesterol 39 (L) 09/17/2018 09:18 AM  
 LDL, calculated 48 09/17/2018 09:18 AM  
 Triglyceride 79 09/17/2018 09:18 AM  
 CHOL/HDL Ratio 3.4 09/29/2010 08:28 AM  
 
Lab Results Component Value Date/Time GFR est non-AA 56 (L) 03/15/2019 09:37 AM  
 GFR est AA 64 03/15/2019 09:37 AM  
 Creatinine 1.24 03/15/2019 09:37 AM  
 BUN 16 03/15/2019 09:37 AM  
 Sodium 139 03/15/2019 09:37 AM  
 Potassium 4.2 03/15/2019 09:37 AM  
 Chloride 101 03/15/2019 09:37 AM  
 CO2 25 03/15/2019 09:37 AM  
 Magnesium 2.0 03/01/2016 04:23 AM  
  
Review of Systems Respiratory: Negative for shortness of breath. Cardiovascular: Negative for chest pain. Physical Exam  
Constitutional: He is oriented to person, place, and time. He appears well-developed and well-nourished. No distress. HENT:  
Head: Normocephalic and atraumatic. Mouth/Throat: Oropharynx is clear and moist. No oropharyngeal exudate. Eyes: Conjunctivae and EOM are normal. Right eye exhibits no discharge. Left eye exhibits no discharge. Neck: Normal range of motion. Neck supple. Cardiovascular: Normal rate, regular rhythm and normal heart sounds. Exam reveals no gallop and no friction rub. No murmur heard. Pulmonary/Chest: Effort normal and breath sounds normal. No respiratory distress. He has no wheezes. He has no rales. He exhibits no tenderness. Diffuse crackles anterior and posterior Musculoskeletal: Normal range of motion. He exhibits edema (trace edema). He exhibits no tenderness or deformity. Lymphadenopathy: He has no cervical adenopathy. Neurological: He is alert and oriented to person, place, and time. He has normal reflexes. Coordination abnormal.  
Monofilament nl BLE, good peripheral pulses, no ulcers Skin: Skin is warm and dry. No rash noted. He is not diaphoretic. No erythema. No pallor. Psychiatric: He has a normal mood and affect. His behavior is normal.  
 
 
ASSESSMENT and PLAN 
  ICD-10-CM ICD-9-CM 1. Diabetes mellitus without complication ([facility] Diet controlled, a1c at goal  E11.9 250.00  DIABETES FOOT EXAM  
2. Atrial fibrillation, unspecified type ([facility] Follows with Dr Miguel Hewitt, remains in NSR, not on anti coag other than Asprin  I48.91 427.31   
3. Abdominal aortic aneurysm (AAA) without rupture ([facility] Follows with Juwan Fang annually, will see him later this month  I71.4 441.4 4. Chronic bronchitis, unspecified chronic bronchitis type ([facility] 
 
 
 
chornically on home o2, just saw Dr Mara Kerr today, ranges 2-3 L, continue symbicort and albuterol  J42 491.9 5. ILD (interstitial lung disease) ([facility] See above  J84.9 515   
6. Interstitial pulmonary fibrosis ([facility] See above J84.10 515   
7. Essential hypertension Controlled on valsartan and metropol, running on lower side today because he is ill, BP has been flutating, discused cutting avapro in 1/2 if needed for low BP  I10 401.9 8. Coronary artery disease involving native coronary artery of native heart without angina pectoris 
 
 
 
 
 
utd with burns, negative stress test in 2/19 I25.10 414.01   
9. Gastroesophageal reflux disease without esophagitis Continues prilosec BID, has neeeded to use sucrafate recently more due to prednisone  K21.9 530.81   
10. Benign prostatic hyperplasia without lower urinary tract symptoms Will be seeing Dr Miguel Hewitt in august, continues on prescar, vesicare and flomax  N40.0 600.00   
11. SEAN (obstructive sleep apnea) Compliant with cpap saw sleep cinic in 2/19  G47.33 327.23   
12. Hypercholesterolemia Controlled on max dose lipitor and zetia, will repeat lipids in 12/19 E78.00 272.0 13. Reactive depression Back on wellbutrin, doing well with current dose  F32.9 300.4 14. Bronchitis Currently on oniceft and prednisone taper  J40 490 Scribed by Star Galarza of [employer], as dictated by Dr. Evangelina Tovar. Current diagnosis and concerns discussed with pt at length. Pt understands risks and benefits or current treatment plan and medications, and accepts the treatment and medication with any possible risks. Pt asks appropriate questions, which were answered. Pt was instructed to call with any concerns or problems. I have reviewed the note documented by the scribe. The services provided are my own. The documentation is accurate

## 2019-07-18 ENCOUNTER — HOSPITAL ENCOUNTER (OUTPATIENT)
Dept: GENERAL RADIOLOGY | Age: 79
Discharge: HOME | End: 2019-07-18
Payer: MEDICARE

## 2019-07-18 PROCEDURE — 71046 X-RAY EXAM CHEST 2 VIEWS: CPT

## 2019-07-23 ENCOUNTER — HOSPITAL ENCOUNTER (OUTPATIENT)
Dept: CARDIAC REHAB | Age: 79
Discharge: HOME | End: 2019-07-23

## 2019-07-25 ENCOUNTER — APPOINTMENT (OUTPATIENT)
Dept: CARDIAC REHAB | Age: 79
End: 2019-07-25

## 2019-07-26 ENCOUNTER — OFFICE VISIT (OUTPATIENT)
Dept: INTERNAL MEDICINE CLINIC | Age: 79
End: 2019-07-26

## 2019-07-26 VITALS
DIASTOLIC BLOOD PRESSURE: 63 MMHG | WEIGHT: 192 LBS | BODY MASS INDEX: 27.49 KG/M2 | RESPIRATION RATE: 16 BRPM | TEMPERATURE: 99.2 F | HEART RATE: 100 BPM | HEIGHT: 70 IN | SYSTOLIC BLOOD PRESSURE: 112 MMHG | OXYGEN SATURATION: 88 %

## 2019-07-26 DIAGNOSIS — J84.9 ILD (INTERSTITIAL LUNG DISEASE) (HCC): ICD-10-CM

## 2019-07-26 DIAGNOSIS — I10 ESSENTIAL HYPERTENSION: ICD-10-CM

## 2019-07-26 DIAGNOSIS — J84.10 INTERSTITIAL PULMONARY FIBROSIS (HCC): Primary | ICD-10-CM

## 2019-07-26 DIAGNOSIS — J40 BRONCHITIS: ICD-10-CM

## 2019-07-26 LAB
ERYTHROCYTE [DISTWIDTH] IN BLOOD BY AUTOMATED COUNT: 13.2 % (ref 12.3–15.4)
HCT VFR BLD AUTO: 35.1 % (ref 37.5–51)
HGB BLD-MCNC: 11.6 G/DL (ref 13–17.7)
MCH RBC QN AUTO: 31.1 PG (ref 26.6–33)
MCHC RBC AUTO-ENTMCNC: 33 G/DL (ref 31.5–35.7)
MCV RBC AUTO: 94 FL (ref 79–97)
PLATELET # BLD AUTO: 317 X10E3/UL (ref 150–450)
RBC # BLD AUTO: 3.73 X10E6/UL (ref 4.14–5.8)
WBC # BLD AUTO: 19.2 X10E3/UL (ref 3.4–10.8)

## 2019-07-26 RX ORDER — LEVOFLOXACIN 750 MG/1
750 TABLET ORAL DAILY
Qty: 7 TAB | Refills: 0 | Status: SHIPPED | OUTPATIENT
Start: 2019-07-26 | End: 2019-08-02

## 2019-07-26 RX ORDER — PREDNISONE 20 MG/1
20 TABLET ORAL
Qty: 30 TAB | Refills: 0 | Status: SHIPPED | OUTPATIENT
Start: 2019-07-26 | End: 2020-02-28

## 2019-07-26 NOTE — PROGRESS NOTES
HISTORY OF PRESENT ILLNESS  Michael Alamo is a 70 y.o. male. HPI   Last here 7/11/19. Pt is here for an acute visit. Pt presents with his wife who provides some of the hx.      BP is 112/63  -140/70s or 80s   Orthostatic here today  Feels weak  Pt's continues on avapro 300mg daily and metoprolol 25mg BID  Lov, Discussed cutting avapro in half while he is sick if he starts to feel lightheaded   Pt has only cut avapro in half once but went back to taking the whole pill   Checked orthostatics- will cut avapro in half          Reviewed labs 3/19  Will get labs today            Pt follows with Dr. Mackenzie Naik (pulm) routinely q3 months for ILD  Pt is on 2.5 L O2   Last visit was 7/11/19 --  Pt was restarted on prednisone taper, taking 35 mg today   Pt is also on 5 mg maintenance dose as well   Has tried using cough syrup which helped him sleep but he still has cough   Pt has also been on omnicef for 20 days     Reviewed Chest XR 7/18/19: Increase in diffuse interstitial disease could reflect progression of  interstitial fibrosis or superimposed atypical or viral pneumonia. No pleural  effusions or confluent infiltrate.   Pt denies any fever or chills but temperature this morning was 99.2   Pt's wife state that he sometimes feels better but still feels very weak   Pt's wife states he has loose stools- discussed this is likely due to antibiotic   Pt is taking a probiotic   Follows with pulm rehab --will be starting this soon thinks it will help     Spoke with Dr Mary Jo Hardy regarding treatment plan over phone  Will continue prednisone taper but stop after getting to 20 mg   Will given levaquin 7 day course      Continues on symbicort daily and albuterol prn (not daily)  Pt is regularly supplemental 2L O2-currently on 2.5-3L due to bronchitis   Recall esprit caused GI upset               Patient Active Problem List    Diagnosis Date Noted    SEAN (obstructive sleep apnea) 03/15/2019    Pneumonia 02/28/2016    Hyperglycemia 02/28/2016    ACP (advance care planning) 10/12/2015    ILD (interstitial lung disease) ([facility] 06/12/2014    Interstitial pulmonary fibrosis ([facility] 10/09/2013    HTN (hypertension) 10/09/2013    BPH (benign prostatic hyperplasia) 10/09/2013    A-fib ([facility] 10/09/2013    AAA (abdominal aortic aneurysm) (HCC) 10/09/2013    Benign prostatic hyperplasia     GERD (gastroesophageal reflux disease)     CAD (coronary artery disease)     S/P CABG (coronary artery bypass graft) 06/20/2011    Degenerative arthritis of thumb 06/20/2011    Testosterone deficiency 03/24/2011    Hypercholesterolemia 09/29/2010    Chronic bronchitis ([facility] 03/01/2010    Asthma 03/01/2010    Depression 03/01/2010    Arthritis shoulders and spine 03/01/2010     Current Outpatient Medications   Medication Sig Dispense Refill    predniSONE (DELTASONE) 10 mg tablet       cefdinir (OMNICEF) 300 mg capsule       omeprazole (PRILOSEC) 40 mg capsule Take 1 Cap by mouth daily. 180 Cap 1    oxybutynin (DITROPAN) 5 mg tablet Take 5 mg by mouth daily.  buPROPion XL (WELLBUTRIN XL) 150 mg tablet Take 1 Tab by mouth every morning. 90 Tab 1    ketoconazole (NIZORAL) 2 % topical cream APPLY TO AFFECTED AREA(S) DAILY. USE ON SKIN LESIONS EVERY COUPLE OF DAYS AS DIRECTED. 1 Tube 0    tamsulosin (FLOMAX) 0.4 mg capsule TAKE 1 CAPSULE EVERY NIGHT 90 Cap 3    benzonatate (TESSALON) 200 mg capsule Take 200 mg by mouth as needed for Cough.  irbesartan (AVAPRO) 150 mg tablet Take 300 mg by mouth nightly.  sucralfate (CARAFATE) 1 gram tablet Take 1 g by mouth four (4) times daily.  budesonide-formoterol (SYMBICORT) 160-4.5 mcg/actuation HFAA Take 2 Puffs by inhalation two (2) times a day.  BUDESONIDE/FORMOTEROL FUMARATE (SYMBICORT IN) Take 3 Puffs by inhalation two (2) times a day.       Azelastine (ASTEPRO) 0.15 % (205.5 mcg) nasal spray       ketoconazole (NIZORAL) 2 % shampoo Apply  to affected area two (2) times a week.  0    albuterol (ACCUNEB) 1.25 mg/3 mL nebu 3 mL by Nebulization route every six (6) hours as needed. 1 Each 1    finasteride (PROSCAR) 5 mg tablet TAKE 1 TABLET EVERY DAY 90 tablet 2    atorvastatin (LIPITOR) 80 mg tablet Take 80 mg by mouth daily.  montelukast (SINGULAIR) 10 mg tablet Take 1 Tab by mouth daily (after dinner). 90 Tab 3    NEBULIZER by Does Not Apply route.  nitroglycerin (NITROQUICK) 0.4 mg SL tablet 1 Tab by SubLINGual route as needed. 25 Tab prn    albuterol (PROVENTIL HFA, VENTOLIN HFA) 90 mcg/Actuation inhaler Take 2 Puffs by inhalation every four (4) hours as needed for Wheezing. 1 Inhaler 2    multivitamin (ONE-A-DAY MENS) tablet Take 1 Tab by mouth daily.  metoprolol (LOPRESSOR) 25 mg tablet Take 25 mg by mouth two (2) times a day. PT INSTRUCTED TO TAKE AM DOS PER ANESTHESIA PROTOCOL      ezetimibe (ZETIA) 10 mg tablet Take 10 mg by mouth every morning.        Past Surgical History:   Procedure Laterality Date    CABG, ARTERIAL, THREE  10/2003    CARDIAC SURG PROCEDURE UNLIST  10/2003    CABGx3 vessel    COLONOSCOPY N/A 7/14/2017    [facility] ENDOSCOPY    ENDOSCOPY, Darrian Quintero    HX APPENDECTOMY      HX CHOLECYSTECTOMY  11/22/2008    laparoscopic    HX COLECTOMY  12/29/07    sigmoid colectomy for volvulus in [address] HX COLONOSCOPY  06/17/2014    Repeat in 3 years    HX HEENT  while in 20's 1948    submucus resection sinus//resection sub mucus 1962    [address]    right    HX ORTHOPAEDIC  June 20, 2011    right hand, thumb surgery, plate inserted    HX ORTHOPAEDIC  1946    broken nose    HX ORTHOPAEDIC  1962    sub mucous resection    HX ORTHOPAEDIC  02/05/15    right hand surgery, ligament repair    HX OTHER SURGICAL      HX OTHER SURGICAL      hemorrhoids    HX OTHER SURGICAL  8/2015    basal cell carcinoma     HX SEPTOPLASTY  1962    s/p broken nose 1946    HX TONSIL AND ADENOIDECTOMY  1949    HX TONSILLECTOMY  1949    HX UROLOGICAL  1990    vasectomy      Lab Results   Component Value Date/Time    WBC 8.1 03/05/2018 01:54 PM    HGB 13.6 03/05/2018 01:54 PM    HCT 40.6 03/05/2018 01:54 PM    PLATELET 095 49/52/3454 01:54 PM    MCV 96.4 03/05/2018 01:54 PM     Lab Results   Component Value Date/Time    Cholesterol, total 103 09/17/2018 09:18 AM    HDL Cholesterol 39 (L) 09/17/2018 09:18 AM    LDL, calculated 48 09/17/2018 09:18 AM    Triglyceride 79 09/17/2018 09:18 AM    CHOL/HDL Ratio 3.4 09/29/2010 08:28 AM     Lab Results   Component Value Date/Time    GFR est non-AA 56 (L) 03/15/2019 09:37 AM    GFR est AA 64 03/15/2019 09:37 AM    Creatinine 1.24 03/15/2019 09:37 AM    BUN 16 03/15/2019 09:37 AM    Sodium 139 03/15/2019 09:37 AM    Potassium 4.2 03/15/2019 09:37 AM    Chloride 101 03/15/2019 09:37 AM    CO2 25 03/15/2019 09:37 AM    Magnesium 2.0 03/01/2016 04:23 AM        Review of Systems   Respiratory: Positive for cough. Negative for shortness of breath. Cardiovascular: Negative for chest pain. Physical Exam   Constitutional: He is oriented to person, place, and time. He appears well-developed and well-nourished. No distress. HENT:   Head: Normocephalic and atraumatic. Right Ear: External ear normal.   Left Ear: External ear normal.   Mouth/Throat: Oropharynx is clear and moist. No oropharyngeal exudate. Eyes: Conjunctivae and EOM are normal. Right eye exhibits no discharge. Left eye exhibits no discharge. Neck: Normal range of motion. Neck supple. Cardiovascular: Normal rate, regular rhythm and normal heart sounds. Exam reveals no gallop and no friction rub. No murmur heard. Pulmonary/Chest: Effort normal. No respiratory distress. He has wheezes. He has rales. He exhibits no tenderness. Anterior and diffuse crackles    Musculoskeletal: He exhibits no edema, tenderness or deformity.    Lymphadenopathy:     He has no cervical adenopathy. Neurological: He is alert and oriented to person, place, and time. He has normal reflexes. Coordination abnormal.   Skin: Skin is warm and dry. No rash noted. He is not diaphoretic. No erythema. No pallor. Psychiatric: He has a normal mood and affect. His behavior is normal.       ASSESSMENT and PLAN    ICD-10-CM ICD-9-CM    1. Interstitial pulmonary fibrosis (HCC)            Pt with progressive disease, currently with acute exacertbation, either pneumonia or bronchitis, not improving despite 20 days of omnicef, will change to levaquin for 7 d and continue prednisone taper  J84.10 515 CBC W/O DIFF   2. Bronchitis            See above, already had XR  J40 490 CBC W/O DIFF   3. Essential hypertension            Pt is orthostatic, will decrease avapro to 150 mg daily  I10 401.9    4. ILD (interstitial lung disease) (HCC)          Continue prednisone taper, taper down to 20 mg and hold at 20 until he see Dr Rhett Barillas in early 8/19 J84.9 515       Depression screen reviewed and negative. Scribed by Jair Jamison of [employer], as dictated by Dr. Anisa Francisco. Current diagnosis and concerns discussed with pt at length. Pt understands risks and benefits or current treatment plan and medications, and accepts the treatment and medication with any possible risks. Pt asks appropriate questions, which were answered. Pt was instructed to call with any concerns or problems. I have reviewed the note documented by the scribe. The services provided are my own.   The documentation is accurate

## 2019-07-26 NOTE — PATIENT INSTRUCTIONS
Office Policies    Phone calls/patient messages:            Please allow up to 24 hours for someone in the office to contact you about your call or message. Be mindful your provider may be out of the office or your message may require further review. We encourage you to use [employer] for your messages as this is a faster, more efficient way to communicate with our office                         Medication Refills:            Prescription medications require 48-72 business hours to process. We encourage you to use [employer] for your refills. For controlled medications: Please allow 72 business hours to process. Certain medications may require you to  a written prescription at our office. NO narcotic/controlled medications will be prescribed after 4pm Monday through Friday or on weekends              Form/Paperwork Completion:            Please note a $25 fee may incur for all paperwork for completed by our providers. We ask that you allow 7-10 business days. Pre-payment is due prior to picking up/faxing the completed form. You may also download your forms to [employer] to have your doctor print off.       Stop omnicef    Start levaquin daily     Taper to 20mg prednisone

## 2019-07-27 NOTE — PROGRESS NOTES
Why were the rest of the labs ordered from 7/11 not run?   Please discuss with  and add on or call pt back in for blood draw    Let him know wbc up --d/t illness and prednisone, mild anemia on lab may be related to anemia, will need to repeat cbc, ferritin, iron panel in 1 month

## 2019-07-30 ENCOUNTER — HOSPITAL ENCOUNTER (OUTPATIENT)
Dept: CARDIAC REHAB | Age: 79
End: 2019-07-30

## 2019-07-30 DIAGNOSIS — E11.9 DIABETES MELLITUS WITHOUT COMPLICATION (HCC): ICD-10-CM

## 2019-07-30 DIAGNOSIS — E78.00 HYPERCHOLESTEROLEMIA: ICD-10-CM

## 2019-07-30 DIAGNOSIS — J84.9 ILD (INTERSTITIAL LUNG DISEASE) (HCC): ICD-10-CM

## 2019-07-30 DIAGNOSIS — I48.91 ATRIAL FIBRILLATION, UNSPECIFIED TYPE (HCC): ICD-10-CM

## 2019-07-30 DIAGNOSIS — D64.9 ANEMIA, UNSPECIFIED TYPE: ICD-10-CM

## 2019-07-30 DIAGNOSIS — G47.33 OSA (OBSTRUCTIVE SLEEP APNEA): ICD-10-CM

## 2019-07-30 DIAGNOSIS — K21.9 GASTROESOPHAGEAL REFLUX DISEASE WITHOUT ESOPHAGITIS: ICD-10-CM

## 2019-07-30 DIAGNOSIS — I71.40 ABDOMINAL AORTIC ANEURYSM (AAA) WITHOUT RUPTURE: ICD-10-CM

## 2019-07-30 DIAGNOSIS — F32.9 REACTIVE DEPRESSION: ICD-10-CM

## 2019-07-30 DIAGNOSIS — I25.10 CORONARY ARTERY DISEASE INVOLVING NATIVE CORONARY ARTERY OF NATIVE HEART WITHOUT ANGINA PECTORIS: ICD-10-CM

## 2019-07-30 DIAGNOSIS — J40 BRONCHITIS: ICD-10-CM

## 2019-07-30 DIAGNOSIS — J42 CHRONIC BRONCHITIS, UNSPECIFIED CHRONIC BRONCHITIS TYPE (HCC): ICD-10-CM

## 2019-07-30 DIAGNOSIS — D64.9 ANEMIA, UNSPECIFIED TYPE: Primary | ICD-10-CM

## 2019-07-30 DIAGNOSIS — N40.0 BENIGN PROSTATIC HYPERPLASIA WITHOUT LOWER URINARY TRACT SYMPTOMS: ICD-10-CM

## 2019-07-30 DIAGNOSIS — J84.10 INTERSTITIAL PULMONARY FIBROSIS (HCC): ICD-10-CM

## 2019-07-30 DIAGNOSIS — I10 ESSENTIAL HYPERTENSION: ICD-10-CM

## 2019-07-30 NOTE — PROGRESS NOTES
Called, spoke to pt. Two pt identifiers confirmed. Pt informed per Dr. Nellie Rdz  wbc up --d/t illness and prednisone, mild anemia on lab may be related to anemia, will need to repeat cbc, ferritin, iron panel in 1 month. Pt also advised to return ASAP to get the rest of his Labs from 7/11/2019 drawn. Pt states he will come in tomorrow, 7/31/2019, to have labs drawn. Pt verbalized understanding of information discussed w/ no further questions at this time. Labs ordered and mailed to pt.

## 2019-07-31 LAB
BUN SERPL-MCNC: 22 MG/DL (ref 8–27)
BUN/CREAT SERPL: 18 (ref 10–24)
CALCIUM SERPL-MCNC: 9.3 MG/DL (ref 8.6–10.2)
CHLORIDE SERPL-SCNC: 97 MMOL/L (ref 96–106)
CHOLEST SERPL-MCNC: 129 MG/DL (ref 100–199)
CO2 SERPL-SCNC: 24 MMOL/L (ref 20–29)
CREAT SERPL-MCNC: 1.2 MG/DL (ref 0.76–1.27)
ERYTHROCYTE [DISTWIDTH] IN BLOOD BY AUTOMATED COUNT: 14.9 % (ref 12.3–15.4)
FERRITIN SERPL-MCNC: 272 NG/ML (ref 30–400)
GLUCOSE SERPL-MCNC: 214 MG/DL (ref 65–99)
HCT VFR BLD AUTO: 36.5 % (ref 37.5–51)
HDLC SERPL-MCNC: 45 MG/DL
HGB BLD-MCNC: 12 G/DL (ref 13–17.7)
IRON SATN MFR SERPL: 48 % (ref 15–55)
IRON SERPL-MCNC: 112 UG/DL (ref 38–169)
LDLC SERPL CALC-MCNC: 52 MG/DL (ref 0–99)
MCH RBC QN AUTO: 31.7 PG (ref 26.6–33)
MCHC RBC AUTO-ENTMCNC: 32.9 G/DL (ref 31.5–35.7)
MCV RBC AUTO: 97 FL (ref 79–97)
PLATELET # BLD AUTO: 322 X10E3/UL (ref 150–450)
POTASSIUM SERPL-SCNC: 5.4 MMOL/L (ref 3.5–5.2)
RBC # BLD AUTO: 3.78 X10E6/UL (ref 4.14–5.8)
SODIUM SERPL-SCNC: 134 MMOL/L (ref 134–144)
TIBC SERPL-MCNC: 232 UG/DL (ref 250–450)
TRIGL SERPL-MCNC: 162 MG/DL (ref 0–149)
TSH SERPL DL<=0.005 MIU/L-ACNC: 3.03 UIU/ML (ref 0.45–4.5)
UIBC SERPL-MCNC: 120 UG/DL (ref 111–343)
VLDLC SERPL CALC-MCNC: 32 MG/DL (ref 5–40)
WBC # BLD AUTO: 13.6 X10E3/UL (ref 3.4–10.8)

## 2019-07-31 NOTE — PROGRESS NOTES
Mild improving anemia--normal iron --will monitor    k level elevated, normal last check, will repeat lab at f/u

## 2019-08-01 ENCOUNTER — APPOINTMENT (OUTPATIENT)
Dept: CARDIAC REHAB | Age: 79
End: 2019-08-01
Payer: MEDICARE

## 2019-08-01 NOTE — PROGRESS NOTES
Called, spoke to pt. Two pt identifiers confirmed. Pt informed per Dr. Agustín Goodson mild improving anemia--normal iron --will monitor. Pt informed per Dr. Agustín Goodson K+ level elevated, normal last check, will repeat lab at f/u. Pt verbalized understanding of information discussed w/ no further questions at this time.

## 2019-08-06 ENCOUNTER — HOSPITAL ENCOUNTER (OUTPATIENT)
Dept: CARDIAC REHAB | Age: 79
Discharge: HOME | End: 2019-08-06
Payer: MEDICARE

## 2019-08-06 VITALS — WEIGHT: 194.8 LBS | BODY MASS INDEX: 27.95 KG/M2

## 2019-08-06 PROCEDURE — G0239 OTH RESP PROC, GROUP: HCPCS

## 2019-08-06 PROCEDURE — G0424 PULMONARY REHAB W EXER: HCPCS

## 2019-08-08 ENCOUNTER — HOSPITAL ENCOUNTER (OUTPATIENT)
Dept: CARDIAC REHAB | Age: 79
Discharge: HOME | End: 2019-08-08
Payer: MEDICARE

## 2019-08-08 VITALS — WEIGHT: 195.2 LBS | BODY MASS INDEX: 28.01 KG/M2

## 2019-08-08 PROCEDURE — G0239 OTH RESP PROC, GROUP: HCPCS

## 2019-08-13 ENCOUNTER — HOSPITAL ENCOUNTER (OUTPATIENT)
Dept: CARDIAC REHAB | Age: 79
Discharge: HOME | End: 2019-08-13
Payer: MEDICARE

## 2019-08-13 VITALS — WEIGHT: 195.6 LBS | BODY MASS INDEX: 28.07 KG/M2

## 2019-08-13 PROCEDURE — G0239 OTH RESP PROC, GROUP: HCPCS

## 2019-08-15 ENCOUNTER — HOSPITAL ENCOUNTER (OUTPATIENT)
Dept: CARDIAC REHAB | Age: 79
Discharge: HOME | End: 2019-08-15
Payer: MEDICARE

## 2019-08-15 PROCEDURE — G0239 OTH RESP PROC, GROUP: HCPCS

## 2019-08-20 ENCOUNTER — HOSPITAL ENCOUNTER (OUTPATIENT)
Dept: CARDIAC REHAB | Age: 79
Discharge: HOME | End: 2019-08-20
Payer: MEDICARE

## 2019-08-20 VITALS — WEIGHT: 195.4 LBS | BODY MASS INDEX: 28.04 KG/M2

## 2019-08-20 PROCEDURE — G0239 OTH RESP PROC, GROUP: HCPCS

## 2019-08-22 ENCOUNTER — HOSPITAL ENCOUNTER (OUTPATIENT)
Dept: CARDIAC REHAB | Age: 79
Discharge: HOME | End: 2019-08-22
Payer: MEDICARE

## 2019-08-22 VITALS — WEIGHT: 194.8 LBS | BODY MASS INDEX: 27.95 KG/M2

## 2019-08-22 PROCEDURE — G0239 OTH RESP PROC, GROUP: HCPCS

## 2019-08-27 ENCOUNTER — HOSPITAL ENCOUNTER (OUTPATIENT)
Dept: CARDIAC REHAB | Age: 79
Discharge: HOME | End: 2019-08-27
Payer: MEDICARE

## 2019-08-27 VITALS — WEIGHT: 196 LBS | BODY MASS INDEX: 28.12 KG/M2

## 2019-08-27 PROCEDURE — G0239 OTH RESP PROC, GROUP: HCPCS

## 2019-08-29 ENCOUNTER — HOSPITAL ENCOUNTER (OUTPATIENT)
Dept: CARDIAC REHAB | Age: 79
Discharge: HOME | End: 2019-08-29
Payer: MEDICARE

## 2019-08-29 VITALS — WEIGHT: 195.6 LBS | BODY MASS INDEX: 28.07 KG/M2

## 2019-08-29 LAB
ERYTHROCYTE [DISTWIDTH] IN BLOOD BY AUTOMATED COUNT: 15.6 % (ref 12.3–15.4)
FERRITIN SERPL-MCNC: 192 NG/ML (ref 30–400)
HCT VFR BLD AUTO: 35.4 % (ref 37.5–51)
HGB BLD-MCNC: 11.7 G/DL (ref 13–17.7)
IRON SATN MFR SERPL: 25 % (ref 15–55)
IRON SERPL-MCNC: 62 UG/DL (ref 38–169)
MCH RBC QN AUTO: 31.2 PG (ref 26.6–33)
MCHC RBC AUTO-ENTMCNC: 33.1 G/DL (ref 31.5–35.7)
MCV RBC AUTO: 94 FL (ref 79–97)
PLATELET # BLD AUTO: 261 X10E3/UL (ref 150–450)
RBC # BLD AUTO: 3.75 X10E6/UL (ref 4.14–5.8)
TIBC SERPL-MCNC: 247 UG/DL (ref 250–450)
UIBC SERPL-MCNC: 185 UG/DL (ref 111–343)
WBC # BLD AUTO: 9.2 X10E3/UL (ref 3.4–10.8)

## 2019-08-29 PROCEDURE — G0239 OTH RESP PROC, GROUP: HCPCS

## 2019-09-03 ENCOUNTER — HOSPITAL ENCOUNTER (OUTPATIENT)
Dept: CARDIAC REHAB | Age: 79
Discharge: HOME | End: 2019-09-03
Payer: MEDICARE

## 2019-09-05 ENCOUNTER — HOSPITAL ENCOUNTER (OUTPATIENT)
Dept: CARDIAC REHAB | Age: 79
Discharge: HOME | End: 2019-09-05
Payer: MEDICARE

## 2019-09-05 VITALS — BODY MASS INDEX: 28.15 KG/M2 | WEIGHT: 196.2 LBS

## 2019-09-05 PROCEDURE — G0239 OTH RESP PROC, GROUP: HCPCS

## 2019-09-10 ENCOUNTER — HOSPITAL ENCOUNTER (OUTPATIENT)
Dept: CARDIAC REHAB | Age: 79
Discharge: HOME | End: 2019-09-10
Payer: MEDICARE

## 2019-09-10 VITALS — BODY MASS INDEX: 28.24 KG/M2 | WEIGHT: 196.8 LBS

## 2019-09-10 PROCEDURE — G0239 OTH RESP PROC, GROUP: HCPCS

## 2019-09-12 ENCOUNTER — HOSPITAL ENCOUNTER (OUTPATIENT)
Dept: CARDIAC REHAB | Age: 79
Discharge: HOME | End: 2019-09-12
Payer: MEDICARE

## 2019-09-12 VITALS — BODY MASS INDEX: 28.12 KG/M2 | WEIGHT: 196 LBS

## 2019-09-12 PROCEDURE — G0239 OTH RESP PROC, GROUP: HCPCS

## 2019-09-17 ENCOUNTER — HOSPITAL ENCOUNTER (OUTPATIENT)
Dept: CARDIAC REHAB | Age: 79
Discharge: HOME | End: 2019-09-17
Payer: MEDICARE

## 2019-09-17 VITALS — BODY MASS INDEX: 27.84 KG/M2 | WEIGHT: 194 LBS

## 2019-09-17 PROCEDURE — G0239 OTH RESP PROC, GROUP: HCPCS

## 2019-09-19 ENCOUNTER — HOSPITAL ENCOUNTER (OUTPATIENT)
Dept: CARDIAC REHAB | Age: 79
Discharge: HOME | End: 2019-09-19
Payer: MEDICARE

## 2019-09-19 VITALS — BODY MASS INDEX: 28.01 KG/M2 | WEIGHT: 195.2 LBS

## 2019-09-19 PROCEDURE — G0239 OTH RESP PROC, GROUP: HCPCS

## 2019-09-19 RX ORDER — BUPROPION HYDROCHLORIDE 150 MG/1
TABLET ORAL
Qty: 90 TAB | Refills: 1 | Status: SHIPPED | OUTPATIENT
Start: 2019-09-19 | End: 2019-10-28

## 2019-09-24 ENCOUNTER — APPOINTMENT (OUTPATIENT)
Dept: CARDIAC REHAB | Age: 79
End: 2019-09-24
Payer: MEDICARE

## 2019-09-26 ENCOUNTER — HOSPITAL ENCOUNTER (OUTPATIENT)
Dept: CARDIAC REHAB | Age: 79
Discharge: HOME | End: 2019-09-26
Payer: MEDICARE

## 2019-09-26 VITALS — BODY MASS INDEX: 28.09 KG/M2 | WEIGHT: 195.8 LBS

## 2019-09-26 PROCEDURE — G0239 OTH RESP PROC, GROUP: HCPCS

## 2019-09-27 ENCOUNTER — CLINICAL SUPPORT (OUTPATIENT)
Dept: INTERNAL MEDICINE CLINIC | Age: 79
End: 2019-09-27

## 2019-09-27 DIAGNOSIS — Z23 ENCOUNTER FOR IMMUNIZATION: Primary | ICD-10-CM

## 2019-09-27 NOTE — PROGRESS NOTES
After obtaining consent, and per verbal order from Dr. Valeria Farris , patient received influenza vaccine given by Anthony Levin in right  Deltoid. Influenza Vaccine 0.5 mL IM now. Patient was observed for 10 minutes post injection. Patient tolerated injection well. VIS given.

## 2019-10-01 ENCOUNTER — APPOINTMENT (OUTPATIENT)
Dept: CARDIAC REHAB | Age: 79
End: 2019-10-01
Payer: MEDICARE

## 2019-10-03 ENCOUNTER — APPOINTMENT (OUTPATIENT)
Dept: CARDIAC REHAB | Age: 79
End: 2019-10-03
Payer: MEDICARE

## 2019-10-08 ENCOUNTER — APPOINTMENT (OUTPATIENT)
Dept: CARDIAC REHAB | Age: 79
End: 2019-10-08
Payer: MEDICARE

## 2019-10-10 ENCOUNTER — HOSPITAL ENCOUNTER (OUTPATIENT)
Dept: CARDIAC REHAB | Age: 79
End: 2019-10-10
Payer: MEDICARE

## 2019-10-15 ENCOUNTER — HOSPITAL ENCOUNTER (OUTPATIENT)
Dept: CARDIAC REHAB | Age: 79
Discharge: HOME | End: 2019-10-15
Payer: MEDICARE

## 2019-10-15 NOTE — CARDIO/PULMONARY
Pt's wife called to cancel patient's appointment today as he is sick. He hopes to return by Thursday. Appts adjusted in computer.

## 2019-10-17 ENCOUNTER — HOSPITAL ENCOUNTER (OUTPATIENT)
Dept: CARDIAC REHAB | Age: 79
End: 2019-10-17
Payer: MEDICARE

## 2019-10-24 ENCOUNTER — HOSPITAL ENCOUNTER (OUTPATIENT)
Dept: CARDIAC REHAB | Age: 79
Discharge: HOME | End: 2019-10-24
Payer: MEDICARE

## 2019-10-24 VITALS — BODY MASS INDEX: 27.95 KG/M2 | WEIGHT: 194.8 LBS

## 2019-10-24 PROCEDURE — G0239 OTH RESP PROC, GROUP: HCPCS

## 2019-10-25 ENCOUNTER — DOCUMENTATION ONLY (OUTPATIENT)
Dept: INTERNAL MEDICINE CLINIC | Age: 79
End: 2019-10-25

## 2019-10-25 NOTE — PROGRESS NOTES
Medicare Part B Preventive Services Limitations Recommendation Scheduled   Bone Mass Measurement  (age 72 & older, biennial) Requires diagnosis related to osteoporosis or estrogen deficiency. Biennial benefit unless patient has history of long-term glucocorticoid tx or baseline is needed because initial test was by other method N/A N/A   Cardiovascular Screening Blood Tests (every 5 years)  Total cholesterol, HDL, Triglycerides Order as a panel if possible Completed 7/2019      Recommended annually Due 7/2020   Colorectal Cancer Screening  -Fecal occult blood test (annual)  -Flexible sigmoidoscopy (5y)  -Screening colonoscopy (10y)  -Barium Enema    Completed 7/14/17 with Dr. Saba Keenan  Repeat in 5 years Due 7/2022   Counseling to Prevent Tobacco Use (up to 8 sessions per year)  - Counseling greater than 3 and up to 10 minutes  - Counseling greater than 10 minutes Patients must be asymptomatic of tobacco-related conditions to receive as preventive service N/A N/A   Diabetes Screening Tests (at least every 3 years, Medicare covers annually or at 6-month intervals for prediabetic patients)      Fasting blood sugar (FBS) or glucose tolerance test (GTT) Patient must be diagnosed with one of the following:  -Hypertension, Dyslipidemia, obesity, previous impaired FBS or GTT  Or any two of the following: overweight, FH of diabetes, age ? 72, history of gestational diabetes, birth of baby weighing more than 9 pounds Completed 7/2019 with A1C 6.1      Recommended every 3-6 months for pre-diabetics      Due now-1/2020   Diabetes Self-Management Training (DSMT) (no USPSTF recommendation) Requires referral by treating physician for patient with diabetes or renal disease. 10 hours of initial DSMT session of no less than 30 minutes each in a continuous 12-month period.  2 hours of follow-up DSMT in subsequent years.  N/A N/A   Glaucoma Screening (no USPSTF recommendation) Diabetes mellitus, family history, , age 48 or over,  American, age 72 or over Completed 11/2018      Recommended annually Due 11/2019   Human Immunodeficiency Virus (HIV) Screening (annually for increased risk patients)  HIV-1 and HIV-2 by EIA, JORGE, rapid antibody test, or oral mucosa transudate Patient must be at increased risk for HIV infection per USPSTF guidelines or pregnant.  Tests covered annually for patients at increased risk.  Pregnant patients may receive up to 3 test during pregnancy. N/A N/A   Medical Nutrition Therapy (MNT) (for diabetes or renal disease not recommended schedule) Requires referral by treating physician for patient with diabetes or renal disease.  Can be provided in same year as diabetes self-management training (DSMT), and CMS recommends medical nutrition therapy take place after DSMT.  Up to 3 hours for initial year and 2 hours in subsequent years. N/A  N/A   Prostate Cancer Screening (annually up to age 76)  - Digital rectal exam (PAUL)  - Prostate specific antigen (PSA) Annually (age 48 or over), PAUL not paid separately when covered E/M service is provided on same date Completed 8/18 Due as directed by Dr. Adelia Blanc Vaccination (annually)    Completed 9/18      Recommended annually Due now              Pneumococcal Vaccination (once after 72)    Pneumococcal 23 - 9/7/12         Prevnar 13 - 10/13/15      Both recommended once over the age of 72 Completed             Completed   Hepatitis B Vaccinations (if medium/high risk) Medium/high risk factors:  End-stage renal disease,  Hemophiliacs who received Factor VIII or IX concentrates, Clients of institutions for the mentally retarded, Persons who live in the same house as a HepB virus carrier, Homosexual men, Illicit injectable drug abusers.  N/A N/A   Ultrasound Screening for Abdominal Aortic Aneurysm (AAA) (once) Patient must be referred through IPPE and not have had a screening for abdominal aortic aneurysm before under Medicare.  Limited to patients who meet one of the following criteria:  - Men who are 73-68 years old and have smoked more than 100 cigarettes in their lifetime.  -Anyone with a FH of AAA  -Anyone recommended for screening by USPSTF Completed CT abd 6/2018 - 3.5cm Due as directed by Dr Amy Marie

## 2019-10-28 ENCOUNTER — OFFICE VISIT (OUTPATIENT)
Dept: INTERNAL MEDICINE CLINIC | Age: 79
End: 2019-10-28

## 2019-10-28 VITALS
RESPIRATION RATE: 16 BRPM | OXYGEN SATURATION: 92 % | DIASTOLIC BLOOD PRESSURE: 60 MMHG | BODY MASS INDEX: 28.2 KG/M2 | HEART RATE: 77 BPM | HEIGHT: 70 IN | WEIGHT: 197 LBS | SYSTOLIC BLOOD PRESSURE: 101 MMHG | TEMPERATURE: 97.9 F

## 2019-10-28 DIAGNOSIS — E78.00 HYPERCHOLESTEROLEMIA: ICD-10-CM

## 2019-10-28 DIAGNOSIS — I48.91 ATRIAL FIBRILLATION, UNSPECIFIED TYPE (HCC): ICD-10-CM

## 2019-10-28 DIAGNOSIS — I25.10 CORONARY ARTERY DISEASE INVOLVING NATIVE CORONARY ARTERY OF NATIVE HEART WITHOUT ANGINA PECTORIS: ICD-10-CM

## 2019-10-28 DIAGNOSIS — R73.01 IFG (IMPAIRED FASTING GLUCOSE): ICD-10-CM

## 2019-10-28 DIAGNOSIS — I10 ESSENTIAL HYPERTENSION: Primary | ICD-10-CM

## 2019-10-28 DIAGNOSIS — F32.9 REACTIVE DEPRESSION: ICD-10-CM

## 2019-10-28 DIAGNOSIS — G47.33 OSA (OBSTRUCTIVE SLEEP APNEA): ICD-10-CM

## 2019-10-28 DIAGNOSIS — K21.9 GASTROESOPHAGEAL REFLUX DISEASE WITHOUT ESOPHAGITIS: ICD-10-CM

## 2019-10-28 DIAGNOSIS — E66.3 OVERWEIGHT: ICD-10-CM

## 2019-10-28 DIAGNOSIS — Z00.00 MEDICARE ANNUAL WELLNESS VISIT, SUBSEQUENT: ICD-10-CM

## 2019-10-28 DIAGNOSIS — J84.10 INTERSTITIAL PULMONARY FIBROSIS (HCC): ICD-10-CM

## 2019-10-28 DIAGNOSIS — N40.0 BENIGN PROSTATIC HYPERPLASIA WITHOUT LOWER URINARY TRACT SYMPTOMS: ICD-10-CM

## 2019-10-28 DIAGNOSIS — I71.40 ABDOMINAL AORTIC ANEURYSM (AAA) WITHOUT RUPTURE: ICD-10-CM

## 2019-10-28 RX ORDER — BUPROPION HYDROCHLORIDE 300 MG/1
300 TABLET ORAL
Qty: 90 TAB | Refills: 1 | Status: SHIPPED | OUTPATIENT
Start: 2019-10-28 | End: 2020-03-08

## 2019-10-28 NOTE — PATIENT INSTRUCTIONS
Medicare Wellness Visit, Male The best way to live healthy is to have a lifestyle where you eat a well-balanced diet, exercise regularly, limit alcohol use, and quit all forms of tobacco/nicotine, if applicable. Regular preventive services are another way to keep healthy. Preventive services (vaccines, screening tests, monitoring & exams) can help personalize your care plan, which helps you manage your own care. Screening tests can find health problems at the earliest stages, when they are easiest to treat. [facility] follows the current, evidence-based guidelines published by the [facility] ([facility]) when recommending preventive services for our patients. Because we follow these guidelines, sometimes recommendations change over time as research supports it. (For example, a prostate screening blood test is no longer routinely recommended for men with no symptoms.) Of course, you and your doctor may decide to screen more often for some diseases, based on your risk and co-morbidities (chronic disease you are already diagnosed with). Preventive services for you include: - Medicare offers their members a free annual wellness visit, which is time for you and your primary care provider to discuss and plan for your preventive service needs. Take advantage of this benefit every year! 
-All adults over age 72 should receive the recommended pneumonia vaccines. Current USPSTF guidelines recommend a series of two vaccines for the best pneumonia protection.  
-All adults should have a flu vaccine yearly and an ECG.  All adults age 61 and older should receive a shingles vaccine once in their lifetime.   
-All adults age 38-68 who are overweight should have a diabetes screening test once every three years.  
-Other screening tests & preventive services for persons with diabetes include: an eye exam to screen for diabetic retinopathy, a kidney function test, a foot exam, and stricter control over your cholesterol.  
-Cardiovascular screening for adults with routine risk involves an electrocardiogram (ECG) at intervals determined by the provider.  
-Colorectal cancer screening should be done for adults age 54-65 with no increased risk factors for colorectal cancer. There are a number of acceptable methods of screening for this type of cancer. Each test has its own benefits and drawbacks. Discuss with your provider what is most appropriate for you during your annual wellness visit. The different tests include: colonoscopy (considered the best screening method), a fecal occult blood test, a fecal DNA test, and sigmoidoscopy. 
-All adults born between [facility] should be screened once for Hepatitis C. 
-An Abdominal Aortic Aneurysm (AAA) Screening is recommended for men age 73-68 who has ever smoked in their lifetime. Here is a list of your current Health Maintenance items (your personalized list of preventive services) with a due date: 
Health Maintenance Due Topic Date Due [facility] Eye Exam  03/05/1950  Shingles Vaccine (1 of 2) 03/05/1992  Pneumococcal Vaccine (1 of 2 - PCV13) 09/07/2013  Glaucoma Screening   07/14/2018 [facility] Annual Well Visit  09/18/2019 Medicare Part B Preventive Services Limitations Recommendation Scheduled Bone Mass Measurement 
(age 72 & older, biennial) Requires diagnosis related to osteoporosis or estrogen deficiency. Biennial benefit unless patient has history of long-term glucocorticoid tx or baseline is needed because initial test was by other method N/A N/A Cardiovascular Screening Blood Tests (every 5 years) Total cholesterol, HDL, Triglycerides Order as a panel if possible Completed 7/2019 
   
Recommended annually Due 7/2020 Colorectal Cancer Screening 
-Fecal occult blood test (annual) -Flexible sigmoidoscopy (5y) 
-Screening colonoscopy (10y) -Barium Enema    Completed 7/14/17 with Dr. Hope Mcbride 
 Repeat in 5 years Due 7/2022 Counseling to Prevent Tobacco Use (up to 8 sessions per year) - Counseling greater than 3 and up to 10 minutes - Counseling greater than 10 minutes Patients must be asymptomatic of tobacco-related conditions to receive as preventive service N/A N/A Diabetes Screening Tests (at least every 3 years, Medicare covers annually or at 6-month intervals for prediabetic patients) 
   
Fasting blood sugar (FBS) or glucose tolerance test (GTT) Patient must be diagnosed with one of the following: 
-Hypertension, Dyslipidemia, obesity, previous impaired FBS or GTT 
Or any two of the following: overweight, FH of diabetes, age ? 72, history of gestational diabetes, birth of baby weighing more than 9 pounds Completed 7/2019 with A1C 6.1 
   
Recommended every 3-6 months for pre-diabetics  
   Due now-1/2020 Diabetes Self-Management Training (DSMT) (no USPSTF recommendation) Requires referral by treating physician for patient with diabetes or renal disease. 10 hours of initial DSMT session of no less than 30 minutes each in a continuous 12-month period.  2 hours of follow-up DSMT in subsequent years. N/A N/A Glaucoma Screening (no USPSTF recommendation) Diabetes mellitus, family history, , age 48 or over,  American, age 72 or over Completed 11/2018 
   
Recommended annually Due 11/2019 Human Immunodeficiency Virus (HIV) Screening (annually for increased risk patients) HIV-1 and HIV-2 by EIA, JORGE, rapid antibody test, or oral mucosa transudate Patient must be at increased risk for HIV infection per USPSTF guidelines or pregnant.  Tests covered annually for patients at increased risk.  Pregnant patients may receive up to 3 test during pregnancy. N/A N/A Medical Nutrition Therapy (MNT) (for diabetes or renal disease not recommended schedule) Requires referral by treating physician for patient with diabetes or renal disease.  Can be provided in same year as diabetes self-management training (DSMT), and CMS recommends medical nutrition therapy take place after DSMT.  Up to 3 hours for initial year and 2 hours in subsequent years. N/A  N/A Prostate Cancer Screening (annually up to age 76) - Digital rectal exam (PAUL) - Prostate specific antigen (PSA) Annually (age 48 or over), PAUL not paid separately when covered E/M service is provided on same date Completed 8/19 with dr Debra Joyce  Due as directed by Dr. Lorri Castro Seasonal Influenza Vaccination (annually)    Completed 9/19 
   
Recommended annually Due fall 2020   
         
Pneumococcal Vaccination (once after 65)    Pneumococcal 23 - 9/7/12 
   
  
Prevnar 13 - 10/13/15 
   
Both recommended once over the age of 72 Completed    
   
  
Completed Hepatitis B Vaccinations (if medium/high risk) Medium/high risk factors:  End-stage renal disease, Hemophiliacs who received Factor VIII or IX concentrates, Clients of institutions for the mentally retarded, Persons who live in the same house as a HepB virus carrier, Homosexual men, Illicit injectable drug abusers. N/A N/A Ultrasound Screening for Abdominal Aortic Aneurysm (AAA) (once) Patient must be referred through IPPE and not have had a screening for abdominal aortic aneurysm before under Medicare.  Limited to patients who meet one of the following criteria: 
- Men who are 73-68 years old and have smoked more than 100 cigarettes in their lifetime. 
-Anyone with a FH of AAA 
-Anyone recommended for screening by USPSTF Completed CT abd 6/2018 - 3.5cm Due as directed by Dr Johnson Persons GET PNEUMOVAX 23 HERE OR YOUR LOCAL PHARMACY WHEN PREDNISONE IS DOWN

## 2019-10-28 NOTE — PROGRESS NOTES
This is the Subsequent Medicare Annual Wellness Exam, performed 12 months or more after the Initial AWV or the last Subsequent AWV I have reviewed the patient's medical history in detail and updated the computerized patient record. History Past Medical History:  
Diagnosis Date  Aneurysm ([facility] [address] small AAA  Arthritis shoulders and spine 3/1/2010  Asthma 3/1/2010 Dr. Shelby Caro, Dr. Santacruz  BPH (benign prostatic hypertrophy)  Broken nose 3/1/2010  Bronchitis 02/2017  CAD (coronary artery disease) Dr. Esqueda Curlew  Cancer ([facility] [address] 2000  
 basal cell chest  
 Chronic bronchitis ([facility] [address] 3/1/2010  Contact dermatitis and other eczema, due to unspecified cause Dr. Kelley Cheadle  COPD  Depression 3/1/2010  GERD (gastroesophageal reflux disease)  Hypercholesterolemia 9/29/2010  Hypertension  IPF (idiopathic pulmonary fibrosis) ([facility] [address] 2007  Pneumonia 3/1/2010  Testosterone deficiency 3/24/2011 Past Surgical History:  
Procedure Laterality Date  CABG, ARTERIAL, THREE  10/2003  CARDIAC SURG PROCEDURE UNLIST  10/2003 CABGx3 vessel  COLONOSCOPY N/A 7/14/2017 COLONOSCOPY performed by Ryna Qureshi MD at [facility] ENDOSCOPY  ENDOSCOPY, COLON, DIAGNOSTIC Dr. Varun Up  HX APPENDECTOMY  HX CHOLECYSTECTOMY  11/22/2008  
 laparoscopic  HX COLECTOMY  12/29/07  
 sigmoid colectomy for volvulus in [place]  HX COLONOSCOPY  06/17/2014 Repeat in 3 years  HX HEENT  while in 22's 1948  
 submucus resection sinus//resection sub mucus 1962 [address]  
 right  HX ORTHOPAEDIC  June 20, 2011  
 right hand, thumb surgery, plate inserted  HX ORTHOPAEDIC  1946  
 broken nose  HX ORTHOPAEDIC  1962  
 sub mucous resection  HX ORTHOPAEDIC  02/05/15  
 right hand surgery, ligament repair  HX OTHER SURGICAL    
 HX OTHER SURGICAL    
 hemorrhoids  HX OTHER SURGICAL  8/2015  
 basal cell carcinoma  HX SEPTOPLASTY  1962  
 s/p broken nose 1946  
 HX TONSIL AND ADENOIDECTOMY  1949  
 HX TONSILLECTOMY  1949  
 HX UROLOGICAL  1990  
 vasectomy Current Outpatient Medications Medication Sig Dispense Refill  varicella-zoster recombinant, PF, (SHINGRIX, PF,) 50 mcg/0.5 mL susr injection 0.5mL by IntraMUSCular route once now and then repeat in 2-6 months 0.5 mL 1  
 buPROPion XL (WELLBUTRIN XL) 150 mg tablet TAKE 1 TABLET EVERY MORNING 90 Tab 1  predniSONE (DELTASONE) 20 mg tablet Take 20 mg by mouth daily (with breakfast). 30 Tab 0  predniSONE (DELTASONE) 10 mg tablet  cefdinir (OMNICEF) 300 mg capsule  omeprazole (PRILOSEC) 40 mg capsule Take 1 Cap by mouth daily. 180 Cap 1  
 oxybutynin (DITROPAN) 5 mg tablet Take 5 mg by mouth daily.  ketoconazole (NIZORAL) 2 % topical cream APPLY TO AFFECTED AREA(S) DAILY. USE ON SKIN LESIONS EVERY COUPLE OF DAYS AS DIRECTED. 1 Tube 0  
 tamsulosin (FLOMAX) 0.4 mg capsule TAKE 1 CAPSULE EVERY NIGHT 90 Cap 3  
 benzonatate (TESSALON) 200 mg capsule Take 200 mg by mouth as needed for Cough.  irbesartan (AVAPRO) 150 mg tablet Take 300 mg by mouth nightly.  sucralfate (CARAFATE) 1 gram tablet Take 1 g by mouth four (4) times daily.  budesonide-formoterol (SYMBICORT) 160-4.5 mcg/actuation HFAA Take 2 Puffs by inhalation two (2) times a day.  BUDESONIDE/FORMOTEROL FUMARATE (SYMBICORT IN) Take 3 Puffs by inhalation two (2) times a day.  Azelastine (ASTEPRO) 0.15 % (205.5 mcg) nasal spray  ketoconazole (NIZORAL) 2 % shampoo Apply  to affected area two (2) times a week.  0  
 albuterol (ACCUNEB) 1.25 mg/3 mL nebu 3 mL by Nebulization route every six (6) hours as needed. 1 Each 1  
 finasteride (PROSCAR) 5 mg tablet TAKE 1 TABLET EVERY DAY 90 tablet 2  
 atorvastatin (LIPITOR) 80 mg tablet Take 80 mg by mouth daily.  montelukast (SINGULAIR) 10 mg tablet Take 1 Tab by mouth daily (after dinner).  90 Tab 3  
  NEBULIZER by Does Not Apply route.  nitroglycerin (NITROQUICK) 0.4 mg SL tablet 1 Tab by SubLINGual route as needed. 25 Tab prn  albuterol (PROVENTIL HFA, VENTOLIN HFA) 90 mcg/Actuation inhaler Take 2 Puffs by inhalation every four (4) hours as needed for Wheezing. 1 Inhaler 2  
 multivitamin (ONE-A-DAY MENS) tablet Take 1 Tab by mouth daily.  metoprolol (LOPRESSOR) 25 mg tablet Take 25 mg by mouth two (2) times a day. PT INSTRUCTED TO TAKE AM DOS PER ANESTHESIA PROTOCOL  ezetimibe (ZETIA) 10 mg tablet Take 10 mg by mouth every morning. Allergies Allergen Reactions  Celexa [Citalopram] Other (comments) agitation  Cephalexin Other (comments) GI upset.  Demerol [Meperidine] Unknown (comments)  Mold Extracts Unknown (comments)  Niaspan [Niacin] Nausea Only  Other Medication Unknown (comments) Grass smut, standardized mite mix, weed mix, dogs, white pam, white hickory, red mulberry, barley, cottonseed, malt, rice, rye, black pepper, navy bean  Percocet [Oxycodone-Acetaminophen] Unknown (comments) [address]  Sulfa (Sulfonamide Antibiotics) Nausea Only Family History Problem Relation Age of Onset  Cancer Mother   
     spine or abdomen  Stroke Father  Heart Attack Father 46s  Heart Disease Father  Cancer Brother   
     CLL, lung  Diabetes Brother  Cancer Brother   
     lung  Cancer Brother   
     lung  Cancer Brother   
     lung  Heart Disease Brother  Heart Disease Brother 58 CABG  
 Other Brother   
     shingles  Lung Disease Sister  High Cholesterol Son  Lung Disease Daughter Overactive Airways  No Known Problems Daughter Social History Tobacco Use  Smoking status: Former Smoker Packs/day: 1.00 Years: 15.00 Pack years: 15.00 Last attempt to quit: 1970 Years since quittin.8  Smokeless tobacco: Never Used Substance Use Topics  Alcohol use: Yes Alcohol/week: 2.0 standard drinks Types: 2 Glasses of wine per week Comment: rarely Patient Active Problem List  
Diagnosis Code  Chronic bronchitis ([facility] J42  Asthma J45.909  Depression F32.9  Arthritis shoulders and spine M19.90  
 Hypercholesterolemia E78.00  Testosterone deficiency E34.9  S/P CABG (coronary artery bypass graft) Z95.1  Degenerative arthritis of thumb M18.10  GERD (gastroesophageal reflux disease) K21.9  CAD (coronary artery disease) I25.10  Benign prostatic hyperplasia N40.0  Interstitial pulmonary fibrosis (HCC) J84.10  
 HTN (hypertension) I10  
 BPH (benign prostatic hyperplasia) N40.0  A-fib (HCC) I48.91  
 AAA (abdominal aortic aneurysm) (HCC) I71.4  
 ILD (interstitial lung disease) ([facility] J84.9  ACP (advance care planning) Z71.89  
 Pneumonia J18.9  Hyperglycemia R73.9  SEAN (obstructive sleep apnea) G47.33 Depression Risk Factor Screening:  
 
3 most recent PHQ Screens 10/28/2019 Little interest or pleasure in doing things Not at all Feeling down, depressed, irritable, or hopeless Not at all Total Score PHQ 2 0 Trouble falling or staying asleep, or sleeping too much - Feeling tired or having little energy - Poor appetite, weight loss, or overeating - Feeling bad about yourself - or that you are a failure or have let yourself or your family down - Trouble concentrating on things such as school, work, reading, or watching TV - Moving or speaking so slowly that other people could have noticed; or the opposite being so fidgety that others notice - Thoughts of being better off dead, or hurting yourself in some way -  
PHQ 9 Score - How difficult have these problems made it for you to do your work, take care of your home and get along with others -  
irritible adjusted meds Alcohol Risk Factor Screening: You do not drink alcohol or very rarely. Functional Ability and Level of Safety:  
Hearing Loss Hearing is good. Activities of Daily Living The home contains: no safety equipment. Patient needs help with:  laundry and housework Fall Risk Fall Risk Assessment, last 12 mths 10/28/2019 Able to walk? Yes Fall in past 12 months? No  
 
 
Abuse Screen Patient is not abused Lives with wife and daughter safe Cognitive Screening Evaluation of Cognitive Function: 
Has your family/caregiver stated any concerns about your memory: no 
Normal 
 
Patient Care Team  
Patient Care Team: 
Asuncion Pepper MD as PCP - General (Internal Medicine) Leonora Harris MD (Pulmonary Disease) Cassy Santizo, RN Shania Morris MD (Cardiology) Cydney Keenan MD (Dermatology) Joon Covington MD (Gastroenterology) Tiara Irvin MD (Urology) 
dakotah (Allergy) Cheryle Gainer, MD (Ophthalmology) Miguel Blue MD as Surgeon (General Surgery) Will Cordero MD (Pulmonary Disease) Shaila Mireles MD (Vascular Surgery)  
updated Assessment/Plan Education and counseling provided: 
Are appropriate based on today's review and evaluation Pneumococcal Vaccine Colorectal cancer screening tests Cardiovascular screening blood test 
Screening for glaucoma Diabetes screening test 
 
Diagnoses and all orders for this visit: 
 
1. Medicare annual wellness visit, subsequent Other orders 
-     varicella-zoster recombinant, PF, (SHINGRIX, PF,) 50 mcg/0.5 mL susr injection; 0.5mL by IntraMUSCular route once now and then repeat in 2-6 months Health Maintenance Due Topic Date Due  
 EYE EXAM RETINAL OR DILATED  03/05/1950  Shingrix Vaccine Age 50> (1 of 2) 03/05/1992  Pneumococcal 65+ years (1 of 2 - PCV13) 09/07/2013  GLAUCOMA SCREENING Q2Y  07/14/2018  MEDICARE YEARLY EXAM  09/18/2019 ACP on file. SDM is his wife.  
  
 
Colonoscopy: 7/14/17,  repeat in 5 years, Dr. Connie Calderón PSA:  per Dr. Jerod Li,  per dr Oni Camargo AAA screen: CT  - 3.2mm, Breana follows, ,  Tdap:  advised to get at his pharmacy Pneumovax: 2012, pt will get at pharmacy in 1 week while pt is on low prednisone [id number]: 10/13/2015 Zostavax: 2008 Shingrix: ordered 20, was backordered Flu shot: 21 Eye exam: Dr. Stephie Humphrey at [facility] [place], , scheduled for , pt may getting cataract surg sometime in future A1c: ,  POC 6.1 due now Andrew Busing annual  
 
EK/10/17  
Hepatitis C screen: , negative Medication reconciliation completed by MA and reviewed by me. Medical/surgical/social/family history reviewed and updated by me. Patient provided AVS and preventative screening table.  
Patient verbalized understanding of all information discussed.

## 2019-10-28 NOTE — PROGRESS NOTES
HISTORY OF PRESENT ILLNESS Adam Mueller is a 70 y.o. male. HPI Last here 7/26/19. Pt is here to f/u on chronic conditions. Pt presents with his wife who provides some of the hx. 
   
BP is 101/60 BP had been higher due to being on prednisone 116/72 at pul rehab , reports bp goes up to 140's sbp at times Pt's continues on avapro now 75 mg daily per cardio and metoprolol 25mg BID Recall norvasc caused edema  
   
Pt has not been monitoring his BS at home His DM is diet-controlled  
   
Wt today is 197 lbs-- stable x lov Discussed wt is higher side of nl, discussed OK as long as he does not gain more wt Discussed low carb diet and w/l  
   
Reviewed labs 8/19 and 7/19 Will get labs today  
  
  
Pt follows with Dr. Yahaira Downey (derm) Pt had a basal cell carcinoma removed from his R arm Last visit was 10/22/19 and he had a squamous cell carcinoma removed from his arm  
   
Pt follows with Dr. Timur Yanez (pulm) routinely q3 months for ILD Last visit was 10/17/19 Was given prednisone and omnicef On 15 mg prednisone a day, will taper down to 5 mg and will stay on 5 mg Has rx for , prednisone and omnicef if he gets sick prn pul  
Next visit scheduled for 12/19 Follows with [facility] rehab, every Tuesday and Thursday  
Pt's wife wonders how they know it is ILD Discussed this is evident on ct scans and other imaging  
  
Continues on symbicort bid and albuterol prn (not daily) Pt is regularly supplemental 2L O2, 3 L if he is doing something, up to 4-6 L during exercise class Recall esprit caused GI upset  
   
Pt follows with Dr. Leonel Nichols ([facility]) annually in August 
Last visit was 9/30/19  had PSA checked then Continues on proscar,  and flomax per uro Now on oxybutin 5 mg rather than vesicare Will f/u in 1 year  
 
  
Pt follows with Dr. Jose Carlos Moscoso (cardio) for h/o remote afib and cad Last visit was 8/15/19   
bp meds reduced still on metoprolol bid Now on avapro 75 mg --was 150mg before Had neg stress test in 2/19 Next visit scheduled for 2/2020  
  
Pt follows with Dr. Santacruz (allergy) Continues singulair for allergies - controlled  
Will f/u prn  
   
Pt follows with Dr. Daniella Willson (vasc surg) for AAA Last visit was 7/19 Follows for AAA annually  
  
Pt follows with Dr. Fabian Woods (GI) for gerd Last visit was 7/2/18 Continues on prilosec 40mg BID for reflux, which works well for the most part, happy w/ dose 10/19 Pt still takes sucralfate- took 3 times this week --pred upsts his stomach  
  
Pt saw Dr Antonia Chavez (sleep) Last visit was with NP 7/18--had f/u later in 2/19 Pt is compliant with CPAP nightly This is helping his sleep, tolerating well, also keeps O2 at 2L at night  
  
Pt saw Dr Romero Acosta (neuro) for a tremor Last visit was 10/18/18 Not bothering him too much  
  
Pt is on wellbutrin 150 mg-works well --restarted since lov, this helped a lot Mood has improved but pt would like to increase the dose as pt states he is more short tempered and irritible Ordered 300 mg Recall celexa caused irritability in the past. Pt tolerated wellbutrin in the past. 
   
Continues claritin for allergies, which works well 
   
Continues on lipitor 80mg, max dose, daily for cholesterol He also takes zetia daily Pt lives with his wife and daughter, this is a safe environment  
 
 no safety equipment Functionally independent ACP on file. SDM is his wife.  
  
PREVENTIVE:   
Colonoscopy: 7/14/17,  repeat in 5 years, Dr. Fabian Woods EGD: 7/14/17, Dr. Fabian Woods PSA: 8/18 per Dr. Pravin Galvez, 8/19 per dr Shirley Carvajal AAA screen: CT 7/12 - 3.2mm, Breana follows, repeat in 10/13 and 2/15, 7/18 Tdap: will check cost at pharmacy, advised to get at his pharmacy Pneumovax: 9/07/2012, pt will get at pharmacy in 1 week while pt is on low prednisone [id number]: 10/13/2015 Zostavax: 6/01/2008 Shingrix: ordered 05/14/18, was backordered Flu shot: 09/27/19 Microalbumin: 3/19 Foot exam: 07/11/19 A1c:   6.2,  6.1,  6.8,  6.2,  6.0,  6.3,  6.2,  6.2, 3/19 6.0,  POC 6.1 Eye exam: Dr. Claudell Showers at [facility], , scheduled for , pt may getting cataract surg sometime in future [facility] EK/10/17  
Hepatitis C screen: , negative 
  
 
Patient Active Problem List  
 Diagnosis Date Noted  SEAN (obstructive sleep apnea) 03/15/2019  Pneumonia 2016  Hyperglycemia 2016  ACP (advance care planning) 10/12/2015  ILD (interstitial lung disease) ([facility] [address] 2014  Interstitial pulmonary fibrosis [facility] 10/09/2013  
 HTN (hypertension) 10/09/2013  BPH (benign prostatic hyperplasia) 10/09/2013  A-fib ([facility] 10/09/2013  AAA (abdominal aortic aneurysm) ([facility] 10/09/2013  Benign prostatic hyperplasia  GERD (gastroesophageal reflux disease)  CAD (coronary artery disease)  S/P CABG (coronary artery bypass graft) 2011  Degenerative arthritis of thumb 2011  Testosterone deficiency 2011  Hypercholesterolemia 2010  Chronic bronchitis ([facility] 2010  Asthma 2010  Depression 2010  Arthritis shoulders and spine 2010 Current Outpatient Medications Medication Sig Dispense Refill  buPROPion XL (WELLBUTRIN XL) 150 mg tablet TAKE 1 TABLET EVERY MORNING 90 Tab 1  predniSONE (DELTASONE) 20 mg tablet Take 20 mg by mouth daily (with breakfast). 30 Tab 0  predniSONE (DELTASONE) 10 mg tablet  cefdinir (OMNICEF) 300 mg capsule  omeprazole (PRILOSEC) 40 mg capsule Take 1 Cap by mouth daily. 180 Cap 1  
 oxybutynin (DITROPAN) 5 mg tablet Take 5 mg by mouth daily.  ketoconazole (NIZORAL) 2 % topical cream APPLY TO AFFECTED AREA(S) DAILY. USE ON SKIN LESIONS EVERY COUPLE OF DAYS AS DIRECTED.  1 Tube 0  
 tamsulosin (FLOMAX) 0.4 mg capsule TAKE 1 CAPSULE EVERY NIGHT 90 Cap 3  
 benzonatate (TESSALON) 200 mg capsule Take 200 mg by mouth as needed for Cough.    
 irbesartan (AVAPRO) 150 mg tablet Take 300 mg by mouth nightly.  sucralfate (CARAFATE) 1 gram tablet Take 1 g by mouth four (4) times daily.  budesonide-formoterol (SYMBICORT) 160-4.5 mcg/actuation HFAA Take 2 Puffs by inhalation two (2) times a day.  BUDESONIDE/FORMOTEROL FUMARATE (SYMBICORT IN) Take 3 Puffs by inhalation two (2) times a day.  Azelastine (ASTEPRO) 0.15 % (205.5 mcg) nasal spray  ketoconazole (NIZORAL) 2 % shampoo Apply  to affected area two (2) times a week.  0  
 albuterol (ACCUNEB) 1.25 mg/3 mL nebu 3 mL by Nebulization route every six (6) hours as needed. 1 Each 1  
 finasteride (PROSCAR) 5 mg tablet TAKE 1 TABLET EVERY DAY 90 tablet 2  
 atorvastatin (LIPITOR) 80 mg tablet Take 80 mg by mouth daily.  montelukast (SINGULAIR) 10 mg tablet Take 1 Tab by mouth daily (after dinner). 90 Tab 3  
 NEBULIZER by Does Not Apply route.  nitroglycerin (NITROQUICK) 0.4 mg SL tablet 1 Tab by SubLINGual route as needed. 25 Tab prn  albuterol (PROVENTIL HFA, VENTOLIN HFA) 90 mcg/Actuation inhaler Take 2 Puffs by inhalation every four (4) hours as needed for Wheezing. 1 Inhaler 2  
 multivitamin (ONE-A-DAY MENS) tablet Take 1 Tab by mouth daily.  metoprolol (LOPRESSOR) 25 mg tablet Take 25 mg by mouth two (2) times a day. PT INSTRUCTED TO TAKE AM DOS PER ANESTHESIA PROTOCOL  ezetimibe (ZETIA) 10 mg tablet Take 10 mg by mouth every morning. Past Surgical History:  
Procedure Laterality Date  CABG, ARTERIAL, THREE  10/2003  CARDIAC SURG PROCEDURE UNLIST  10/2003 CABGx3 vessel  COLONOSCOPY N/A 7/14/2017 COLONOSCOPY performed by Polo Delacruz MD at [facility] ENDOSCOPY  ENDOSCOPY, COLON, DIAGNOSTIC Dr. Winston Arias  HX APPENDECTOMY  HX CHOLECYSTECTOMY  11/22/2008  
 laparoscopic  HX COLECTOMY  12/29/07  
 sigmoid colectomy for volvulus in [place]  HX COLONOSCOPY  06/17/2014 Repeat in 3 years  HX HEENT  while in 20's 1948  
 submucus resection sinus//resection sub mucus 1962 [address]  
 right  HX ORTHOPAEDIC  June 20, 2011  
 right hand, thumb surgery, plate inserted  HX ORTHOPAEDIC  1946  
 broken nose  HX ORTHOPAEDIC  1962  
 sub mucous resection  HX ORTHOPAEDIC  02/05/15  
 right hand surgery, ligament repair  HX OTHER SURGICAL    
 HX OTHER SURGICAL    
 hemorrhoids  HX OTHER SURGICAL  8/2015  
 basal cell carcinoma  HX SEPTOPLASTY  1962  
 s/p broken nose 1946  
 HX TONSIL AND ADENOIDECTOMY  1949  
 HX TONSILLECTOMY  1949  
 HX UROLOGICAL  1990  
 vasectomy Lab Results Component Value Date/Time WBC 9.2 08/28/2019 09:48 AM  
 HGB 11.7 (L) 08/28/2019 09:48 AM  
 HCT 35.4 (L) 08/28/2019 09:48 AM  
 PLATELET 111 73/63/7156 09:48 AM  
 MCV 94 08/28/2019 09:48 AM  
 
Lab Results Component Value Date/Time Cholesterol, total 129 07/30/2019 11:46 AM  
 HDL Cholesterol 45 07/30/2019 11:46 AM  
 LDL, calculated 52 07/30/2019 11:46 AM  
 Triglyceride 162 (H) 07/30/2019 11:46 AM  
 CHOL/HDL Ratio 3.4 09/29/2010 08:28 AM  
 
Lab Results Component Value Date/Time GFR est non-AA 58 (L) 07/30/2019 11:46 AM  
 GFR est AA 67 07/30/2019 11:46 AM  
 Creatinine 1.20 07/30/2019 11:46 AM  
 BUN 22 07/30/2019 11:46 AM  
 Sodium 134 07/30/2019 11:46 AM  
 Potassium 5.4 (H) 07/30/2019 11:46 AM  
 Chloride 97 07/30/2019 11:46 AM  
 CO2 24 07/30/2019 11:46 AM  
 Magnesium 2.0 03/01/2016 04:23 AM  
  
 
Review of Systems Respiratory: Negative for shortness of breath. Cardiovascular: Negative for chest pain. Physical Exam  
Constitutional: He is oriented to person, place, and time. He appears well-developed and well-nourished. No distress. HENT:  
Head: Normocephalic and atraumatic. Right Ear: External ear normal.  
Left Ear: External ear normal.  
Mouth/Throat: Oropharynx is clear and moist. No oropharyngeal exudate. Eyes: Conjunctivae and EOM are normal. Right eye exhibits no discharge. Left eye exhibits no discharge. Neck: Normal range of motion. Neck supple. Cardiovascular: Normal rate, regular rhythm and normal heart sounds. Exam reveals no gallop and no friction rub. No murmur heard. Pulmonary/Chest: Effort normal and breath sounds normal. No respiratory distress. He has no wheezes. He has no rales. He exhibits no tenderness. Chronic diffuse crackles Abdominal: Soft. He exhibits no distension. There is no tenderness. There is no rebound and no guarding. Musculoskeletal: He exhibits no edema, tenderness or deformity. Lymphadenopathy:  
  He has no cervical adenopathy. Neurological: He is alert and oriented to person, place, and time. He has normal reflexes. Coordination abnormal.  
Skin: Skin is warm and dry. No rash noted. He is not diaphoretic. No erythema. No pallor. Psychiatric: He has a normal mood and affect. His behavior is normal.  
 
 
ASSESSMENT and PLAN 
  ICD-10-CM ICD-9-CM 1. Essential hypertension Controlled on avapro 75 mg and metoprolol bid continue no change to dose L84 484.0 METABOLIC PANEL, COMPREHENSIVE  
   TSH 3RD GENERATION  
   HEMOGLOBIN A1C WITH EAG 2. Medicare annual wellness visit, subsequent H32.28 Q58.0 METABOLIC PANEL, COMPREHENSIVE  
   TSH 3RD GENERATION  
   HEMOGLOBIN A1C WITH EAG  
3. Interstitial pulmonary fibrosis ([facility] [address] Chronic progressive problem non reversible follows with dr amy Castaneda he is currently going to pulm rehab twice per week which helps on anywhere from 2-6L of O2 depending on exertion level currently on prednisone taper for acute exacerbation this will taper down to baseline prednisone 5 mg per day continues on symbicort BID and albuterol prn  F42.10 763 METABOLIC PANEL, COMPREHENSIVE  
   TSH 3RD GENERATION  
   HEMOGLOBIN A1C WITH EAG 4. Atrial fibrillation, unspecified type ([address] Follows with dr Lanette Salgado for this remains in nsr, only on asa for anti coag  G10.64 536.47 METABOLIC PANEL, COMPREHENSIVE  
   TSH 3RD GENERATION  
   HEMOGLOBIN A1C WITH EAG  
5. Abdominal aortic aneurysm (AAA) without rupture ([facility] [facility] Hx of follows with dr Robbert Osler annually saw him in July  E23.4 556.2 METABOLIC PANEL, COMPREHENSIVE  
   TSH 3RD GENERATION  
   HEMOGLOBIN A1C WITH EAG 6. Reactive depression Worsening increase wellbutrin to 300 mg daily  D17.0 583.9 METABOLIC PANEL, COMPREHENSIVE  
   TSH 3RD GENERATION  
   HEMOGLOBIN A1C WITH EAG  
7. IFG (impaired fasting glucose) Has been in diabetic range several time he is chronically on prednsione check a1c today currently diet controlled  J93.69 314.07 METABOLIC PANEL, COMPREHENSIVE  
   TSH 3RD GENERATION  
   HEMOGLOBIN A1C WITH EAG  
8. Benign prostatic hyperplasia without lower urinary tract symptoms Follows with dr Shahab Palacio, on oxybutin, finasteride and flomax stable   S85.2 965.51 METABOLIC PANEL, COMPREHENSIVE  
   TSH 3RD GENERATION  
   HEMOGLOBIN A1C WITH EAG  
9. Hypercholesterolemia Controlled on lipitor and zetia  Y22.51 553.1 METABOLIC PANEL, COMPREHENSIVE  
   TSH 3RD GENERATION  
   HEMOGLOBIN A1C WITH EAG 10. Coronary artery disease involving native coronary artery of native heart without angina pectoris Medically managed utd with dr Elicia Olivera  D01.35 502.71 METABOLIC PANEL, COMPREHENSIVE  
   TSH 3RD GENERATION  
   HEMOGLOBIN A1C WITH EAG 11. SEAN (obstructive sleep apnea) Compliant with cpap  U99.46 412.73 METABOLIC PANEL, COMPREHENSIVE  
   TSH 3RD GENERATION  
   HEMOGLOBIN A1C WITH EAG 12. Gastroesophageal reflux disease without esophagitis Controlled with prilosec BID, no longer on carafate  V31.6 456.84 METABOLIC PANEL, COMPREHENSIVE  
   TSH 3RD GENERATION  
   HEMOGLOBIN A1C WITH EAG  
 13. Overweight Discussed diet wt loss near goal continue  D32.7 784.25 METABOLIC PANEL, COMPREHENSIVE  
   TSH 3RD GENERATION  
   HEMOGLOBIN A1C WITH EAG Scribed by Brenden Cross of [address] [place] [address] [address], as dictated by Dr. Karon Cardoso. Current diagnosis and concerns discussed with pt at length. Pt understands risks and benefits or current treatment plan and medications, and accepts the treatment and medication with any possible risks. Pt asks appropriate questions, which were answered. Pt was instructed to call with any concerns or problems. I have reviewed the note documented by the scribe. The services provided are my own. The documentation is accurate

## 2019-10-29 ENCOUNTER — HOSPITAL ENCOUNTER (OUTPATIENT)
Dept: CARDIAC REHAB | Age: 79
Discharge: HOME | End: 2019-10-29
Payer: MEDICARE

## 2019-10-29 VITALS — WEIGHT: 195.6 LBS | BODY MASS INDEX: 28.07 KG/M2

## 2019-10-29 LAB
ALBUMIN SERPL-MCNC: 3.5 G/DL (ref 3.5–4.8)
ALBUMIN/GLOB SERPL: 1.1 {RATIO} (ref 1.2–2.2)
ALP SERPL-CCNC: 86 IU/L (ref 39–117)
ALT SERPL-CCNC: 25 IU/L (ref 0–44)
AST SERPL-CCNC: 24 IU/L (ref 0–40)
BILIRUB SERPL-MCNC: 0.7 MG/DL (ref 0–1.2)
BUN SERPL-MCNC: 18 MG/DL (ref 8–27)
BUN/CREAT SERPL: 15 (ref 10–24)
CALCIUM SERPL-MCNC: 9.3 MG/DL (ref 8.6–10.2)
CHLORIDE SERPL-SCNC: 99 MMOL/L (ref 96–106)
CO2 SERPL-SCNC: 24 MMOL/L (ref 20–29)
CREAT SERPL-MCNC: 1.24 MG/DL (ref 0.76–1.27)
EST. AVERAGE GLUCOSE BLD GHB EST-MCNC: 143 MG/DL
GLOBULIN SER CALC-MCNC: 3.2 G/DL (ref 1.5–4.5)
GLUCOSE SERPL-MCNC: 227 MG/DL (ref 65–99)
HBA1C MFR BLD: 6.6 % (ref 4.8–5.6)
POTASSIUM SERPL-SCNC: 4.3 MMOL/L (ref 3.5–5.2)
PROT SERPL-MCNC: 6.7 G/DL (ref 6–8.5)
SODIUM SERPL-SCNC: 139 MMOL/L (ref 134–144)
TSH SERPL DL<=0.005 MIU/L-ACNC: 3.03 UIU/ML (ref 0.45–4.5)

## 2019-10-29 PROCEDURE — G0239 OTH RESP PROC, GROUP: HCPCS

## 2019-10-29 NOTE — PROGRESS NOTES
Sugars up again , DM range 
 
rec starting metformin xr 500mg once daily --pt is chronically on prednisone now and this will continue to keep sugars up Needs to check fasting glucose 2-3 times per week.  
 
A1c, bmp 1 week prior to f/u

## 2019-10-30 DIAGNOSIS — R73.01 IFG (IMPAIRED FASTING GLUCOSE): Primary | ICD-10-CM

## 2019-10-30 RX ORDER — METFORMIN HYDROCHLORIDE 500 MG/1
500 TABLET, EXTENDED RELEASE ORAL
Qty: 30 TAB | Refills: 1 | Status: SHIPPED | OUTPATIENT
Start: 2019-10-30 | End: 2020-01-01

## 2019-10-30 NOTE — PROGRESS NOTES
Called, spoke to pt. Two pt identifiers confirmed. Pt informed per Dr. Rad Miranda sugars are elevated again and since pt is chronically on prednisone now, this will continue to keep sugars up. Pt informed per Dr. Rad Miranda recommends starting metformin 50mg XR--pt agreed--pended. Pt informed per Dr. Rad Miranda monitor glucose x2-3 weekly and repeat labs at [facility]--Labs ordered and mailed to pt. Pt reports having glucometer and supplies at home. Pt verbalized understanding of information discussed w/ no further questions at this time.

## 2019-10-31 ENCOUNTER — HOSPITAL ENCOUNTER (OUTPATIENT)
Dept: CARDIAC REHAB | Age: 79
Discharge: HOME | End: 2019-10-31
Payer: MEDICARE

## 2019-10-31 VITALS — WEIGHT: 194.6 LBS | BODY MASS INDEX: 27.92 KG/M2

## 2019-10-31 PROCEDURE — G0239 OTH RESP PROC, GROUP: HCPCS

## 2019-11-03 RX ORDER — OMEPRAZOLE 40 MG/1
CAPSULE, DELAYED RELEASE ORAL
Qty: 180 CAP | Refills: 0 | Status: SHIPPED | OUTPATIENT
Start: 2019-11-03 | End: 2020-02-02

## 2019-11-05 ENCOUNTER — HOSPITAL ENCOUNTER (OUTPATIENT)
Dept: CARDIAC REHAB | Age: 79
Discharge: HOME | End: 2019-11-05
Payer: MEDICARE

## 2019-11-05 VITALS — WEIGHT: 195 LBS | BODY MASS INDEX: 27.98 KG/M2

## 2019-11-05 PROCEDURE — G0239 OTH RESP PROC, GROUP: HCPCS

## 2019-11-07 ENCOUNTER — HOSPITAL ENCOUNTER (OUTPATIENT)
Dept: CARDIAC REHAB | Age: 79
Discharge: HOME | End: 2019-11-07
Payer: MEDICARE

## 2019-11-07 VITALS — BODY MASS INDEX: 28.07 KG/M2 | WEIGHT: 195.6 LBS

## 2019-11-07 PROCEDURE — G0239 OTH RESP PROC, GROUP: HCPCS

## 2019-11-12 ENCOUNTER — HOSPITAL ENCOUNTER (OUTPATIENT)
Dept: CARDIAC REHAB | Age: 79
Discharge: HOME | End: 2019-11-12
Payer: MEDICARE

## 2019-11-12 VITALS — BODY MASS INDEX: 27.98 KG/M2 | WEIGHT: 195 LBS

## 2019-11-12 PROCEDURE — G0239 OTH RESP PROC, GROUP: HCPCS

## 2019-11-14 ENCOUNTER — HOSPITAL ENCOUNTER (OUTPATIENT)
Dept: CARDIAC REHAB | Age: 79
Discharge: HOME | End: 2019-11-14
Payer: MEDICARE

## 2019-11-14 VITALS — WEIGHT: 193 LBS | BODY MASS INDEX: 27.69 KG/M2

## 2019-11-14 PROCEDURE — G0239 OTH RESP PROC, GROUP: HCPCS

## 2019-11-19 ENCOUNTER — HOSPITAL ENCOUNTER (OUTPATIENT)
Dept: CARDIAC REHAB | Age: 79
Discharge: HOME | End: 2019-11-19
Payer: MEDICARE

## 2019-11-19 VITALS — WEIGHT: 195 LBS | BODY MASS INDEX: 27.98 KG/M2

## 2019-11-19 PROCEDURE — G0239 OTH RESP PROC, GROUP: HCPCS

## 2019-11-20 RX ORDER — TAMSULOSIN HYDROCHLORIDE 0.4 MG/1
CAPSULE ORAL
Qty: 90 CAP | Refills: 0 | Status: SHIPPED | OUTPATIENT
Start: 2019-11-20 | End: 2020-01-29

## 2019-11-26 ENCOUNTER — HOSPITAL ENCOUNTER (OUTPATIENT)
Dept: CARDIAC REHAB | Age: 79
Discharge: HOME | End: 2019-11-26
Payer: MEDICARE

## 2019-11-26 VITALS — BODY MASS INDEX: 27.92 KG/M2 | WEIGHT: 194.6 LBS

## 2019-11-26 PROCEDURE — G0239 OTH RESP PROC, GROUP: HCPCS

## 2019-12-05 ENCOUNTER — HOSPITAL ENCOUNTER (OUTPATIENT)
Dept: CARDIAC REHAB | Age: 79
Discharge: HOME | End: 2019-12-05
Payer: MEDICARE

## 2019-12-05 VITALS — BODY MASS INDEX: 27.81 KG/M2 | WEIGHT: 193.8 LBS

## 2019-12-05 PROCEDURE — G0239 OTH RESP PROC, GROUP: HCPCS

## 2019-12-10 ENCOUNTER — HOSPITAL ENCOUNTER (OUTPATIENT)
Dept: CARDIAC REHAB | Age: 79
Discharge: HOME | End: 2019-12-10
Payer: MEDICARE

## 2019-12-10 VITALS — BODY MASS INDEX: 27.81 KG/M2 | WEIGHT: 193.8 LBS

## 2019-12-10 PROCEDURE — G0239 OTH RESP PROC, GROUP: HCPCS

## 2019-12-12 ENCOUNTER — HOSPITAL ENCOUNTER (OUTPATIENT)
Dept: CARDIAC REHAB | Age: 79
Discharge: HOME | End: 2019-12-12
Payer: MEDICARE

## 2019-12-12 VITALS — BODY MASS INDEX: 27.69 KG/M2 | WEIGHT: 193 LBS

## 2019-12-12 PROCEDURE — G0239 OTH RESP PROC, GROUP: HCPCS

## 2019-12-17 ENCOUNTER — HOSPITAL ENCOUNTER (OUTPATIENT)
Dept: CARDIAC REHAB | Age: 79
Discharge: HOME | End: 2019-12-17
Payer: MEDICARE

## 2019-12-17 VITALS — BODY MASS INDEX: 27.58 KG/M2 | WEIGHT: 192.2 LBS

## 2019-12-17 PROCEDURE — G0239 OTH RESP PROC, GROUP: HCPCS

## 2019-12-17 NOTE — CARDIO/PULMONARY
COPD Assessment Tool (CAT)     0-5 DISCHARGE (CAT) I never cough/I cough all the time       Score:  3 I have no phlegm in my chest/ My chest is completely full of phlegm  Score:  2 My chest does not feel tight at all/ My chest feels very tight   Score:  2 When I walk up a hill or stairs I am bot breathless/ When I walk up a hill or stairs I am very breathless    Score:  4 I am not limited doing any activities at home/ 
 I am very limited doing activities at home     Score:  4 I am confident leaving my home despite my lung condition/ 
I am not at all confident leaving my home because of my lung condition Score:  2 I sleep soundly/ I don't sleep because of my lung condition   Score:  2 I have lots of energy/ I have no energy at all     Score:  3            Total:  22

## 2019-12-31 ENCOUNTER — APPOINTMENT (OUTPATIENT)
Dept: CARDIAC REHAB | Age: 79
End: 2019-12-31
Payer: MEDICARE

## 2020-01-01 ENCOUNTER — HOME CARE VISIT (OUTPATIENT)
Dept: SCHEDULING | Facility: HOME HEALTH | Age: 80
End: 2020-01-01
Payer: MEDICARE

## 2020-01-01 ENCOUNTER — VIRTUAL VISIT (OUTPATIENT)
Dept: PALLATIVE CARE | Age: 80
End: 2020-01-01
Payer: MEDICARE

## 2020-01-01 ENCOUNTER — HOME CARE VISIT (OUTPATIENT)
Dept: HOME HEALTH SERVICES | Facility: HOME HEALTH | Age: 80
End: 2020-01-01
Payer: MEDICARE

## 2020-01-01 ENCOUNTER — TELEPHONE (OUTPATIENT)
Dept: PALLATIVE CARE | Age: 80
End: 2020-01-01

## 2020-01-01 ENCOUNTER — TELEPHONE (OUTPATIENT)
Dept: INTERNAL MEDICINE CLINIC | Age: 80
End: 2020-01-01

## 2020-01-01 ENCOUNTER — HOSPITAL ENCOUNTER (INPATIENT)
Age: 80
LOS: 2 days | Discharge: HOME | DRG: 199 | End: 2020-06-08
Attending: EMERGENCY MEDICINE | Admitting: HOSPITALIST
Payer: MEDICARE

## 2020-01-01 ENCOUNTER — HOSPITAL ENCOUNTER (OUTPATIENT)
Dept: ULTRASOUND IMAGING | Age: 80
Discharge: HOME | End: 2020-06-02
Attending: INTERNAL MEDICINE
Payer: MEDICARE

## 2020-01-01 ENCOUNTER — HOME HEALTH ADMISSION (OUTPATIENT)
Dept: HOME HEALTH SERVICES | Facility: HOME HEALTH | Age: 80
End: 2020-01-01
Payer: MEDICARE

## 2020-01-01 ENCOUNTER — HOSPITAL ENCOUNTER (OUTPATIENT)
Dept: MAMMOGRAPHY | Age: 80
Discharge: HOME | End: 2020-06-02
Attending: INTERNAL MEDICINE
Payer: MEDICARE

## 2020-01-01 ENCOUNTER — APPOINTMENT (OUTPATIENT)
Dept: GENERAL RADIOLOGY | Age: 80
DRG: 199 | End: 2020-01-01
Attending: INTERNAL MEDICINE
Payer: MEDICARE

## 2020-01-01 ENCOUNTER — VIRTUAL VISIT (OUTPATIENT)
Dept: INTERNAL MEDICINE CLINIC | Age: 80
End: 2020-01-01

## 2020-01-01 ENCOUNTER — ANESTHESIA (OUTPATIENT)
Dept: SURGERY | Age: 80
DRG: 199 | End: 2020-01-01
Payer: MEDICARE

## 2020-01-01 ENCOUNTER — DOCUMENTATION ONLY (OUTPATIENT)
Dept: OTHER | Age: 80
End: 2020-01-01

## 2020-01-01 ENCOUNTER — PATIENT OUTREACH (OUTPATIENT)
Dept: FAMILY MEDICINE CLINIC | Age: 80
End: 2020-01-01

## 2020-01-01 ENCOUNTER — OFFICE VISIT (OUTPATIENT)
Dept: INTERNAL MEDICINE CLINIC | Age: 80
End: 2020-01-01
Payer: MEDICARE

## 2020-01-01 ENCOUNTER — VIRTUAL VISIT (OUTPATIENT)
Dept: PALLATIVE CARE | Age: 80
End: 2020-01-01

## 2020-01-01 ENCOUNTER — APPOINTMENT (OUTPATIENT)
Dept: GENERAL RADIOLOGY | Age: 80
DRG: 199 | End: 2020-01-01
Attending: HOSPITALIST
Payer: MEDICARE

## 2020-01-01 ENCOUNTER — ANESTHESIA EVENT (OUTPATIENT)
Dept: SURGERY | Age: 80
DRG: 199 | End: 2020-01-01
Payer: MEDICARE

## 2020-01-01 ENCOUNTER — APPOINTMENT (OUTPATIENT)
Dept: CT IMAGING | Age: 80
DRG: 199 | End: 2020-01-01
Attending: PHYSICIAN ASSISTANT
Payer: MEDICARE

## 2020-01-01 ENCOUNTER — VIRTUAL VISIT (OUTPATIENT)
Dept: INTERNAL MEDICINE CLINIC | Age: 80
End: 2020-01-01
Payer: MEDICARE

## 2020-01-01 ENCOUNTER — PATIENT OUTREACH (OUTPATIENT)
Dept: CASE MANAGEMENT | Age: 80
End: 2020-01-01

## 2020-01-01 VITALS
OXYGEN SATURATION: 93 % | DIASTOLIC BLOOD PRESSURE: 70 MMHG | SYSTOLIC BLOOD PRESSURE: 128 MMHG | TEMPERATURE: 97.3 F | HEART RATE: 74 BPM | RESPIRATION RATE: 18 BRPM

## 2020-01-01 VITALS
OXYGEN SATURATION: 99 % | BODY MASS INDEX: 25.22 KG/M2 | HEART RATE: 64 BPM | WEIGHT: 176.2 LBS | SYSTOLIC BLOOD PRESSURE: 114 MMHG | TEMPERATURE: 98.2 F | RESPIRATION RATE: 16 BRPM | HEIGHT: 70 IN | DIASTOLIC BLOOD PRESSURE: 69 MMHG

## 2020-01-01 VITALS
TEMPERATURE: 98.8 F | OXYGEN SATURATION: 97 % | RESPIRATION RATE: 18 BRPM | DIASTOLIC BLOOD PRESSURE: 78 MMHG | SYSTOLIC BLOOD PRESSURE: 140 MMHG | HEART RATE: 74 BPM

## 2020-01-01 VITALS
TEMPERATURE: 98 F | HEART RATE: 73 BPM | SYSTOLIC BLOOD PRESSURE: 98 MMHG | DIASTOLIC BLOOD PRESSURE: 70 MMHG | OXYGEN SATURATION: 99 % | RESPIRATION RATE: 17 BRPM

## 2020-01-01 VITALS
DIASTOLIC BLOOD PRESSURE: 70 MMHG | OXYGEN SATURATION: 93 % | SYSTOLIC BLOOD PRESSURE: 110 MMHG | TEMPERATURE: 97.5 F | HEART RATE: 66 BPM

## 2020-01-01 VITALS
WEIGHT: 175.93 LBS | RESPIRATION RATE: 20 BRPM | HEIGHT: 70 IN | SYSTOLIC BLOOD PRESSURE: 103 MMHG | DIASTOLIC BLOOD PRESSURE: 54 MMHG | HEART RATE: 76 BPM | TEMPERATURE: 98.7 F | BODY MASS INDEX: 25.19 KG/M2 | OXYGEN SATURATION: 100 %

## 2020-01-01 VITALS
DIASTOLIC BLOOD PRESSURE: 78 MMHG | OXYGEN SATURATION: 94 % | TEMPERATURE: 98.3 F | HEART RATE: 66 BPM | RESPIRATION RATE: 19 BRPM | SYSTOLIC BLOOD PRESSURE: 128 MMHG

## 2020-01-01 VITALS
OXYGEN SATURATION: 99 % | SYSTOLIC BLOOD PRESSURE: 110 MMHG | DIASTOLIC BLOOD PRESSURE: 66 MMHG | RESPIRATION RATE: 20 BRPM | HEART RATE: 79 BPM | TEMPERATURE: 97.6 F

## 2020-01-01 VITALS
DIASTOLIC BLOOD PRESSURE: 80 MMHG | OXYGEN SATURATION: 97 % | HEART RATE: 77 BPM | RESPIRATION RATE: 18 BRPM | SYSTOLIC BLOOD PRESSURE: 130 MMHG | TEMPERATURE: 97.5 F

## 2020-01-01 VITALS
DIASTOLIC BLOOD PRESSURE: 86 MMHG | TEMPERATURE: 98.2 F | OXYGEN SATURATION: 88 % | SYSTOLIC BLOOD PRESSURE: 122 MMHG | RESPIRATION RATE: 20 BRPM | HEART RATE: 82 BPM

## 2020-01-01 VITALS
RESPIRATION RATE: 20 BRPM | WEIGHT: 181 LBS | HEART RATE: 82 BPM | SYSTOLIC BLOOD PRESSURE: 132 MMHG | TEMPERATURE: 98 F | OXYGEN SATURATION: 96 % | DIASTOLIC BLOOD PRESSURE: 64 MMHG | HEIGHT: 70 IN | BODY MASS INDEX: 25.91 KG/M2

## 2020-01-01 VITALS
SYSTOLIC BLOOD PRESSURE: 118 MMHG | RESPIRATION RATE: 18 BRPM | DIASTOLIC BLOOD PRESSURE: 80 MMHG | OXYGEN SATURATION: 95 % | HEART RATE: 83 BPM | TEMPERATURE: 98.4 F

## 2020-01-01 VITALS
HEART RATE: 70 BPM | DIASTOLIC BLOOD PRESSURE: 76 MMHG | SYSTOLIC BLOOD PRESSURE: 128 MMHG | TEMPERATURE: 98.4 F | RESPIRATION RATE: 20 BRPM | OXYGEN SATURATION: 98 %

## 2020-01-01 VITALS
OXYGEN SATURATION: 97 % | TEMPERATURE: 98.4 F | HEART RATE: 72 BPM | DIASTOLIC BLOOD PRESSURE: 70 MMHG | RESPIRATION RATE: 16 BRPM | SYSTOLIC BLOOD PRESSURE: 108 MMHG

## 2020-01-01 VITALS
RESPIRATION RATE: 18 BRPM | DIASTOLIC BLOOD PRESSURE: 80 MMHG | TEMPERATURE: 98.1 F | DIASTOLIC BLOOD PRESSURE: 70 MMHG | HEART RATE: 66 BPM | OXYGEN SATURATION: 96 % | TEMPERATURE: 97.8 F | SYSTOLIC BLOOD PRESSURE: 130 MMHG | RESPIRATION RATE: 18 BRPM | HEART RATE: 72 BPM | SYSTOLIC BLOOD PRESSURE: 110 MMHG | OXYGEN SATURATION: 99 %

## 2020-01-01 VITALS
HEART RATE: 64 BPM | TEMPERATURE: 97.6 F | OXYGEN SATURATION: 97 % | DIASTOLIC BLOOD PRESSURE: 80 MMHG | SYSTOLIC BLOOD PRESSURE: 128 MMHG

## 2020-01-01 VITALS
RESPIRATION RATE: 18 BRPM | HEART RATE: 92 BPM | DIASTOLIC BLOOD PRESSURE: 64 MMHG | OXYGEN SATURATION: 98 % | TEMPERATURE: 98.2 F | SYSTOLIC BLOOD PRESSURE: 120 MMHG

## 2020-01-01 VITALS
SYSTOLIC BLOOD PRESSURE: 108 MMHG | HEART RATE: 80 BPM | DIASTOLIC BLOOD PRESSURE: 83 MMHG | RESPIRATION RATE: 19 BRPM | TEMPERATURE: 98.3 F | OXYGEN SATURATION: 96 %

## 2020-01-01 VITALS
DIASTOLIC BLOOD PRESSURE: 78 MMHG | HEART RATE: 66 BPM | OXYGEN SATURATION: 94 % | TEMPERATURE: 98.3 F | SYSTOLIC BLOOD PRESSURE: 128 MMHG

## 2020-01-01 VITALS
SYSTOLIC BLOOD PRESSURE: 120 MMHG | OXYGEN SATURATION: 97 % | DIASTOLIC BLOOD PRESSURE: 70 MMHG | TEMPERATURE: 98 F | HEART RATE: 67 BPM

## 2020-01-01 VITALS
TEMPERATURE: 98.8 F | DIASTOLIC BLOOD PRESSURE: 78 MMHG | HEART RATE: 69 BPM | OXYGEN SATURATION: 98 % | SYSTOLIC BLOOD PRESSURE: 140 MMHG | RESPIRATION RATE: 20 BRPM

## 2020-01-01 VITALS
DIASTOLIC BLOOD PRESSURE: 65 MMHG | TEMPERATURE: 98 F | OXYGEN SATURATION: 98 % | HEART RATE: 69 BPM | SYSTOLIC BLOOD PRESSURE: 129 MMHG

## 2020-01-01 DIAGNOSIS — J84.10 INTERSTITIAL PULMONARY FIBROSIS (HCC): ICD-10-CM

## 2020-01-01 DIAGNOSIS — I71.40 ABDOMINAL AORTIC ANEURYSM (AAA) WITHOUT RUPTURE: ICD-10-CM

## 2020-01-01 DIAGNOSIS — E03.9 ACQUIRED HYPOTHYROIDISM: ICD-10-CM

## 2020-01-01 DIAGNOSIS — G47.33 OSA (OBSTRUCTIVE SLEEP APNEA): ICD-10-CM

## 2020-01-01 DIAGNOSIS — I10 ESSENTIAL HYPERTENSION: ICD-10-CM

## 2020-01-01 DIAGNOSIS — J96.11 CHRONIC RESPIRATORY FAILURE WITH HYPOXIA (HCC): Primary | ICD-10-CM

## 2020-01-01 DIAGNOSIS — J84.9 INTERSTITIAL LUNG DISEASE (HCC): ICD-10-CM

## 2020-01-01 DIAGNOSIS — Z23 ENCOUNTER FOR IMMUNIZATION: ICD-10-CM

## 2020-01-01 DIAGNOSIS — F32.9 REACTIVE DEPRESSION: ICD-10-CM

## 2020-01-01 DIAGNOSIS — I48.91 ATRIAL FIBRILLATION, UNSPECIFIED TYPE (HCC): ICD-10-CM

## 2020-01-01 DIAGNOSIS — Z51.5 PALLIATIVE CARE BY SPECIALIST: ICD-10-CM

## 2020-01-01 DIAGNOSIS — I25.10 CORONARY ARTERY DISEASE INVOLVING NATIVE CORONARY ARTERY OF NATIVE HEART WITHOUT ANGINA PECTORIS: ICD-10-CM

## 2020-01-01 DIAGNOSIS — K21.9 GASTROESOPHAGEAL REFLUX DISEASE WITHOUT ESOPHAGITIS: ICD-10-CM

## 2020-01-01 DIAGNOSIS — Z13.29 SCREENING FOR THYROID DISORDER: Primary | ICD-10-CM

## 2020-01-01 DIAGNOSIS — Z13.29 SCREENING FOR THYROID DISORDER: ICD-10-CM

## 2020-01-01 DIAGNOSIS — R35.0 BENIGN PROSTATIC HYPERPLASIA WITH URINARY FREQUENCY: ICD-10-CM

## 2020-01-01 DIAGNOSIS — F32.9 REACTIVE DEPRESSION: Primary | ICD-10-CM

## 2020-01-01 DIAGNOSIS — E11.9 CONTROLLED TYPE 2 DIABETES MELLITUS WITHOUT COMPLICATION, WITHOUT LONG-TERM CURRENT USE OF INSULIN (HCC): ICD-10-CM

## 2020-01-01 DIAGNOSIS — N40.0 BENIGN PROSTATIC HYPERPLASIA WITHOUT LOWER URINARY TRACT SYMPTOMS: ICD-10-CM

## 2020-01-01 DIAGNOSIS — R53.81 DEBILITY: ICD-10-CM

## 2020-01-01 DIAGNOSIS — E78.00 HYPERCHOLESTEROLEMIA: ICD-10-CM

## 2020-01-01 DIAGNOSIS — N63.20 BREAST MASS, LEFT: Primary | ICD-10-CM

## 2020-01-01 DIAGNOSIS — Z00.00 MEDICARE ANNUAL WELLNESS VISIT, SUBSEQUENT: ICD-10-CM

## 2020-01-01 DIAGNOSIS — J96.11 CHRONIC RESPIRATORY FAILURE WITH HYPOXIA (HCC): ICD-10-CM

## 2020-01-01 DIAGNOSIS — N63.20 BREAST MASS, LEFT: ICD-10-CM

## 2020-01-01 DIAGNOSIS — N62 GYNECOMASTIA: Primary | ICD-10-CM

## 2020-01-01 DIAGNOSIS — N62 GYNECOMASTIA: ICD-10-CM

## 2020-01-01 DIAGNOSIS — N40.1 BENIGN PROSTATIC HYPERPLASIA WITH URINARY FREQUENCY: ICD-10-CM

## 2020-01-01 DIAGNOSIS — K21.9 GASTROESOPHAGEAL REFLUX DISEASE WITHOUT ESOPHAGITIS: Primary | ICD-10-CM

## 2020-01-01 DIAGNOSIS — I10 ESSENTIAL HYPERTENSION: Primary | ICD-10-CM

## 2020-01-01 DIAGNOSIS — J98.2 PNEUMOMEDIASTINUM (HCC): ICD-10-CM

## 2020-01-01 DIAGNOSIS — J98.2: Primary | ICD-10-CM

## 2020-01-01 LAB
ALBUMIN SERPL-MCNC: 3.4 G/DL (ref 3.7–4.7)
ALBUMIN/GLOB SERPL: 1 {RATIO} (ref 1.2–2.2)
ALP SERPL-CCNC: 84 IU/L (ref 39–117)
ALT SERPL-CCNC: 18 IU/L (ref 0–44)
ANION GAP SERPL CALC-SCNC: 4 MMOL/L (ref 5–15)
ANION GAP SERPL CALC-SCNC: 4 MMOL/L (ref 5–15)
ANION GAP SERPL CALC-SCNC: 7 MMOL/L (ref 5–15)
AST SERPL-CCNC: 21 IU/L (ref 0–40)
ATRIAL RATE: 74 BPM
BACTERIA SPEC CULT: NORMAL
BASOPHILS # BLD: 0 K/UL (ref 0–0.1)
BASOPHILS # BLD: 0 K/UL (ref 0–0.1)
BASOPHILS NFR BLD: 0 % (ref 0–1)
BASOPHILS NFR BLD: 0 % (ref 0–1)
BILIRUB SERPL-MCNC: 0.8 MG/DL (ref 0–1.2)
BUN SERPL-MCNC: 12 MG/DL (ref 6–20)
BUN SERPL-MCNC: 13 MG/DL (ref 6–20)
BUN SERPL-MCNC: 14 MG/DL (ref 8–27)
BUN SERPL-MCNC: 18 MG/DL (ref 6–20)
BUN/CREAT SERPL: 12 (ref 10–24)
BUN/CREAT SERPL: 14 (ref 12–20)
BUN/CREAT SERPL: 16 (ref 12–20)
BUN/CREAT SERPL: 16 (ref 12–20)
CALCIUM SERPL-MCNC: 8.8 MG/DL (ref 8.5–10.1)
CALCIUM SERPL-MCNC: 9.2 MG/DL (ref 8.5–10.1)
CALCIUM SERPL-MCNC: 9.3 MG/DL (ref 8.5–10.1)
CALCIUM SERPL-MCNC: 9.6 MG/DL (ref 8.6–10.2)
CALCULATED P AXIS, ECG09: 46 DEGREES
CALCULATED R AXIS, ECG10: 2 DEGREES
CALCULATED T AXIS, ECG11: 19 DEGREES
CHLORIDE SERPL-SCNC: 100 MMOL/L (ref 97–108)
CHLORIDE SERPL-SCNC: 100 MMOL/L (ref 97–108)
CHLORIDE SERPL-SCNC: 101 MMOL/L (ref 96–106)
CHLORIDE SERPL-SCNC: 98 MMOL/L (ref 97–108)
CHOLEST SERPL-MCNC: 123 MG/DL (ref 100–199)
CO2 SERPL-SCNC: 28 MMOL/L (ref 20–29)
CO2 SERPL-SCNC: 29 MMOL/L (ref 21–32)
CO2 SERPL-SCNC: 29 MMOL/L (ref 21–32)
CO2 SERPL-SCNC: 30 MMOL/L (ref 21–32)
COMMENT, HOLDF: NORMAL
CREAT SERPL-MCNC: 0.81 MG/DL (ref 0.7–1.3)
CREAT SERPL-MCNC: 0.88 MG/DL (ref 0.7–1.3)
CREAT SERPL-MCNC: 1.1 MG/DL (ref 0.7–1.3)
CREAT SERPL-MCNC: 1.13 MG/DL (ref 0.76–1.27)
DIAGNOSIS, 93000: NORMAL
DIFFERENTIAL METHOD BLD: ABNORMAL
DIFFERENTIAL METHOD BLD: ABNORMAL
EOSINOPHIL # BLD: 0.2 K/UL (ref 0–0.4)
EOSINOPHIL # BLD: 0.2 K/UL (ref 0–0.4)
EOSINOPHIL NFR BLD: 1 % (ref 0–7)
EOSINOPHIL NFR BLD: 2 % (ref 0–7)
ERYTHROCYTE [DISTWIDTH] IN BLOOD BY AUTOMATED COUNT: 13.6 % (ref 11.5–14.5)
ERYTHROCYTE [DISTWIDTH] IN BLOOD BY AUTOMATED COUNT: 13.7 % (ref 11.5–14.5)
ERYTHROCYTE [DISTWIDTH] IN BLOOD BY AUTOMATED COUNT: 13.9 % (ref 11.5–14.5)
EST. AVERAGE GLUCOSE BLD GHB EST-MCNC: 128 MG/DL
GLOBULIN SER CALC-MCNC: 3.3 G/DL (ref 1.5–4.5)
GLUCOSE BLD STRIP.AUTO-MCNC: 107 MG/DL (ref 65–100)
GLUCOSE BLD STRIP.AUTO-MCNC: 107 MG/DL (ref 65–100)
GLUCOSE BLD STRIP.AUTO-MCNC: 109 MG/DL (ref 65–100)
GLUCOSE BLD STRIP.AUTO-MCNC: 111 MG/DL (ref 65–100)
GLUCOSE BLD STRIP.AUTO-MCNC: 114 MG/DL (ref 65–100)
GLUCOSE BLD STRIP.AUTO-MCNC: 116 MG/DL (ref 65–100)
GLUCOSE BLD STRIP.AUTO-MCNC: 116 MG/DL (ref 65–100)
GLUCOSE BLD STRIP.AUTO-MCNC: 119 MG/DL (ref 65–100)
GLUCOSE BLD STRIP.AUTO-MCNC: 119 MG/DL (ref 65–100)
GLUCOSE BLD STRIP.AUTO-MCNC: 121 MG/DL (ref 65–100)
GLUCOSE BLD STRIP.AUTO-MCNC: 136 MG/DL (ref 65–100)
GLUCOSE BLD STRIP.AUTO-MCNC: 245 MG/DL (ref 65–100)
GLUCOSE SERPL-MCNC: 121 MG/DL (ref 65–99)
GLUCOSE SERPL-MCNC: 125 MG/DL (ref 65–100)
GLUCOSE SERPL-MCNC: 173 MG/DL (ref 65–100)
GLUCOSE SERPL-MCNC: 97 MG/DL (ref 65–100)
GRAM STN SPEC: NORMAL
HBA1C MFR BLD: 6.1 % (ref 4.8–5.6)
HCT VFR BLD AUTO: 35.4 % (ref 36.6–50.3)
HCT VFR BLD AUTO: 36.2 % (ref 36.6–50.3)
HCT VFR BLD AUTO: 39.3 % (ref 36.6–50.3)
HDLC SERPL-MCNC: 40 MG/DL
HGB BLD-MCNC: 11.5 G/DL (ref 12.1–17)
HGB BLD-MCNC: 11.6 G/DL (ref 12.1–17)
HGB BLD-MCNC: 12.7 G/DL (ref 12.1–17)
IMM GRANULOCYTES # BLD AUTO: 0 K/UL (ref 0–0.04)
IMM GRANULOCYTES # BLD AUTO: 0.1 K/UL (ref 0–0.04)
IMM GRANULOCYTES NFR BLD AUTO: 0 % (ref 0–0.5)
IMM GRANULOCYTES NFR BLD AUTO: 1 % (ref 0–0.5)
LDLC SERPL CALC-MCNC: 63 MG/DL (ref 0–99)
LIPASE SERPL-CCNC: 193 U/L (ref 73–393)
LYMPHOCYTES # BLD: 2.1 K/UL (ref 0.8–3.5)
LYMPHOCYTES # BLD: 2.1 K/UL (ref 0.8–3.5)
LYMPHOCYTES NFR BLD: 16 % (ref 12–49)
LYMPHOCYTES NFR BLD: 21 % (ref 12–49)
MCH RBC QN AUTO: 30.7 PG (ref 26–34)
MCH RBC QN AUTO: 30.9 PG (ref 26–34)
MCH RBC QN AUTO: 31.3 PG (ref 26–34)
MCHC RBC AUTO-ENTMCNC: 32 G/DL (ref 30–36.5)
MCHC RBC AUTO-ENTMCNC: 32.3 G/DL (ref 30–36.5)
MCHC RBC AUTO-ENTMCNC: 32.5 G/DL (ref 30–36.5)
MCV RBC AUTO: 95.2 FL (ref 80–99)
MCV RBC AUTO: 95.8 FL (ref 80–99)
MCV RBC AUTO: 96.8 FL (ref 80–99)
MONOCYTES # BLD: 0.6 K/UL (ref 0–1)
MONOCYTES # BLD: 0.6 K/UL (ref 0–1)
MONOCYTES NFR BLD: 5 % (ref 5–13)
MONOCYTES NFR BLD: 6 % (ref 5–13)
NEUTS SEG # BLD: 10 K/UL (ref 1.8–8)
NEUTS SEG # BLD: 7 K/UL (ref 1.8–8)
NEUTS SEG NFR BLD: 71 % (ref 32–75)
NEUTS SEG NFR BLD: 77 % (ref 32–75)
NRBC # BLD: 0 K/UL (ref 0–0.01)
NRBC BLD-RTO: 0 PER 100 WBC
P-R INTERVAL, ECG05: 190 MS
PLATELET # BLD AUTO: 201 K/UL (ref 150–400)
PLATELET # BLD AUTO: 205 K/UL (ref 150–400)
PLATELET # BLD AUTO: 249 K/UL (ref 150–400)
PMV BLD AUTO: 10.2 FL (ref 8.9–12.9)
PMV BLD AUTO: 10.3 FL (ref 8.9–12.9)
PMV BLD AUTO: 9.9 FL (ref 8.9–12.9)
POTASSIUM SERPL-SCNC: 3.7 MMOL/L (ref 3.5–5.1)
POTASSIUM SERPL-SCNC: 4 MMOL/L (ref 3.5–5.1)
POTASSIUM SERPL-SCNC: 4.3 MMOL/L (ref 3.5–5.1)
POTASSIUM SERPL-SCNC: 4.9 MMOL/L (ref 3.5–5.2)
PROT SERPL-MCNC: 6.7 G/DL (ref 6–8.5)
Q-T INTERVAL, ECG07: 388 MS
QRS DURATION, ECG06: 106 MS
QTC CALCULATION (BEZET), ECG08: 430 MS
RBC # BLD AUTO: 3.72 M/UL (ref 4.1–5.7)
RBC # BLD AUTO: 3.78 M/UL (ref 4.1–5.7)
RBC # BLD AUTO: 4.06 M/UL (ref 4.1–5.7)
SAMPLES BEING HELD,HOLD: NORMAL
SERVICE CMNT-IMP: ABNORMAL
SERVICE CMNT-IMP: NORMAL
SODIUM SERPL-SCNC: 133 MMOL/L (ref 136–145)
SODIUM SERPL-SCNC: 134 MMOL/L (ref 136–145)
SODIUM SERPL-SCNC: 134 MMOL/L (ref 136–145)
SODIUM SERPL-SCNC: 139 MMOL/L (ref 134–144)
T4 FREE SERPL-MCNC: 1.04 NG/DL (ref 0.82–1.77)
THYROPEROXIDASE AB SERPL-ACNC: 85 IU/ML (ref 0–34)
TRIGL SERPL-MCNC: 101 MG/DL (ref 0–149)
TROPONIN I SERPL-MCNC: <0.05 NG/ML
TSH SERPL DL<=0.005 MIU/L-ACNC: 2.43 UIU/ML (ref 0.45–4.5)
TSH SERPL DL<=0.005 MIU/L-ACNC: 3.57 UIU/ML (ref 0.45–4.5)
TSH SERPL DL<=0.005 MIU/L-ACNC: 8.25 UIU/ML (ref 0.45–4.5)
VENTRICULAR RATE, ECG03: 74 BPM
VLDLC SERPL CALC-MCNC: 20 MG/DL (ref 5–40)
WBC # BLD AUTO: 12.9 K/UL (ref 4.1–11.1)
WBC # BLD AUTO: 9.4 K/UL (ref 4.1–11.1)
WBC # BLD AUTO: 9.9 K/UL (ref 4.1–11.1)

## 2020-01-01 PROCEDURE — 3331090001 HH PPS REVENUE CREDIT

## 2020-01-01 PROCEDURE — G8427 DOCREV CUR MEDS BY ELIG CLIN: HCPCS | Performed by: INTERNAL MEDICINE

## 2020-01-01 PROCEDURE — 77030020143 HC AIRWY LARYN INTUB CGAS -A: Performed by: ANESTHESIOLOGY

## 2020-01-01 PROCEDURE — 74011000250 HC RX REV CODE- 250: Performed by: INTERNAL MEDICINE

## 2020-01-01 PROCEDURE — C9113 INJ PANTOPRAZOLE SODIUM, VIA: HCPCS | Performed by: HOSPITALIST

## 2020-01-01 PROCEDURE — 3331090002 HH PPS REVENUE DEBIT

## 2020-01-01 PROCEDURE — 1101F PT FALLS ASSESS-DOCD LE1/YR: CPT | Performed by: INTERNAL MEDICINE

## 2020-01-01 PROCEDURE — 77010033678 HC OXYGEN DAILY

## 2020-01-01 PROCEDURE — 74011000250 HC RX REV CODE- 250: Performed by: HOSPITALIST

## 2020-01-01 PROCEDURE — G0299 HHS/HOSPICE OF RN EA 15 MIN: HCPCS

## 2020-01-01 PROCEDURE — 74011636637 HC RX REV CODE- 636/637: Performed by: HOSPITALIST

## 2020-01-01 PROCEDURE — 83690 ASSAY OF LIPASE: CPT

## 2020-01-01 PROCEDURE — 74011250637 HC RX REV CODE- 250/637: Performed by: HOSPITALIST

## 2020-01-01 PROCEDURE — 99215 OFFICE O/P EST HI 40 MIN: CPT | Performed by: INTERNAL MEDICINE

## 2020-01-01 PROCEDURE — G0152 HHCP-SERV OF OT,EA 15 MIN: HCPCS

## 2020-01-01 PROCEDURE — 85025 COMPLETE CBC W/AUTO DIFF WBC: CPT

## 2020-01-01 PROCEDURE — 77030018836 HC SOL IRR NACL ICUM -A: Performed by: THORACIC SURGERY (CARDIOTHORACIC VASCULAR SURGERY)

## 2020-01-01 PROCEDURE — 74011250636 HC RX REV CODE- 250/636: Performed by: HOSPITALIST

## 2020-01-01 PROCEDURE — 36415 COLL VENOUS BLD VENIPUNCTURE: CPT

## 2020-01-01 PROCEDURE — 76060000032 HC ANESTHESIA 0.5 TO 1 HR: Performed by: THORACIC SURGERY (CARDIOTHORACIC VASCULAR SURGERY)

## 2020-01-01 PROCEDURE — 90653 IIV ADJUVANT VACCINE IM: CPT | Performed by: INTERNAL MEDICINE

## 2020-01-01 PROCEDURE — G0300 HHS/HOSPICE OF LPN EA 15 MIN: HCPCS

## 2020-01-01 PROCEDURE — 82962 GLUCOSE BLOOD TEST: CPT

## 2020-01-01 PROCEDURE — G0157 HHC PT ASSISTANT EA 15: HCPCS

## 2020-01-01 PROCEDURE — 3331090003 HH PPS REVENUE ADJ

## 2020-01-01 PROCEDURE — 99218 HC RM OBSERVATION: CPT

## 2020-01-01 PROCEDURE — 74011250636 HC RX REV CODE- 250/636: Performed by: NURSE ANESTHETIST, CERTIFIED REGISTERED

## 2020-01-01 PROCEDURE — 74011636320 HC RX REV CODE- 636/320: Performed by: HOSPITALIST

## 2020-01-01 PROCEDURE — 71260 CT THORAX DX C+: CPT

## 2020-01-01 PROCEDURE — 94640 AIRWAY INHALATION TREATMENT: CPT

## 2020-01-01 PROCEDURE — 65660000000 HC RM CCU STEPDOWN

## 2020-01-01 PROCEDURE — 76210000006 HC OR PH I REC 0.5 TO 1 HR: Performed by: THORACIC SURGERY (CARDIOTHORACIC VASCULAR SURGERY)

## 2020-01-01 PROCEDURE — C9113 INJ PANTOPRAZOLE SODIUM, VIA: HCPCS | Performed by: INTERNAL MEDICINE

## 2020-01-01 PROCEDURE — 93005 ELECTROCARDIOGRAM TRACING: CPT

## 2020-01-01 PROCEDURE — 99214 OFFICE O/P EST MOD 30 MIN: CPT | Performed by: INTERNAL MEDICINE

## 2020-01-01 PROCEDURE — 76010000138 HC OR TIME 0.5 TO 1 HR: Performed by: THORACIC SURGERY (CARDIOTHORACIC VASCULAR SURGERY)

## 2020-01-01 PROCEDURE — 87070 CULTURE OTHR SPECIMN AEROBIC: CPT

## 2020-01-01 PROCEDURE — 0BJ08ZZ INSPECTION OF TRACHEOBRONCHIAL TREE, VIA NATURAL OR ARTIFICIAL OPENING ENDOSCOPIC: ICD-10-PCS | Performed by: THORACIC SURGERY (CARDIOTHORACIC VASCULAR SURGERY)

## 2020-01-01 PROCEDURE — 77030040361 HC SLV COMPR DVT MDII -B: Performed by: THORACIC SURGERY (CARDIOTHORACIC VASCULAR SURGERY)

## 2020-01-01 PROCEDURE — G8756 NO BP MEASURE DOC: HCPCS | Performed by: INTERNAL MEDICINE

## 2020-01-01 PROCEDURE — G8754 DIAS BP LESS 90: HCPCS | Performed by: INTERNAL MEDICINE

## 2020-01-01 PROCEDURE — 77066 DX MAMMO INCL CAD BI: CPT

## 2020-01-01 PROCEDURE — 400013 HH SOC

## 2020-01-01 PROCEDURE — 94760 N-INVAS EAR/PLS OXIMETRY 1: CPT

## 2020-01-01 PROCEDURE — G0158 HHC OT ASSISTANT EA 15: HCPCS

## 2020-01-01 PROCEDURE — 74011000258 HC RX REV CODE- 258: Performed by: RADIOLOGY

## 2020-01-01 PROCEDURE — G0008 ADMIN INFLUENZA VIRUS VAC: HCPCS | Performed by: INTERNAL MEDICINE

## 2020-01-01 PROCEDURE — G0151 HHCP-SERV OF PT,EA 15 MIN: HCPCS

## 2020-01-01 PROCEDURE — 80048 BASIC METABOLIC PNL TOTAL CA: CPT

## 2020-01-01 PROCEDURE — 74220 X-RAY XM ESOPHAGUS 1CNTRST: CPT

## 2020-01-01 PROCEDURE — 70491 CT SOFT TISSUE NECK W/DYE: CPT

## 2020-01-01 PROCEDURE — 74011636320 HC RX REV CODE- 636/320: Performed by: RADIOLOGY

## 2020-01-01 PROCEDURE — 96374 THER/PROPH/DIAG INJ IV PUSH: CPT

## 2020-01-01 PROCEDURE — 74011250636 HC RX REV CODE- 250/636: Performed by: INTERNAL MEDICINE

## 2020-01-01 PROCEDURE — 74011000250 HC RX REV CODE- 250: Performed by: NURSE ANESTHETIST, CERTIFIED REGISTERED

## 2020-01-01 PROCEDURE — 84484 ASSAY OF TROPONIN QUANT: CPT

## 2020-01-01 PROCEDURE — G8752 SYS BP LESS 140: HCPCS | Performed by: INTERNAL MEDICINE

## 2020-01-01 PROCEDURE — 99285 EMERGENCY DEPT VISIT HI MDM: CPT

## 2020-01-01 PROCEDURE — G8419 CALC BMI OUT NRM PARAM NOF/U: HCPCS | Performed by: INTERNAL MEDICINE

## 2020-01-01 PROCEDURE — G9717 DOC PT DX DEP/BP F/U NT REQ: HCPCS | Performed by: INTERNAL MEDICINE

## 2020-01-01 PROCEDURE — 71046 X-RAY EXAM CHEST 2 VIEWS: CPT

## 2020-01-01 PROCEDURE — 85027 COMPLETE CBC AUTOMATED: CPT

## 2020-01-01 PROCEDURE — G8536 NO DOC ELDER MAL SCRN: HCPCS | Performed by: INTERNAL MEDICINE

## 2020-01-01 RX ORDER — LEVOTHYROXINE SODIUM 25 UG/1
25 TABLET ORAL
Qty: 30 TAB | Refills: 1 | Status: SHIPPED | OUTPATIENT
Start: 2020-01-01 | End: 2020-01-01

## 2020-01-01 RX ORDER — OXYBUTYNIN CHLORIDE 5 MG/1
5 TABLET ORAL DAILY
Status: DISCONTINUED | OUTPATIENT
Start: 2020-01-01 | End: 2020-01-01 | Stop reason: HOSPADM

## 2020-01-01 RX ORDER — METOPROLOL TARTRATE 25 MG/1
25 TABLET, FILM COATED ORAL 2 TIMES DAILY
Status: DISCONTINUED | OUTPATIENT
Start: 2020-01-01 | End: 2020-01-01 | Stop reason: HOSPADM

## 2020-01-01 RX ORDER — LEVOTHYROXINE SODIUM 25 UG/1
TABLET ORAL
Qty: 30 TAB | Refills: 0 | Status: SHIPPED | OUTPATIENT
Start: 2020-01-01 | End: 2020-01-01

## 2020-01-01 RX ORDER — DEXAMETHASONE SODIUM PHOSPHATE 4 MG/ML
INJECTION, SOLUTION INTRA-ARTICULAR; INTRALESIONAL; INTRAMUSCULAR; INTRAVENOUS; SOFT TISSUE AS NEEDED
Status: DISCONTINUED | OUTPATIENT
Start: 2020-01-01 | End: 2020-01-01 | Stop reason: HOSPADM

## 2020-01-01 RX ORDER — SODIUM CHLORIDE 0.9 % (FLUSH) 0.9 %
5-40 SYRINGE (ML) INJECTION EVERY 8 HOURS
Status: DISCONTINUED | OUTPATIENT
Start: 2020-01-01 | End: 2020-01-01 | Stop reason: HOSPADM

## 2020-01-01 RX ORDER — SODIUM CHLORIDE 0.9 % (FLUSH) 0.9 %
5-40 SYRINGE (ML) INJECTION AS NEEDED
Status: DISCONTINUED | OUTPATIENT
Start: 2020-01-01 | End: 2020-01-01 | Stop reason: HOSPADM

## 2020-01-01 RX ORDER — INSULIN LISPRO 100 [IU]/ML
INJECTION, SOLUTION INTRAVENOUS; SUBCUTANEOUS EVERY 6 HOURS
Status: DISCONTINUED | OUTPATIENT
Start: 2020-01-01 | End: 2020-01-01 | Stop reason: HOSPADM

## 2020-01-01 RX ORDER — MONTELUKAST SODIUM 10 MG/1
10 TABLET ORAL
Status: DISCONTINUED | OUTPATIENT
Start: 2020-01-01 | End: 2020-01-01 | Stop reason: HOSPADM

## 2020-01-01 RX ORDER — BENZONATATE 100 MG/1
200 CAPSULE ORAL
Status: DISCONTINUED | OUTPATIENT
Start: 2020-01-01 | End: 2020-01-01 | Stop reason: HOSPADM

## 2020-01-01 RX ORDER — BUPROPION HYDROCHLORIDE 300 MG/1
TABLET ORAL
Qty: 90 TAB | Refills: 1 | Status: SHIPPED | OUTPATIENT
Start: 2020-01-01

## 2020-01-01 RX ORDER — THERA TABS 400 MCG
1 TAB ORAL DAILY
Status: DISCONTINUED | OUTPATIENT
Start: 2020-01-01 | End: 2020-01-01 | Stop reason: HOSPADM

## 2020-01-01 RX ORDER — LIDOCAINE HYDROCHLORIDE 20 MG/ML
INJECTION, SOLUTION EPIDURAL; INFILTRATION; INTRACAUDAL; PERINEURAL AS NEEDED
Status: DISCONTINUED | OUTPATIENT
Start: 2020-01-01 | End: 2020-01-01 | Stop reason: HOSPADM

## 2020-01-01 RX ORDER — MORPHINE SULFATE 10 MG/ML
2 INJECTION, SOLUTION INTRAMUSCULAR; INTRAVENOUS
Status: DISCONTINUED | OUTPATIENT
Start: 2020-01-01 | End: 2020-01-01 | Stop reason: HOSPADM

## 2020-01-01 RX ORDER — SODIUM CHLORIDE, SODIUM LACTATE, POTASSIUM CHLORIDE, CALCIUM CHLORIDE 600; 310; 30; 20 MG/100ML; MG/100ML; MG/100ML; MG/100ML
100 INJECTION, SOLUTION INTRAVENOUS CONTINUOUS
Status: DISCONTINUED | OUTPATIENT
Start: 2020-01-01 | End: 2020-01-01 | Stop reason: HOSPADM

## 2020-01-01 RX ORDER — MIDAZOLAM HYDROCHLORIDE 1 MG/ML
1 INJECTION, SOLUTION INTRAMUSCULAR; INTRAVENOUS AS NEEDED
Status: DISCONTINUED | OUTPATIENT
Start: 2020-01-01 | End: 2020-01-01 | Stop reason: HOSPADM

## 2020-01-01 RX ORDER — IPRATROPIUM BROMIDE AND ALBUTEROL SULFATE 2.5; .5 MG/3ML; MG/3ML
3 SOLUTION RESPIRATORY (INHALATION)
Status: DISCONTINUED | OUTPATIENT
Start: 2020-01-01 | End: 2020-01-01 | Stop reason: HOSPADM

## 2020-01-01 RX ORDER — INSULIN LISPRO 100 [IU]/ML
INJECTION, SOLUTION INTRAVENOUS; SUBCUTANEOUS
Status: DISCONTINUED | OUTPATIENT
Start: 2020-01-01 | End: 2020-01-01

## 2020-01-01 RX ORDER — MIDAZOLAM HYDROCHLORIDE 1 MG/ML
0.5 INJECTION, SOLUTION INTRAMUSCULAR; INTRAVENOUS
Status: DISCONTINUED | OUTPATIENT
Start: 2020-01-01 | End: 2020-01-01 | Stop reason: HOSPADM

## 2020-01-01 RX ORDER — OMEPRAZOLE 40 MG/1
CAPSULE, DELAYED RELEASE ORAL
Qty: 180 CAP | Refills: 0 | Status: SHIPPED | OUTPATIENT
Start: 2020-01-01 | End: 2020-01-01

## 2020-01-01 RX ORDER — SOLIFENACIN SUCCINATE 5 MG/1
5 TABLET, FILM COATED ORAL DAILY
COMMUNITY

## 2020-01-01 RX ORDER — DEXTROSE MONOHYDRATE 100 MG/ML
0-250 INJECTION, SOLUTION INTRAVENOUS AS NEEDED
Status: DISCONTINUED | OUTPATIENT
Start: 2020-01-01 | End: 2020-01-01 | Stop reason: HOSPADM

## 2020-01-01 RX ORDER — TAMSULOSIN HYDROCHLORIDE 0.4 MG/1
0.4 CAPSULE ORAL
Status: DISCONTINUED | OUTPATIENT
Start: 2020-01-01 | End: 2020-01-01 | Stop reason: HOSPADM

## 2020-01-01 RX ORDER — PROPOFOL 10 MG/ML
INJECTION, EMULSION INTRAVENOUS AS NEEDED
Status: DISCONTINUED | OUTPATIENT
Start: 2020-01-01 | End: 2020-01-01 | Stop reason: HOSPADM

## 2020-01-01 RX ORDER — PANTOPRAZOLE SODIUM 40 MG/1
40 TABLET, DELAYED RELEASE ORAL 2 TIMES DAILY
Qty: 60 TAB | Refills: 2 | Status: SHIPPED | OUTPATIENT
Start: 2020-01-01 | End: 2020-01-01

## 2020-01-01 RX ORDER — SODIUM CHLORIDE 0.9 % (FLUSH) 0.9 %
10 SYRINGE (ML) INJECTION
Status: COMPLETED | OUTPATIENT
Start: 2020-01-01 | End: 2020-01-01

## 2020-01-01 RX ORDER — ACETAMINOPHEN 325 MG/1
650 TABLET ORAL ONCE
Status: DISCONTINUED | OUTPATIENT
Start: 2020-01-01 | End: 2020-01-01 | Stop reason: HOSPADM

## 2020-01-01 RX ORDER — HYDRALAZINE HYDROCHLORIDE 20 MG/ML
10 INJECTION INTRAMUSCULAR; INTRAVENOUS
Status: DISCONTINUED | OUTPATIENT
Start: 2020-01-01 | End: 2020-01-01 | Stop reason: HOSPADM

## 2020-01-01 RX ORDER — MIDAZOLAM HYDROCHLORIDE 1 MG/ML
INJECTION, SOLUTION INTRAMUSCULAR; INTRAVENOUS AS NEEDED
Status: DISCONTINUED | OUTPATIENT
Start: 2020-01-01 | End: 2020-01-01 | Stop reason: HOSPADM

## 2020-01-01 RX ORDER — LEVOTHYROXINE SODIUM 25 UG/1
TABLET ORAL
Qty: 30 TAB | Refills: 0 | Status: SHIPPED | OUTPATIENT
Start: 2020-01-01 | End: 2021-01-01

## 2020-01-01 RX ORDER — TAMSULOSIN HYDROCHLORIDE 0.4 MG/1
CAPSULE ORAL
Qty: 90 CAP | Refills: 0 | Status: SHIPPED | OUTPATIENT
Start: 2020-01-01

## 2020-01-01 RX ORDER — DEXTROSE, SODIUM CHLORIDE, AND POTASSIUM CHLORIDE 5; .45; .22 G/100ML; G/100ML; G/100ML
INJECTION INTRAVENOUS CONTINUOUS
Status: DISCONTINUED | OUTPATIENT
Start: 2020-01-01 | End: 2020-01-01

## 2020-01-01 RX ORDER — FENTANYL CITRATE 50 UG/ML
50 INJECTION, SOLUTION INTRAMUSCULAR; INTRAVENOUS AS NEEDED
Status: DISCONTINUED | OUTPATIENT
Start: 2020-01-01 | End: 2020-01-01 | Stop reason: HOSPADM

## 2020-01-01 RX ORDER — ONDANSETRON 2 MG/ML
4 INJECTION INTRAMUSCULAR; INTRAVENOUS AS NEEDED
Status: DISCONTINUED | OUTPATIENT
Start: 2020-01-01 | End: 2020-01-01 | Stop reason: HOSPADM

## 2020-01-01 RX ORDER — ATORVASTATIN CALCIUM 40 MG/1
80 TABLET, FILM COATED ORAL DAILY
Status: DISCONTINUED | OUTPATIENT
Start: 2020-01-01 | End: 2020-01-01 | Stop reason: HOSPADM

## 2020-01-01 RX ORDER — EZETIMIBE 10 MG/1
10 TABLET ORAL
Status: DISCONTINUED | OUTPATIENT
Start: 2020-01-01 | End: 2020-01-01 | Stop reason: HOSPADM

## 2020-01-01 RX ORDER — PANTOPRAZOLE SODIUM 40 MG/10ML
INJECTION, POWDER, LYOPHILIZED, FOR SOLUTION INTRAVENOUS
Status: DISPENSED
Start: 2020-01-01 | End: 2020-01-01

## 2020-01-01 RX ORDER — HYDROMORPHONE HYDROCHLORIDE 1 MG/ML
0.2 INJECTION, SOLUTION INTRAMUSCULAR; INTRAVENOUS; SUBCUTANEOUS
Status: DISCONTINUED | OUTPATIENT
Start: 2020-01-01 | End: 2020-01-01 | Stop reason: HOSPADM

## 2020-01-01 RX ORDER — SERTRALINE HYDROCHLORIDE 25 MG/1
TABLET, FILM COATED ORAL
Qty: 30 TAB | Refills: 1 | Status: SHIPPED | OUTPATIENT
Start: 2020-01-01 | End: 2021-01-01

## 2020-01-01 RX ORDER — TAMSULOSIN HYDROCHLORIDE 0.4 MG/1
CAPSULE ORAL
Qty: 90 CAP | Refills: 0 | Status: SHIPPED | OUTPATIENT
Start: 2020-01-01 | End: 2020-01-01

## 2020-01-01 RX ORDER — DEXTROSE MONOHYDRATE 100 MG/ML
0-250 INJECTION, SOLUTION INTRAVENOUS AS NEEDED
Status: DISCONTINUED | OUTPATIENT
Start: 2020-01-01 | End: 2020-01-01

## 2020-01-01 RX ORDER — SODIUM CHLORIDE 9 MG/ML
25 INJECTION, SOLUTION INTRAVENOUS CONTINUOUS
Status: DISCONTINUED | OUTPATIENT
Start: 2020-01-01 | End: 2020-01-01 | Stop reason: HOSPADM

## 2020-01-01 RX ORDER — FENTANYL CITRATE 50 UG/ML
25 INJECTION, SOLUTION INTRAMUSCULAR; INTRAVENOUS
Status: DISCONTINUED | OUTPATIENT
Start: 2020-01-01 | End: 2020-01-01 | Stop reason: HOSPADM

## 2020-01-01 RX ORDER — DIPHENHYDRAMINE HYDROCHLORIDE 50 MG/ML
12.5 INJECTION, SOLUTION INTRAMUSCULAR; INTRAVENOUS AS NEEDED
Status: DISCONTINUED | OUTPATIENT
Start: 2020-01-01 | End: 2020-01-01 | Stop reason: HOSPADM

## 2020-01-01 RX ORDER — ONDANSETRON 2 MG/ML
INJECTION INTRAMUSCULAR; INTRAVENOUS AS NEEDED
Status: DISCONTINUED | OUTPATIENT
Start: 2020-01-01 | End: 2020-01-01 | Stop reason: HOSPADM

## 2020-01-01 RX ORDER — LABETALOL HYDROCHLORIDE 5 MG/ML
10 INJECTION, SOLUTION INTRAVENOUS
Status: DISCONTINUED | OUTPATIENT
Start: 2020-01-01 | End: 2020-01-01 | Stop reason: HOSPADM

## 2020-01-01 RX ORDER — OMEPRAZOLE 40 MG/1
CAPSULE, DELAYED RELEASE ORAL
Qty: 180 CAP | Refills: 0 | Status: SHIPPED | OUTPATIENT
Start: 2020-01-01 | End: 2021-01-01

## 2020-01-01 RX ORDER — FENTANYL CITRATE 50 UG/ML
INJECTION, SOLUTION INTRAMUSCULAR; INTRAVENOUS AS NEEDED
Status: DISCONTINUED | OUTPATIENT
Start: 2020-01-01 | End: 2020-01-01 | Stop reason: HOSPADM

## 2020-01-01 RX ORDER — MAGNESIUM SULFATE 100 %
4 CRYSTALS MISCELLANEOUS AS NEEDED
Status: DISCONTINUED | OUTPATIENT
Start: 2020-01-01 | End: 2020-01-01 | Stop reason: HOSPADM

## 2020-01-01 RX ORDER — MAGNESIUM SULFATE 100 %
4 CRYSTALS MISCELLANEOUS AS NEEDED
Status: DISCONTINUED | OUTPATIENT
Start: 2020-01-01 | End: 2020-01-01

## 2020-01-01 RX ORDER — LIDOCAINE HYDROCHLORIDE 10 MG/ML
0.1 INJECTION, SOLUTION EPIDURAL; INFILTRATION; INTRACAUDAL; PERINEURAL AS NEEDED
Status: DISCONTINUED | OUTPATIENT
Start: 2020-01-01 | End: 2020-01-01 | Stop reason: HOSPADM

## 2020-01-01 RX ORDER — SUCRALFATE 1 G/1
1 TABLET ORAL
Status: DISCONTINUED | OUTPATIENT
Start: 2020-01-01 | End: 2020-01-01 | Stop reason: HOSPADM

## 2020-01-01 RX ORDER — SODIUM CHLORIDE, SODIUM LACTATE, POTASSIUM CHLORIDE, CALCIUM CHLORIDE 600; 310; 30; 20 MG/100ML; MG/100ML; MG/100ML; MG/100ML
INJECTION, SOLUTION INTRAVENOUS
Status: DISCONTINUED | OUTPATIENT
Start: 2020-01-01 | End: 2020-01-01 | Stop reason: HOSPADM

## 2020-01-01 RX ORDER — SERTRALINE HYDROCHLORIDE 25 MG/1
25 TABLET, FILM COATED ORAL DAILY
Qty: 30 TAB | Refills: 2 | Status: SHIPPED | OUTPATIENT
Start: 2020-01-01 | End: 2020-01-01

## 2020-01-01 RX ORDER — BUPROPION HYDROCHLORIDE 150 MG/1
150 TABLET, EXTENDED RELEASE ORAL 2 TIMES DAILY
Status: DISCONTINUED | OUTPATIENT
Start: 2020-01-01 | End: 2020-01-01 | Stop reason: HOSPADM

## 2020-01-01 RX ORDER — SODIUM CHLORIDE, SODIUM LACTATE, POTASSIUM CHLORIDE, CALCIUM CHLORIDE 600; 310; 30; 20 MG/100ML; MG/100ML; MG/100ML; MG/100ML
25 INJECTION, SOLUTION INTRAVENOUS CONTINUOUS
Status: DISCONTINUED | OUTPATIENT
Start: 2020-01-01 | End: 2020-01-01 | Stop reason: HOSPADM

## 2020-01-01 RX ORDER — FINASTERIDE 5 MG/1
5 TABLET, FILM COATED ORAL DAILY
Status: DISCONTINUED | OUTPATIENT
Start: 2020-01-01 | End: 2020-01-01

## 2020-01-01 RX ADMIN — LIDOCAINE HYDROCHLORIDE 60 MG: 20 INJECTION, SOLUTION EPIDURAL; INFILTRATION; INTRACAUDAL; PERINEURAL at 10:41

## 2020-01-01 RX ADMIN — THERA TABS 1 TABLET: TAB at 09:26

## 2020-01-01 RX ADMIN — EZETIMIBE 10 MG: 10 TABLET ORAL at 06:17

## 2020-01-01 RX ADMIN — ARFORMOTEROL TARTRATE: 15 SOLUTION RESPIRATORY (INHALATION) at 19:57

## 2020-01-01 RX ADMIN — SODIUM CHLORIDE 40 MG: 9 INJECTION INTRAMUSCULAR; INTRAVENOUS; SUBCUTANEOUS at 09:27

## 2020-01-01 RX ADMIN — MONTELUKAST SODIUM 10 MG: 10 TABLET, FILM COATED ORAL at 17:54

## 2020-01-01 RX ADMIN — SODIUM CHLORIDE 40 MG: 9 INJECTION INTRAMUSCULAR; INTRAVENOUS; SUBCUTANEOUS at 21:19

## 2020-01-01 RX ADMIN — Medication 10 ML: at 20:43

## 2020-01-01 RX ADMIN — Medication 10 ML: at 01:37

## 2020-01-01 RX ADMIN — ARFORMOTEROL TARTRATE: 15 SOLUTION RESPIRATORY (INHALATION) at 08:10

## 2020-01-01 RX ADMIN — SODIUM CHLORIDE 40 MG: 9 INJECTION INTRAMUSCULAR; INTRAVENOUS; SUBCUTANEOUS at 09:32

## 2020-01-01 RX ADMIN — LABETALOL HYDROCHLORIDE 10 MG: 5 INJECTION INTRAVENOUS at 22:46

## 2020-01-01 RX ADMIN — ONDANSETRON HYDROCHLORIDE 4 MG: 2 INJECTION, SOLUTION INTRAMUSCULAR; INTRAVENOUS at 10:48

## 2020-01-01 RX ADMIN — Medication 10 ML: at 17:57

## 2020-01-01 RX ADMIN — DEXTROSE MONOHYDRATE, SODIUM CHLORIDE, AND POTASSIUM CHLORIDE: 50; 4.5; 2.24 INJECTION, SOLUTION INTRAVENOUS at 03:11

## 2020-01-01 RX ADMIN — Medication 10 ML: at 20:59

## 2020-01-01 RX ADMIN — ARFORMOTEROL TARTRATE: 15 SOLUTION RESPIRATORY (INHALATION) at 19:35

## 2020-01-01 RX ADMIN — SODIUM CHLORIDE, POTASSIUM CHLORIDE, SODIUM LACTATE AND CALCIUM CHLORIDE: 600; 310; 30; 20 INJECTION, SOLUTION INTRAVENOUS at 10:34

## 2020-01-01 RX ADMIN — FENTANYL CITRATE 25 MCG: 50 INJECTION, SOLUTION INTRAMUSCULAR; INTRAVENOUS at 10:41

## 2020-01-01 RX ADMIN — METOPROLOL TARTRATE 25 MG: 25 TABLET, FILM COATED ORAL at 09:26

## 2020-01-01 RX ADMIN — ARFORMOTEROL TARTRATE: 15 SOLUTION RESPIRATORY (INHALATION) at 07:29

## 2020-01-01 RX ADMIN — SODIUM CHLORIDE, SODIUM LACTATE, POTASSIUM CHLORIDE, AND CALCIUM CHLORIDE 100 ML/HR: 600; 310; 30; 20 INJECTION, SOLUTION INTRAVENOUS at 12:29

## 2020-01-01 RX ADMIN — ATORVASTATIN CALCIUM 80 MG: 40 TABLET, FILM COATED ORAL at 09:26

## 2020-01-01 RX ADMIN — IOHEXOL 90 ML: 300 INJECTION, SOLUTION INTRAVENOUS at 10:05

## 2020-01-01 RX ADMIN — HYDRALAZINE HYDROCHLORIDE 10 MG: 20 INJECTION INTRAMUSCULAR; INTRAVENOUS at 09:27

## 2020-01-01 RX ADMIN — SODIUM CHLORIDE 55 ML: 900 INJECTION, SOLUTION INTRAVENOUS at 20:43

## 2020-01-01 RX ADMIN — IPRATROPIUM BROMIDE AND ALBUTEROL SULFATE 3 ML: .5; 3 SOLUTION RESPIRATORY (INHALATION) at 14:16

## 2020-01-01 RX ADMIN — PANTOPRAZOLE SODIUM 40 MG: 40 INJECTION, POWDER, FOR SOLUTION INTRAVENOUS at 01:37

## 2020-01-01 RX ADMIN — SODIUM CHLORIDE 40 MG: 9 INJECTION INTRAMUSCULAR; INTRAVENOUS; SUBCUTANEOUS at 20:59

## 2020-01-01 RX ADMIN — Medication 10 ML: at 21:24

## 2020-01-01 RX ADMIN — OXYBUTYNIN CHLORIDE 5 MG: 5 TABLET ORAL at 09:25

## 2020-01-01 RX ADMIN — MIDAZOLAM 1 MG: 1 INJECTION INTRAMUSCULAR; INTRAVENOUS at 10:34

## 2020-01-01 RX ADMIN — DEXAMETHASONE SODIUM PHOSPHATE 8 MG: 4 INJECTION, SOLUTION INTRAMUSCULAR; INTRAVENOUS at 10:48

## 2020-01-01 RX ADMIN — BUPROPION HYDROCHLORIDE 150 MG: 150 TABLET, EXTENDED RELEASE ORAL at 09:26

## 2020-01-01 RX ADMIN — IPRATROPIUM BROMIDE AND ALBUTEROL SULFATE 3 ML: .5; 3 SOLUTION RESPIRATORY (INHALATION) at 13:20

## 2020-01-01 RX ADMIN — FAMOTIDINE 20 MG: 10 INJECTION, SOLUTION INTRAVENOUS at 07:05

## 2020-01-01 RX ADMIN — Medication 10 ML: at 21:06

## 2020-01-01 RX ADMIN — IOPAMIDOL 100 ML: 612 INJECTION, SOLUTION INTRAVENOUS at 20:43

## 2020-01-01 RX ADMIN — METOPROLOL TARTRATE 25 MG: 25 TABLET, FILM COATED ORAL at 20:59

## 2020-01-01 RX ADMIN — MIDAZOLAM 1 MG: 1 INJECTION INTRAMUSCULAR; INTRAVENOUS at 10:49

## 2020-01-01 RX ADMIN — INSULIN LISPRO 2 UNITS: 100 INJECTION, SOLUTION INTRAVENOUS; SUBCUTANEOUS at 17:54

## 2020-01-01 RX ADMIN — Medication 10 ML: at 06:41

## 2020-01-01 RX ADMIN — SODIUM CHLORIDE 40 MG: 9 INJECTION INTRAMUSCULAR; INTRAVENOUS; SUBCUTANEOUS at 12:25

## 2020-01-01 RX ADMIN — TAMSULOSIN HYDROCHLORIDE 0.4 MG: 0.4 CAPSULE ORAL at 17:54

## 2020-01-01 RX ADMIN — BUPROPION HYDROCHLORIDE 150 MG: 150 TABLET, EXTENDED RELEASE ORAL at 17:54

## 2020-01-01 RX ADMIN — PROPOFOL 200 MG: 10 INJECTION, EMULSION INTRAVENOUS at 10:41

## 2020-01-01 RX ADMIN — DEXTROSE MONOHYDRATE, SODIUM CHLORIDE, AND POTASSIUM CHLORIDE: 50; 4.5; 2.24 INJECTION, SOLUTION INTRAVENOUS at 11:06

## 2020-01-02 ENCOUNTER — HOSPITAL ENCOUNTER (OUTPATIENT)
Dept: CARDIAC REHAB | Age: 80
Discharge: HOME | End: 2020-01-02
Payer: MEDICARE

## 2020-01-02 ENCOUNTER — APPOINTMENT (OUTPATIENT)
Dept: CARDIAC REHAB | Age: 80
End: 2020-01-02
Payer: MEDICARE

## 2020-01-02 VITALS — WEIGHT: 188.4 LBS | BODY MASS INDEX: 27.03 KG/M2

## 2020-01-02 PROCEDURE — 94618 PULMONARY STRESS TESTING: CPT

## 2020-01-02 NOTE — CARDIO/PULMONARY
Denilson Choudhary  Completed phase II pulmonary rehab and attended 36 sessions. Denilson Choudhary is interested in maintaining optimal health and will work with Judith Bueno MD. Denilson Choudhary has improved his endurance and stamina through regular exercise, lost 9.7 lbs and 2.5 inches from his waist. Blood pressure is 143/74 and  is not WNL. He has also improved his nutrition, CAT and depression scores and these were reviewed with patient. Denilson Choudhary plans to continue exercising at home and has set revised goals that include     1.  To develop confidence about when to use or not to use oxygen  2.to maintain a healthy exercise and activity routine        COPD Assessment Tool (CAT)     0-5           I never cough/I cough all the time       Score: 3      I have no phlegm in my chest/ My chest is completely full of phlegm  Score: 2      My chest does not feel tight at all/ My chest feels very tight   Score: 2      When I walk up a hill or stairs I am bot breathless/   When I walk up a hill or stairs I am very breathless    Score: 5      I am not limited doing any activities at home/   I am very limited doing activities at home     Score: 4      I am confident leaving my home despite my lung condition/  I am not at all confident leaving my home because of my lung condition Score: 2      I sleep soundly/ I don't sleep because of my lung condition   Score: 1      I have lots of energy/ I have no energy at all     Score: 3                   Total: [address], [place]  1/2/2020

## 2020-01-29 RX ORDER — TAMSULOSIN HYDROCHLORIDE 0.4 MG/1
CAPSULE ORAL
Qty: 90 CAP | Refills: 0 | Status: SHIPPED | OUTPATIENT
Start: 2020-01-29 | End: 2020-01-01

## 2020-02-02 RX ORDER — OMEPRAZOLE 40 MG/1
CAPSULE, DELAYED RELEASE ORAL
Qty: 180 CAP | Refills: 0 | Status: SHIPPED | OUTPATIENT
Start: 2020-02-02 | End: 2020-01-01

## 2020-02-14 LAB
BUN SERPL-MCNC: 14 MG/DL (ref 8–27)
BUN/CREAT SERPL: 12 (ref 10–24)
CALCIUM SERPL-MCNC: 9.7 MG/DL (ref 8.6–10.2)
CHLORIDE SERPL-SCNC: 100 MMOL/L (ref 96–106)
CO2 SERPL-SCNC: 26 MMOL/L (ref 20–29)
CREAT SERPL-MCNC: 1.14 MG/DL (ref 0.76–1.27)
EST. AVERAGE GLUCOSE BLD GHB EST-MCNC: 123 MG/DL
GLUCOSE SERPL-MCNC: 109 MG/DL (ref 65–99)
HBA1C MFR BLD: 5.9 % (ref 4.8–5.6)
POTASSIUM SERPL-SCNC: 5.2 MMOL/L (ref 3.5–5.2)
SODIUM SERPL-SCNC: 140 MMOL/L (ref 134–144)

## 2020-02-26 NOTE — PROGRESS NOTES
HISTORY OF PRESENT ILLNESS Mallika Helm is a 70 y.o. male. HPI Last here 10/28/19 .  Pt is here to f/u on chronic conditions. Pt presents with his wife who provides some of the hx. 
   
BP is controlled /72 at Dr Colette Matute No longer on avapro 75 mg  
States he coordinated this with dr Mery Cedeño Currently just on metoprolol 25 mg BID Recall norvasc caused edema  
   
BS at home around 100-105, does not check often Encouraged more often home glu checks His DM is diet-controlled Lov, ordered metformin as a1c had climbed to 6 .6 However pt never took this  
[place] today is 189 lbs-- down 8 lbs x lov Discussed wt is higher side of nl, discussed OK as long as he does not gain more wt Discussed low carb diet and w/l  
   
Reviewed labs /20 Will get labs today  
  
  
Pt follows with Dr. 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  
   
Pt follows with Dr. Lisha Flores (pulm) routinely q3 months for ILD Last visit was 1/22/19, no changed per pt No longer on prednisone and omniceft Has rx for , prednisone and omnicef if he gets sick prn pulm dr 
Completed  pul rehab 
  
Continues on symbicort bid and albuterol prn (not daily) Pt is regularly supplemental 3L O2 baseline, up to 4-6 L during exercise class Recall esprit caused GI upset  
   
Pt follows with Dr. Mae Isaac ([facility]) annually in August 
Last visit was 9/30/19  had PSA checked then Continues on proscar,  and flomax per uro Now back on vesicare rather than oxybutin Will f/u in 1 year  
  
  
Pt follows with Dr. Colette Matute (cardio) for h/o remote afib and cad Last visit was 2/19/20  
bp meds reduced still on metoprolol bid No longer on avapro Had neg stress test in 2/19  
 
  
Pt follows with Dr. Santacruz (allergy) Continues singulair for allergies - controlled  
Will f/u prn  
   
Pt follows with Dr. Laquita Rinaldi (vasc surg) for AAA Last visit was   
  Follows for AAA annually  
  
Pt follows with Dr. Arsalan Okeefe (GI) for gerd Last visit was 18 Continues on prilosec 40mg BID for reflux, which works well for the most part, happy w/ dose  Pt still takes sucralfate prn --not much 
  
Pt saw Dr Daniele aKy (sleep) Last visit was with NP --had f/u later in  Pt is compliant with CPAP nightly This is helping his sleep, tolerating well, also keeps O2 at 2L at night  
  
Pt saw Dr Shiela Meigs (neuro) for a tremor Last visit was 10/18/18 Not bothering him too much  
  
Pt is on wellbutrin 300 mg-- increased lov from 150 mg He states this is working well for irritability and mood Recall celexa caused irritability in the past. Pt tolerated wellbutrin in the past. 
   
Continues claritin for allergies, which works well 
   
Continues on lipitor 80mg, max dose, daily for cholesterol He also takes zetia daily Pt's wife states he dozes off often while watching tv Wonders if this is related to BS or bp Discussed this is fine Discussed more likely related to lungs  
  
ACP on file. SDM is his wife.  
  
PREVENTIVE:   
Colonoscopy: 17,  repeat in 5 years, Dr. Arsalan Okeefe EGD: 17, Dr. Arsalan Okeefe PSA:  per Dr. Garald Spatz,  per dr Betsy Alas AAA screen: CT  - 3.2mm, [place] follows, repeat in 10/13 and 2/15,  Tdap: will check cost at pharmacy, advised to get at his pharmacy Pneumovax: 2012, pt will get at pharmacy in 1 week while pt is on low prednisone [id number]: 10/13/2015 Zostavax: 2008 Shingrix: both rounds completed Flu shot: 19 Microalbumin:  Foot exam: 19  
A1c:    6.2,  6.0,  6.3,  6.2,  6.2, 3/19 6.0,  POC 6.1   6.6  5.9 Eye exam: Dr. Ling Mendoza at [facility], pt may getting cataract surg sometime in future Ashwin Colvin EK/10/17  
Hepatitis C screen: , negative Patient Active Problem List  
 Diagnosis Date Noted  IFG (impaired fasting glucose) 10/28/2019  SEAN (obstructive sleep apnea) 03/15/2019  Pneumonia 02/28/2016  Interstitial pulmonary fibrosis ([facility] 10/09/2013  
 HTN (hypertension) 10/09/2013  A-fib ([facility] 10/09/2013  AAA (abdominal aortic aneurysm) ([facility] 10/09/2013  Benign prostatic hyperplasia  GERD (gastroesophageal reflux disease)  CAD (coronary artery disease)  S/P CABG (coronary artery bypass graft) 06/20/2011  Testosterone deficiency 03/24/2011  Hypercholesterolemia 09/29/2010  Depression 03/01/2010  Arthritis shoulders and spine 03/01/2010 Current Outpatient Medications Medication Sig Dispense Refill  omeprazole (PRILOSEC) 40 mg capsule TAKE ONE CAPSULE BY MOUTH TWICE A  Cap 0  
 tamsulosin (FLOMAX) 0.4 mg capsule TAKE 1 CAPSULE EVERY NIGHT 90 Cap 0  
 metFORMIN ER (GLUCOPHAGE XR) 500 mg tablet Take 1 Tab by mouth daily (with dinner). 30 Tab 1  varicella-zoster recombinant, PF, (SHINGRIX, PF,) 50 mcg/0.5 mL susr injection 0.5mL by IntraMUSCular route once now and then repeat in 2-6 months 0.5 mL 1  
 buPROPion XL (WELLBUTRIN XL) 300 mg XL tablet Take 1 Tab by mouth every morning. 90 Tab 1  predniSONE (DELTASONE) 20 mg tablet Take 20 mg by mouth daily (with breakfast). 30 Tab 0  
 cefdinir (OMNICEF) 300 mg capsule  oxybutynin (DITROPAN) 5 mg tablet Take 5 mg by mouth daily.  ketoconazole (NIZORAL) 2 % topical cream APPLY TO AFFECTED AREA(S) DAILY. USE ON SKIN LESIONS EVERY COUPLE OF DAYS AS DIRECTED. 1 Tube 0  
 benzonatate (TESSALON) 200 mg capsule Take 200 mg by mouth as needed for Cough.  irbesartan (AVAPRO) 150 mg tablet Take 75 mg by mouth nightly.  sucralfate (CARAFATE) 1 gram tablet Take 1 g by mouth four (4) times daily.  budesonide-formoterol (SYMBICORT) 160-4.5 mcg/actuation HFAA Take 2 Puffs by inhalation two (2) times a day.     
 BUDESONIDE/FORMOTEROL FUMARATE (SYMBICORT IN) Take 3 Puffs by inhalation two (2) times a day.  Azelastine (ASTEPRO) 0.15 % (205.5 mcg) nasal spray  ketoconazole (NIZORAL) 2 % shampoo Apply  to affected area two (2) times a week.  0  
 albuterol (ACCUNEB) 1.25 mg/3 mL nebu 3 mL by Nebulization route every six (6) hours as needed. 1 Each 1  
 finasteride (PROSCAR) 5 mg tablet TAKE 1 TABLET EVERY DAY 90 tablet 2  
 atorvastatin (LIPITOR) 80 mg tablet Take 80 mg by mouth daily.  montelukast (SINGULAIR) 10 mg tablet Take 1 Tab by mouth daily (after dinner). 90 Tab 3  
 NEBULIZER by Does Not Apply route.  nitroglycerin (NITROQUICK) 0.4 mg SL tablet 1 Tab by SubLINGual route as needed. 25 Tab prn  albuterol (PROVENTIL HFA, VENTOLIN HFA) 90 mcg/Actuation inhaler Take 2 Puffs by inhalation every four (4) hours as needed for Wheezing. 1 Inhaler 2  
 multivitamin (ONE-A-DAY MENS) tablet Take 1 Tab by mouth daily.  metoprolol (LOPRESSOR) 25 mg tablet Take 25 mg by mouth two (2) times a day. PT INSTRUCTED TO TAKE AM DOS PER ANESTHESIA PROTOCOL  ezetimibe (ZETIA) 10 mg tablet Take 10 mg by mouth every morning. Past Surgical History:  
Procedure Laterality Date  CABG, ARTERIAL, THREE  10/2003  CARDIAC SURG PROCEDURE UNLIST  10/2003 CABGx3 vessel  COLONOSCOPY N/A 7/14/2017 COLONOSCOPY performed by Monisha Berger MD at [facility] ENDOSCOPY  ENDOSCOPY, COLON, DIAGNOSTIC Dr. Aba Patricio  HX APPENDECTOMY  HX CHOLECYSTECTOMY  11/22/2008  
 laparoscopic  HX COLECTOMY  12/29/07  
 sigmoid colectomy for volvulus in [place]  HX COLONOSCOPY  06/17/2014 Repeat in 3 years  HX HEENT  while in 20's 1948  
 submucus resection sinus//resection sub mucus 1962 [address]  
 right  HX ORTHOPAEDIC  June 20, 2011  
 right hand, thumb surgery, plate inserted  HX ORTHOPAEDIC  1946  
 broken nose  HX ORTHOPAEDIC  1962  
 sub mucous resection  HX ORTHOPAEDIC  02/05/15  
 right hand surgery, ligament repair  HX OTHER SURGICAL    
 HX OTHER SURGICAL    
 hemorrhoids  HX OTHER SURGICAL  8/2015  
 basal cell carcinoma  HX SEPTOPLASTY  1962  
 s/p broken nose 1946  
 HX TONSIL AND ADENOIDECTOMY  1949  
 HX TONSILLECTOMY  1949  
 HX UROLOGICAL  1990  
 vasectomy Lab Results Component Value Date/Time WBC 9.2 08/28/2019 09:48 AM  
 HGB 11.7 (L) 08/28/2019 09:48 AM  
 HCT 35.4 (L) 08/28/2019 09:48 AM  
 PLATELET 901 47/05/1901 09:48 AM  
 MCV 94 08/28/2019 09:48 AM  
 
Lab Results Component Value Date/Time Cholesterol, total 129 07/30/2019 11:46 AM  
 HDL Cholesterol 45 07/30/2019 11:46 AM  
 LDL, calculated 52 07/30/2019 11:46 AM  
 Triglyceride 162 (H) 07/30/2019 11:46 AM  
 CHOL/HDL Ratio 3.4 09/29/2010 08:28 AM  
 
Lab Results Component Value Date/Time GFR est non-AA 62 02/13/2020 09:12 AM  
 GFR est AA 71 02/13/2020 09:12 AM  
 Creatinine 1.14 02/13/2020 09:12 AM  
 BUN 14 02/13/2020 09:12 AM  
 Sodium 140 02/13/2020 09:12 AM  
 Potassium 5.2 02/13/2020 09:12 AM  
 Chloride 100 02/13/2020 09:12 AM  
 CO2 26 02/13/2020 09:12 AM  
 Magnesium 2.0 03/01/2016 04:23 AM  
  
 
Review of Systems Respiratory: Negative for shortness of breath. Cardiovascular: Negative for chest pain. Physical Exam 
Constitutional:   
   General: He is not in acute distress. Appearance: He is well-developed. He is not diaphoretic. HENT:  
   Head: Normocephalic and atraumatic. Eyes:  
   General:     
   Right eye: No discharge. Left eye: No discharge. Conjunctiva/sclera: Conjunctivae normal.  
Neck: Musculoskeletal: Normal range of motion and neck supple. Cardiovascular:  
   Rate and Rhythm: Normal rate and regular rhythm. Heart sounds: Normal heart sounds. No murmur. No friction rub. No gallop. Pulmonary:  
   Effort: Pulmonary effort is normal. No respiratory distress. Breath sounds: Normal breath sounds. No wheezing or rales. Comments: Chronic stable crackles Chest:  
   Chest wall: No tenderness. Abdominal:  
   Palpations: Abdomen is soft. Musculoskeletal: Normal range of motion. General: No tenderness or deformity. Right lower leg: No edema. Left lower leg: No edema. Lymphadenopathy:  
   Cervical: No cervical adenopathy. Skin: 
   General: Skin is warm and dry. Coloration: Skin is not pale. Findings: No erythema or rash. Neurological:  
   Mental Status: He is alert and oriented to person, place, and time. Coordination: Coordination abnormal.  
   Deep Tendon Reflexes: Reflexes are normal and symmetric. Psychiatric:     
   Mood and Affect: Mood normal.     
   Behavior: Behavior normal.  
 
 
 
ASSESSMENT and PLAN 
  ICD-10-CM ICD-9-CM 1. Interstitial pulmonary fibrosis ([facility] Has advanced lung disease follows with dr amy Cedeño last seen in [address] not currently on prednisone is on oxygen takes symbicort and albuterol prn  J84.10 515 LIPID PANEL  
   METABOLIC PANEL, COMPREHENSIVE  
   HEMOGLOBIN A1C WITH EAG  
   TSH 3RD GENERATION 2. Atrial fibrillation, unspecified type ([facility] Hx of follows with dr dr Alin le from feb  I48.91 427.31 LIPID PANEL  
   METABOLIC PANEL, COMPREHENSIVE  
   HEMOGLOBIN A1C WITH EAG  
   TSH 3RD GENERATION 3. Abdominal aortic aneurysm (AAA) without rupture ([facility] Follows with norbert annually In July  I71.4 441.4 LIPID PANEL  
   METABOLIC PANEL, COMPREHENSIVE  
   HEMOGLOBIN A1C WITH EAG  
   TSH 3RD GENERATION 4. Controlled type 2 diabetes mellitus without complication, without long-term current use of insulin ([facility] Currently diet controlled wt improved from lov a1c down he decided not to start metformin continue with diet  E11.9 250.00 LIPID PANEL  
   METABOLIC PANEL, COMPREHENSIVE  
   HEMOGLOBIN A1C WITH EAG  
   TSH 3RD GENERATION  
 AMB POC URINE, MICROALBUMIN, SEMIQUANT (3 RESULTS) CANCELED: MICROALBUMIN, UR, RAND W/ MICROALB/CREAT RATIO 5. Hypercholesterolemia Controlled with lipitor and zetia repeat lipids prior to f/u  E78.00 272.0 LIPID PANEL  
   METABOLIC PANEL, COMPREHENSIVE  
   HEMOGLOBIN A1C WITH EAG  
   TSH 3RD GENERATION 6. Essential hypertension Controlled on toprol he is no longer on avapro was having low bp  I10 401.9 LIPID PANEL  
   METABOLIC PANEL, COMPREHENSIVE  
   HEMOGLOBIN A1C WITH EAG  
   TSH 3RD GENERATION 7. Benign prostatic hyperplasia without lower urinary tract symptoms Follows with uro continues on proscar oxybutin and flomax will see dr Ashley Sanchez again in sept  N40.0 600.00 LIPID PANEL  
   METABOLIC PANEL, COMPREHENSIVE  
   HEMOGLOBIN A1C WITH EAG  
   TSH 3RD GENERATION 8. Coronary artery disease involving native coronary artery of native heart without angina pectoris Medically managed utd sees dr Cortes Wolff  I25.10 414.01 LIPID PANEL  
   METABOLIC PANEL, COMPREHENSIVE  
   HEMOGLOBIN A1C WITH EAG  
   TSH 3RD GENERATION  
9. SEAN (obstructive sleep apnea) Compliant with cpap has o2 at night as well  G47.33 327.23 LIPID PANEL  
   METABOLIC PANEL, COMPREHENSIVE  
   HEMOGLOBIN A1C WITH EAG  
   TSH 3RD GENERATION 10. Gastroesophageal reflux disease without esophagitis Uses prilosec bid has sucrafate to take prn but does not use this routinely  K21.9 530.81 LIPID PANEL  
   METABOLIC PANEL, COMPREHENSIVE  
   HEMOGLOBIN A1C WITH EAG  
   TSH 3RD GENERATION 11. Reactive depression Doing quite well with wellbutrin 300 mg continue  F32.9 300.4 LIPID PANEL  
   METABOLIC PANEL, COMPREHENSIVE  
   HEMOGLOBIN A1C WITH EAG  
   TSH 3RD GENERATION Scribed by Lokesh Bell of [address] [place] [address] [address], as dictated by Dr. Viviana Ortiz. Current diagnosis and concerns discussed with pt at length. Pt understands risks and benefits or current treatment plan and medications, and accepts the treatment and medication with any possible risks. Pt asks appropriate questions, which were answered. Pt was instructed to call with any concerns or problems. I have reviewed the note documented by the scribe. The services provided are my own. The documentation is accurate

## 2020-02-28 ENCOUNTER — OFFICE VISIT (OUTPATIENT)
Dept: INTERNAL MEDICINE CLINIC | Age: 80
End: 2020-02-28

## 2020-02-28 VITALS
DIASTOLIC BLOOD PRESSURE: 61 MMHG | SYSTOLIC BLOOD PRESSURE: 106 MMHG | RESPIRATION RATE: 16 BRPM | HEIGHT: 70 IN | OXYGEN SATURATION: 90 % | TEMPERATURE: 97.7 F | HEART RATE: 77 BPM | BODY MASS INDEX: 27.11 KG/M2 | WEIGHT: 189.4 LBS

## 2020-02-28 DIAGNOSIS — I71.40 ABDOMINAL AORTIC ANEURYSM (AAA) WITHOUT RUPTURE: ICD-10-CM

## 2020-02-28 DIAGNOSIS — I25.10 CORONARY ARTERY DISEASE INVOLVING NATIVE CORONARY ARTERY OF NATIVE HEART WITHOUT ANGINA PECTORIS: ICD-10-CM

## 2020-02-28 DIAGNOSIS — K21.9 GASTROESOPHAGEAL REFLUX DISEASE WITHOUT ESOPHAGITIS: ICD-10-CM

## 2020-02-28 DIAGNOSIS — E78.00 HYPERCHOLESTEROLEMIA: ICD-10-CM

## 2020-02-28 DIAGNOSIS — I10 ESSENTIAL HYPERTENSION: ICD-10-CM

## 2020-02-28 DIAGNOSIS — J84.10 INTERSTITIAL PULMONARY FIBROSIS (HCC): Primary | ICD-10-CM

## 2020-02-28 DIAGNOSIS — F32.9 REACTIVE DEPRESSION: ICD-10-CM

## 2020-02-28 DIAGNOSIS — I48.91 ATRIAL FIBRILLATION, UNSPECIFIED TYPE (HCC): ICD-10-CM

## 2020-02-28 DIAGNOSIS — E11.9 CONTROLLED TYPE 2 DIABETES MELLITUS WITHOUT COMPLICATION, WITHOUT LONG-TERM CURRENT USE OF INSULIN (HCC): ICD-10-CM

## 2020-02-28 DIAGNOSIS — G47.33 OSA (OBSTRUCTIVE SLEEP APNEA): ICD-10-CM

## 2020-02-28 DIAGNOSIS — N40.0 BENIGN PROSTATIC HYPERPLASIA WITHOUT LOWER URINARY TRACT SYMPTOMS: ICD-10-CM

## 2020-02-28 PROBLEM — R73.01 IFG (IMPAIRED FASTING GLUCOSE): Status: RESOLVED | Noted: 2019-10-28 | Resolved: 2020-02-28

## 2020-02-28 LAB
ALBUMIN UR QL STRIP: 80 MG/L
CREATININE, URINE POC: 200 MG/DL
MICROALBUMIN/CREAT RATIO POC: NORMAL MG/G

## 2020-03-08 RX ORDER — BUPROPION HYDROCHLORIDE 300 MG/1
TABLET ORAL
Qty: 90 TAB | Refills: 1 | Status: SHIPPED | OUTPATIENT
Start: 2020-03-08 | End: 2020-01-01

## 2020-05-29 NOTE — TELEPHONE ENCOUNTER
Called, spoke to pt. Two pt identifiers confirmed. Pt offered/accepted Virtual appt for 5/29/20 at 1215. Informed pt that it will be billed under insurance and pt may expect a co-pay. Informed that pt must have access to a smartphone and/or a computer w/ a camera and a microphone. Informed pt that a PSR may contact pt roughly 15 minutes prior to [address] 1237 for check-in. Pt verbalized understanding of information discussed w/ no further questions at this time.

## 2020-05-29 NOTE — PROGRESS NOTES
HISTORY OF PRESENT ILLNESS Leonid Mazariegos is a 68 y.o. male. HPI This is an established visit completed with telemedicine was completed with video assist 
the patient acknowledges and agrees to this method of visitation Last here 2/20  Pt is here to f/u on chronic conditions. the patient has been on abx 2 times for leg cyst per urology This is improving much smaller but not resolved--may need I & D 
 
 
the patient has a lump on left breast 2 inch diameter, this is sore w ttp but otw does not ache It is soft No drainage Not enlarging Present for 1 month  
 
   
BP is controlled in generally At pulmonary bp was 127/83 No longer on avapro 75 mg  
States he coordinated this with dr Elizabeth Caceres Currently just on metoprolol 25 mg BID Recall norvasc caused edema  
   
BS at home around 108 no recent checks advised monitoring b/c he is on prednisone Encouraged more often home glu checks His DM is diet-controlled  
 
 
   
Wt was 189 lbs-- similar now Discussed wt is higher side of nl, discussed OK as long as he does not gain more wt Discussed low carb diet and w/l  
   
Reviewed labs /20 Will get labs today  
  
  
Pt follows with Dr. Jackie العراقي (derm) Pt had a basal cell carcinoma removed from his R arm Last visit was 10/22/19 and he had a squamous cell carcinoma removed from his arm  
   
Pt follows with Dr. Wendy Gutierrez (pulm) routinely q3 months for ILD Last visit was 5/20 last week  No abx He is on  Prednisone 20mg--now tapering   Again b/c of bronchitis Recall Has rx for , prednisone and omnicef if he gets sick prn pulm dr Pierre  pulm rehab 
  
Continues on symbicort bid and albuterol prn (not daily) Pt is regularly supplemental 3L O2 baseline, up to 4-6 L during exercise class Recall esprit caused GI upset  
   
Pt follows with Dr. Kira Brown ([facility]) annually in August 
Last there 5/20--last week had PSA checked in April 2020 No longer Continues on proscar, he is on flomax per uro Now back on vesicare rather than oxybutin He stopped proscar which could cause this enlargement  
  
  
Pt follows with Dr. Deb Vera (cardio) for h/o remote afib and cad Last visit was 2/19/20  
bp meds reduced still on metoprolol bid No longer on avapro Had neg stress test in 2/19  
  
  
Pt follows with Dr. Santacruz (allergy) Continues singulair for allergies - controlled  
Will f/u prn  
   
Pt follows with Dr. Royal Merida (vasc surg) for AAA Last visit was 7/19  
  Follows for AAA annually  
  
Pt follows with Dr. Oliverio Jay (GI) for gerd Last visit was 7/2/18 Continues on prilosec 40mg BID for reflux, which works well for the most part, happy w/ dose 5/20 Pt still takes sucralfate prn --not much 
  
Pt saw Dr Soni (sleep) Last visit was with NP 7/18--had f/u later in 2/19 Pt is compliant with CPAP nightly This is helping his sleep, tolerating well, also keeps O2 at 2L at night  
  
Pt saw Dr Meg Hernandez (neuro) for a tremor Last visit was 10/18/18 Not bothering him too much  
  
Pt is on wellbutrin 300 mg-- not sad or down He states this is working well for irritability and mood Recall celexa caused irritability in the past. Pt tolerated wellbutrin in the past. 
   
Continues claritin for allergies, which works well 
   
Continues on lipitor 80mg, max dose, daily for cholesterol He also takes zetia daily 
  
 
  
ACP on file. SDM is his wife.  
  
PREVENTIVE:   
Colonoscopy: 7/14/17,  repeat in 5 years, Dr. Oliverio Jay EGD: 7/14/17, Dr. Oliverio Jay PSA: 8/18 per Dr. Edwin Haro, 8/19 per dr Aguilar Lee 
AAA screen: CT 7/12 - 3.2mm, Royal Merida follows, repeat in 10/13 and 2/15, 7/18 Tdap: will check cost at pharmacy, advised to get at his pharmacy Pneumovax: 9/07/2012, pt will get at pharmacy in 1 week while pt is on low prednisone  
[id number]: 10/13/2015 Zostavax: 6/01/2008 Shingrix: both rounds completed Flu shot: 09/27/19 Microalbumin: 2/20 Foot exam: 19  
A1c:    6.2,  6.0,  6.3,  6.2,  6.2, 3/19 6.0,  POC 6.1   6.6  5.9 Eye exam: Dr. Rojelio Danielle at [address], pt may getting cataract surg sometime in [address] [place] [address] EK/10/17  
Hepatitis C screen: , negative Patient Active Problem List  
Diagnosis Code  Depression F32.9  Arthritis shoulders and spine M19.90  
 Hypercholesterolemia E78.00  Testosterone deficiency E34.9  S/P CABG (coronary artery bypass graft) Z95.1  GERD (gastroesophageal reflux disease) K21.9  CAD (coronary artery disease) I25.10  Benign prostatic hyperplasia N40.0  Interstitial pulmonary fibrosis ([facility]) J84.10  
 HTN (hypertension) I10  
 A-fib ([facility]) I48.91  
 AAA (abdominal aortic aneurysm) ([facility]) I71.4  
 SEAN (obstructive sleep apnea) G47.33  
 Controlled type 2 diabetes mellitus without complication, without long-term current use of insulin ([facility]) E11.9 Current Outpatient Medications Medication Sig Dispense Refill  omeprazole (PRILOSEC) 40 mg capsule TAKE ONE CAPSULE BY MOUTH TWICE A  Cap 0  
 buPROPion XL (WELLBUTRIN XL) 300 mg XL tablet TAKE 1 TABLET EVERY MORNING 90 Tab 1  
 tamsulosin (FLOMAX) 0.4 mg capsule TAKE 1 CAPSULE EVERY NIGHT 90 Cap 0  
 metFORMIN ER (GLUCOPHAGE XR) 500 mg tablet Take 1 Tab by mouth daily (with dinner). 30 Tab 1  cefdinir (OMNICEF) 300 mg capsule  oxybutynin (DITROPAN) 5 mg tablet Take 5 mg by mouth daily.  ketoconazole (NIZORAL) 2 % topical cream APPLY TO AFFECTED AREA(S) DAILY. USE ON SKIN LESIONS EVERY COUPLE OF DAYS AS DIRECTED. 1 Tube 0  
 benzonatate (TESSALON) 200 mg capsule Take 200 mg by mouth as needed for Cough.  sucralfate (CARAFATE) 1 gram tablet Take 1 g by mouth four (4) times daily.  budesonide-formoterol (SYMBICORT) 160-4.5 mcg/actuation HFAA Take 2 Puffs by inhalation two (2) times a day.  BUDESONIDE/FORMOTEROL FUMARATE (SYMBICORT IN) Take 3 Puffs by inhalation two (2) times a day.  Azelastine (ASTEPRO) 0.15 % (205.5 mcg) nasal spray  ketoconazole (NIZORAL) 2 % shampoo Apply  to affected area two (2) times a week.  0  
 albuterol (ACCUNEB) 1.25 mg/3 mL nebu 3 mL by Nebulization route every six (6) hours as needed. 1 Each 1  
 finasteride (PROSCAR) 5 mg tablet TAKE 1 TABLET EVERY DAY 90 tablet 2  
 atorvastatin (LIPITOR) 80 mg tablet Take 80 mg by mouth daily.  montelukast (SINGULAIR) 10 mg tablet Take 1 Tab by mouth daily (after dinner). 90 Tab 3  
 NEBULIZER by Does Not Apply route.  nitroglycerin (NITROQUICK) 0.4 mg SL tablet 1 Tab by SubLINGual route as needed. 25 Tab prn  albuterol (PROVENTIL HFA, VENTOLIN HFA) 90 mcg/Actuation inhaler Take 2 Puffs by inhalation every four (4) hours as needed for Wheezing. 1 Inhaler 2  
 multivitamin (ONE-A-DAY MENS) tablet Take 1 Tab by mouth daily.  metoprolol (LOPRESSOR) 25 mg tablet Take 25 mg by mouth two (2) times a day. PT INSTRUCTED TO TAKE AM DOS PER ANESTHESIA PROTOCOL  ezetimibe (ZETIA) 10 mg tablet Take 10 mg by mouth every morning. Lab Results Component Value Date/Time WBC 9.2 08/28/2019 09:48 AM  
 HGB 11.7 (L) 08/28/2019 09:48 AM  
 HCT 35.4 (L) 08/28/2019 09:48 AM  
 PLATELET 883 94/73/2404 09:48 AM  
 MCV 94 08/28/2019 09:48 AM  
 
Lab Results Component Value Date/Time Cholesterol, total 129 07/30/2019 11:46 AM  
 HDL Cholesterol 45 07/30/2019 11:46 AM  
 LDL, calculated 52 07/30/2019 11:46 AM  
 Triglyceride 162 (H) 07/30/2019 11:46 AM  
 CHOL/HDL Ratio 3.4 09/29/2010 08:28 AM  
 
Lab Results Component Value Date/Time  GFR est non-AA 62 02/13/2020 09:12 AM  
 GFR est AA 71 02/13/2020 09:12 AM  
 Creatinine 1.14 02/13/2020 09:12 AM  
 BUN 14 02/13/2020 09:12 AM  
 Sodium 140 02/13/2020 09:12 AM  
 Potassium 5.2 02/13/2020 09:12 AM  
 Chloride 100 02/13/2020 09:12 AM  
 CO2 26 02/13/2020 09:12 AM  
 Magnesium 2.0 03/01/2016 04:23 AM  
  
 
Review of Systems Constitutional: Negative for chills and fever. Respiratory: Positive for shortness of breath (chronic stable ). Cardiovascular: Negative for chest pain. Physical Exam 
Constitutional:   
   General: He is not in acute distress. Appearance: Normal appearance. He is not ill-appearing, toxic-appearing or diaphoretic. Comments: Oxygen on HENT:  
   Head: Normocephalic and atraumatic. Eyes:  
   General:     
   Right eye: No discharge. Left eye: No discharge. Conjunctiva/sclera: Conjunctivae normal.  
Pulmonary:  
   Effort: Pulmonary effort is normal. No respiratory distress. Neurological:  
   General: No focal deficit present. Mental Status: He is alert and oriented to person, place, and time. Psychiatric:     
   Mood and Affect: Mood normal.     
   Behavior: Behavior normal.  
 
 
 
ASSESSMENT and PLAN 
  ICD-10-CM ICD-9-CM 1. Breast mass, left Patient with new left breast mass may be related to medication which was recently stopped by urology will get mammogram and ultrasound for further evaluation and treat further as needed once results are back N63.20 611.72 [facility] MAMMOGRAM DIAG LEFT SAME DAY INCL CAD  
   [address]  
2. Controlled type 2 diabetes mellitus without complication, without long-term current use of insulin ([facility] [address] Patient needs to monitor his blood glucose more frequently he is not currently on metformin as his blood sugars have been controlled with diet alone He is on prednisone currently encourage more frequent glucose monitoring will check A1c prior to follow-up E11.9 250.00   
3. Essential hypertension Controlled on metoprolol I10 401.9 4. SEAN (obstructive sleep apnea) Compliant  with CPAP G47.33 327.23   
5. Hypercholesterolemia Controlled on Lipitor and Zetia in the past will repeat lipids prior to schedule follow-up E78.00 272.0 6. Gastroesophageal reflux disease without esophagitis 
 
prilosec bid Sucralfate prn  
 
 K21.9 530.81   
7. Coronary artery disease involving native coronary artery of native heart without angina pectoris Medically managed up-to-date with Dr. Mode Cutler I25.10 414.01   
8. Reactive depression Controlled on Wellbutrin continue current dose doing well F32.9 300.4   
9 COPD patient with severe COPD he is currently on a prednisone taper he is chronically on 3 L of oxygen/interstitial fibrosis Kelly King is a 68 y.o. male being evaluated by a Virtual Visit (video visit) encounter to address concerns as mentioned above. A caregiver was present when appropriate. Due to this being a TeleHealth encounter (During QEXSS-51 public health emergency), evaluation of the following organ systems was limited: Vitals/Constitutional/EENT/Resp/CV/GI//MS/Neuro/Skin/Heme-Lymph-Imm. Pursuant to the emergency declaration under the [address] [place] [address], [address] [place] [address] authority and the [employer] Act, this Virtual Visit was conducted with patient's (and/or legal guardian's) consent, to reduce the risk of exposure to COVID-19 and provide necessary medical care. Services were provided through a video synchronous discussion virtually to substitute for in-person encounter. --Jocelyn Stearns MD on 5/29/2020 at 12:47 PM 
 
An electronic signature was used to authenticate this note.

## 2020-05-29 NOTE — TELEPHONE ENCOUNTER
Patient states he needs a call back in reference to getting an appt or to be advised what to do about a mass/lump in his Left Breast area that patient is concerned with. Please call to advise Plan of care or if virtual visit is required.  Thank you

## 2020-06-04 NOTE — TELEPHONE ENCOUNTER
Called, spoke to Darvin. Two pt identifiers confirmed.  dalton offered/accepted Virtual appt for 6/4/2020 at [phone]. Informed pt that it will be billed under insurance and pt may expect a co-pay. Informed that pt must have access to a smartphone and/or a computer w/ a camera and a microphone. Informed pt that a PSR may contact pt roughly 15 minutes prior to VV for check-in.  Darvin verbalized understanding of information discussed w/ no further questions at this time.

## 2020-06-04 NOTE — TELEPHONE ENCOUNTER
Patient's wife, Karen Ugarte, states she needs a call back to get a Virtual Visit today with Dr. Patrick Camejo for patient reporting Swollen Glands, Sore Throat & difficulty  Talking. Please call.  Thank you

## 2020-06-04 NOTE — PROGRESS NOTES
HISTORY OF PRESENT ILLNESS Renny Douglas is a 68 y.o. male. HPI This is an established visit completed with telemedicine was completed with video assist 
the patient acknowledges and agrees to this method of visitation 
deonte Woke up in middle of the night with burning in his esophagus Took a sucralfate, continued to have burning Took a second sucralfate and this did not help Now it hurts to take a deep breath-- In general d/t IPF he generally coughs when he takes a ddep breath Now deep breath hurts in stomach His glands feel swollen, also has a sore throat. Took mylanta and that helped No wheezing  
o2 level 98%HR 62 BOP has been fine but not checked today  
  
  
Last here 5/20  Pt is here to f/u on chronic conditions. 
  
  
  
the patient has been on abx 2 times for leg cyst per urology This is improving much smaller but not resolved--may need I & D He is currently on antibiotic for his leg cyst 
Demetrius Almonte is currently on keflex 500mg  
  
the patient has a lump on left breast 2 inch diameter, this is sore w ttp but otw does not ache Got mammogram--reviewed Gynecomastia 
 
 
   
BP is controlled in generally Did not hcek bp today but \"good\" other days No longer on avapro 75 mg  
States he coordinated this with dr Rupal Thompson Currently just on metoprolol 25 mg BID Recall norvasc caused edema  
   
Checking sugars now 106 last check  advised monitoring b/c he is on prednisone Encouraged more often home glu checks  
His DM is diet-controlled  
  
  
   
Wt was 189 lbs-- similar now Discussed wt is higher side of nl, discussed OK as long as he does not gain more wt Discussed low carb diet and w/l  
   
Reviewed labs  
  
  
Pt follows with Dr. Zaida High (derm) Pt had a basal cell carcinoma removed from his R arm Last visit was 10/22/19 and he had a squamous cell carcinoma removed from his arm  
   
Pt follows with Dr. Kita Fernandez (pulm) routinely q3 months for ILD 
 Last visit was 5/20  No abx He is on  Prednisone now down to 5mg daily --tapering still    Again b/c of bronchitis 
  
Recall Has rx for , prednisone and omnicef if he gets sick prn pulm dr 
Completed  pulm rehab 
  
Continues on symbicort bid and albuterol prn (not daily) Pt is regularly supplemental 3L O2 baseline, up to 4-6 L during exercise class Recall esprit caused GI upset  
   
Pt follows with Dr. Torsten Pena ([facility]) annually in August 
Last there 5/20-had PSA checked in April 2020 No longer Continues on proscar, he is on flomax per uro 
 on vesicare rather than oxybutin  
He stopped proscar which could cause this enlargement --gynecomastia 
  
  
Pt follows with Dr. Gabriela Caldera (cardio) for h/o remote afib and cad Last visit was 2/19/20  
bp meds reduced still on metoprolol bid No longer on avapro  
Had neg stress test in 2/19  
  
  
Pt follows with Dr. Santacruz (allergy) Continues singulair for allergies - controlled  
Will f/u prn  
   
Pt follows with Dr. Tate Reagan (vasc surg) for AAA Last visit was 7/19  
  Follows for AAA annually  
  
Pt follows with Dr. Arjun Sandoval (GI) for gerd Last visit was 7/2/18 Continues on prilosec 40mg BID for reflux, which works well for the most part, See above to current sx 
  
Pt saw Dr Bre Light (sleep) Last visit was with NP 7/18--had f/u later in 2/19 Pt is compliant with CPAP nightly This is helping his sleep, tolerating well, also keeps O2 at 2L at night  
  
Pt saw Dr Will Fonseca (neuro) for a tremor Last visit was 10/18/18 Not bothering him too much  
  
 
Pt is on wellbutrin 300 mg-- not sad or down SHERRELL killed April 30th in a truck accident He is doing okay but has nl grief He states this is working well for irritability and mood  
Recall celexa caused irritability in the past. Pt tolerated wellbutrin in the past. 
   
Continues claritin for allergies, which works well 
TRISH Chery on lipitor 80mg, max dose, daily for cholesterol He also takes zetia daily   
  
  
 ACP on file. SDM is his wife.  
  
PREVENTIVE:   
Colonoscopy: 17,  repeat in 5 years, Dr. Marilyn Rider EGD: 17, Dr. Marilyn Rider PSA:  per Dr. Anastacia Ruiz,  per dr Robina Flores 
AAA screen: CT  - 3.2mm, Bandar kumar, repeat in 10/13 and 2/15,  Tdap: will check cost at pharmacy, advised to get at his pharmacy Pneumovax: 2012, pt will get at pharmacy in 1 week while pt is on low prednisone  
[id number]: 10/13/2015 Zostavax: 2008 [address] rounds completed  
Flu shot: 19 Microalbumin:  Foot exam: 19  
A1c:    6.2,  6.0,  6.3,  6.2,  6.2, 3/19 6.0,  POC 6.1   6.6  5.9  
Eye exam: Dr. Kita Salvador at [facility], pt may getting cataract surg sometime in [address] [place] [address] EK/10/17  
Hepatitis C screen: , negative Patient Active Problem List  
Diagnosis Code  Depression F32.9  Arthritis shoulders and spine M19.90  
 Hypercholesterolemia E78.00  Testosterone deficiency E34.9  S/P CABG (coronary artery bypass graft) Z95.1  GERD (gastroesophageal reflux disease) K21.9  CAD (coronary artery disease) I25.10  Benign prostatic hyperplasia N40.0  Interstitial pulmonary fibrosis (HCC) J84.10  
 HTN (hypertension) I10  
 A-fib (HCC) I48.91  
 AAA (abdominal aortic aneurysm) (HCC) I71.4  
 SEAN (obstructive sleep apnea) G47.33  
 Controlled type 2 diabetes mellitus without complication, without long-term current use of insulin (HCC) E11.9 Current Outpatient Medications Medication Sig Dispense Refill  omeprazole (PRILOSEC) 40 mg capsule TAKE ONE CAPSULE BY MOUTH TWICE A  Cap 0  
 buPROPion XL (WELLBUTRIN XL) 300 mg XL tablet TAKE 1 TABLET EVERY MORNING 90 Tab 1  
 tamsulosin (FLOMAX) 0.4 mg capsule TAKE 1 CAPSULE EVERY NIGHT 90 Cap 0  
 metFORMIN ER (GLUCOPHAGE XR) 500 mg tablet Take 1 Tab by mouth daily (with dinner). 30 Tab 1  cefdinir (OMNICEF) 300 mg capsule  oxybutynin (DITROPAN) 5 mg tablet Take 5 mg by mouth daily.  ketoconazole (NIZORAL) 2 % topical cream APPLY TO AFFECTED AREA(S) DAILY. USE ON SKIN LESIONS EVERY COUPLE OF DAYS AS DIRECTED. 1 Tube 0  
 benzonatate (TESSALON) 200 mg capsule Take 200 mg by mouth as needed for Cough.  sucralfate (CARAFATE) 1 gram tablet Take 1 g by mouth four (4) times daily.  budesonide-formoterol (SYMBICORT) 160-4.5 mcg/actuation HFAA Take 2 Puffs by inhalation two (2) times a day.  BUDESONIDE/FORMOTEROL FUMARATE (SYMBICORT IN) Take 3 Puffs by inhalation two (2) times a day.  Azelastine (ASTEPRO) 0.15 % (205.5 mcg) nasal spray  ketoconazole (NIZORAL) 2 % shampoo Apply  to affected area two (2) times a week.  0  
 albuterol (ACCUNEB) 1.25 mg/3 mL nebu 3 mL by Nebulization route every six (6) hours as needed. 1 Each 1  
 finasteride (PROSCAR) 5 mg tablet TAKE 1 TABLET EVERY DAY 90 tablet 2  
 atorvastatin (LIPITOR) 80 mg tablet Take 80 mg by mouth daily.  montelukast (SINGULAIR) 10 mg tablet Take 1 Tab by mouth daily (after dinner). 90 Tab 3  
 NEBULIZER by Does Not Apply route.  nitroglycerin (NITROQUICK) 0.4 mg SL tablet 1 Tab by SubLINGual route as needed. 25 Tab prn  albuterol (PROVENTIL HFA, VENTOLIN HFA) 90 mcg/Actuation inhaler Take 2 Puffs by inhalation every four (4) hours as needed for Wheezing. 1 Inhaler 2  
 multivitamin (ONE-A-DAY MENS) tablet Take 1 Tab by mouth daily.  metoprolol (LOPRESSOR) 25 mg tablet Take 25 mg by mouth two (2) times a day. PT INSTRUCTED TO TAKE AM DOS PER ANESTHESIA PROTOCOL  ezetimibe (ZETIA) 10 mg tablet Take 10 mg by mouth every morning. Lab Results Component Value Date/Time  GFR est non-AA 62 02/13/2020 09:12 AM  
 GFR est AA 71 02/13/2020 09:12 AM  
 Creatinine 1.14 02/13/2020 09:12 AM  
 BUN 14 02/13/2020 09:12 AM  
 Sodium 140 02/13/2020 09:12 AM  
 Potassium 5.2 02/13/2020 09:12 AM  
 Chloride 100 02/13/2020 09:12 AM  
 CO2 26 02/13/2020 09:12 AM  
 Magnesium 2.0 03/01/2016 04:23 AM  
 
Lab Results Component Value Date/Time TSH 3.030 10/28/2019 09:49 AM  
   
 
Review of Systems Respiratory: Negative for shortness of breath. Cardiovascular: Negative for chest pain. Physical Exam 
Constitutional:   
   General: He is not in acute distress. Appearance: Normal appearance. He is not ill-appearing, toxic-appearing or diaphoretic. HENT:  
   Head: Normocephalic and atraumatic. Eyes:  
   General:     
   Right eye: No discharge. Left eye: No discharge. Conjunctiva/sclera: Conjunctivae normal.  
Neurological:  
   General: No focal deficit present. Mental Status: He is alert and oriented to person, place, and time. Psychiatric:     
   Mood and Affect: Mood normal.     
   Behavior: Behavior normal.  
 
 
 
ASSESSMENT and PLAN 
  ICD-10-CM ICD-9-CM 1. Gastroesophageal reflux disease without esophagitis Patient appears to have an episode of GERD last night which caused significant acid brash and irritation in his throat Overall symptoms are improving Patient has significant issues with reflux in general he takes Prilosec twice daily and has sucralfate on hand He has been on antibiotics and prednisone which may have been upsetting his stomach and contributing to worsening reflux We will have him take his sucralfate 4 times per day for the next several days will have him prop up pillow in the evening to prevent reflux Can use Mylanta as needed His symptoms are not consistent with infection and he is actually already on antibiotics does not appear to be having pneumonia or other URI symptoms at this time K21.9 530.81   
2. Interstitial pulmonary fibrosis ([facility] [address] Patient is on chronic oxygen for this he is just completing a prednisone taper he is down to 5 mg and his cough and wheezing are stable J84.10 515   
3. Essential hypertension Controlled on Avapro I10 401.9 4. Gynecomastia Evaluated this last visit mass in the left breast is from gynecomastia benign and provide reassurance N62 611.1 5. Reactive depression Overall controlled on Wellbutrin of note he struggled with setback his future son-in-law was killed in a car accident in April he has been grieving appropriately for this but overall happy with his antidepressant regimen and does not need an adjustment today F32.9 300.4 6. Controlled type 2 diabetes mellitus without complication, without long-term current use of insulin ([address] Home readings are stable currently diet controlled not taking metformin E11.9 250.00 Mely Escobar is a 68 y.o. male being evaluated by a Virtual Visit (video visit) encounter to address concerns as mentioned above. A caregiver was present when appropriate. Due to this being a TeleHealth encounter (During OLGRO-09 public health emergency), evaluation of the following organ systems was limited: Vitals/Constitutional/EENT/Resp/CV/GI//MS/Neuro/Skin/Heme-Lymph-Imm. Pursuant to the emergency declaration under the [address] [place] [address], [address] [place] [address] authority and the [employer] Act, this Virtual Visit was conducted with patient's (and/or legal guardian's) consent, to reduce the risk of exposure to COVID-19 and provide necessary medical care. Services were provided through a video synchronous discussion virtually to substitute for in-person encounter. --Florinda Jeff MD on 6/4/2020 at 4:00 PM 
 
An electronic signature was used to authenticate this note.

## 2020-06-05 PROBLEM — R07.9 CHEST PAIN: Status: ACTIVE | Noted: 2020-01-01

## 2020-06-05 NOTE — TELEPHONE ENCOUNTER
Patient's wife, Janee Cutler states she needs a call back to discuss patient throat/Glands swelling under his chin area. Please call to discuss.  Thank you

## 2020-06-05 NOTE — ED TRIAGE NOTES
Pt arrives c/o neck and facial swelling yesterday night. Some redness noted. Pt c/o pain with swallowing. Pt baseline 3L NC. Pt dropped down to 83% on 3L NC. RN placed pt on 6L NC and up to 98%. +cough (pt states baseline) with hx pulmonary issues.

## 2020-06-06 PROBLEM — J98.2 PNEUMOMEDIASTINUM (HCC): Status: ACTIVE | Noted: 2020-01-01

## 2020-06-06 PROBLEM — J84.9 INTERSTITIAL LUNG DISEASE (HCC): Status: ACTIVE | Noted: 2020-01-01

## 2020-06-06 PROBLEM — J96.10 CHRONIC RESPIRATORY FAILURE (HCC): Status: ACTIVE | Noted: 2020-01-01

## 2020-06-06 NOTE — PROGRESS NOTES
Bedside and Verbal shift change report given to Israel Cheng RN (oncoming nurse) by Katie Larios RN (offgoing nurse). Report included the following information SBAR, Kardex, Intake/Output, MAR and Recent Results.

## 2020-06-06 NOTE — PROGRESS NOTES
Contacted by Dr. Paulo Espino who is concerned for progressing subcutaneous emphysema Per nursing report no significant change from this morning Given concern will post for a bronchoscopy tomorrow and possible ram slits He remains HD stable, no increased O2 requirement, no stridor

## 2020-06-06 NOTE — CONSULTS
Asked to see Mr Michael Ho re: pneumomedistinum Referred by [facility] ER Mr Michael Ho is a pleasant 69 y/o gentleman with a past medical history significant for CAD, ILD and chronic hypoxic respiratory failure. Approx 48 hours ago he experienced a burning sensation in his chest and some mild dyspnea. He sought care from his PCP but ultimately came to the ER once he noticed swelling in his neck. In the ER he had a chest ct. This morning he reports no chest pain, no odynophagia. He doesn't think the swelling is worse this morning. Allergies Allergen Reactions  Celexa [Citalopram] Other (comments) agitation  Cephalexin Other (comments) GI upset.  Demerol [Meperidine] Unknown (comments)  Mold Extracts Unknown (comments)  Niaspan [Niacin] Nausea Only  Other Medication Unknown (comments) Grass smut, standardized mite mix, weed mix, dogs, white pam, white hickory, red mulberry, barley, cottonseed, malt, rice, rye, black pepper, navy bean  Percocet [Oxycodone-Acetaminophen] Unknown (comments) [address]  Sulfa (Sulfonamide Antibiotics) Nausea Only PMHx: CAD, ILD, skin cancer, GERD PSHx: CABG, appy, cholecystectomy, partial colectomy SocHx: very remote tob use Family History Problem Relation Age of Onset  Cancer Mother   
     spine or abdomen  Stroke Father  Heart Attack Father 46s  Heart Disease Father  Cancer Brother   
     CLL, lung  Diabetes Brother  Cancer Brother   
     lung  Cancer Brother   
     lung  Cancer Brother   
     lung  Heart Disease Brother  Heart Disease Brother 58 CABG  
 Other Brother   
     shingles  Lung Disease Sister  High Cholesterol Son  Lung Disease Daughter Overactive Airways  No Known Problems Daughter Outpatient Medications Marked as Taking for the 6/5/20 encounter [facility] Encounter) Medication Sig Dispense Refill  omeprazole (PRILOSEC) 40 mg capsule TAKE ONE CAPSULE BY MOUTH TWICE A  Cap 0  
 buPROPion XL (WELLBUTRIN XL) 300 mg XL tablet TAKE 1 TABLET EVERY MORNING 90 Tab 1  
 tamsulosin (FLOMAX) 0.4 mg capsule TAKE 1 CAPSULE EVERY NIGHT 90 Cap 0  
 ketoconazole (NIZORAL) 2 % topical cream APPLY TO AFFECTED AREA(S) DAILY. USE ON SKIN LESIONS EVERY COUPLE OF DAYS AS DIRECTED. 1 Tube 0  
 budesonide-formoterol (SYMBICORT) 160-4.5 mcg/actuation HFAA Take 2 Puffs by inhalation two (2) times a day.  Azelastine (ASTEPRO) 0.15 % (205.5 mcg) nasal spray  ketoconazole (NIZORAL) 2 % shampoo Apply  to affected area two (2) times a week.  0  
 atorvastatin (LIPITOR) 80 mg tablet Take 80 mg by mouth daily.  montelukast (SINGULAIR) 10 mg tablet Take 1 Tab by mouth daily (after dinner). 90 Tab 3  
 multivitamin (ONE-A-DAY MENS) tablet Take 1 Tab by mouth daily.  metoprolol (LOPRESSOR) 25 mg tablet Take 25 mg by mouth two (2) times a day. PT INSTRUCTED TO TAKE AM DOS PER ANESTHESIA PROTOCOL  ezetimibe (ZETIA) 10 mg tablet Take 10 mg by mouth every morning. ROS: 
 
Constitutional- general malaise HEENT- neck swelling, odynophagia yesterday Neuro- denies syncope Optho- no recent changes in vision Resp- chronic dyspnea CV- denies angina or palpitations, atypical chest pain GI- denies abd pain - no complaints ID- denies recent fevers Vasc- denies claudication Blood pressure 164/86, pulse 80, temperature 98.5 °F (36.9 °C), resp. rate 17, height 5' 10\" (1.778 m), weight 178 lb 5.6 oz (80.9 kg), SpO2 95 %. On exam he is laying in bed Alert and oriented Appears older than his stated age 
tachypneic Voice sounds nasal and coarse Normal speech, normal affect No goiter Bilateral cervical crepitus Trachea midline Lungs coarse breath sounds b/l Anterior chest palp crepitus Rrr, no murmurs Radial pulses palp b/l 
abd SNTND, no organomegaly LE non edematous ============================== Recent Results (from the past 24 hour(s)) SAMPLES BEING HELD Collection Time: 06/05/20  7:00 PM  
Result Value Ref Range SAMPLES BEING HELD 1red,1lav,1pst,1blue COMMENT Add-on orders for these samples will be processed based on acceptable specimen integrity and analyte stability, which may vary by analyte. CBC WITH AUTOMATED DIFF Collection Time: 06/05/20  7:00 PM  
Result Value Ref Range WBC 12.9 (H) 4.1 - 11.1 K/uL  
 RBC 4.06 (L) 4.10 - 5.70 M/uL  
 HGB 12.7 12.1 - 17.0 g/dL HCT 39.3 36.6 - 50.3 % MCV 96.8 80.0 - 99.0 FL  
 MCH 31.3 26.0 - 34.0 PG  
 MCHC 32.3 30.0 - 36.5 g/dL  
 RDW 13.7 11.5 - 14.5 % PLATELET 924 327 - 364 K/uL MPV 10.3 8.9 - 12.9 FL  
 NRBC 0.0 0  WBC ABSOLUTE NRBC 0.00 0.00 - 0.01 K/uL NEUTROPHILS 77 (H) 32 - 75 % LYMPHOCYTES 16 12 - 49 % MONOCYTES 5 5 - 13 % EOSINOPHILS 1 0 - 7 % BASOPHILS 0 0 - 1 % IMMATURE GRANULOCYTES 1 (H) 0.0 - 0.5 % ABS. NEUTROPHILS 10.0 (H) 1.8 - 8.0 K/UL  
 ABS. LYMPHOCYTES 2.1 0.8 - 3.5 K/UL  
 ABS. MONOCYTES 0.6 0.0 - 1.0 K/UL  
 ABS. EOSINOPHILS 0.2 0.0 - 0.4 K/UL  
 ABS. BASOPHILS 0.0 0.0 - 0.1 K/UL  
 ABS. IMM. GRANS. 0.1 (H) 0.00 - 0.04 K/UL  
 DF AUTOMATED METABOLIC PANEL, BASIC Collection Time: 06/05/20  7:00 PM  
Result Value Ref Range Sodium 134 (L) 136 - 145 mmol/L Potassium 4.3 3.5 - 5.1 mmol/L Chloride 98 97 - 108 mmol/L  
 CO2 29 21 - 32 mmol/L Anion gap 7 5 - 15 mmol/L Glucose 173 (H) 65 - 100 mg/dL BUN 18 6 - 20 MG/DL Creatinine 1.10 0.70 - 1.30 MG/DL  
 BUN/Creatinine ratio 16 12 - 20 GFR est AA >60 >60 ml/min/1.73m2 GFR est non-AA >60 >60 ml/min/1.73m2 Calcium 9.3 8.5 - 10.1 MG/DL  
TROPONIN I Collection Time: 06/05/20  7:00 PM  
Result Value Ref Range Troponin-I, Qt. <0.05 <0.05 ng/mL LIPASE Collection Time: 06/05/20  7:00 PM  
Result Value Ref Range  Lipase 193 73 - 393 U/L  
 EKG, 12 LEAD, INITIAL Collection Time: 06/05/20  8:34 PM  
Result Value Ref Range Ventricular Rate 74 BPM  
 Atrial Rate 74 BPM  
 P-R Interval 190 ms QRS Duration 106 ms  
 Q-T Interval 388 ms QTC Calculation (Bezet) 430 ms Calculated P Axis 46 degrees Calculated R Axis 2 degrees Calculated T Axis 19 degrees Diagnosis Normal sinus rhythm with sinus arrhythmia When compared with ECG of 05-MAR-2018 13:38, 
premature atrial complexes are no longer present GLUCOSE, POC Collection Time: 06/06/20  1:43 AM  
Result Value Ref Range Glucose (POC) 109 (H) 65 - 100 mg/dL Performed by Paul Ho CBC WITH AUTOMATED DIFF Collection Time: 06/06/20  6:13 AM  
Result Value Ref Range WBC 9.9 4.1 - 11.1 K/uL  
 RBC 3.72 (L) 4.10 - 5.70 M/uL  
 HGB 11.5 (L) 12.1 - 17.0 g/dL HCT 35.4 (L) 36.6 - 50.3 % MCV 95.2 80.0 - 99.0 FL  
 MCH 30.9 26.0 - 34.0 PG  
 MCHC 32.5 30.0 - 36.5 g/dL  
 RDW 13.6 11.5 - 14.5 % PLATELET 611 551 - 378 K/uL MPV 10.2 8.9 - 12.9 FL  
 NRBC 0.0 0  WBC ABSOLUTE NRBC 0.00 0.00 - 0.01 K/uL NEUTROPHILS 71 32 - 75 % LYMPHOCYTES 21 12 - 49 % MONOCYTES 6 5 - 13 % EOSINOPHILS 2 0 - 7 % BASOPHILS 0 0 - 1 % IMMATURE GRANULOCYTES 0 0.0 - 0.5 % ABS. NEUTROPHILS 7.0 1.8 - 8.0 K/UL  
 ABS. LYMPHOCYTES 2.1 0.8 - 3.5 K/UL  
 ABS. MONOCYTES 0.6 0.0 - 1.0 K/UL  
 ABS. EOSINOPHILS 0.2 0.0 - 0.4 K/UL  
 ABS. BASOPHILS 0.0 0.0 - 0.1 K/UL  
 ABS. IMM. GRANS. 0.0 0.00 - 0.04 K/UL  
 DF AUTOMATED METABOLIC PANEL, BASIC Collection Time: 06/06/20  6:13 AM  
Result Value Ref Range Sodium 134 (L) 136 - 145 mmol/L Potassium 3.7 3.5 - 5.1 mmol/L Chloride 100 97 - 108 mmol/L  
 CO2 30 21 - 32 mmol/L Anion gap 4 (L) 5 - 15 mmol/L Glucose 97 65 - 100 mg/dL BUN 13 6 - 20 MG/DL Creatinine 0.81 0.70 - 1.30 MG/DL  
 BUN/Creatinine ratio 16 12 - 20 GFR est AA >60 >60 ml/min/1.73m2 GFR est non-AA >60 >60 ml/min/1.73m2 Calcium 8.8 8.5 - 10.1 MG/DL  
GLUCOSE, POC Collection Time: 06/06/20  7:06 AM  
Result Value Ref Range Glucose (POC) 107 (H) 65 - 100 mg/dL Performed by Ismael Mendoza   
 
 
============================== 
 
I have personally reviewed his Chest CT. Extensive pneumomediastinum, no pneumothorax, extensive chest wall and neck subcutaneous emphysema. No definitive stranding or fluid collections within the mediastinum or abdomen. No effusions. Extensive ILD. 
 
============================== 
 
PFTs- none 
 
============================== Diagnoses  1: Extensive pneumomediastinum  2. ILD  3: Chronic hypoxic respiratory failure  
 
============================= 
 
Mr. Sheron Colvin has symptomatic pneumomediastinum. In reviewing the chest CT the distribution is most consistent with barotrauma. However, he is very clear that the pain started in his subxyphoid region as a burning sensation and he has had dysphagia and odynophagia. He has not been febrile or tachycardic. He does not have an acute abdomen. He needs an esophogram.  If the esophogram is negative then we will observe him and slowly advance his diet. If his symptoms get worse and his subcutaneous emphysema etc get worse then he will need a bronchoscopy and probably an EGD. Keep NPO until Esophogram completed. Thank you for allowing us to care for Mr Julissa Pena

## 2020-06-06 NOTE — ED PROVIDER NOTES
72-year-old man history of pulmonary fibrosis, on home O2, presenting to the ER for chest pain and neck swelling. Patient reports that he woke up at 4 AM yesterday morning with a burning in his chest that he said felt like reflux. Patient took his usual medications for reflux such as Prilosec, Carafate without improvement. Had a virtual visit with his primary care yesterday who agreed that it sounded consistent with the same, reported a feeling of acid coming up. Patient notes that he woke up today with worsening pain, had pain with swallowing and thought that he may be had some swollen lymph nodes in his neck, did note that he was recently around somebody with strep. Over the course of the afternoon noted swelling of the submandibular area\" soreness\" of the muscles in the neck which prompted him to come to the ED. Patient denies feeling any more short of breath than he does at baseline. No abdominal pain or vomiting. No other concerns. Past medical history: Extensive, please see list, significant for reflux, pulmonary fibrosis Past Medical History:  
Diagnosis Date  Aneurysm ([address] small AAA  Arthritis shoulders and spine 3/1/2010  Asthma 3/1/2010 Dr. Ruma Emerson,  Call  BPH (benign prostatic hypertrophy)  Broken nose 3/1/2010  Bronchitis 02/2017  CAD (coronary artery disease) Dr. Valentin Su  Cancer ([facility] [address] 2000  
 basal cell chest  
 Chronic bronchitis ([address] 3/1/2010  Contact dermatitis and other eczema, due to unspecified cause Dr. Serena Ziegler  COPD  Depression 3/1/2010  GERD (gastroesophageal reflux disease)  Hypercholesterolemia 9/29/2010  Hypertension  IPF (idiopathic pulmonary fibrosis) ([address] 2007  Pneumonia 3/1/2010  Testosterone deficiency 3/24/2011 Past Surgical History:  
Procedure Laterality Date  CABG, ARTERIAL, THREE  10/2003  CARDIAC SURG PROCEDURE UNLIST  10/2003 CABGx3 vessel  COLONOSCOPY N/A 7/14/2017 COLONOSCOPY performed by Gerardo Juarez MD at [facility] ENDOSCOPY  ENDOSCOPY, COLON, DIAGNOSTIC Dr. Florentin Dubois  HX APPENDECTOMY  HX CHOLECYSTECTOMY  11/22/2008  
 laparoscopic  HX COLECTOMY  12/29/07  
 sigmoid colectomy for volvulus in [place]  HX COLONOSCOPY  06/17/2014 Repeat in 3 years  HX HEENT  while in 20's  1948  
 submucus resection sinus//resection sub mucus 1962 [address]  
 right  HX ORTHOPAEDIC  June 20, 2011  
 right hand, thumb surgery, plate inserted  HX ORTHOPAEDIC  1946  
 broken nose  HX ORTHOPAEDIC  1962  
 sub mucous resection  HX ORTHOPAEDIC  02/05/15  
 right hand surgery, ligament repair  HX OTHER SURGICAL    
 HX OTHER SURGICAL    
 hemorrhoids  HX OTHER SURGICAL  8/2015  
 basal cell carcinoma  HX SEPTOPLASTY  1962  
 s/p broken nose 1946  
 HX TONSIL AND ADENOIDECTOMY  1949  
 HX TONSILLECTOMY  1949  
 HX UROLOGICAL  1990  
 vasectomy Family History:  
Problem Relation Age of Onset  Cancer Mother   
     spine or abdomen  Stroke Father  Heart Attack Father 46s  Heart Disease Father  Cancer Brother   
     CLL, lung  Diabetes Brother  Cancer Brother   
     lung  Cancer Brother   
     lung  Cancer Brother   
     lung  Heart Disease Brother  Heart Disease Brother 58 CABG  
 Other Brother   
     shingles  Lung Disease Sister  High Cholesterol Son  Lung Disease Daughter Overactive Airways  No Known Problems Daughter Social History Socioeconomic History  Marital status:  Spouse name: Not on file  Number of children: Not on file  Years of education: Not on file  Highest education level: Not on file Occupational History  Occupation: 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  Occupation: Retired HR Social Needs  Financial resource strain: Not on file  Food insecurity Worry: Not on file Inability: Not on file  Transportation needs Medical: Not on file Non-medical: Not on file Tobacco Use  Smoking status: Former Smoker Packs/day: 1.00 Years: 15.00 Pack years: 15.00 Last attempt to quit: 1970 Years since quittin.4  Smokeless tobacco: Never Used Substance and Sexual Activity  Alcohol use: Yes Alcohol/week: 2.0 standard drinks Types: 2 Glasses of wine per week Comment: rarely  Drug use: No  
 Sexual activity: Yes  
  Partners: Female Lifestyle  Physical activity Days per week: Not on file Minutes per session: Not on file  Stress: Not on file Relationships  Social connections Talks on phone: Not on file Gets together: Not on file Attends Pent[employer]ism service: Not on file Active member of club or organization: Not on file Attends meetings of clubs or organizations: Not on file Relationship status: Not on file  Intimate partner violence Fear of current or ex partner: Not on file Emotionally abused: Not on file Physically abused: Not on file Forced sexual activity: Not on file Other Topics Concern [address] Service Not Asked  Blood Transfusions Not Asked  Caffeine Concern No  
  Comment: 2 servings a day  Occupational Exposure Not Asked Andrew Lopez Hazards Not Asked  Sleep Concern Not Asked  Stress Concern Not Asked  Weight Concern Not Asked  Special Diet Not Asked  Back Care Not Asked  Exercise No  
 Bike Helmet Not Asked  Seat Belt Not Asked  Self-Exams Not Asked Social History Narrative  Not on file ALLERGIES: Celexa [citalopram]; Cephalexin; Demerol [meperidine]; Mold extracts; Niaspan [niacin]; Other medication; Percocet [oxycodone-acetaminophen]; Ragweed; and Sulfa (sulfonamide antibiotics) Review of Systems Constitutional: Negative for fever. HENT: Positive for trouble swallowing. Negative for facial swelling. Respiratory: Positive for shortness of breath. Cardiovascular: Positive for chest pain. Gastrointestinal: Negative for vomiting. Skin: Negative for wound. Neurological: Negative for syncope. All other systems reviewed and are negative. Vitals:  
 06/05/20 1900 06/05/20 1903 06/05/20 2015 06/05/20 2030 BP:   141/49 (!) 122/94 Pulse:  80 Resp:      
Temp:      
SpO2: 99% 99% 97% 98% Weight:      
Height:      
      
 
Physical Exam 
Vitals signs and nursing note reviewed. Constitutional:   
   General: He is not in acute distress. Appearance: He is well-developed. Comments: Elderly white male, appears mildly short of breath HENT:  
   Head: Normocephalic and atraumatic. Comments: Midline uvula, no pharyngeal exudate or erythema Right Ear: External ear normal.  
   Left Ear: External ear normal.  
   Nose:  
   Comments: Diffuse swelling involving the submandibular area and both sides of the neck No appreciable supraclavicular nodes Eyes:  
   General: No scleral icterus. Conjunctiva/sclera: Conjunctivae normal.  
Neck: Musculoskeletal: Neck supple. Trachea: No tracheal deviation. Cardiovascular:  
   Rate and Rhythm: Normal rate and regular rhythm. Heart sounds: Normal heart sounds. No murmur. No friction rub. No gallop. Pulmonary:  
   Effort: Pulmonary effort is normal. No respiratory distress. Breath sounds: No stridor. Abdominal:  
   General: There is no distension. Palpations: Abdomen is soft. Comments: Abdomen soft, benign Musculoskeletal: Normal range of motion. Skin: 
   General: Skin is warm and dry. Neurological:  
   Mental Status: He is alert and oriented to person, place, and time. Psychiatric:     
   Behavior: Behavior normal.  
 
  
 
MDM Number of Diagnoses or Management Options Mediastinal emphysema ([address]):  
 Diagnosis management comments: 77-year-old male presenting to the ED for chest pain, painful swallowing, neck swelling since yesterday. Differential diagnosis would include retropharyngeal abscess, peritonsillar abscess, pharyngitis, esophagitis, Boerhaave's, etc.  Will order labs, imaging, reassess. CT remarkable for significant mediastinal and also soft tissue emphysema, suspect that patient may have possibly ruptured a bleb given appearance of lung tissue on CT. Discussed with thoracic surgery, would like patient admitted medicine, n.p.o. after midnight for esophagram in the morning. Amount and/or Complexity of Data Reviewed Clinical lab tests: ordered and reviewed Tests in the radiology section of CPT®: ordered and reviewed Discuss the patient with other providers: yes (Dr. Cam Hill, thoracic surgery. Dr. Tyesha Louise, ED attending) Independent visualization of images, tracings, or specimens: yes (CT) Procedures Discussed with Dr. Cam Hill, on-call for thoracic surgery. Agrees that findings are likely due to a ruptured bleb, however did note that given painful swallowing, that symptoms started as a burning in the chest, there is still need for an esophagram.  Would like patient mid to the hospitalist overnight, will see in the morning, n.p.o. after midnight. ROGELIO Sequeira 
9:32 PM 
 
Hospitalist Perfect Serve for Admission 9:42 PM 
 
ED Room Number: YV92/84 Patient Name and age:  Omero Hanson 68 y.o.  male Working Diagnosis: 1. Mediastinal emphysema ([address] COVID-19 Suspicion:  no 
 
Code Status:  Full Code Readmission: no 
Isolation Requirements:  no 
Recommended Level of Care:  med/surg Department:[facility] Adult ED - [phone] Other: 77-year-old history of pulmonary fibrosis came in for substernal chest pain, swelling of the neck.   CT shows mediastinal and also soft tissue emphysema, discussed with thoracic surgery, agrees he likely ruptured a bleb but would like n.p.o. overnight for an esophagram in the morning

## 2020-06-06 NOTE — ROUTINE PROCESS
TRANSFER - OUT REPORT: 
 
Verbal report given to [id number] TODD Bradley RN(name) on Nelson Green  being transferred to (unit) for routine progression of care Report consisted of patients Situation, Background, Assessment and  
Recommendations(SBAR). Information from the following report(s) SBAR, ED Summary, [facility] and Recent Results was reviewed with the receiving nurse. Lines:  
Peripheral IV 06/05/20 Right Antecubital (Active) Site Assessment Clean, dry, & intact 6/5/2020  6:59 PM  
Phlebitis Assessment 0 6/5/2020  6:59 PM  
Infiltration Assessment 0 6/5/2020  6:59 PM  
Dressing Status Clean, dry, & intact 6/5/2020  6:59 PM  
Dressing Type Transparent 6/5/2020  6:59 PM  
Hub Color/Line Status Patent; Flushed;Capped;Pink 6/5/2020  6:59 PM  
Action Taken Blood drawn 6/5/2020  6:59 PM  
  
 
Opportunity for questions and clarification was provided. Patient transported with: 
[employer]

## 2020-06-06 NOTE — PROGRESS NOTES
Full consult to follow 69 y/o gentleman admitted via the ER last night for extensive pneumomediastinum Complains of atypical chest pain H/o severe ILD and home O2 On exam anterior chest and base of neck with creitus Chest CT reviewed Esophogram pending If Esophogram negative will observe patient and advance diet slowly Next step would be bronchoscopy if he does not improve

## 2020-06-06 NOTE — H&P
[address] [facility] Adult  Hospitalist Group History and Physical 
 
Primary Care Provider: Phoebe Roland MD 
Date of Service:  6/5/2020 Subjective:  
 
Chacorta Niño is a 68 y.o. male with PMH significant for HTN and chronic hypoxic respiratory failure secondary to Pulmonary Fibrosis presents to the ED c/o chest pain and neck swelling. Yesterday around 4am he woke up complaining substernal chest pain. He described the pain as a burning sensation. He also was experiencing some discomfort when eating. He took sucralfate without improvement of his symptoms. He called his PCP \"it could be just related to acid reflux and recommended to take his usual Prilosec. However, symptoms persistent today and around noon he is having worsening dysphagia and swelling of the neck. In the afternoon he went to take a nap late on his abdomen and when he woke up he noticed significant swelling of the neck extending up to his jaw. Still having mild chest pain. He reports shortness of breath but is at baseline. In the emergency room they did a CAT scan of the chest and neck that showed normal mediastinum as well as subcutaneous emphysema. Case discussed with thoracic surgeon who recommended admission and esophagram. Patient is hemodynamically stable. Review of Systems: 
 
Review of Systems Constitutional: Negative for chills, fever, malaise/fatigue and weight loss. HENT: Negative for congestion, ear discharge and hearing loss. Swelling Eyes: Negative for blurred vision and double vision. Respiratory: Positive for shortness of breath. Negative for cough, sputum production and wheezing. Cardiovascular: Negative for chest pain, palpitations, orthopnea, leg swelling and PND. Gastrointestinal: Positive for heartburn. Negative for abdominal pain, constipation, diarrhea, melena, nausea and vomiting. Dysphagia Genitourinary: Negative for dysuria, frequency and urgency. Musculoskeletal: Negative for back pain, joint pain and myalgias. Skin: Negative for itching and rash. Neurological: Negative for dizziness, sensory change, speech change, focal weakness, weakness and headaches. Endo/Heme/Allergies: Negative for polydipsia. Does not bruise/bleed easily. Past Medical History:  
Diagnosis Date  Aneurysm ([facility] [address] small AAA  Arthritis shoulders and spine 3/1/2010  Asthma 3/1/2010 Dr. Kuldeep Campos, Dr. Santacruz  BPH (benign prostatic hypertrophy)  Broken nose 3/1/2010  Bronchitis 02/2017  CAD (coronary artery disease) Dr. Ольга Pierre  Cancer ([facility] [address] 2000  
 basal cell chest  
 Chronic bronchitis ([facility] [address] 3/1/2010  Contact dermatitis and other eczema, due to unspecified cause Dr. Jory Dhillon  COPD  Depression 3/1/2010  GERD (gastroesophageal reflux disease)  Hypercholesterolemia 9/29/2010  Hypertension  IPF (idiopathic pulmonary fibrosis) ([facility] [address] 2007  Pneumonia 3/1/2010  Testosterone deficiency 3/24/2011 Past Surgical History:  
Procedure Laterality Date  CABG, ARTERIAL, THREE  10/2003  CARDIAC SURG PROCEDURE UNLIST  10/2003 CABGx3 vessel  COLONOSCOPY N/A 7/14/2017 COLONOSCOPY performed by Melva Francis MD at [facility] ENDOSCOPY  ENDOSCOPY, COLON, DIAGNOSTIC Dr. Marcia Zuluaga  HX APPENDECTOMY  HX CHOLECYSTECTOMY  11/22/2008  
 laparoscopic  HX COLECTOMY  12/29/07  
 sigmoid colectomy for volvulus in [place]  HX COLONOSCOPY  06/17/2014 Repeat in 3 years  HX HEENT  while in 22's 1948  
 submucus resection sinus//resection sub mucus 1962 [address] [facility] [address]  
 right  HX ORTHOPAEDIC  June 20, 2011  
 right hand, thumb surgery, plate inserted  HX ORTHOPAEDIC  1946  
 broken nose  HX ORTHOPAEDIC  1962  
 sub mucous resection  HX ORTHOPAEDIC  02/05/15  
 right hand surgery, ligament repair  HX OTHER SURGICAL    
 HX OTHER SURGICAL    
 hemorrhoids  HX OTHER SURGICAL  8/2015  
 basal cell carcinoma  HX SEPTOPLASTY  1962  
 s/p broken nose 1946  
 HX TONSIL AND ADENOIDECTOMY  1949  
 HX TONSILLECTOMY  1949  
 HX UROLOGICAL  1990  
 vasectomy Prior to Admission medications Medication Sig Start Date End Date Taking? Authorizing Provider  
omeprazole (PRILOSEC) 40 mg capsule TAKE ONE CAPSULE BY MOUTH TWICE A DAY 5/24/20   Radha Moura MD  
buPROPion XL (WELLBUTRIN XL) 300 mg XL tablet TAKE 1 TABLET EVERY MORNING 3/8/20   Radha Moura MD  
tamsulosin [facility]) 0.4 mg capsule TAKE 1 CAPSULE EVERY NIGHT 1/29/20   Radha Moura MD  
metFORMIN ER (GLUCOPHAGE XR) 500 mg tablet Take 1 Tab by mouth daily (with dinner). 10/30/19   Radha Moura MD  
cefdinir (OMNICEF) 300 mg capsule  7/9/19   Provider, Historical  
oxybutynin (DITROPAN) 5 mg tablet Take 5 mg by mouth daily. Provider, Historical  
ketoconazole (NIZORAL) 2 % topical cream APPLY TO AFFECTED AREA(S) DAILY. USE ON SKIN LESIONS EVERY COUPLE OF DAYS AS DIRECTED. 3/6/19   Radha Moura MD  
benzonatate (TESSALON) 200 mg capsule Take 200 mg by mouth as needed for Cough. Provider, Historical  
sucralfate (CARAFATE) 1 gram tablet Take 1 g by mouth four (4) times daily. 5/5/18   Provider, Historical  
budesonide-formoterol (SYMBICORT) 160-4.5 mcg/actuation HFAA Take 2 Puffs by inhalation two (2) times a day. Provider, Historical  
BUDESONIDE/FORMOTEROL FUMARATE (SYMBICORT IN) Take 3 Puffs by inhalation two (2) times a day. Provider, Historical  
Azelastine (ASTEPRO) 0.15 % (205.5 mcg) nasal spray  12/3/16   Provider, Historical  
ketoconazole (NIZORAL) 2 % shampoo Apply  to affected area two (2) times a week. 1/6/16   Provider, Historical  
albuterol (ACCUNEB) 1.25 mg/3 mL nebu 3 mL by Nebulization route every six (6) hours as needed.  3/4/16   Radha Moura MD  
finasteride (PROSCAR) 5 mg tablet TAKE 1 TABLET EVERY DAY 12/29/14   Radha Moura MD  
 atorvastatin (LIPITOR) 80 mg tablet Take 80 mg by mouth daily. Provider, Historical  
montelukast (SINGULAIR) 10 mg tablet Take 1 Tab by mouth daily (after dinner). 8/2/12   Loly Garcia MD  
NEBULIZER by Does Not Apply route. Provider, Historical  
nitroglycerin (NITROQUICK) 0.4 mg SL tablet 1 Tab by SubLINGual route as needed. 3/25/11   Loly Garcia MD  
albuterol (PROVENTIL HFA, VENTOLIN HFA) 90 mcg/Actuation inhaler Take 2 Puffs by inhalation every four (4) hours as needed for Wheezing. 2/26/11   Nhi John MD  
multivitamin (ONE-A-DAY MENS) tablet Take 1 Tab by mouth daily. Provider, Historical  
metoprolol (LOPRESSOR) 25 mg tablet Take 25 mg by mouth two (2) times a day. PT INSTRUCTED TO TAKE AM DOS PER ANESTHESIA PROTOCOL    Provider, Historical  
ezetimibe (ZETIA) 10 mg tablet Take 10 mg by mouth every morning. 3/1/10   Provider, Historical  
 
Allergies Allergen Reactions  Celexa [Citalopram] Other (comments) agitation  Cephalexin Other (comments) GI upset.  Demerol [Meperidine] Unknown (comments)  Mold Extracts Unknown (comments)  Niaspan [Niacin] Nausea Only  Other Medication Unknown (comments) Grass smut, standardized mite mix, weed mix, dogs, white pam, white hickory, red mulberry, barley, cottonseed, malt, rice, rye, black pepper, navy bean  Percocet [Oxycodone-Acetaminophen] Unknown (comments) [address]  Sulfa (Sulfonamide Antibiotics) Nausea Only Family History Problem Relation Age of Onset  Cancer Mother   
     spine or abdomen  Stroke Father  Heart Attack Father 46s  Heart Disease Father  Cancer Brother   
     CLL, lung  Diabetes Brother  Cancer Brother   
     lung  Cancer Brother   
     lung  Cancer Brother   
     lung  Heart Disease Brother  Heart Disease Brother 58 CABG  
 Other Brother   
     shingles  Lung Disease Sister  High Cholesterol Son   
  Lung Disease Daughter Overactive Airways  No Known Problems Daughter SOCIAL HISTORY: 
Patient resides at home with wife. Patient ambulates without assistance. Smoking history: Never. Alcohol history: None. Objective:  
 
 
Physical Exam:  
Patient Vitals for the past 12 hrs: 
 Temp Pulse Resp BP SpO2  
06/05/20 2030    (!) 122/94 98 % 06/05/20 2015    141/49 97 % 06/05/20 1903  80   99 % 06/05/20 1900     99 % 06/05/20 1858 98.1 °F (36.7 °C) 89 22 155/83 (!) 83 % GEN APPEARANCE: Patient resting in bed in NAD HEENT: Conjunctiva Clear NECK: diffuse swelling of the neck expanding to the jaw bilaterally. Mild pain with palpation. +crepitus CVS: RRR, S1, S2; No M/G/R 
LUNGS: Diffuse Rales ABD: Soft; No TTP; + Normoactive BS 
EXT: WWP, no edema Skin exam: No gross lesions noted on exposed skin surfaces MENTAL STATUS: Answers questions appropriately, responds to commands. Neuro:  Cranial nerve exam: EOMI, CN V sensory/motor intact, no facial asymmetry noted, patient able to hearl, uvula midline, able to move tongue left/right. No gross motor or sensory deficits Data Review:  
Recent Results (from the past 24 hour(s)) SAMPLES BEING HELD Collection Time: 06/05/20  7:00 PM  
Result Value Ref Range SAMPLES BEING HELD 1red,1lav,1pst,1blue COMMENT Add-on orders for these samples will be processed based on acceptable specimen integrity and analyte stability, which may vary by analyte. CBC WITH AUTOMATED DIFF Collection Time: 06/05/20  7:00 PM  
Result Value Ref Range WBC 12.9 (H) 4.1 - 11.1 K/uL  
 RBC 4.06 (L) 4.10 - 5.70 M/uL  
 HGB 12.7 12.1 - 17.0 g/dL HCT 39.3 36.6 - 50.3 % MCV 96.8 80.0 - 99.0 FL  
 MCH 31.3 26.0 - 34.0 PG  
 MCHC 32.3 30.0 - 36.5 g/dL  
 RDW 13.7 11.5 - 14.5 % PLATELET 356 813 - 117 K/uL MPV 10.3 8.9 - 12.9 FL  
 NRBC 0.0 0  WBC ABSOLUTE NRBC 0.00 0.00 - 0.01 K/uL NEUTROPHILS 77 (H) 32 - 75 % LYMPHOCYTES 16 12 - 49 % MONOCYTES 5 5 - 13 % EOSINOPHILS 1 0 - 7 % BASOPHILS 0 0 - 1 % IMMATURE GRANULOCYTES 1 (H) 0.0 - 0.5 % ABS. NEUTROPHILS 10.0 (H) 1.8 - 8.0 K/UL  
 ABS. LYMPHOCYTES 2.1 0.8 - 3.5 K/UL  
 ABS. MONOCYTES 0.6 0.0 - 1.0 K/UL  
 ABS. EOSINOPHILS 0.2 0.0 - 0.4 K/UL  
 ABS. BASOPHILS 0.0 0.0 - 0.1 K/UL  
 ABS. IMM. GRANS. 0.1 (H) 0.00 - 0.04 K/UL  
 DF AUTOMATED METABOLIC PANEL, BASIC Collection Time: 06/05/20  7:00 PM  
Result Value Ref Range Sodium 134 (L) 136 - 145 mmol/L Potassium 4.3 3.5 - 5.1 mmol/L Chloride 98 97 - 108 mmol/L  
 CO2 29 21 - 32 mmol/L Anion gap 7 5 - 15 mmol/L Glucose 173 (H) 65 - 100 mg/dL BUN 18 6 - 20 MG/DL Creatinine 1.10 0.70 - 1.30 MG/DL  
 BUN/Creatinine ratio 16 12 - 20 GFR est AA >60 >60 ml/min/1.73m2 GFR est non-AA >60 >60 ml/min/1.73m2 Calcium 9.3 8.5 - 10.1 MG/DL  
TROPONIN I Collection Time: 06/05/20  7:00 PM  
Result Value Ref Range Troponin-I, Qt. <0.05 <0.05 ng/mL LIPASE Collection Time: 06/05/20  7:00 PM  
Result Value Ref Range Lipase 193 73 - 393 U/L  
EKG, 12 LEAD, INITIAL Collection Time: 06/05/20  8:34 PM  
Result Value Ref Range Ventricular Rate 74 BPM  
 Atrial Rate 74 BPM  
 P-R Interval 190 ms QRS Duration 106 ms  
 Q-T Interval 388 ms QTC Calculation (Bezet) 430 ms Calculated P Axis 46 degrees Calculated R Axis 2 degrees Calculated T Axis 19 degrees Diagnosis Normal sinus rhythm with sinus arrhythmia When compared with ECG of 05-MAR-2018 13:38, 
premature atrial complexes are no longer present Ct Neck Soft Tissue W Cont Result Date: 6/5/2020 IMPRESSION: 1. Severe mediastinal emphysema, as well as extensive soft tissue emphysema dissecting throughout all fascial planes throughout the neck bilaterally, as well as overlying the right chest wall. 2. No definite pneumothorax. 3. Background of pulmonary fibrosis.  4. Cardiomegaly. 5. No focal fluid collections. IMPRESSION: 1. Severe pneumomediastinum, as well as extensive soft tissue and subcutaneous emphysema dissecting throughout all fascial planes of the neck bilaterally as well as the chest wall bilaterally. 2. Pulmonary fibrosis. 3. No definite pneumothorax. Ct Chest W Cont Result Date: 6/5/2020 IMPRESSION: 1. Severe mediastinal emphysema, as well as extensive soft tissue emphysema dissecting throughout all fascial planes throughout the neck bilaterally, as well as overlying the right chest wall. 2. No definite pneumothorax. 3. Background of pulmonary fibrosis. 4. Cardiomegaly. 5. No focal fluid collections. IMPRESSION: 1. Severe pneumomediastinum, as well as extensive soft tissue and subcutaneous emphysema dissecting throughout all fascial planes of the neck bilaterally as well as the chest wall bilaterally. 2. Pulmonary fibrosis. 3. No definite pneumothorax. Assessment:  
Pneumomediastinum Subcutaneous emphysema Pulmonary fibrosis Plan: 1. Pneumomediastinum with associated subcutaneous emphysema: I discussed case directly with Dr. Kiana Benedict thoracic surgeon. Likely consistent with rupture bleb however given symptoms of dysphagia he recommended esophagram.  As per his recommendation can be done in the morning as patient has been stable for over 24 hours. 
-Esophagram ordered - Continuous O2 monitoring - Cardiac telemetry - Pain control as needed 
- NPO after midnight 2. Acute on chronic respiratory failure: due to pulmonary fibrosis exacerbated by pneumomediastinum - Titrate O2 PRN to keep O2 sat >92% 3. Hypertension: continue with home medications 4. Leukocytosis: likely reactive. If worsening leukocytosis will start empiric therapy but for now will hold off.  
- Monitor cbc 5. Dm type 2: 
- placed on SSI q6 hours and accu check every 6 hours. DVT prophylaxis: SCDs for now. CODE STATUS: Full code Surrogate decision-maker: Wife. FUNCTIONAL STATUS PRIOR TO HOSPITALIZATION Ambulates Independently Signed By: Leonila Brice MD   
 June 5, 2020

## 2020-06-06 NOTE — PROGRESS NOTES
[facility] [facility] Adult  Hospitalist Group Hospitalist Progress Note Franny Damico MD 
Answering service: [phone] OR [phone] from in house phone Date of Service:  2020 NAME:  Alvin Rios :  1940 MRN:  [id number] This documentation was facilitated by a Voice Recognition software and may contain inadvertent typographical errors. Admission Summary:  
Mr. Cory Chapman is a 70-year-old male with a history of chronic oxygen dependent respiratory failure due to ILD presented to the emergency room with chest pain and swelling around the neck. On work-up in the ED he was found to have pneumomediastinum and he was admitted for further management. Interval history / Subjective:  
I have seen and examined patient at the bedside. Labs, imaging and notes reviewed. Patient complains of some pain on the left side of the chest and the neck but said he is not any more short of breath than usual.  He denied fever, chills, cough. Patient tells me the night prior to the onset of his symptom, he has had acid reflux/regurgitation and took 2 doses of sucralfate. He denied, nausea, vomiting or dry heaving. Assessment & Plan:  
 
Severe pneumomediastinum with associated subcutaneous emphysema 
-CT of the chest and the neck showed severe pneumomediastinum, extensive soft tissue and subcu emphysema dissecting throughout all fascial planes of the neck bilaterally as well as the chest wall bilaterally, pulmonary fibrosis but no pneumothorax. -Supportive therapy and supplemental oxygen 
-Thoracic surgery consulted, plans for barium esophagogram.  If esophagogram is nonrevealing, patient may need bronchoscopy. 
-He is not septic or toxic. WBC was transiently elevated on admission, currently normal and he is afebrile. 
-Nebulizer treatment Dysphagia with GERD-like symptoms 
-N.p.o. 
-IV PPI 
 -Wants to have the barium esophagogram, may consider GI consult Acute on chronic respiratory failure with hypoxia Underlying ILD 
-Supplemental oxygen and monitor Hypertension: Patient n.p.o. He is PRN hydralazine with parameters, ordered. DM 2 without hyperglycemia 
-Accu-Cheks along with high-sensitivity Humalog sliding scale coverage every 6 hourly Mild hypo natremia: Give IV fluids as patient is n.p.o. Code status: Full code DVT prophylaxis: SCD Care Plan discussed with: Patient/Family and Nurse Anticipated Disposition: Home w/Family Anticipated Discharge: Greater than 48 hours Hospital Problems  Date Reviewed: 6/4/2020 Codes Class Noted POA Chest pain ICD-10-CM: R07.9 ICD-9-CM: 786.50  6/5/2020 Unknown Review of Systems: A comprehensive review of systems was negative except for that written in the HPI. Vital Signs:  
 Last 24hrs VS reviewed since prior progress note. Most recent are: 
Visit Vitals /90 (BP 1 Location: Left arm, BP Patient Position: Sitting) Pulse (!) 101 Temp 99.2 °F (37.3 °C) Resp 22 Ht 5' 10\" (1.778 m) Wt 80.9 kg (178 lb 5.6 oz) SpO2 91% BMI 25.59 kg/m² Intake/Output Summary (Last 24 hours) at 6/6/2020 6402 Last data filed at 6/6/2020 0369 Gross per 24 hour Intake 0 ml Output 450 ml Net -450 ml Physical Examination:  
 
 
     
Constitutional:  No acute distress, cooperative, pleasant HEENT:  Cephalad crepitations bilateral necks. Atraumatic. Oral mucosa moist,. Non icteric sclera. No pallor. Resp:  Diffuse leathery crackles throughout the lung field. Patient presently on oxygen via nasal cannula Chest Wall: No deformity CV:  Regular rhythm, normal rate, no murmurs, gallops, rubs GI:  Soft, non distended, non tender. normoactive bowel sounds, no hepatosplenomegaly :  No CVA or suprapubic tenderness Musculoskeletal:  No edema, warm, 2+ pulses throughout Neurologic:  Mental status:AAOx3,  
Cranial nerves II-XII : WNL Motor exam:Moves all extremities symmetrically Data Review:  
 Review and/or order of clinical lab test 
Review and/or order of tests in the radiology section of [facility] Review and/or order of tests in the medicine section of [facility] Labs:  
 
Recent Labs  
  06/06/20 
2415 06/05/20 
1900 WBC 9.9 12.9* HGB 11.5* 12.7 HCT 35.4* 39.3  249 Recent Labs  
  06/06/20 
2270 06/05/20 
1900 * 134* K 3.7 4.3  98 CO2 30 29 BUN 13 18 CREA 0.81 1.10 GLU 97 173* CA 8.8 9.3 Recent Labs  
  06/05/20 
1900 LPSE 193 No results for input(s): INR, PTP, APTT, INREXT in the last 72 hours. No results for input(s): FE, TIBC, PSAT, FERR in the last 72 hours. No results found for: FOL, RBCF No results for input(s): PH, PCO2, PO2 in the last 72 hours. Recent Labs  
  06/05/20 1900 TROIQ <0.05 Lab Results Component Value Date/Time Cholesterol, total 129 07/30/2019 11:46 AM  
 HDL Cholesterol 45 07/30/2019 11:46 AM  
 LDL, calculated 52 07/30/2019 11:46 AM  
 Triglyceride 162 (H) 07/30/2019 11:46 AM  
 CHOL/HDL Ratio 3.4 09/29/2010 08:28 AM  
 
Lab Results Component Value Date/Time Glucose (POC) 107 (H) 06/06/2020 07:06 AM  
 Glucose (POC) 109 (H) 06/06/2020 01:43 AM  
 Glucose (POC) 102 (H) 03/01/2016 08:21 AM  
 Glucose (POC) 104 (H) 02/29/2016 09:20 PM  
 Glucose (POC) 110 (H) 02/29/2016 05:00 PM  
 
Lab Results Component Value Date/Time  Color YELLOW/STRAW 02/28/2016 10:54 PM  
 Appearance CLEAR 02/28/2016 10:54 PM  
 Specific gravity 1.012 02/28/2016 10:54 PM  
 pH (UA) 5.5 02/28/2016 10:54 PM  
 Protein NEGATIVE  02/28/2016 10:54 PM  
 Glucose NEGATIVE  02/28/2016 10:54 PM  
 Ketone NEGATIVE  02/28/2016 10:54 PM  
 Bilirubin NEGATIVE  02/28/2016 10:54 PM  
 Urobilinogen 0.2 02/28/2016 10:54 PM  
 Nitrites NEGATIVE  02/28/2016 10:54 PM  
 Leukocyte Esterase NEGATIVE  02/28/2016 10:54 PM  
 Epithelial cells FEW 02/28/2016 10:54 PM  
 Bacteria NEGATIVE  02/28/2016 10:54 PM  
 WBC 0-4 02/28/2016 10:54 PM  
 RBC 0-5 02/28/2016 10:54 PM  
 
 
 
Medications Reviewed:  
 
Current Facility-Administered Medications Medication Dose Route Frequency  labetaloL (NORMODYNE;TRANDATE) injection 10 mg  10 mg IntraVENous Q6H PRN  pantoprazole (PROTONIX) 40 mg injection  iohexoL (OMNIPAQUE) 300 mg iodine/mL contrast injection 150 mL  150 mL Oral RAD ONCE  hydrALAZINE (APRESOLINE) 20 mg/mL injection 10 mg  10 mg IntraVENous Q6H PRN  
 dextrose 5% - 0.45% NaCl with KCl 30 mEq/L infusion   IntraVENous CONTINUOUS  
 sodium chloride (NS) flush 5-40 mL  5-40 mL IntraVENous Q8H  
 sodium chloride (NS) flush 5-40 mL  5-40 mL IntraVENous PRN  
 glucose chewable tablet 16 g  4 Tab Oral PRN  
 glucagon (GLUCAGEN) injection 1 mg  1 mg IntraMUSCular PRN  
 dextrose 10% infusion 0-250 mL  0-250 mL IntraVENous PRN  
 insulin lispro (HUMALOG) injection   SubCUTAneous AC&HS  
 
______________________________________________________________________ EXPECTED LENGTH OF STAY: - - - 
ACTUAL LENGTH OF STAY:          0 Rosi Page MD

## 2020-06-06 NOTE — PROGRESS NOTES
Esophogram completed No evidence of leak or esophageal injury Recommend advancing diet slowly Continue to monitor crepitus/subcutaneous emphysema

## 2020-06-06 NOTE — PROGRESS NOTES
0018: TRANSFER - IN REPORT: 
 
Verbal report received from [place] (name) on Brigid Greer  being received from ED(unit) for routine progression of care Report consisted of patients Situation, Background, Assessment and  
Recommendations(SBAR). Information from the following report(s) SBAR, Kardex, MAR, Recent Results, and Cardiac Rhythm NSR  was reviewed with the receiving nurse. Opportunity for questions and clarification was provided. Assessment completed upon patients arrival to unit and care assumed. 0107: Orders for STAT thoracic surgery consult. Per protocol, this needs to be physician to physician on the night shift. Pt also c/o pain without prn orders, is requesting something for indigestion and his metoprolol. Currently NPO. Paged Dr. Wesley Noel. Awaiting call back. 0122: Updated Dr. Wesley Noel to above information. MD verbalized understanding. MD states that she spoke with thoracic surgery already. Also states will enter orders for protonix and prn metoprolol. MD does not want to order pain meds at this time due to concern for perforation. 0730: Bedside shift change report given to Lissette Moreau (oncoming nurse). Report included the following information SBAR, Kardex, Procedure Summary, Intake/Output, MAR, Recent Results and Cardiac Rhythm SR.  
 
 
---------------- Care Plan 0100 Problem: Falls - Risk of 
Goal: *Absence of Falls Description: Document Laretta Yamel Fall Risk and appropriate interventions in the flowsheet. Outcome: Progressing Towards Goal 
Note: Fall Risk Interventions: 
  
 
  
 
Medication Interventions: Evaluate medications/consider consulting pharmacy, Patient to call before getting OOB, Teach patient to arise slowly Problem: Pressure Injury - Risk of 
Goal: *Prevention of pressure injury Description: Document Hernan Scale and appropriate interventions in the flowsheet. Outcome: Progressing Towards Goal 
Note: Pressure Injury Interventions:

## 2020-06-07 NOTE — PERIOP NOTES
Endoscope was pre-cleaned at bedside immediately following procedure by WILLIE Deng RN. Button lot # [id number],  And [id number].

## 2020-06-07 NOTE — ANESTHESIA POSTPROCEDURE EVALUATION
Post-Anesthesia Evaluation and Assessment Patient: Radha Kelsey MRN: [id number]  SSN: [id number] YOB: 1940  Age: 68 y.o. Sex: male Cardiovascular Function/Vital Signs Visit Vitals /72 Pulse (!) 106 Temp 36.8 °C (98.3 °F) Resp 25 Ht 5' 10\" (1.778 m) Wt 80.9 kg (178 lb 5.6 oz) SpO2 98% BMI 25.59 kg/m² Patient is status post General anesthesia for Procedure(s): DIAGNOSTIC BRONCHOSCOPY. Nausea/Vomiting: None Postoperative hydration reviewed and adequate. Pain: 
Pain Scale 1: Numeric (0 - 10) (06/07/20 1233) Pain Intensity 1: 0 (06/07/20 1233) Managed Neurological Status:  
Neuro (WDL): Within Defined Limits (06/07/20 1117) At baseline Mental Status and Level of Consciousness: Alert and oriented to person, place, and time Pulmonary Status:  
O2 Device: Nasal cannula (06/07/20 1320) Adequate oxygenation and airway patent Complications related to anesthesia: None Post-anesthesia assessment completed. No concerns Signed By: Patricia Dash MD   
 June 7, 2020 Procedure(s): DIAGNOSTIC BRONCHOSCOPY. general 
 
<BSHSIANPOST> INITIAL Post-op Vital signs:  
Vitals Value Taken Time /72 6/7/2020 11:45 AM  
Temp 36.4 °C (97.5 °F) 6/7/2020 11:56 AM  
Pulse 104 6/7/2020 11:56 AM  
Resp 23 6/7/2020 11:56 AM  
SpO2 97 % 6/7/2020 11:56 AM

## 2020-06-07 NOTE — PROGRESS NOTES
[address] [facility] Adult  Hospitalist Group Hospitalist Progress Note Franny Damico MD 
Answering service: [phone] OR [phone] from in house phone Date of Service:  2020 NAME:  Alvin Rios :  1940 MRN:  [id number] This documentation was facilitated by a Voice Recognition software and may contain inadvertent typographical errors. Admission Summary:  
Mr. Cory Chapman is a 56-year-old male with a history of chronic oxygen dependent respiratory failure due to ILD presented to the emergency room with chest pain and swelling around the neck. On work-up in the ED he was found to have pneumomediastinum and he was admitted for further management. Interval history / Subjective:  
I have seen and examined patient at the bedside. Labs, imaging and notes reviewed. Patient returning from bronchoscopy. He notes the swelling around his neck and face has improved. Assessment & Plan:  
 
Severe pneumomediastinum with associated subcutaneous emphysema 
-CT of the chest and the neck showed severe pneumomediastinum, extensive soft tissue and subcu emphysema dissecting throughout all fascial planes of the neck bilaterally as well as the chest wall bilaterally, pulmonary fibrosis but no pneumothorax. -Supportive therapy and supplemental oxygen 
-Barium esophagogram negative 
-He is not septic or toxic. WBC was transiently elevated on admission, currently normal and he is afebrile. 
-Nebulizer treatment 
-Bronchoscopy on  unremarkable 
-Continue observation 
-He follows with pulmonary associates, will consult Dysphagia with GERD-like symptoms 
-Started on diet per 
-Protonix Acute on chronic respiratory failure with hypoxia Underlying ILD 
-Supplemental oxygen and monitor 
-Consult pulmonary associates Hypertension: Patient n.p.o. He is PRN hydralazine with parameters, ordered. DM 2 without hyperglycemia 
-Accu-Cheks along with high-sensitivity Humalog sliding scale coverage every 6 hourly Mild hypo natremia: Give IV fluids as patient is n.p.o. Code status: Full code DVT prophylaxis: SCD Care Plan discussed with: Patient/Family and Nurse Anticipated Disposition: Home w/Family Anticipated Discharge: Greater than 48 hours Hospital Problems  Date Reviewed: 6/7/2020 Codes Class Noted POA Chronic respiratory failure (HCC) ICD-10-CM: J96.10 ICD-9-CM: 518.83  6/6/2020 Unknown Pneumomediastinum ([facility] [address] ICD-10-CM: J98.2 ICD-9-CM: 518.1  6/6/2020 Unknown Interstitial lung disease ([facility] [address] ICD-10-CM: J84.9 ICD-9-CM: 464  6/6/2020 Unknown Chest pain ICD-10-CM: R07.9 ICD-9-CM: 786.50  6/5/2020 Unknown Review of Systems: A comprehensive review of systems was negative except for that written in the HPI. Vital Signs:  
 Last 24hrs VS reviewed since prior progress note. Most recent are: 
Visit Vitals /66 (BP 1 Location: Left arm, BP Patient Position: At rest) Pulse (!) 104 Temp 98.2 °F (36.8 °C) Resp 25 Ht 5' 10\" (1.778 m) Wt 80.9 kg (178 lb 5.6 oz) SpO2 98% BMI 25.59 kg/m² Intake/Output Summary (Last 24 hours) at 6/7/2020 1703 Last data filed at 6/7/2020 1509 Gross per 24 hour Intake 1267.5 ml Output 1345 ml Net -77.5 ml Physical Examination:  
 
 
     
Constitutional:  No acute distress, cooperative, pleasant HEENT:  Cephalad crepitations bilateral necks. Atraumatic. Oral mucosa moist,. Non icteric sclera. No pallor. Resp:  Diffuse leathery crackles throughout the lung field. Patient presently on oxygen via nasal cannula Chest Wall: No deformity CV:  Regular rhythm, normal rate, no murmurs, gallops, rubs GI:  Soft, non distended, non tender. normoactive bowel sounds, no hepatosplenomegaly :  No CVA or suprapubic tenderness Musculoskeletal:  No edema, warm, 2+ pulses throughout Neurologic:  Mental status:AAOx3,  
Cranial nerves II-XII : WNL Motor exam:Moves all extremities symmetrically Data Review:  
 Review and/or order of clinical lab test 
Review and/or order of tests in the radiology section of [facility] Review and/or order of tests in the medicine section of [facility] Labs:  
 
Recent Labs  
  06/07/20 
0441 06/06/20 
7842 WBC 9.4 9.9 HGB 11.6* 11.5* HCT 36.2* 35.4*  
 201 Recent Labs  
  06/07/20 
0441 06/06/20 
0613 06/05/20 
1900 * 134* 134* K 4.0 3.7 4.3  100 98 CO2 29 30 29 BUN 12 13 18 CREA 0.88 0.81 1.10 * 97 173* CA 9.2 8.8 9.3 Recent Labs  
  06/05/20 
1900 LPSE 193 No results for input(s): INR, PTP, APTT, INREXT, INREXT in the last 72 hours. No results for input(s): FE, TIBC, PSAT, FERR in the last 72 hours. No results found for: FOL, RBCF No results for input(s): PH, PCO2, PO2 in the last 72 hours. Recent Labs  
  06/05/20 1900 TROIQ <0.05 Lab Results Component Value Date/Time Cholesterol, total 129 07/30/2019 11:46 AM  
 HDL Cholesterol 45 07/30/2019 11:46 AM  
 LDL, calculated 52 07/30/2019 11:46 AM  
 Triglyceride 162 (H) 07/30/2019 11:46 AM  
 CHOL/HDL Ratio 3.4 09/29/2010 08:28 AM  
 
Lab Results Component Value Date/Time Glucose (POC) 136 (H) 06/07/2020 12:23 PM  
 Glucose (POC) 121 (H) 06/07/2020 06:40 AM  
 Glucose (POC) 114 (H) 06/07/2020 12:51 AM  
 Glucose (POC) 107 (H) 06/06/2020 09:56 PM  
 Glucose (POC) 111 (H) 06/06/2020 07:14 PM  
 
Lab Results Component Value Date/Time  Color YELLOW/STRAW 02/28/2016 10:54 PM  
 Appearance CLEAR 02/28/2016 10:54 PM  
 Specific gravity 1.012 02/28/2016 10:54 PM  
 pH (UA) 5.5 02/28/2016 10:54 PM  
 Protein NEGATIVE  02/28/2016 10:54 PM  
 Glucose NEGATIVE  02/28/2016 10:54 PM  
 Ketone NEGATIVE  02/28/2016 10:54 PM  
 Bilirubin NEGATIVE  02/28/2016 10:54 PM  
 Urobilinogen 0.2 02/28/2016 10:54 PM  
 Nitrites NEGATIVE  02/28/2016 10:54 PM  
 Leukocyte Esterase NEGATIVE  02/28/2016 10:54 PM  
 Epithelial cells FEW 02/28/2016 10:54 PM  
 Bacteria NEGATIVE  02/28/2016 10:54 PM  
 WBC 0-4 02/28/2016 10:54 PM  
 RBC 0-5 02/28/2016 10:54 PM  
 
 
 
Medications Reviewed:  
 
Current Facility-Administered Medications Medication Dose Route Frequency  albuterol-ipratropium (DUO-NEB) 2.5 MG-0.5 MG/3 ML  3 mL Nebulization Q6H RT  
 [START ON 6/8/2020] atorvastatin (LIPITOR) tablet 80 mg  80 mg Oral DAILY  benzonatate (TESSALON) capsule 200 mg  200 mg Oral Q12H PRN  
 buPROPion SR (WELLBUTRIN SR) tablet 150 mg  150 mg Oral BID  [START ON 6/8/2020] ezetimibe (ZETIA) tablet 10 mg  10 mg Oral 7am  
 metoprolol tartrate (LOPRESSOR) tablet 25 mg  25 mg Oral BID  montelukast (SINGULAIR) tablet 10 mg  10 mg Oral PCD  [START ON 6/8/2020] therapeutic multivitamin (THERAGRAN) tablet 1 Tab  1 Tab Oral DAILY  [START ON 6/8/2020] oxybutynin (DITROPAN) tablet 5 mg  5 mg Oral DAILY  sucralfate (CARAFATE) tablet 1 g  1 g Oral QID PRN  
 tamsulosin (FLOMAX) capsule 0.4 mg  0.4 mg Oral PCD  labetaloL (NORMODYNE;TRANDATE) injection 10 mg  10 mg IntraVENous Q6H PRN  
 hydrALAZINE (APRESOLINE) 20 mg/mL injection 10 mg  10 mg IntraVENous Q6H PRN  pantoprazole (PROTONIX) 40 mg in 0.9% sodium chloride 10 mL injection  40 mg IntraVENous Q12H  
 insulin lispro (HUMALOG) injection   SubCUTAneous Q6H  
 arformoterol 15 mcg/budesonide 0.5 mg neb solution   Nebulization BID RT  
 sodium chloride (NS) flush 5-40 mL  5-40 mL IntraVENous Q8H  
 sodium chloride (NS) flush 5-40 mL  5-40 mL IntraVENous PRN  
 glucose chewable tablet 16 g  4 Tab Oral PRN  
 glucagon (GLUCAGEN) injection 1 mg  1 mg IntraMUSCular PRN  
 dextrose 10% infusion 0-250 mL  0-250 mL IntraVENous PRN  
 
______________________________________________________________________ EXPECTED LENGTH OF STAY: - - - 
 ACTUAL LENGTH OF STAY:          1 Dunia Plasencia MD

## 2020-06-07 NOTE — PROGRESS NOTES
2000: Bedside and Verbal shift change report given to Rita Glover RN (oncoming nurse) by Sabina Brown RN (offgoing nurse). Report included the following information SBAR, ED Summary, Procedure Summary, Intake/Output, MAR, Recent Results, Cardiac Rhythm SR-ST and Alarm Parameters . 2245: Pt states he feels his \"heart racing. \"  , . PRN labetalol given at this time. 0800: Bedside and Verbal shift change report given to Sabina Brown RN (oncoming nurse) by Rita Glover RN (offgoing nurse). Report included the following information SBAR, ED Summary, Procedure Summary, Intake/Output, MAR, Recent Results, Cardiac Rhythm SR-ST and Alarm Parameters .

## 2020-06-07 NOTE — ANESTHESIA PREPROCEDURE EVALUATION
Anesthetic History No history of anesthetic complications Review of Systems / Medical History Patient summary reviewed, nursing notes reviewed and pertinent labs reviewed Pulmonary COPD (Pulm fibrosis): moderate Asthma Comments: Stable Chronic Pulmonary Fibrosis on CXR 2-20-17 O2 3-4L continuously Neuro/Psych Psychiatric history (Depression) Cardiovascular Hypertension Dysrhythmias : atrial fibrillation CAD (Fair daily activity), CABG (3 vessel in 2003) and hyperlipidemia Exercise tolerance: >4 METS Comments: Small AAA 
 
2-2016 EKG: NSR, LAE  
GI/Hepatic/Renal 
  
GERD: well controlled Comments: GERD, Dysphagia Hx of sigmoid colectomy for volvulus Hx of hemorrhoids Endo/Other Arthritis Other Findings Comments: BPH Testosterone defic Physical Exam 
 
Airway Mallampati: II 
TM Distance: > 6 cm Neck ROM: normal range of motion Mouth opening: Normal 
 
 Cardiovascular Regular rate and rhythm,  S1 and S2 normal,  no murmur, click, rub, or gallop Rhythm: regular Rate: normal 
 
 
 
 Dental 
 
Dentition: Bridges and Caps/crowns Pulmonary Breath sounds clear to auscultation Abdominal 
GI exam deferred Other Findings Anesthetic Plan ASA: 3 Anesthesia type: general 
 
 
 
 
Induction: Intravenous Anesthetic plan and risks discussed with: Patient

## 2020-06-07 NOTE — OP NOTES
[address] [facility] 
OPERATIVE REPORT Name:  Jeaneth Rich 
MR#:  [id number] :  1940 ACCOUNT #:  [de-identified] DATE OF SERVICE:  2020 CLINICAL SERVICE:  Thoracic Surgery. ATTENDING SURGEON:  Tyler Candelaria MD 
 
OPERATION PERFORMED:  Diagnostic flexible bronchoscopy. PREOPERATIVE DIAGNOSES: 
1. Pneumomediastinum. 2.  Extensive subcutaneous emphysema. 3.  Interstitial lung disease with chronic hypoxic respiratory failure. POSTOPERATIVE DIAGNOSES: 
1. Pneumomediastinum. 2.  Subcutaneous emphysema. 3.  Tracheomalacia. 4.  Interstitial lung disease with chronic hypoxic respiratory failure. DRAINS AND TUBES:  None. SPECIMENS SENT:  None. ANESTHESIA:  General with LMA. ESTIMATED BLOOD LOSS:  For this case was minimal. 
 
INDICATIONS FOR PROCEDURE:  The patient is a 70-year-old gentleman with a past medical history significant for interstitial lung disease on home oxygen who was admitted via the ER with extensive pneumomediastinum and subcutaneous emphysema. Esophagram was negative for esophageal injury and the patient had persistent crepitus and subcutaneous emphysema and the decision was made to proceed to the operating room for bronchoscopy to ensure there was no tracheal or airway injury as well as to possibly perform bilateral ram slits. PROCEDURE IN DETAIL:  After informed consent was obtained and placed on the chart, the patient was taken to the operating room and placed supine on the operating table. General anesthesia with LMA intubation was induced without complication. Preoperative antibiotics were not indicated. Time-out was performed. Bronchoscope was introduced through the LMA. The cords were visualized and were normal in appearance. The bronchoscope was easily advanced past the cords. The main trachea had no apparent injuries. There were no lacerations, no stigmata of bleeding.   There was also no deformity of the anterior tracheal rings. It was noted, however, that there was fairly significant tracheomalacia with the posterior membranous trachea collapsing and almost kissing anteriorly to the tracheal rings. The kenisha was fairly normal in appearance. The right tracheobronchial tree again and the right mainstem bronchus membranous portion of the bronchus showed significant tracheomalacia. The right lower lobe bronchus, right middle lobe bronchus, and right upper lobe bronchus were all intubated and inspected. There was no evidence of injury, stigmata of bleeding. The scope was then advanced into the left tracheobronchial tree. Again, the left mainstem bronchus showed evidence of tracheomalacia. The left upper, left lingular, and left lower lobe bronchus were all intubated and again, there was no evidence of tracheal injury, stigmata of bleeding, or other abnormalities beyond the tracheomalacia noted. There was no purulence or erythema or mucus noted in any portion of the tracheobronchial tree. The bronchoscope was slowly withdrawn, again inspecting the main trachea for any evidence of injury, which was not identified. The bronchoscope was then completely withdrawn. The patient was then examined again. The amount of subcutaneous emphysema overlying his chest and neck appeared to be less than yesterday and it was not felt that he needed ram slits to relieve this at this time. The patient was then reversed from general anesthesia, extubated and taken to PACU in stable condition. All surgical counts were correct x2 at the end of the case. There were no immediate complications identified during this case. Dr. Jessica Harvey was present and scrubbed throughout the entire procedure. Reuben Romberg, MD 
 
 
[id number] 
D:  06/07/2020 11:22 
T:  06/07/2020 14:53 JOB #:  [id number]

## 2020-06-07 NOTE — ROUTINE PROCESS
TRANSFER - OUT REPORT: 
 
Verbal report given to Sabina Brown RN(name) on Renny Douglas  being transferred to (unit) for routine post - op Report consisted of patients Situation, Background, Assessment and  
Recommendations(SBAR). Time Pre op antibiotic given:n/a Anesthesia Stop time: 1117 Duffy Present on Transfer to floor:no Order for Duffy on Chart:no Discharge Prescriptions with Chart:no Information from the following report(s) SBAR, Procedure Summary, Intake/Output and MAR was reviewed with the receiving nurse. Opportunity for questions and clarification was provided. Is the patient on 02? YES 
     L/Min 4 Is the patient on a monitor? YES Is the nurse transporting with the patient? YES Surgical Waiting Area notified of patient's transfer from PACU? NO The following personal items collected during your admission accompanied patient upon transfer:  
Dental Appliance: Dental Appliances: (glasses with patient) Vision: Visual Aid: Glasses, With patient Hearing Aid:   
Jewelry:   
Clothing:   
Other Valuables: Other Valuables: Stanford Gong, Cell Phone Valuables sent to safe:

## 2020-06-07 NOTE — PROGRESS NOTES
Problem: Falls - Risk of 
Goal: *Absence of Falls Description: Document Avinash Maravilla Fall Risk and appropriate interventions in the flowsheet. Outcome: Progressing Towards Goal 
Note: Fall Risk Interventions: 
  
 
  
 
Medication Interventions: Evaluate medications/consider consulting pharmacy Problem: Patient Education: Go to Patient Education Activity Goal: Patient/Family Education Outcome: Progressing Towards Goal 
  
Problem: Pressure Injury - Risk of 
Goal: *Prevention of pressure injury Description: Document Hernan Scale and appropriate interventions in the flowsheet. Outcome: Progressing Towards Goal 
Note: Pressure Injury Interventions: 
  
 
  
 
  
 
Mobility Interventions: HOB 30 degrees or less Nutrition Interventions: Document food/fluid/supplement intake

## 2020-06-07 NOTE — PROGRESS NOTES
Bedside and Verbal shift change report given to Mami Stratton (oncoming nurse) by Paloma Rehman (offgoing nurse). Report included the following information SBAR, Kardex, Intake/Output, MAR and Cardiac Rhythm ST.

## 2020-06-08 NOTE — DISCHARGE SUMMARY
Discharge Summary PATIENT ID: Shania Triplett MRN: [id number] YOB: 1940 DATE OF ADMISSION: 2020  7:31 PM   
DATE OF DISCHARGE: 20 PRIMARY CARE PROVIDER: Rosy Norris MD  
 
ATTENDING PHYSICIAN: Gail Del Rosario MD 
 
DISCHARGING PROVIDER: Gail Del Rosario MD   
To contact this individual call [phone] and ask the  to page. If unavailable ask to be transferred the Adult Hospitalist Department. CONSULTATIONS: IP CONSULT TO THORACIC SURGERY 
IP CONSULT TO PULMONOLOGY PROCEDURES/SURGERIES: Procedure(s): DIAGNOSTIC BRONCHOSCOPY 
 
ADMITTING [address] [place] [address] COURSE:  
Mr. Halima Bright is a 70-year-old male with a history of chronic oxygen dependent respiratory failure due to ILD presented to the emergency room with chest pain and swelling around the neck. On work-up in the ED he was found to have pneumomediastinum and he was admitted for further management. DISCHARGE DIAGNOSES / PLAN:   
Severe pneumomediastinum with associated subcutaneous emphysema likely related to his ILD 
-CT of the chest and the neck showed severe pneumomediastinum, extensive soft tissue and subcu emphysema dissecting throughout all fascial planes of the neck bilaterally as well as the chest wall bilaterally, pulmonary fibrosis but no pneumothorax. -Barium esophagogram and bronchoscopy without evidence of leak. Patient clinically improved, almost weaned off oxygen, he is on home oxygen anyway. Thoracic surgeon pulmonary agreed with discharge plan and follow-up with pulmonology. Patient is advised by thoracic surgery not to wear his CPAP for at least a week 
  Dysphagia with GERD-like symptoms 
-Barium swallow negative, patient tolerated regular diet without difficulty. 
  
Acute on chronic respiratory failure with hypoxia Underlying ILD 
-Supplemental oxygen and monitor 
-Consult pulmonary associates -On home CPAP. During my rounds prior to discharge today, patient reiterated that thoracic surgery advised him not use the CPAP for at least a week. 
  
Hypertension: Patient n.p.o. He is PRN hydralazine with parameters, ordered. 
  
DM 2 without hyperglycemia 
-Accu-Cheks along with high-sensitivity Humalog sliding scale coverage every 6 hourly 
  
Mild hypo natremia: Stable PENDING TEST RESULTS:  
At the time of discharge the following test results are still pending: None FOLLOW UP APPOINTMENTS:   
Follow-up Information Follow up With Specialties Details Why Contact Info Julio Castro MD Internal Medicine   [address] [facility] [address] [facility] 
[phone] Cherie Farrell MD Pulmonary Disease  Call office to schedule appointment [address] [facility] 
[phone] DIET: Regular Diet and Cardiac Diet ACTIVITY: Activity as tolerated DISCHARGE MEDICATIONS: 
Current Discharge Medication List  
  
CONTINUE these medications which have NOT CHANGED Details  
omeprazole (PRILOSEC) 40 mg capsule TAKE ONE CAPSULE BY MOUTH TWICE A DAY Qty: 180 Cap, Refills: 0  
  
buPROPion XL (WELLBUTRIN XL) 300 mg XL tablet TAKE 1 TABLET EVERY MORNING Qty: 90 Tab, Refills: 1  
  
tamsulosin (FLOMAX) 0.4 mg capsule TAKE 1 CAPSULE EVERY NIGHT Qty: 90 Cap, Refills: 0  
  
ketoconazole (NIZORAL) 2 % topical cream APPLY TO AFFECTED AREA(S) DAILY. USE ON SKIN LESIONS EVERY COUPLE OF DAYS AS DIRECTED. Qty: 1 Tube, Refills: 0  
  
!! budesonide-formoterol (SYMBICORT) 160-4.5 mcg/actuation HFAA Take 2 Puffs by inhalation two (2) times a day. Azelastine (ASTEPRO) 0.15 % (205.5 mcg) nasal spray   
  
ketoconazole (NIZORAL) 2 % shampoo Apply  to affected area two (2) times a week. Refills: 0  
  
atorvastatin (LIPITOR) 80 mg tablet Take 80 mg by mouth daily. montelukast (SINGULAIR) 10 mg tablet Take 1 Tab by mouth daily (after dinner). Qty: 90 Tab, Refills: 3  
  
multivitamin (ONE-A-DAY MENS) tablet Take 1 Tab by mouth daily. metoprolol (LOPRESSOR) 25 mg tablet Take 25 mg by mouth two (2) times a day. PT INSTRUCTED TO TAKE AM DOS PER ANESTHESIA PROTOCOL  
  
ezetimibe (ZETIA) 10 mg tablet Take 10 mg by mouth every morning. metFORMIN ER (GLUCOPHAGE XR) 500 mg tablet Take 1 Tab by mouth daily (with dinner). Qty: 30 Tab, Refills: 1  
  
cefdinir (OMNICEF) 300 mg capsule   
  
oxybutynin (DITROPAN) 5 mg tablet Take 5 mg by mouth daily. benzonatate (TESSALON) 200 mg capsule Take 200 mg by mouth as needed for Cough. sucralfate (CARAFATE) 1 gram tablet Take 1 g by mouth as needed for Other (stomach pain). !! BUDESONIDE/FORMOTEROL FUMARATE (SYMBICORT IN) Take 3 Puffs by inhalation two (2) times a day. albuterol (ACCUNEB) 1.25 mg/3 mL nebu 3 mL by Nebulization route every six (6) hours as needed. Qty: 1 Each, Refills: 1  
  
finasteride (PROSCAR) 5 mg tablet TAKE 1 TABLET EVERY DAY Qty: 90 tablet, Refills: 2 NEBULIZER by Does Not Apply route. nitroglycerin (NITROQUICK) 0.4 mg SL tablet 1 Tab by SubLINGual route as needed. Qty: 25 Tab, Refills: prn  
  
albuterol (PROVENTIL HFA, VENTOLIN HFA) 90 mcg/Actuation inhaler Take 2 Puffs by inhalation every four (4) hours as needed for Wheezing. Qty: 1 Inhaler, Refills: 2 Associated Diagnoses: Asthma with bronchitis ! ! - Potential duplicate medications found. Please discuss with provider. NOTIFY YOUR PHYSICIAN FOR ANY OF THE FOLLOWING:  
Fever over 101 degrees for 24 hours. Chest pain, shortness of breath, fever, chills, nausea, vomiting, diarrhea, change in mentation, falling, weakness, bleeding. Severe pain or pain not relieved by medications. Or, any other signs or symptoms that you may have questions about.  
 
DISPOSITION: 
X Home With: 
 OT  PT  [facility]  RN  
  
 Long term SNF/Inpatient Rehab Independent/assisted living Hospice Other:  
 
 
PATIENT CONDITION AT DISCHARGE:  
 
Functional status Poor Deconditioned X Independent Cognition X Lucid Forgetful Dementia Catheters/lines (plus indication) Duffy PICC   
 PEG   
X None Code status X Full code DNR   
 
PHYSICAL EXAMINATION AT DISCHARGE: 
General:          Alert, cooperative, no distress, appears stated age. HEENT:           Atraumatic, anicteric sclerae, pink conjunctivae No oral ulcers, mucosa moist, throat clear, dentition fair Neck:                Crepitations on both sides due to subcutaneous emphysema Lungs:              Diffuse crackles throughout the lung field Chest wall:      No tenderness  No Accessory muscle use. Heart:              Regular  rhythm,  No  murmur   No edema Abdomen:        Soft, non-tender. Not distended. Bowel sounds normal 
Extremities:     No cyanosis. No clubbing,   
                        Skin turgor normal, Capillary refill normal 
Skin:                Not pale. Not Jaundiced  No rashes Psych:             Not anxious or agitated. Neurologic:      Alert, moves all extremities, answers questions appropriately and responds to commands CHRONIC MEDICAL DIAGNOSES: 
Problem List as of 6/8/2020 Date Reviewed: 6/7/2020 Codes Class Noted - Resolved Chronic respiratory failure (HCC) ICD-10-CM: J96.10 ICD-9-CM: 518.83  6/6/2020 - Present Pneumomediastinum ([facility] ICD-10-CM: J98.2 ICD-9-CM: 518.1  6/6/2020 - Present Interstitial lung disease ([facility] ICD-10-CM: J84.9 ICD-9-CM: 123  6/6/2020 - Present Chest pain ICD-10-CM: R07.9 ICD-9-CM: 786.50  6/5/2020 - Present Controlled type 2 diabetes mellitus without complication, without long-term current use of insulin ([facility] ICD-10-CM: E11.9 ICD-9-CM: 250.00  2/28/2020 - Present SEAN (obstructive sleep apnea) ICD-10-CM: E17.57 
ICD-9-CM: 327.23  3/15/2019 - Present Interstitial pulmonary fibrosis (HCC) ICD-10-CM: J84.10 ICD-9-CM: 045  10/9/2013 - Present Overview Signed 10/9/2013  2:08 PM by Sharan Rivera MD  
  Sees dr Yan Crandall, sees him q6 months for pft. 
  
  
   
 HTN (hypertension) ICD-10-CM: I10 
ICD-9-CM: 401.9  10/9/2013 - Present A-fib [facility]) ICD-10-CM: I48.91 
ICD-9-CM: 427.31  10/9/2013 - Present AAA (abdominal aortic aneurysm) ([facility] ICD-10-CM: I71.4 ICD-9-CM: 441.4  10/9/2013 - Present Benign prostatic hyperplasia ICD-10-CM: N40.0 ICD-9-CM: 600.00  Unknown - Present GERD (gastroesophageal reflux disease) ICD-10-CM: K21.9 ICD-9-CM: 530.81  Unknown - Present CAD (coronary artery disease) ICD-10-CM: I25.10 ICD-9-CM: 414.00  Unknown - Present Overview Addendum 10/9/2013  2:09 PM by MD Dr. Nory Garcia H/o cabg in 2003, 3V 
  
  
   
 S/P CABG (coronary artery bypass graft) ICD-10-CM: Z95.1 ICD-9-CM: V45.81  6/20/2011 - Present Overview Signed 6/20/2011  7:51 AM by Sulaiman Wilson MD  
  In 2003 Testosterone deficiency ICD-10-CM: E34.9 ICD-9-CM: 259.9  3/24/2011 - Present Hypercholesterolemia ICD-10-CM: E78.00 ICD-9-CM: 272.0  9/29/2010 - Present Depression (Chronic) ICD-10-CM: F32.9 ICD-9-CM: 824  3/1/2010 - Present Overview Signed 3/1/2010  4:13 PM by Nohelia Briseno On meds [id number] and [id number] Arthritis shoulders and spine (Chronic) ICD-10-CM: M19.90 ICD-9-CM: 716.90  3/1/2010 - Present Overview Signed 3/1/2010  4:18 PM by Nohelia Briseno Diagnosed 2006 RESOLVED: IFG (impaired fasting glucose) ICD-10-CM: R73.01 
ICD-9-CM: 790.21  10/28/2019 - 2/28/2020 RESOLVED: Pneumonia ICD-10-CM: J18.9 ICD-9-CM: 410  2/28/2016 - 2/28/2020 RESOLVED: Hyperglycemia ICD-10-CM: R73.9 ICD-9-CM: 790.29  2/28/2016 - 10/28/2019 RESOLVED: ACP (advance care planning) ICD-10-CM: Z71.89 ICD-9-CM: V65.49  10/12/2015 - 10/28/2019 Overview Signed 10/12/2015 12:25 PM by Phoebe Roland MD  
  ACP planning begun today, SDM is. Wife Marisela Fan RESOLVED: ILD (interstitial lung disease) ([facility] ICD-10-CM: J84.9 ICD-9-CM: 405  6/12/2014 - 10/28/2019 Overview Signed 6/12/2014 10:10 PM by Vicki Diallo Dx by pulm associates dr Shar Puente RESOLVED: BPH (benign prostatic hyperplasia) ICD-10-CM: N40.0 ICD-9-CM: 600.00  10/9/2013 - 10/28/2019 RESOLVED: Volvulus of sigmoid colon (HCC) ICD-10-CM: M87.2 ICD-9-CM: 560.2  6/20/2011 - 9/19/2011 Overview Signed 6/20/2011  7:53 AM by Marilyn King MD  
  Colon surgery 2005 RESOLVED: Degenerative arthritis of thumb ICD-10-CM: M18.10 ICD-9-CM: 715.34  6/20/2011 - 10/28/2019 RESOLVED: Chronic bronchitis ([facility] ICD-10-CM: P09 ICD-9-CM: 491.9  3/1/2010 - 10/28/2019 Overview Signed 3/1/2010  4:11 PM by Wendy Meier Ages 2-8 ([id number]) RESOLVED: Asthma (Chronic) ICD-10-CM: J45.909 ICD-9-CM: 493.90  3/1/2010 - 10/28/2019 Overview Signed 3/1/2010  4:12 PM by Wendy Meier Since 1975 Greater than 30 minutes were spent with the patient on counseling and coordination of care Signed:   
Lulu Sinclair MD 
6/8/2020 
3:36 PM

## 2020-06-08 NOTE — PROGRESS NOTES
Thoracic Surgery Simple Progress Note Admit Date: 2020 POD: 1 Day Post-Op Procedure:  Procedure(s): DIAGNOSTIC BRONCHOSCOPY Subjective:  
 
Patient has complaints: No significant medical complaints Wants more to eat and is concerned about the amount of sugar in the OJ and jello he got on the clear liquid diet Review of Systems: 
 
CARDIAC: positive for H/O HTN, CAD s/p CABGs, A-fib RESP: positive for ILD and sleep apnea NEURO:  negative EXT: Denies new swelling or pain in the legs or calves. Objective:  
 
Blood pressure 169/84, pulse 85, temperature 98.3 °F (36.8 °C), resp. rate 24, height 5' 10\" (1.778 m), weight 175 lb 14.8 oz (79.8 kg), SpO2 97 %. Temp (24hrs), Av.2 °F (36.8 °C), Min:97.5 °F (36.4 °C), Max:98.7 °F (37.1 °C) Hemodynamics PAP 
  CO 
  CI No intake/output data recorded.  1901 -  0700 In: 1267.5 [I.V.:1267.5] Out:  [YVERQ:1935] EXAM: 
GENERAL: VSS, afrible, alert and cooperative HEART:  regular rate and rhythm LUNG: clear to auscultation bilaterally NEURO:  normal without focal findings 
mental status, speech normal, alert and oriented x iii EXTREMITIES:No evidence of DVT seen on physical exam. 
GI/: Abd soft, nonterder with + bowel sounds. Voiding Labs: 
Recent Results (from the past 24 hour(s)) GLUCOSE, POC Collection Time: 20 12:23 PM  
Result Value Ref Range Glucose (POC) 136 (H) 65 - 100 mg/dL Performed by Jayden Starr CULTURE, RESPIRATORY/SPUTUM/BRONCH W GRAM STAIN Collection Time: 20 12:45 PM  
Result Value Ref Range Special Requests: NO SPECIAL REQUESTS    
 GRAM STAIN FEW WBCS SEEN    
 GRAM STAIN OCCASIONAL EPITHELIAL CELLS SEEN    
 GRAM STAIN FEW GRAM POSITIVE COCCI IN PAIRS    
 GRAM STAIN OCCASIONAL GRAM NEGATIVE RODS Culture result: PENDING   
GLUCOSE, POC Collection Time: 20  5:45 PM  
Result Value Ref Range Glucose (POC) 245 (H) 65 - 100 mg/dL Performed by Shaun Caulfield GLUCOSE, POC Collection Time: 06/07/20 11:21 PM  
Result Value Ref Range Glucose (POC) 119 (H) 65 - 100 mg/dL Performed by Kishor Purcell GLUCOSE, POC Collection Time: 06/08/20  6:19 AM  
Result Value Ref Range Glucose (POC) 116 (H) 65 - 100 mg/dL Performed by Kishor Purcell Assessment:  
No evidence of DVT. Active Problems: 
  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  
Advance diet as tolerated as his swallow study was negative for perforation See orders Signed By: Gus COOLEY

## 2020-06-08 NOTE — PROGRESS NOTES
Problem: Falls - Risk of 
Goal: *Absence of Falls Description: Document Elvinjalil Cloviskandice Fall Risk and appropriate interventions in the flowsheet. Outcome: Progressing Towards Goal 
Note: Fall Risk Interventions: 
  
 
  
 
Medication Interventions: Teach patient to arise slowly Problem: Patient Education: Go to Patient Education Activity Goal: Patient/Family Education Outcome: Progressing Towards Goal 
  
Problem: Pressure Injury - Risk of 
Goal: *Prevention of pressure injury Description: Document Hernan Scale and appropriate interventions in the flowsheet. Outcome: Progressing Towards Goal 
Note: Pressure Injury Interventions: Activity Interventions: Increase time out of bed Mobility Interventions: Pressure redistribution bed/mattress (bed type) Nutrition Interventions: Document food/fluid/supplement intake

## 2020-06-08 NOTE — DISCHARGE INSTRUCTIONS
Discharge Instructions       PATIENT ID: Stephie Curry  MRN: [id number]   YOB: 1940    DATE OF ADMISSION: 6/5/2020  7:31 PM    DATE OF DISCHARGE: 6/8/2020    PRIMARY CARE PROVIDER: Noe Dorsey MD     ATTENDING PHYSICIAN: Basil Hurtado MD  DISCHARGING PROVIDER: Lyly Martini MD    To contact this individual call [phone] and ask the  to page. If unavailable ask to be transferred the Adult Hospitalist Department. DISCHARGE DIAGNOSES and CARE RECOMMENDATIONS:     Spontaneous pneumomediastinum and subcutaneous emphysema. There is no obvious reason, barium swallow is a focus and bronchoscopy did not show evidence of air leakage. There is probably related to the underlying interstitial lung disease as spontaneous pneumomediastinum he is known to occur in patients with interstitial lung disease. You are already feeling better and the emphysema is decreasing. Thoracic surgeon and pulmonary  okay for you to discharge. Please call Dr. Blanca Joel office to schedule an appointment soon as possible. If you experience difficulty breathing, chest tightness dropping her oxygenation, call 9 on the to the emergency room.         DIET: Regular Diet and Cardiac Diet      ACTIVITY: Activity as tolerated      PENDING TEST RESULTS:   At the time of discharge the following test results are still pending: None    FOLLOW UP APPOINTMENTS:   Follow-up Information     Follow up With Specialties Details Why Contact Marbella Kowalski MD Internal Medicine   [address]  [address] [place] [address] Pky      Gary Rodriguez MD Pulmonary Disease  Call office to schedule appointment [address]  [address]  [facility]  [phone]               YOU WERE SEEN BY THE FOLLOWING CONSULTATIONS:   IP CONSULT TO THORACIC SURGERY  IP CONSULT TO PULMONOLOGY    YOU HAD THE FOLLOWING PROCEDURES/SURGERIES  :   Procedure(s):  DIAGNOSTIC BRONCHOSCOPY              DISCHARGE MEDICATIONS:   See Medication Reconciliation Form    · It is important that you take the medication exactly as they are prescribed. · Keep your medication in the bottles provided by the pharmacist and keep a list of the medication names, dosages, and times to be taken in your wallet. · Do not take other medications without consulting your doctor. NOTIFY YOUR PHYSICIAN FOR ANY OF THE FOLLOWING:   Fever over 101 degrees for 24 hours. Chest pain, shortness of breath, fever, chills, nausea, vomiting, diarrhea, change in mentation, falling, weakness, bleeding. Severe pain or pain not relieved by medications. Or, any other signs or symptoms that you may have questions about. Signed:    Franny Damico MD  6/8/2020  3:19 PM

## 2020-06-08 NOTE — PROGRESS NOTES
ALEJANDRA PLAN: 
 
RUR 12% Patient was admitted from home and will be discharged home in care of his wife. Patient's wife will provide transportation. Reason for Admission:   Chest pain, diagnostic bronchoscopy RUR Score:     12% Plan for utilizing home health:   No indicated PCP: First and Last name:  Cheryle London Name of Practice: [facility] Are you a current patient: Yes Approximate date of last visit: 6/3/20. Virtual Visit Can you participate in a virtual visit with your PCP: Yes Current Advanced Directive/Advance Care Plan: Yes, AMD is in the chart Transition of Care Plan:  CM met with patient to discuss discharge planning. Patient lives with his wife in their private residence. He is independent without any assistive devices. Patient has home oxygen through [address] and uses O2 at 3-6 L via [place]. Patient's wife will provide transportation home and he did not voice any discharge barriers. CM will continue to follow as needed. Lore Rivera MSA, RN, CRM. Care Management Interventions PCP Verified by CM: Yes 
Palliative Care Criteria Met (RRAT>21 & CHF Dx)?: No 
Mode of Transport at Discharge: Other (see comment)(Private car) Transition of Care Consult (CM Consult): Discharge Planning MyChart Signup: No 
Discharge Durable Medical Equipment: No 
Health Maintenance Reviewed: Yes Physical Therapy Consult: No 
Occupational Therapy Consult: No 
Speech Therapy Consult: No 
Current Support Network: Lives with Spouse Confirm Follow Up Transport: Family Discharge Location Discharge Placement: Home with family assistance

## 2020-06-08 NOTE — ROUTINE PROCESS
Bedside and Verbal shift change report given to Nadya Dyson (oncoming nurse) by Elias Li (offgoing nurse). Report included the following information SBAR, ED Summary, Intake/Output, MAR and Cardiac Rhythm ST.

## 2020-06-08 NOTE — PROGRESS NOTES
Bedside shift change report given to Kayode Barron (oncoming nurse) by Bhanu Barrett (offgoing nurse). Report included the following information SBAR, Kardex, Intake/Output, MAR and Cardiac Rhythm NSR; sinus tach.

## 2020-06-08 NOTE — PROGRESS NOTES
Problem: Falls - Risk of 
Goal: *Absence of Falls Description: Document Starleen Freeze Fall Risk and appropriate interventions in the flowsheet. Outcome: Progressing Towards Goal 
Note: Fall Risk Interventions: 
  
 
  
 
Medication Interventions: Patient to call before getting OOB Problem: Patient Education: Go to Patient Education Activity Goal: Patient/Family Education Outcome: Progressing Towards Goal 
  
Problem: Pressure Injury - Risk of 
Goal: *Prevention of pressure injury Description: Document Hernan Scale and appropriate interventions in the flowsheet. Outcome: Progressing Towards Goal 
Note: Pressure Injury Interventions: Activity Interventions: Increase time out of bed Mobility Interventions: HOB 30 degrees or less Nutrition Interventions: Document food/fluid/supplement intake Problem: Discharge Planning Goal: *Discharge to safe environment Outcome: Progressing Towards Goal 
  
Problem: General Medical Care Plan Goal: *Vital signs within specified parameters Outcome: Progressing Towards Goal 
Goal: *Labs within defined limits Outcome: Progressing Towards Goal 
Goal: *Absence of infection signs and symptoms Outcome: Progressing Towards Goal 
Goal: *Optimal pain control at patient's stated goal 
Outcome: Progressing Towards Goal 
Goal: *Skin integrity maintained Outcome: Progressing Towards Goal 
Goal: *Fluid volume balance Outcome: Progressing Towards Goal 
Goal: *Optimize nutritional status Outcome: Progressing Towards Goal 
Goal: *Anxiety reduced or absent Outcome: Progressing Towards Goal 
Goal: *Progressive mobility and function (eg: ADL's) Outcome: Progressing Towards Goal 
  
Problem: Patient Education: Go to Patient Education Activity Goal: Patient/Family Education Outcome: Progressing Towards Goal

## 2020-06-08 NOTE — TELEPHONE ENCOUNTER
Spoke to [employer] (HIPAA). Two pt identifiers confirmed. Maximilian Forth providing an update on pt. States that Friday evening, pt's throat swelled and pt \"looked lik a bull-frog\", went to ED and got admitted. Maximilian Forth unsure when he will be d/c'd.  Maximilian Morris states that she will call his Pulm for a f/u. Maximilian Morris states that she will call our office to provide an update and see if Dr. Kerri Metcalf also needs to see him--has appt 6/24/20 1978. Maximilian Morris informed Dr. Kerri Metcalf will be notified. Maximilian Morris verbalized understanding of information discussed w/ no further questions at this time.

## 2020-06-08 NOTE — CONSULTS
PULMONARY MEDICINE Initial Physician Consultation Note Name: Nilam Barrera : 1940 MRN: [id number] Date: 2020 Subjective:  
Consult Note: 2020 Requesting Physician: Dr. Dahlia Vides Reason for consult: Pneumothorax Medical records and data reviewed. Patient is a 68 y.o. male who presented to the hospital on Friday with 2-3 days of vague retrosternal and epigastric discomfort and subsequently neck and upper chest swelling. Retrosternal discomfort started in the middle of the night and did not respond to use of antacids. He presented to the ER and was found to have severe pneumomediastinum and SQ emphysema. He was seen by thoracic surgery and underwent inspection bronchoscopy and no abnormality was found. Barium swallow was negative for perforation. Patient denies coughing spells or trauma. Patient has underlying ILD (UIP) and is followed on a regular basis by Dr. Janes Preston. Home medications listed do not mention antifibrotic therapy. He is on 2-3 L of home O2 at baseline SQ emphysema is slowly resolving and patient feels better. He wants to go home. Review of Systems: A comprehensive 12 system review of systems was negative except for as documented in HPI Assessment:  
Acute on chronic hypoxic pulmonary insufficiency Spontaneous pneumomediastinum and SQ emphysema (unprovoked) Underlying ILD/UIP followed by Dr. Chey Easton Other medical problems per chart Recommendations: On O2 Clinically improving with resolving SQ emphysema and pneumomediastinum Feels better and appears clinically stable-- can be discharged from pulm standpoint, we will arrange close follow up with Dr. Chey Easton D/W patient and RN Active Problem List:  
 
Problem List  Date Reviewed: 2020 Codes Class Chronic respiratory failure (HCC) ICD-10-CM: J96.10 ICD-9-CM: 518.83 Pneumomediastinum ([address] ICD-10-CM: J98.2 ICD-9-CM: 518.1 Interstitial lung disease ([facility] ICD-10-CM: J84.9 ICD-9-CM: 295 Chest pain ICD-10-CM: R07.9 ICD-9-CM: 786.50 Controlled type 2 diabetes mellitus without complication, without long-term current use of insulin ([facility] ICD-10-CM: E11.9 ICD-9-CM: 250.00   
   
 SEAN (obstructive sleep apnea) ICD-10-CM: G47.33 
ICD-9-CM: 327.23 Interstitial pulmonary fibrosis (HCC) ICD-10-CM: J84.10 ICD-9-CM: 560 Overview Signed 10/9/2013  2:08 PM by Car Malhotra MD  
  Sees dr Servando Allen, sees him q6 months for pft. 
  
  
   
 HTN (hypertension) ICD-10-CM: I10 
ICD-9-CM: 401.9 A-fib [facility]) ICD-10-CM: I48.91 
ICD-9-CM: 427.31   
   
 AAA (abdominal aortic aneurysm) ([facility] ICD-10-CM: I71.4 ICD-9-CM: 933. 4 Benign prostatic hyperplasia ICD-10-CM: N40.0 ICD-9-CM: 600.00 GERD (gastroesophageal reflux disease) ICD-10-CM: K21.9 ICD-9-CM: 530.81 CAD (coronary artery disease) ICD-10-CM: I25.10 ICD-9-CM: 414.00 Overview Addendum 10/9/2013  2:09 PM by MD Dr. Kathrin Arambula H/o cabg in 2003, 3V 
  
  
   
 S/P CABG (coronary artery bypass graft) ICD-10-CM: Z95.1 ICD-9-CM: V45.81 Overview Signed 6/20/2011  7:51 AM by Aubree Crawford MD  
  In 2003 Testosterone deficiency ICD-10-CM: E34.9 ICD-9-CM: 259.9 Hypercholesterolemia ICD-10-CM: E78.00 ICD-9-CM: 272.0 Depression (Chronic) ICD-10-CM: F32.9 ICD-9-CM: 883 Overview Signed 3/1/2010  4:13 PM by Aysha Redman On meds [id number] and [id number] Arthritis shoulders and spine (Chronic) ICD-10-CM: M19.90 ICD-9-CM: 716.90 Overview Signed 3/1/2010  4:18 PM by Aysha Redman Diagnosed 2006 Past Medical History:  
 
 has a past medical history of Aneurysm ([facility] Arthritis shoulders and spine (3/1/2010), Asthma (3/1/2010), BPH (benign prostatic hypertrophy), Broken nose (3/1/2010), Bronchitis (02/2017), CAD (coronary artery disease), Cancer ([facility] (2000), Chronic bronchitis ([facility] (3/1/2010), Contact dermatitis and other eczema, due to unspecified cause, COPD, Depression (3/1/2010), GERD (gastroesophageal reflux disease), Hypercholesterolemia (9/29/2010), Hypertension, IPF (idiopathic pulmonary fibrosis) ([facility] (2007), Pneumonia (3/1/2010), and Testosterone deficiency (3/24/2011). He also has no past medical history of Abuse. Past Surgical History:  
 
 has a past surgical history that includes hx tonsil and adenoidectomy (1949); pr cabg, arterial, three (10/2003); pr cardiac surg procedure unlist (10/2003); hx septoplasty (1962); endoscopy, colon, diagnostic; hx appendectomy; hx urological (1990); hx other surgical; hx other surgical; hx other surgical (8/2015); hx cholecystectomy (11/22/2008); hx hernia repair (1948); hx colectomy (12/29/07); hx colonoscopy (06/17/2014); colonoscopy (N/A, 7/14/2017); hx heent (while in 20's 1948); hx tonsillectomy (1949); hx orthopaedic (June 20, 2011); hx orthopaedic (1946); hx orthopaedic (1962); and hx orthopaedic (02/05/15). Home Medications:  
 
Prior to Admission medications Medication Sig Start Date End Date Taking? Authorizing Provider  
omeprazole (PRILOSEC) 40 mg capsule TAKE ONE CAPSULE BY MOUTH TWICE A DAY 5/24/20  Yes Rosy Norris MD  
buPROPion XL (WELLBUTRIN XL) 300 mg XL tablet TAKE 1 TABLET EVERY MORNING 3/8/20  Yes Rosy Norris MD  
tamsulosin [facility]) 0.4 mg capsule TAKE 1 CAPSULE EVERY NIGHT 1/29/20  Yes Rosy Norris MD  
ketoconazole (NIZORAL) 2 % topical cream APPLY TO AFFECTED AREA(S) DAILY. USE ON SKIN LESIONS EVERY COUPLE OF DAYS AS DIRECTED. 3/6/19  Yes Rosy Norris MD  
budesonide-formoterol [facility]) 160-4.5 mcg/actuation HFAA Take 2 Puffs by inhalation two (2) times a day.    Yes Provider, Historical  
Azelastine (ASTEPRO) 0.15 % (205.5 mcg) nasal spray  12/3/16  Yes Provider, Historical  
 ketoconazole (NIZORAL) 2 % shampoo Apply  to affected area two (2) times a week. 1/6/16  Yes Provider, Historical  
atorvastatin (LIPITOR) 80 mg tablet Take 80 mg by mouth daily. Yes Provider, Historical  
montelukast (SINGULAIR) 10 mg tablet Take 1 Tab by mouth daily (after dinner). 8/2/12  Yes Sukumar Cruz MD  
multivitamin (ONE-A-DAY MENS) tablet Take 1 Tab by mouth daily. Yes Provider, Historical  
metoprolol (LOPRESSOR) 25 mg tablet Take 25 mg by mouth two (2) times a day. PT INSTRUCTED TO TAKE AM DOS PER ANESTHESIA PROTOCOL   Yes Provider, Historical  
ezetimibe (ZETIA) 10 mg tablet Take 10 mg by mouth every morning. 3/1/10  Yes Provider, Historical  
metFORMIN ER (GLUCOPHAGE XR) 500 mg tablet Take 1 Tab by mouth daily (with dinner). 10/30/19   Cande Mcdonald MD  
cefdinir (OMNICEF) 300 mg capsule  7/9/19   Provider, Historical  
oxybutynin (DITROPAN) 5 mg tablet Take 5 mg by mouth daily. Provider, Historical  
benzonatate (TESSALON) 200 mg capsule Take 200 mg by mouth as needed for Cough. Provider, Historical  
sucralfate (CARAFATE) 1 gram tablet Take 1 g by mouth as needed for Other (stomach pain). 5/5/18   Provider, Historical  
BUDESONIDE/FORMOTEROL FUMARATE (SYMBICORT IN) Take 3 Puffs by inhalation two (2) times a day. Provider, Historical  
albuterol (ACCUNEB) 1.25 mg/3 mL nebu 3 mL by Nebulization route every six (6) hours as needed. 3/4/16   Cande Mcdonald MD  
finasteride (PROSCAR) 5 mg tablet TAKE 1 TABLET EVERY DAY 12/29/14   Cande Mcdonald MD  
NEBULIZER by Does Not Apply route. Provider, Historical  
nitroglycerin (NITROQUICK) 0.4 mg SL tablet 1 Tab by SubLINGual route as needed. 3/25/11   Sukumar Cruz MD  
albuterol (PROVENTIL HFA, VENTOLIN HFA) 90 mcg/Actuation inhaler Take 2 Puffs by inhalation every four (4) hours as needed for Wheezing. 2/26/11   Haja Valle MD  
 
 
Allergies/Social/Family History: Allergies Allergen Reactions  Celexa [Citalopram] Other (comments) agitation  Cephalexin Other (comments) GI upset.  Demerol [Meperidine] Unknown (comments)  Mold Extracts Unknown (comments)  Niaspan [Niacin] Nausea Only  Other Medication Unknown (comments) Grass smut, standardized mite mix, weed mix, dogs, white pam, white hickory, red mulberry, barley, cottonseed, malt, rice, rye, black pepper, navy bean  Percocet [Oxycodone-Acetaminophen] Unknown (comments) [address]  Sulfa (Sulfonamide Antibiotics) Nausea Only Social History Tobacco Use  Smoking status: Former Smoker Packs/day: 1.00 Years: 15.00 Pack years: 15.00 Last attempt to quit: 1970 Years since quittin.4  Smokeless tobacco: Never Used Substance Use Topics  Alcohol use: Yes Alcohol/week: 2.0 standard drinks Types: 2 Glasses of wine per week Comment: rarely Family History Problem Relation Age of Onset  Cancer Mother   
     spine or abdomen  Stroke Father  Heart Attack Father 46s  Heart Disease Father  Cancer Brother   
     CLL, lung  Diabetes Brother  Cancer Brother   
     lung  Cancer Brother   
     lung  Cancer Brother   
     lung  Heart Disease Brother  Heart Disease Brother 58 CABG  
 Other Brother   
     shingles  Lung Disease Sister  High Cholesterol Son  Lung Disease Daughter Overactive Airways  No Known Problems Daughter Objective:  
Vital Signs: 
Visit Vitals /75 (BP 1 Location: Left arm, BP Patient Position: Sitting) Pulse 68 Temp 97.9 °F (36.6 °C) Resp 24 Ht 5' 10\" (1.778 m) Wt 79.8 kg (175 lb 14.8 oz) SpO2 97% BMI 25.24 kg/m² O2 Flow Rate (L/min): 1 l/min O2 Device: Nasal cannula Temp (24hrs), Av.3 °F (36.8 °C), Min:97.9 °F (36.6 °C), Max:98.7 °F (37.1 °C) Intake/Output:  
 
Intake/Output Summary (Last 24 hours) at 2020 1510 Last data filed at 6/8/2020 8967 Gross per 24 hour Intake  Output 850 ml Net -850 ml Physical Exam:  
General:  Alert, cooperative, no distress, appears stated age. Head:  Normocephalic, without obvious abnormality, atraumatic. Eyes:  Conjunctivae/corneas clear. PERRL Neck: Supple, symmetrical, no adenopathy, no carotid bruit and no JVD. ++subcutaneous emphysema Lungs:   Bilateral inspiratory crepitations, no wheeze Chest wall:  No tenderness or deformity. Heart:  Regular rate and rhythm, S1-S2 normal, no murmur, no click, rub or gallop. Abdomen:   Soft, non-tender. Bowel sounds present. No masses,  No organomegaly. Extremities: Atraumatic, no cyanosis or edema. Pulses: Palpable Skin: No rashes or lesions Neurologic: Grossly nonfocal  
 
  
 LABS AND  DATA: Personally reviewed Recent Labs  
  06/07/20 
0441 06/06/20 
1730 WBC 9.4 9.9 HGB 11.6* 11.5* HCT 36.2* 35.4*  
 201 Recent Labs  
  06/07/20 
0441 06/06/20 
9113 * 134* K 4.0 3.7  100 CO2 29 30 BUN 12 13 CREA 0.88 0.81 * 97  
CA 9.2 8.8 Recent Labs  
  06/05/20 
1900 LPSE 193 No results for input(s): INR, PTP, APTT, INREXT in the last 72 hours. No results for input(s): PHI, PCO2I, PO2I, FIO2I in the last 72 hours. Recent Labs  
  06/05/20 
1900 TROIQ <0.05  
 
 
MEDS: Reviewed Chest Imaging: personally reviewed and report checked Tele- reviewed Medical decision making:  
I have reviewed the flowsheet and previous day's notes Patient has acute or chronic illness that poses a threat to life or bodily function Review and order of Clinical lab tests Review and Order of Radiology tests Independent visualization of Image Thank you for allowing me to participate in this patient's care. Edward Giles MD 
 
 
[facility]

## 2020-06-08 NOTE — PROGRESS NOTES
1313: Pulmonology ( Dr. Lizeth Perez ) told this RN pt is ready to go home. Paged Dr. Sue White to inform him. Awaiting a call back. 1330: Dr. Lolly Bhardwaj back. 1435: Pt daughter Killian Henderson called for update on her pt discharge. She was updated. 16:45 Discharge instruction given to pt. Pt verbalized understanding

## 2020-06-08 NOTE — PROGRESS NOTES
Hospital follow-up telemedicine PCP transitional care appointment has been scheduled with Dr. Kaycee Lemus for Thursday, 6/11/20 at 10:30 a. m.  Pending patient discharge.   Mario Alberto Pimentel, Care Management Specialist.

## 2020-06-09 NOTE — PROGRESS NOTES
Patient was admitted to [facility] on 20 and discharged on 20 for Severe pneumomediastinum with associated subcutaneous emphysema likely related to ILD. Patient was contacted within 1 business day of discharge. Top Discharge Challenges to be reviewed by the provider Additional needs identified to be addressed with provider no,  
none Results for Georgia Salguero (MRN [id number]) as of 2020 10:14 
 2020 12:23 2020 17:45 2020 23:21 2020 06:19 2020 11:40 GLUCOSE,FAST -  (H) 245 (H) 119 (H) 116 (H) 119 (H) Discussed COVID-19 related testing which was not done at this time. Test results were not done. Patient informed of results, if available? N/a. Method of communication with provider : chart routing Advance Care Planning:  
Does patient have an Advance Directive:  reviewed and current Inpatient Readmission Risk score: n/a Was this a readmission? no  
Patient stated reason for the admission: n/a Patients top risk factors for readmission: medical condition Interventions to address risk factors: red flags & action plan reviewed. Care Transition Nurse (CTN) contacted the patient by telephone to perform post hospital discharge assessment. Verified name and  with patient as identifiers. Provided introduction to self, and explanation of the CTN role. CTN reviewed discharge instructions, medical action plan and red flags with patient & wife who verbalized understanding. Patient & wife given an opportunity to ask questions and did not have any further questions or concerns at this time. The patient & wife agreed to contact the PCP office for questions related to pt's healthcare. Medication reconciliation was performed with patient & wife, who verbalized understanding of administration of home medications. Advised obtaining a 90-day supply of all daily and as-needed medications.   
 
Referral to Pharm D needed: no  
 
 Home Health/Outpatient orders at discharge: none [address]: n/a Date of initial visit: n/a Durable Medical Equipment ordered at discharge: none [address]: n/a Durable Medical Equipment received: n/a Covid Risk Education Patient has following risk factors of: diabetes and A-Fib, Hx Chronic Resp Failure w/ home O2, Pulmonary Fibrosis, ILD. Kj Forrester Education provided regarding infection prevention, and signs and symptoms of COVID-19 and when to seek medical attention with patient & wife who verbalized understanding. Discussed exposure protocols and quarantine From CDC: Are you at higher risk for severe illness?  and given an opportunity for questions and concerns. The patient & wife agree to contact the COVID-19 hotline [phone] or PCP office for questions related to COVID-19. For more information on steps you can take to protect yourself, see CDC's How to Protect Yourself Patient/family/caregiver given information for [employer] and agrees to enroll no Patient's preferred e-mail: declines Patient's preferred phone number: declines Discussed follow-up appointments. If no appointment was previously scheduled, appointment scheduling offered: pt/wife to contact [address] [place] [address] office to schedule f/u appt. [facility] follow up appointment(s):  
Future Appointments Date Time Provider David Romero 6/11/2020 10:30 AM Meredith Bowman MD [address]  
6/24/2020  9:15 AM MD [address]ER[address] [facility] follow up appointment(s): Pulmonary Plan for follow-up call in 10-14 days based on severity of symptoms and risk factors. CTN provided contact information for future needs. Goals Addressed This Visit's Progress  Transition of Care- collaboration & coordination of care to prevent complications post hospitalization. (pt-stated) 6/9/20- pt reported \"feeling much better\". Resp status @ baseline.  Using O2 via NC cont 3-4L/min during day & @ 3L/min during night. Afebrile, no cough. CPAP use on hold x 1 week as per Thoracic Surg instruction. Has Nebulizer. Hasn't needed in a while. No new meds @ d/c. +Med adherence w/ routine meds. Appetite good. No problem w/ bladder/bowel functions. Amb w/o any AD. Red flags & action plan reviewed. CDC COVID-19 Guidelines reviewed. Patient advised providers on call 24 hours a day / 7 days a week (M-F 5 PM to 8 AM, and from Friday 5 PM until Monday 8 AM for the weekend) should the patient have questions or concerns. [facility] information provided. PCP virtual f/u 6/11/20. Pt/wife to call [facility] to schedule f/u appt w/ Dr Jason Antunez. Plan- pt to continue med adherence. Pt to continue O2 use as prescribed. Pt to hold CPAP use x1 week as instructed. Pt to call Pul to schedule f/u appt/. Pt to attend scheduled appts. Pt to continue CDC COVID-19 Guidelines. CTN to collaborate w/ pt/wife, PCP, & other Care Team Members PRN to coordinate care. Pt agreeable w/ CTN f/u ~ 2 weeks-ID.

## 2020-06-09 NOTE — TELEPHONE ENCOUNTER
Called, spoke to [employer] (HIPAA). Two pt identifiers confirmed. Francisco Javier Cabrera states that pt was scheduled for a VV at 1030 on 6/11/20. Francisco Javier Cabrera state that pt has VV w/ Dr. Arlet Morrell ([place]) at [phone]. Francisco Javier Cabrera asking if pt should do a VV w/ Dr. Dawson [address] after Pulm f/u. Advised that  [address] will be back in office 6/22. Francisco Javier Cabrera states they will keep 6/11 1030 VV. Francisco Javier Cabrera verbalized understanding of information discussed w/ no further questions at this time.

## 2020-06-09 NOTE — TELEPHONE ENCOUNTER
#[phone] Kristy Taylor is asking that you call her back as you were speaking of appts. She was told to call you back.

## 2020-06-10 NOTE — PROGRESS NOTES
HISTORY OF PRESENT ILLNESS Julissa Pena is a 68 y.o. male. HPI Pt last vv 6/4/20. This is an established visit completed with telemedicine was completed with video assist 
the patient acknowledges and agrees to this method of visitation 
deonte Mr. Patsy Reyez is a 68y.o. year old male, he is seen today for Transition of Care services following a hospital discharge for pneumomediastinum 6/5-6/8  Our office Nurse Navigator performed an outreach to Mr. Sheron Colvin on6/9 (within 2 business days of discharge) to complete medication reconciliation and a telephonic assessment of his condition. Reviewed d/c summary below Severe pneumomediastinum with associated subcutaneous emphysema likely related to his ILD 
-CT of the chest and the neck showed severe pneumomediastinum, extensive soft tissue and subcu emphysema dissecting throughout all fascial planes of the neck bilaterally as well as the chest wall bilaterally, pulmonary fibrosis but no pneumothorax. -Barium esophagogram and bronchoscopy without evidence of leak. Patient clinically improved, almost weaned off oxygen, he is on home oxygen anyway. Thoracic surgeon pulmonary agreed with discharge plan and follow-up with pulmonology. Patient is advised by thoracic surgery not to wear his CPAP for at least a week 
  Dysphagia with GERD-like symptoms 
-Barium swallow negative, patient tolerated regular diet without difficulty. 
  
Acute on chronic respiratory failure with hypoxia Underlying ILD 
-Supplemental oxygen and monitor 
-Consult pulmonary associates 
-On home CPAP. During my rounds prior to discharge today, patient reiterated that thoracic surgery advised him not use the CPAP for at least a week. 
  
Hypertension: Patient n.p.o. [facility] is PRN hydralazine with parameters, ordered. 
  
DM 2 without hyperglycemia 
-Accu-Cheks along with high-sensitivity Humalog sliding scale coverage every 6 hourly Only change they made was no cpap at night for now Pt will see dr Neha Padilla this afternoon pulmonary Reviewed labs Reviewed and imaging Barium swallow-- Ct neck :  
IMPRESSION: 
1. Severe pneumomediastinum, as well as extensive soft tissue and subcutaneous 
emphysema dissecting throughout all fascial planes of the neck bilaterally as 
well as the chest wall bilaterally. 2. Pulmonary fibrosis. 3. No definite pneumothorax. 
 
 
 
 he is not wearing cpap currently  
 
the patient reports air/swelling in chest/neck and cheeks was 80% resolved prior to leaving the hospital and continues to improve The burning is much better as well  
  
Recall lov  Woke up in middle of the night with burning in his esophagus Took a sucralfate, continued to have burning Had iv protonix in the hospital which worked very well Had not eaten very much when in the hospital  
Had not been alberta Would like to change prilosec to protonix 
 
 
  
 bp had been elevated in hosital when they held metoprolol but improved to 130/70s when back on med Sugars 107-119  
 
 
 
o2 level upper 90%---drops w exertion  
 
  
  
 
  
  
  
the patient has been on abx 2 times for leg cyst per urology This has resolved Got mammogram--reviewed Gynecomastia 
  
  
   
BP is controlled in generally Did not hcek bp today but \"good\" other days No longer on avapro 75 mg  
States he coordinated this with dr Neha Padilla Currently just on metoprolol 25 mg BID Recall norvasc caused edema  
   
Checking sugars now 106 last check  advised monitoring b/c he is on prednisone Encouraged more often home glu checks  
His DM is diet-controlled  
  
  
   
[id number] lbs-- similar now  
Discussed wt is higher side of nl, discussed OK as long as he does not gain more wt Discussed low carb diet and w/l  
   
Reviewed labs  
  
  
Pt follows with Dr. Malissa Wolff (derm) Pt had a basal cell carcinoma removed from his R arm Last visit was 10/22/19 and he had a squamous cell carcinoma removed from his arm  
   
Pt follows with Dr. Alex Benson (pulm) routinely q3 months for ILD Last visit was 5/20  No abx--has f/u today Off prednisone currently  
  
Recall Has rx for , prednisone and omnicef if he gets sick prn pulm dr Pierre  pulm rehab 
  
Continues on symbicort bid and albuterol prn (not daily) Pt is regularly supplemental 3L O2 baseline, up to 4-6 L during exercise class Recall esprit caused GI upset  
   
Pt follows with Dr. Cassi Escalante ([facility]) annually in August 
Last there 5/20-had PSA checked in April 2020  
he is on flomax per uro 
 on vesicare rather than oxybutin  
He stopped proscar which could cause this enlargement --gynecomastia--now he is having more freq urination however  
  
  
Pt follows with Dr. Darren Cruz (cardio) for h/o remote afib and cad Last visit was 2/19/20  
bp meds reduced still on metoprolol bid No longer on avapro  
Had neg stress test in 2/19  
  
  
Pt follows with Dr. Santacruz (allergy) Continues singulair for allergies - controlled  
Will f/u prn  
   
Pt follows with Dr. Aurora Gomez (vasc surg) for AAA Last visit was 7/19 --due next month will set this up  
  Follows for AAA annually  
  
Pt follows with Dr. Maricarmen Kilgore (GI) for gerd Last visit was 7/2/18 Continues on prilosec 40mg BID for reflux, which works well for the most part,---protonix workjed better will change to this See above to current sx 
  
Pt saw Dr Yanni Stallworth (sleep) Last visit was  2/19 Pt is compliant with CPAP nightly normally currently not on this This is helping his sleep, tolerating well, also keeps O2 at 2L at night  
  
Pt saw Dr Leila Leo (neuro) for a tremor Last visit was 10/18/18 Not bothering him too much  
  
  
Pt is on wellbutrin 300 mg-- not sad or down  
SHERRELL killed April 30th in a truck accident He is doing okay but has nl grief and not more down despite recent illness and hospital stay He states this is working well for irritability and mood  
Recall celexa caused irritability in the past. Pt tolerated wellbutrin in the past. 
   
Continues claritin for allergies, which works well 
TRISH Chery on lipitor 80mg, max dose, daily for cholesterol He also takes zetia daily   
  
  
ACP on file. SDM is his wife.  
  
PREVENTIVE:   
Colonoscopy: 17,  repeat in 5 years, Dr. Cha Mensah EGD: 17, Dr. Cha Mensah PSA:  per Dr. Erwin Weller,  per dr Moreno Albert 
AAA screen: CT  - 3.2mm, Raegan Carrero follows, repeat in 10/13 and 2/15,  Tdap: will check cost at pharmacy, advised to get at his pharmacy --still due Pneumovax: 2020 Bsmezoc80: 10/13/2015 Zostavax: 2008 719 [facility] rounds completed  
Flu shot: 19 Microalbumin:  Foot exam: 19  
A1c:    6.2,  6.0,  6.3,  6.2,  6.2, 3/19 6.0,  POC 6.1   6.6  5.9  
Eye exam: Dr. Pj Naranjo at [address] [place] [address], pt may getting cataract surg sometime in [address] [place] [address] EK/10/17  
Hepatitis C screen: , negative Patient Active Problem List  
Diagnosis Code  Depression F32.9  Arthritis shoulders and spine M19.90  
 Hypercholesterolemia E78.00  Testosterone deficiency E34.9  S/P CABG (coronary artery bypass graft) Z95.1  GERD (gastroesophageal reflux disease) K21.9  CAD (coronary artery disease) I25.10  Benign prostatic hyperplasia N40.0  Interstitial pulmonary fibrosis (HCC) J84.10  
 HTN (hypertension) I10  
 A-fib (HCC) I48.91  
 AAA (abdominal aortic aneurysm) (HCC) I71.4  
 SEAN (obstructive sleep apnea) G47.33  
 Controlled type 2 diabetes mellitus without complication, without long-term current use of insulin (HCC) E11.9  Chest pain R07.9  Chronic respiratory failure (HCC) J96.10  Pneumomediastinum ([address] J98.2  Interstitial lung disease ([facility] J84.9 Current Outpatient Medications Medication Sig Dispense Refill  solifenacin (VESICARE) 5 mg tablet Take 5 mg by mouth daily.  omeprazole (PRILOSEC) 40 mg capsule TAKE ONE CAPSULE BY MOUTH TWICE A  Cap 0  
 buPROPion XL (WELLBUTRIN XL) 300 mg XL tablet TAKE 1 TABLET EVERY MORNING 90 Tab 1  
 tamsulosin (FLOMAX) 0.4 mg capsule TAKE 1 CAPSULE EVERY NIGHT 90 Cap 0  
 cefdinir (OMNICEF) 300 mg capsule Take 300 mg by mouth two (2) times a day. Pt reported d/t lung condition starts taking if has a fever & notifies Pulmonary.  ketoconazole (NIZORAL) 2 % topical cream APPLY TO AFFECTED AREA(S) DAILY. USE ON SKIN LESIONS EVERY COUPLE OF DAYS AS DIRECTED. 1 Tube 0  
 sucralfate (CARAFATE) 1 gram tablet Take 1 g by mouth as needed for Other (stomach pain).  budesonide-formoterol (SYMBICORT) 160-4.5 mcg/actuation HFAA Take 2 Puffs by inhalation two (2) times a day.  Azelastine (ASTEPRO) 0.15 % (205.5 mcg) nasal spray 2 Sprays by Both Nostrils route two (2) times a day.  ketoconazole (NIZORAL) 2 % shampoo Apply  to affected area two (2) times a week.  0  
 albuterol (ACCUNEB) 1.25 mg/3 mL nebu 3 mL by Nebulization route every six (6) hours as needed. 1 Each 1  
 atorvastatin (LIPITOR) 80 mg tablet Take 80 mg by mouth daily.  montelukast (SINGULAIR) 10 mg tablet Take 1 Tab by mouth daily (after dinner). 90 Tab 3  
 NEBULIZER by Does Not Apply route.  nitroglycerin (NITROQUICK) 0.4 mg SL tablet 1 Tab by SubLINGual route as needed. 25 Tab prn  albuterol (PROVENTIL HFA, VENTOLIN HFA) 90 mcg/Actuation inhaler Take 2 Puffs by inhalation every four (4) hours as needed for Wheezing. 1 Inhaler 2  
 multivitamin (ONE-A-DAY MENS) tablet Take 1 Tab by mouth daily.  metoprolol (LOPRESSOR) 25 mg tablet Take 25 mg by mouth two (2) times a day. PT INSTRUCTED TO TAKE AM DOS PER ANESTHESIA PROTOCOL  ezetimibe (ZETIA) 10 mg tablet Take 10 mg by mouth every morning. Lab Results Component Value Date/Time Cholesterol, total 129 07/30/2019 11:46 AM  
 HDL Cholesterol 45 07/30/2019 11:46 AM  
 LDL, calculated 52 07/30/2019 11:46 AM  
 Triglyceride 162 (H) 07/30/2019 11:46 AM  
 CHOL/HDL Ratio 3.4 09/29/2010 08:28 AM  
 
Lab Results Component Value Date/Time GFR est non-AA >60 06/07/2020 04:41 AM  
 GFR est AA >60 06/07/2020 04:41 AM  
 Creatinine 0.88 06/07/2020 04:41 AM  
 BUN 12 06/07/2020 04:41 AM  
 Sodium 133 (L) 06/07/2020 04:41 AM  
 Potassium 4.0 06/07/2020 04:41 AM  
 Chloride 100 06/07/2020 04:41 AM  
 CO2 29 06/07/2020 04:41 AM  
 Magnesium 2.0 03/01/2016 04:23 AM  
  
 
Review of Systems Constitutional: Negative for chills and fever. HENT: Positive for hearing loss. Negative for tinnitus. Eyes: Negative for blurred vision and double vision. Respiratory: Positive for shortness of breath. Negative for wheezing. Cardiovascular: Positive for leg swelling. Negative for chest pain and palpitations. Gastrointestinal: Negative for nausea and vomiting. Genitourinary: Positive for frequency. Negative for dysuria. Musculoskeletal: Negative for back pain and falls. Skin: Negative for itching and rash. Neurological: Negative for dizziness, loss of consciousness and headaches. Psychiatric/Behavioral: Negative for depression. The patient is not nervous/anxious. Physical Exam 
Constitutional:   
   General: He is not in acute distress. Appearance: Normal appearance. He is not ill-appearing, toxic-appearing or diaphoretic. HENT:  
   Head: Normocephalic and atraumatic. Eyes:  
   General:     
   Right eye: No discharge. Left eye: No discharge. Conjunctiva/sclera: Conjunctivae normal.  
Neurological:  
   General: No focal deficit present. Mental Status: He is alert and oriented to person, place, and time. Psychiatric:     
   Mood and Affect: Mood normal.     
   Behavior: Behavior normal.  
 
 
 
ASSESSMENT and PLAN 
  ICD-10-CM ICD-9-CM 1. Gynecomastia Had mammo Now off finasteride stable N62 611.1 2. Interstitial lung disease ([facility] Would like palliative care referral to address future concerns about tx vs w/holding care J84.9 515 3. Atrial fibrillation, unspecified type ([facility] Follows w burns Continues on metoporlol  I48.91 427.31   
4. Controlled type 2 diabetes mellitus without complication, without long-term current use of insulin [facility] Diet control a1c due go to lab E11.9 250.00   
5. Essential hypertension Controlled on metoprolol bid I10 401.9 6. SEAN (obstructive sleep apnea) Currently off cpap d/t  Pneumomediastinum Has f/u for 9/20 --may need to move this up to discuss whether or not to resume cpap  G47.33 327.23   
7. Reactive depression Controlled on wellbutrin  F32.9 300.4 8. Pneumomediastinum (HCC) J98.2 518.1 9. Benign prostatic hyperplasia with urinary frequency Off finasteride N40.1 600.01   
 R35.0 788.41   
10 gerd --will change prilosec to protonix bid which may work better Juan Carlos Mendez is a 68 y.o. male who was evaluated by an audio-video encounter for concerns as above. Patient identification was verified prior to start of the visit. A caregiver was present when appropriate. Due to this being a TeleHealth encounter (During TXRQF-28 public health emergency), evaluation of the following organ systems was limited: Vitals/Constitutional/EENT/Resp/CV/GI//MS/Neuro/Skin/Heme-Lymph-Imm. Pursuant to the emergency declaration under the [facility], 1135 waiver authority and the [employer] and [employer] Act, this Virtual Visit was conducted, with patient's (and/or legal guardian's) consent, to reduce the patient's risk of exposure to COVID-19 and provide necessary medical care.  
  
Services were provided through a synchronous discussion virtually to substitute for in-person clinic visit. I was at home. The patient was at home. This is the Subsequent Medicare Annual Wellness Exam, performed 12 months or more after the Initial AWV or the last Subsequent AWV Consent: Renny Douglas, who was seen by synchronous (real-time) audio-video technology, and/or his healthcare decision maker, is aware that this patient-initiated, Telehealth encounter on 6/11/2020 is a billable service. While AWVs are fully covered by Medicare, any services rendered on this date that are not included in an AWV are subject to additional billing, with coverage as determined by his insurance carrier. He is aware that he may receive a bill for any such additional services and has provided verbal consent to proceed: Yes. I have reviewed the patient's medical history in detail and updated the computerized patient record. History Patient Active Problem List  
Diagnosis Code  Depression F32.9  Arthritis shoulders and spine M19.90  
 Hypercholesterolemia E78.00  Testosterone deficiency E34.9  S/P CABG (coronary artery bypass graft) Z95.1  GERD (gastroesophageal reflux disease) K21.9  CAD (coronary artery disease) I25.10  Benign prostatic hyperplasia N40.0  Interstitial pulmonary fibrosis (HCC) J84.10  
 HTN (hypertension) I10  
 A-fib ([facility]) I48.91  
 AAA (abdominal aortic aneurysm) ([facility]) I71.4  
 SEAN (obstructive sleep apnea) G47.33  
 Controlled type 2 diabetes mellitus without complication, without long-term current use of insulin ([facility]) E11.9  Chest pain R07.9  Chronic respiratory failure ([facility]) J96.10  Pneumomediastinum ([address] J98.2  Interstitial lung disease ([address] J84.9 Past Medical History:  
Diagnosis Date  Aneurysm ([address] small AAA  Arthritis shoulders and spine 3/1/2010  Asthma 3/1/2010 Dr. Nick Green Dr. Call  BPH (benign prostatic hypertrophy)  Broken nose 3/1/2010  Bronchitis 02/2017  CAD (coronary artery disease) Dr. Teague Scot  Cancer ([facility] 2000  
 basal cell chest  
 Chronic bronchitis ([facility] 3/1/2010  Contact dermatitis and other eczema, due to unspecified cause Dr. Jacqui Medina  COPD  Depression 3/1/2010  GERD (gastroesophageal reflux disease)  Hypercholesterolemia 9/29/2010  Hypertension  IPF (idiopathic pulmonary fibrosis) ([facility] 2007  Pneumonia 3/1/2010  Testosterone deficiency 3/24/2011 Past Surgical History:  
Procedure Laterality Date  CABG, ARTERIAL, THREE  10/2003  CARDIAC SURG PROCEDURE UNLIST  10/2003 CABGx3 vessel  COLONOSCOPY N/A 7/14/2017 COLONOSCOPY performed by Junito Pham [facility] at [facility] ENDOSCOPY  ENDOSCOPY, COLON, DIAGNOSTIC Dr. Boy Alamo  HX APPENDECTOMY  HX CHOLECYSTECTOMY  11/22/2008  
 laparoscopic  HX COLECTOMY  12/29/07  
 sigmoid colectomy for volvulus in [place]  HX COLONOSCOPY  06/17/2014 Repeat in 3 years  HX HEENT  while in 20's 1948  
 submucus resection sinus//resection sub mucus 1962 [address]  
 right  HX ORTHOPAEDIC  June 20, 2011  
 right hand, thumb surgery, plate inserted  HX ORTHOPAEDIC  1946  
 broken nose  HX ORTHOPAEDIC  1962  
 sub mucous resection  HX ORTHOPAEDIC  02/05/15  
 right hand surgery, ligament repair  HX OTHER SURGICAL    
 HX OTHER SURGICAL    
 hemorrhoids  HX OTHER SURGICAL  8/2015  
 basal cell carcinoma  HX SEPTOPLASTY  1962  
 s/p broken nose 1946  
 HX TONSIL AND ADENOIDECTOMY  1949  
 HX TONSILLECTOMY  1949  
 HX UROLOGICAL  1990  
 vasectomy Current Outpatient Medications Medication Sig Dispense Refill  pantoprazole (PROTONIX) 40 mg tablet Take 1 Tab by mouth two (2) times a day. 60 Tab 2  
 solifenacin (VESICARE) 5 mg tablet Take 5 mg by mouth daily.     
 omeprazole (PRILOSEC) 40 mg capsule TAKE ONE CAPSULE BY MOUTH TWICE A  Cap 0  
  buPROPion XL (WELLBUTRIN XL) 300 mg XL tablet TAKE 1 TABLET EVERY MORNING 90 Tab 1  
 tamsulosin (FLOMAX) 0.4 mg capsule TAKE 1 CAPSULE EVERY NIGHT 90 Cap 0  
 budesonide-formoterol (SYMBICORT) 160-4.5 mcg/actuation HFAA Take 2 Puffs by inhalation two (2) times a day.  albuterol (ACCUNEB) 1.25 mg/3 mL nebu 3 mL by Nebulization route every six (6) hours as needed. 1 Each 1  
 atorvastatin (LIPITOR) 80 mg tablet Take 80 mg by mouth daily.  albuterol (PROVENTIL HFA, VENTOLIN HFA) 90 mcg/Actuation inhaler Take 2 Puffs by inhalation every four (4) hours as needed for Wheezing. 1 Inhaler 2  
 cefdinir (OMNICEF) 300 mg capsule Take 300 mg by mouth two (2) times a day. Pt reported d/t lung condition starts taking if has a fever & notifies Pulmonary.  ketoconazole (NIZORAL) 2 % topical cream APPLY TO AFFECTED AREA(S) DAILY. USE ON SKIN LESIONS EVERY COUPLE OF DAYS AS DIRECTED. 1 Tube 0  
 sucralfate (CARAFATE) 1 gram tablet Take 1 g by mouth as needed for Other (stomach pain).  Azelastine (ASTEPRO) 0.15 % (205.5 mcg) nasal spray 2 Sprays by Both Nostrils route two (2) times a day.  ketoconazole (NIZORAL) 2 % shampoo Apply  to affected area two (2) times a week.  0  
 montelukast (SINGULAIR) 10 mg tablet Take 1 Tab by mouth daily (after dinner). 90 Tab 3  
 NEBULIZER by Does Not Apply route.  nitroglycerin (NITROQUICK) 0.4 mg SL tablet 1 Tab by SubLINGual route as needed. 25 Tab prn  multivitamin (ONE-A-DAY MENS) tablet Take 1 Tab by mouth daily.  metoprolol (LOPRESSOR) 25 mg tablet Take 25 mg by mouth two (2) times a day. PT INSTRUCTED TO TAKE AM DOS PER ANESTHESIA PROTOCOL  ezetimibe (ZETIA) 10 mg tablet Take 10 mg by mouth every morning. Allergies Allergen Reactions  Celexa [Citalopram] Other (comments) agitation  Cephalexin Other (comments) GI upset.  Demerol [Meperidine] Unknown (comments)  Mold Extracts Unknown (comments)  Niaspan [Niacin] Nausea Only  Other Medication Unknown (comments) Grass smut, standardized mite mix, weed mix, dogs, white pam, white hickory, red mulberry, barley, cottonseed, malt, rice, rye, black pepper, navy bean  Percocet [Oxycodone-Acetaminophen] Unknown (comments) [address]  Sulfa (Sulfonamide Antibiotics) Nausea Only Family History Problem Relation Age of Onset  Cancer Mother   
     spine or abdomen  Stroke Father  Heart Attack Father 46s  Heart Disease Father  Cancer Brother   
     CLL, lung  Diabetes Brother  Cancer Brother   
     lung  Cancer Brother   
     lung  Cancer Brother   
     lung  Heart Disease Brother  Heart Disease Brother 58 CABG  
 Other Brother   
     shingles  Lung Disease Sister  High Cholesterol Son  Lung Disease Daughter Overactive Airways  No Known Problems Daughter Social History Tobacco Use  Smoking status: Former Smoker Packs/day: 1.00 Years: 15.00 Pack years: 15.00 Last attempt to quit: 1970 Years since quittin.4  Smokeless tobacco: Never Used Substance Use Topics  Alcohol use: Yes Alcohol/week: 2.0 standard drinks Types: 2 Glasses of wine per week Comment: rarely Depression Risk Factor Screening:  
 
3 most recent PHQ Screens 2020 Little interest or pleasure in doing things Not at all Feeling down, depressed, irritable, or hopeless Not at all Total Score PHQ 2 0 Trouble falling or staying asleep, or sleeping too much - Feeling tired or having little energy - Poor appetite, weight loss, or overeating - Feeling bad about yourself - or that you are a failure or have let yourself or your family down - Trouble concentrating on things such as school, work, reading, or watching TV - Moving or speaking so slowly that other people could have noticed; or the opposite being so fidgety that others notice - Thoughts of being better off dead, or hurting yourself in some way -  
PHQ 9 Score - How difficult have these problems made it for you to do your work, take care of your home and get along with others - Alcohol Risk Factor Screening (MALE > 65): Do you average more 1 drink per night or more than 7 drinks a week: No 
 
In the past three months have you have had more than 4 drinks containing alcohol on one occasion: No 
rare Functional Ability and Level of Safety:  
Hearing: Hearing is good. tested 1.5 years ago Activities of Daily Living: The home contains: no safety equipment. Patient needs help with:  transportation, laundry and housework Ambulation: with mild difficulty Fall Risk: 
Fall Risk Assessment, last 12 mths 6/11/2020 Able to walk? Yes Fall in past 12 months? No  
 
 
Abuse Screen: 
Patient is not abused Lives w wife Cognitive Screening Has your family/caregiver stated any concerns about your memory: no 
Cognitive Screening: Normal - Mini Cog Test 
 
 
No memory concerns Pt knows the month, day and year Can recall 3/3 objects Patient Care Team  
Patient Care Team: 
Meg Jain MD as PCP - General (Internal Medicine) Meg Jain MD as PCP - [facility] Mason Avina MD (Pulmonary Disease) TENA Nath MD (Cardiology) Kirk Cristina MD (Dermatology) Abena Buckner MD (Gastroenterology) Darrian Song MD (Urology) 
call (Allergy) Alfonso Hernandez MD (Ophthalmology) Mari Heath MD as Surgeon (General Surgery) Michael Cam MD (Pulmonary Disease) Mirna Aden MD (Vascular Surgery) Son Gunderson RN as Care Transitions Nurse Assessment/Plan Education and counseling provided: 
Are appropriate based on today's review and evaluation Prostate cancer screening tests (PSA, covered annually) Colorectal cancer screening tests Screening for glaucoma Diabetes screening test 
 
Diagnoses and all orders for this visit: 
 
1. Gynecomastia 2. Interstitial lung disease ([facility] 
-     REFERRAL TO PALLIATIVE MEDICINE 
 
3. Atrial fibrillation, unspecified type ([facility] 4. Controlled type 2 diabetes mellitus without complication, without long-term current use of insulin ([facility] 5. Essential hypertension 6. SEAN (obstructive sleep apnea) 7. Reactive depression 8. Pneum[facility]inum ([facility] 
-     REFERRAL TO PALLIATIVE MEDICINE 9. Benign prostatic hyperplasia with urinary frequency 10. Gastroesophageal reflux disease without esophagitis 11. Medicare annual wellness visit, subsequent Other orders -     pantoprazole (PROTONIX) 40 mg tablet; Take 1 Tab by mouth two (2) times a day. Health Maintenance Due Topic Date Due  Eye Exam Retinal or Dilated  03/05/1950  Shingrix Vaccine Age 50> (1 of 2) 03/05/1992  Pneumococcal 65+ years (2 of 2 - PPSV23) 09/07/2017  GLAUCOMA SCREENING Q2Y  07/14/2018  Foot Exam Q1  07/11/2020 ACP on file. SDM is his wife.  
  
 
Colonoscopy: 7/14/17,  repeat in 5 years, Dr. Carlos Livingston PSA: 8/19 per dr Kolb Standard 
AAA screen: CT 7/12 - 3.2mm, Breana kumar, repeat in 10/13 and 2/15, 7/18 Tdap: will check cost at pharmacy, advised to get at his pharmacy --still due--discussed Pneumovax: 2/2020 Guoxorv55: 10/13/2015 Zostavax: 6/01/2008 [address] rounds completed  
Flu shot: 09/27/19 Eye exam: Dr. Steven Lake at [facility], 11/19,annual  
 
 
A1c:   2/20 5.9 --q3 month --due Lipids: 7/19  LDL 52 annual due Hepatitis C screen: 7/17, negative Medication reconciliation completed by MA and reviewed by me. Medical/surgical/social/family history reviewed and updated by me. Patient provided AVS and preventative screening table. Patient verbalized understanding of all information discussed. Renny Douglas is a 68 y.o. male who was evaluated by an audio-video encounter for concerns as above. Patient identification was verified prior to start of the visit. A caregiver was present when appropriate. Due to this being a TeleHealth encounter (During [facility] public health emergency), evaluation of the following organ systems was limited: Vitals/Constitutional/EENT/Resp/CV/GI//MS/Neuro/Skin/Heme-Lymph-Imm. Pursuant to the emergency declaration under the [facility], [facility] waiver authority and the "[employer] and [employer] Act, this Virtual Visit was conducted, with patient's (and/or legal guardian's) consent, to reduce the patient's risk of exposure to COVID-19 and provide necessary medical care. Services were provided through a synchronous discussion virtually to substitute for in-person clinic visit. I was at home. The patient was at home.  
 
 
Ginny Calixto MD

## 2020-06-11 NOTE — PATIENT INSTRUCTIONS
Medicare Wellness Visit, Male The best way to live healthy is to have a lifestyle where you eat a well-balanced diet, exercise regularly, limit alcohol use, and quit all forms of tobacco/nicotine, if applicable. Regular preventive services are another way to keep healthy. Preventive services (vaccines, screening tests, monitoring & exams) can help personalize your care plan, which helps you manage your own care. Screening tests can find health problems at the earliest stages, when they are easiest to treat. [facility] follows the current, evidence-based guidelines published by the [facility] ([facility]) when recommending preventive services for our patients. Because we follow these guidelines, sometimes recommendations change over time as research supports it. (For example, a prostate screening blood test is no longer routinely recommended for men with no symptoms). Of course, you and your doctor may decide to screen more often for some diseases, based on your risk and co-morbidities (chronic disease you are already diagnosed with). Preventive services for you include: - Medicare offers their members a free annual wellness visit, which is time for you and your primary care provider to discuss and plan for your preventive service needs. Take advantage of this benefit every year! 
-All adults over age 72 should receive the recommended pneumonia vaccines. Current USPSTF guidelines recommend a series of two vaccines for the best pneumonia protection.  
-All adults should have a flu vaccine yearly and tetanus vaccine every 10 years. 
-All adults age 48 and older should receive the shingles vaccines (series of two vaccines).       
-All adults age 38-68 who are overweight should have a diabetes screening test once every three years.  
-Other screening tests & preventive services for persons with diabetes include: an eye exam to screen for diabetic retinopathy, a kidney function test, a foot exam, and stricter control over your cholesterol.  
-Cardiovascular screening for adults with routine risk involves an electrocardiogram (ECG) at intervals determined by the provider.  
-Colorectal cancer screening should be done for adults age 54-65 with no increased risk factors for colorectal cancer. There are a number of acceptable methods of screening for this type of cancer. Each test has its own benefits and drawbacks. Discuss with your provider what is most appropriate for you during your annual wellness visit. The different tests include: colonoscopy (considered the best screening method), a fecal occult blood test, a fecal DNA test, and sigmoidoscopy. 
-All adults born between [facility] should be screened once for Hepatitis C. 
-An Abdominal Aortic Aneurysm (AAA) Screening is recommended for men age 73-68 who has ever smoked in their lifetime. Here is a list of your current Health Maintenance items (your personalized list of preventive services) with a due date: 
Health Maintenance Due Topic Date Due [facility] Eye Exam  03/05/1950  Shingles Vaccine (1 of 2) 03/05/1992  Pneumococcal Vaccine (2 of 2 - PPSV23) 09/07/2017  Glaucoma Screening   07/14/2018 [facility] Diabetic Foot Care  07/11/2020 Medicare Wellness Visit, Male The best way to live healthy is to have a lifestyle where you eat a well-balanced diet, exercise regularly, limit alcohol use, and quit all forms of tobacco/nicotine, if applicable. Regular preventive services are another way to keep healthy. Preventive services (vaccines, screening tests, monitoring & exams) can help personalize your care plan, which helps you manage your own care. Screening tests can find health problems at the earliest stages, when they are easiest to treat.   
[facility] follows the current, evidence-based guidelines published by the [employer] (USPSTF) when recommending preventive services for our patients. Because we follow these guidelines, sometimes recommendations change over time as research supports it. (For example, a prostate screening blood test is no longer routinely recommended for men with no symptoms). Of course, you and your doctor may decide to screen more often for some diseases, based on your risk and co-morbidities (chronic disease you are already diagnosed with). Preventive services for you include: - Medicare offers their members a free annual wellness visit, which is time for you and your primary care provider to discuss and plan for your preventive service needs. Take advantage of this benefit every year! 
-All adults over age 72 should receive the recommended pneumonia vaccines. Current USPSTF guidelines recommend a series of two vaccines for the best pneumonia protection.  
-All adults should have a flu vaccine yearly and tetanus vaccine every 10 years. 
-All adults age 48 and older should receive the shingles vaccines (series of two vaccines). -All adults age 38-68 who are overweight should have a diabetes screening test once every three years.  
-Other screening tests & preventive services for persons with diabetes include: an eye exam to screen for diabetic retinopathy, a kidney function test, a foot exam, and stricter control over your cholesterol.  
-Cardiovascular screening for adults with routine risk involves an electrocardiogram (ECG) at intervals determined by the provider.  
-Colorectal cancer screening should be done for adults age 54-65 with no increased risk factors for colorectal cancer. There are a number of acceptable methods of screening for this type of cancer. Each test has its own benefits and drawbacks. Discuss with your provider what is most appropriate for you during your annual wellness visit.  The different tests include: colonoscopy (considered the best screening method), a fecal occult blood test, a fecal DNA test, and sigmoidoscopy. 
-All adults born between [facility] should be screened once for Hepatitis C. 
-An Abdominal Aortic Aneurysm (AAA) Screening is recommended for men age 73-68 who has ever smoked in their lifetime. Here is a list of your current Health Maintenance items (your personalized list of preventive services) with a due date: 
Health Maintenance Due Topic Date Due [facility]  03/05/1950  Shingles Vaccine (1 of 2) 03/05/1992  Pneumococcal Vaccine (2 of 2 - PPSV23) 09/07/2017  Glaucoma Screening   07/14/2018 [facility] Diabetic Foot Care  07/11/2020

## 2020-06-16 NOTE — TELEPHONE ENCOUNTER
[facility] Palliative Medicine Office  Nursing Note  [phone]  Fax [phone]      Name:  Renny Douglas  YOB: 1940    Received outpatient Palliative Medicine referral from Dr. Kerri Loco to see pt for goals of care and supportive care. Chart  reviewed. Renny Douglas is a 68 y.o. male with interstitial lung disease. Pt's most recent office visit with Dr. Jacqui Armendariz was on 6/11/20 (virtual visit). See her note for complete HPI, treatment history, and plan. ACP:    AMD signed on 5/29/13 is on file. Primary Healthcare Decision Maker is wife Eve Kelley ([phone]), Secondary Healthcare Decision Maker is daughter Todd Nuñez  ([phone]). Alternate Healthcare Decision Maker is Richard Kidney ([phone]) and Roz Guzman ([phone])      Nurse called pt and introduced Palliative Medicine services.   Appt scheduled for July 6 at 2:30pm.

## 2020-06-18 NOTE — PROGRESS NOTES
Please call patient back about results  tsh elevated--however pt was recently ill, prior to treating for hypothyroidism lets start by repeating tsh, free t4 and tpo ab in 2-3 weeks    Rest fine

## 2020-06-18 NOTE — PROGRESS NOTES
Called, spoke to pt. Two pt identifiers confirmed. Pt informed per Dr. Kathryn Herndon the tsh is elevated. Pt informed per Dr. Kathryn Herndon but given recent illness, prior to any treatment for hypothyroidism, get repeat labs (tsh, free t4 and tpo ab in 2-3 weeks)--Labs ordered and mailed to pt. Pt informed rest of labs stable. Pt request labs be faxed to Dr. Brooks Ledesma (Card)--faxed. Pt verbalized understanding of information discussed w/ no further questions at this time.

## 2020-07-02 NOTE — TELEPHONE ENCOUNTER
Called patient to advise/confirm upcoming appt with Dr. Maury Archibald on  7/6/2020     at 2:30pm at [facility], asking patient to arrive at 2:15pm.  No answer with patient's phone so left voicemail, called Mrs. Tarah Guzman and she confirmed appt. Also advised to please bring in your Drivers License and Insurance Card with you to appointment as well as any medication in the original container with you to appt.

## 2020-07-06 NOTE — PROGRESS NOTES
Palliative Medicine Office Visit Palliative Medicine Nurse Check In 
[phone] Patient Name: Riaz Dang YOB: 1940 Date of Office Visit: 7/6/2020 Patient states: \"  \" 
 
From Check In Sheet (scanned in Media): 
Has a medical provider talked with you about cardiopulmonary resuscitation (CPR)? [x] Yes   [] No   [] Unable to obtain Nurse reminder to complete or update ACP FlowSheet: 
 
Is ACP on the Problem List?    [x] Yes    [] No 
IF ACP Document is ON FILE; Nurse to place ACP on Problem List  
 
Is there an ACP Note in Chart Review/Note? [x] Yes    [] No  
If NO: ALERT PROVIDER Advance Care Planning 6/6/2020 Patient's Healthcare Decision Maker is: Named in scanned ACP document Primary Decision Maker Name -  
Primary Decision Maker Phone Number -  
Primary Decision Maker Relationship to Patient - Secondary Decision Maker Name - Secondary Decision Maker Phone Number - Secondary Decision Maker Relationship to Patient -  
Confirm Advance Directive Yes, on file Is there anything that we should know about you as a person in order to provide you the best care possible? Have you been to the ER, urgent care clinic since your last visit? [] Yes   [x] No   [] Unable to obtain Have you been hospitalized since your last visit? [] Yes   [x] No   [] Unable to obtain Have you seen or consulted any other health care providers outside of the [address] [place] [address] since your last visit? [] Yes   [x] No   [] Unable to obtain Functional status (describe):  
 
 
 
Last BM: 7/6/2020  accessed (date): 7/6/2020 Bottle review (for opioid pain medication): 
Medication 1:  
Date filled:  
Directions:  
# filled: # left: # pills taking per day: 
Last dose taken: 
 
Medication 2:  
Date filled:  
Directions:  
# filled: # left: # pills taking per day: 
Last dose taken: 
 
Medication 3:  
Date filled:  
Directions:  
# filled: # left: # pills taking per day: 
Last dose taken: 
 
Medication 4:  
Date filled:  
Directions:  
# filled: # left: # pills taking per day: 
Last dose taken:

## 2020-07-07 NOTE — PATIENT INSTRUCTIONS
Dear Will Blackwood , It was a pleasure seeing you today in your home along with your wife and daughter Solange Ramirez. We will see you again in 2 months If labs or imaging tests have been ordered for you today, please call the office  at [phone] 48 hours after completion to obtain the results. Your stated goal: - To live well and long as possible 
-To die with dignity and comfort when your time comes Your described symptoms were: Fatigue: 3 Drowsiness: 4 Depression: 6 Pain: 0 Anxiety: 3 Nausea: 0 Anorexia: 2 Dyspnea: 8 Constipation: Yes Other Problem (Comment): 0 This is the plan we talked about: 1. Severe shortness of breath associated with pulmonary fibrosis 
-You are on 4 L of oxygen continuously, you struggle with severe, debilitating low blood oxygen levels even with simple exertion such as going to the bathroom, walking 2-4 steps, etc.  You feel fairly okay at rest but unable to talk for a long time without taking a break. 
-This is impacted your quality of life significantly. You understand that this is part of pulmonary fibrosis and expected to get worse in the future. 
-At this time, you are only using oxygen delivered via nasal cannula from a concentrator. You do not have any portable oxygen tanks. Some patients have shared that oxygen from an oxygen tank is more helpful than the concentrator at the time of exertion. We will work with [address] to make sure you get some oxygen tanks and try using that oxygen especially at the time of exertion and see how you feel. When you exert yourself, your oxygen levels drop from high 60s to high 80s from your resting oxygen level of 95%. That level of drop is hard to manage and I understand how you feel exhausted after such an episode. You also breathe oxygen through the mouth, having an extra set of nasal cannula which you can put in your mouth also helps. -Please continue using CPAP at night -Please continue following with Dr. Nataliya Arce closely. 2.  Goals of care - You wanted to establish care with a palliative medicine physician to start talking about what death is going to look like and plan for end-of-life care. You and your family have very good understanding of pulmonary fibrosis, we talked about the normal trajectory of this illness and the expected symptoms in the future such as worsening shortness of breath, life limiting debility, dependence on others for daily care needs, etc.  We talked about the use of opioids and other medications to help alleviate the panic that comes with shortness of breath. You are very interested in trying these medicines in the near future but not right now. We also talked about increasing care needs in the future, we identified that moving in with your daughter Javier Coates is a possibility. Currently, your elderly wife is your primary caregiver and she is doing a fantastic job of that. 
-You are a  but never established care with the [place]. I highly recommend that you establish care with the [place] so we can get physical therapy and Occupational Therapy assessment for you in order to get an electric wheelchair. This will be very helpful in minimizing your exertion and allowing independence. Getting a stair lift in the future may also be easier through the [place]. 
 
-You have an AMD naming your wife is your primary medical power of  and daughter Javier Coates is the secondary medical power of . 
-We talked about the importance of making a decision regarding CPR and intubation. You do not think you want attempts at CPR and intubation if a good outcome cannot be guaranteed. I shared that given your age and pulmonary fibrosis, resuscitative efforts may be futile and intubation is expected to be very difficult and harmful.   I will send the book \"hard choices for loving people \"to your home, please read the book and share your thoughts with your family. We agreed that we will talk more about this during her next visit. You are inclining towards a DNR but very afraid to make that commitment at this time. You wanted a copy of the DNR and POST form in the mail to read through it as well. This is what you have shared with us about Advance Care Planning: 
 
  Primary Decision Maker: Marshall Pavon - Spouse - [phone] Secondary Decision Maker: Anastacia Spence - Daughter - [phone] Advance Care Planning 6/6/2020 Patient's Healthcare Decision Maker is: Named in scanned ACP document Primary Decision Maker Name -  
Primary Decision Maker Phone Number -  
Primary Decision Maker Relationship to Patient - Secondary Decision Maker Name - Secondary Decision Maker Phone Number - Secondary Decision Maker Relationship to Patient -  
Confirm Advance Directive Yes, on file The Palliative Medicine Team is here to support you and your family.   
 
 
Sincerely, 
 
 
Carolyn Elias MD and the Palliative Medicine Team

## 2020-07-07 NOTE — PROGRESS NOTES
Palliative Medicine Outpatient Services Michael: [phone]) Patient Name: Natalie Balderas YOB: 1940 Date of Current Visit: 07/07/20 Location of Current Visit:   
[] [facility] Office 
[] [facility] Office [] [facility] Office 
[] Home 
[x]Synchronous A/V virtual visit Date of Initial Visit: 7/6/2020 Referral from: Self Primary Care Physician: Jorge A Dang MD 
  
 SUMMARY:  
Natalie Balderas is a 68y.o. year old with a  history of DM 2, GERD,, interstitial lung disease diagnosed in 2007 but did not develop symptoms until 2012, currently on continuous 4 L oxygen, who was referred to Palliative Medicine by patient and his daughter for maximizing quality of life and identification of goals of care. 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 He has 3 children, Gloria Almazan is a nurse at [employer], lives in [place], [place], daughter [facility] lives with the patient, son lives out of state. Initial Referral Intake note reviewed PALLIATIVE DIAGNOSES:  
 
  ICD-10-CM ICD-9-CM 1. Chronic respiratory failure with hypoxia (HCC) J96.11 518.83   
  799.02   
2. Interstitial lung disease ([address] J84.9 515 3. Debility R53.81 799.3 4. SEAN (obstructive sleep apnea) G47.33 327.23   
5. Palliative care by specialist Z51.5 V66.7 PLAN:  
 
Patient Instructions Dear Natalie Balderas , It was a pleasure seeing you today in your home along with your wife and daughter Gloria Almazan. We will see you again in 2 months If labs or imaging tests have been ordered for you today, please call the office  at [phone] 48 hours after completion to obtain the results. Your stated goal: - To live well and long as possible 
-To die with dignity and comfort when your time comes Your described symptoms were: Fatigue: 3 Drowsiness: 4 Depression: 6 Pain: 0  
 Anxiety: 3 Nausea: 0 Anorexia: 2 Dyspnea: 8 Constipation: Yes Other Problem (Comment): 0 This is the plan we talked about: 1. Severe shortness of breath associated with pulmonary fibrosis 
-You are on 4 L of oxygen continuously, you struggle with severe, debilitating low blood oxygen levels even with simple exertion such as going to the bathroom, walking 2-4 steps, etc.  You feel fairly okay at rest but unable to talk for a long time without taking a break. 
-This is impacted your quality of life significantly. You understand that this is part of pulmonary fibrosis and expected to get worse in the future. 
-At this time, you are only using oxygen delivered via nasal cannula from a concentrator. You do not have any portable oxygen tanks. Some patients have shared that oxygen from an oxygen tank is more helpful than the concentrator at the time of exertion. We will work with [facility] to make sure you get some oxygen tanks and try using that oxygen especially at the time of exertion and see how you feel. When you exert yourself, your oxygen levels drop from high 60s to high 80s from your resting oxygen level of 95%. That level of drop is hard to manage and I understand how you feel exhausted after such an episode. You also breathe oxygen through the mouth, having an extra set of nasal cannula which you can put in your mouth also helps. -Please continue using CPAP at night 
-Please continue following with Dr. Gaston Gaytan closely. 2.  Goals of care - You wanted to establish care with a palliative medicine physician to start talking about what death is going to look like and plan for end-of-life care.   You and your family have very good understanding of pulmonary fibrosis, we talked about the normal trajectory of this illness and the expected symptoms in the future such as worsening shortness of breath, life limiting debility, dependence on others for daily care needs, etc.  We talked about the use of opioids and other medications to help alleviate the panic that comes with shortness of breath. You are very interested in trying these medicines in the near future but not right now. We also talked about increasing care needs in the future, we identified that moving in with your daughter Aida Gagnon is a possibility. Currently, your elderly wife is your primary caregiver and she is doing a fantastic job of that. 
-You are a  but never established care with the [place]. I highly recommend that you establish care with the [place] so we can get physical therapy and Occupational Therapy assessment for you in order to get an electric wheelchair. This will be very helpful in minimizing your exertion and allowing independence. Getting a stair lift in the future may also be easier through the [place]. 
 
-You have an AMD naming your wife is your primary medical power of  and daughter Adia Gagnon is the secondary medical power of . 
-We talked about the importance of making a decision regarding CPR and intubation. You do not think you want attempts at CPR and intubation if a good outcome cannot be guaranteed. I shared that given your age and pulmonary fibrosis, resuscitative efforts may be futile and intubation is expected to be very difficult and harmful. I will send the book \"hard choices for loving people \"to your home, please read the book and share your thoughts with your family. We agreed that we will talk more about this during her next visit. You are inclining towards a DNR but very afraid to make that commitment at this time. You wanted a copy of the DNR and POST form in the mail to read through it as well. This is what you have shared with us about Advance Care Planning: 
 
  Primary Decision Maker: Stacey Salmeron - Spouse - [phone] Secondary Decision Maker: Mike Wong - Daughter - [phone] Advance Care Planning 6/6/2020 Patient's Healthcare Decision Maker is: Named in scanned ACP document Primary Decision Maker Name -  
Primary Decision Maker Phone Number -  
Primary Decision Maker Relationship to Patient - Secondary Decision Maker Name - Secondary Decision Maker Phone Number - Secondary Decision Maker Relationship to Patient -  
Confirm Advance Directive Yes, on file The Palliative Medicine Team is here to support you and your family. Sincerely, 
 
 
Brayan Louise MD and the Palliative Medicine Team 
 
 
 GOALS OF CARE / TREATMENT PREFERENCES:  
[====Goals of Care====] GOALS OF CARE: 
Patient / health care proxy stated goals: See Patient Instructions / Summary TREATMENT PREFERENCES:  
Code Status:  [x] Attempt Resuscitation       [] Do Not Attempt Resuscitation Advance Care Planning: 
[x] [facility] Interdisciplinary Team has updated the ACP Navigator with Decision Maker and Patient Capacity Primary Decision Maker: Stevie Cavazos - Spouse - [phone] Secondary Decision Maker: Treva Ordonez - Daughter - [phone] Advance Care Planning 6/6/2020 Patient's Healthcare Decision Maker is: Named in scanned ACP document Primary Decision Maker Name -  
Primary Decision Maker Phone Number -  
Primary Decision Maker Relationship to Patient - Secondary Decision Maker Name - Secondary Decision Maker Phone Number - Secondary Decision Maker Relationship to Patient -  
Confirm Advance Directive Yes, on file Other: 
(If patient appropriate for POST, consider using PALLPOST smart phrase here) The palliative care team has discussed with patient / health care proxy about goals of care / treatment preferences for patient. 
[====Goals of Care====] PRESCRIPTIONS GIVEN:  
No orders of the defined types were placed in this encounter. FOLLOW UP: Future Appointments Date Time Provider David Romero 9/1/2020  1:30 PM [facility]  
  
 
 PHYSICIANS INVOLVED IN CARE:  
Patient Care Team: 
Marcy Floyd MD as PCP - General (Internal Medicine) Marcy Floyd MD as PCP - REHABILITATION [facility] Provider Be Gottlieb MD (Pulmonary Disease) Inga Galdamez, RN Yogesh Mitchell MD (Cardiology) Anita Friend MD (Dermatology) Meera Manzano MD (Gastroenterology) Rowdy Amaya MD (Urology) 
call (Allergy) Diaz Verde MD (Ophthalmology) Lelo Stacy MD as Surgeon (General Surgery) Mayank Loaiza MD (Pulmonary Disease) Christina Nuñez MD (Vascular Surgery) Michi Martel RN as Care Transitions Nurse HISTORY:  
Reviewed patient-completed ESAS and advance care planning form. Reviewed patient record in prescription monitoring program. 
 
CHIEF COMPLAINT: No chief complaint on file. HPI/SUBJECTIVE: The patient is: [x] Verbal / [] Nonverbal  
 
Patient seen today virtually along with his wife and daughter Silvina Pena. Pleasant gentleman, talks about his life, his business with his wife and how much they both enjoyed it. They gave it up several years ago for several reasons including the fact that Mr. Andrew Lee was starting to develop significant shortness of breath. He was diagnosed with ILD in 2007 but did not develop significant symptoms until 2012. He has been on increasing amount of oxygen since then with decreasing functionality. He had a complicated hospitalization in June with pneumomediastinum which scared him quite a bit. The family has also 
undergone recent tragedy where his daughter Antoine Bruno was to be  in early May but her fiancee was killed in a taiwo accident in late April. The family remains traumatized by this loss but they are trying to move on. Antoine Bruno has now moved in with the patient and his wife. Their daughter Silvina Pena, is a registered nurse at [employer] and very involved in their care and a big advocate for palliative medicine.   She wants comfort oriented care for her father and support for her mother. The patient and family wants to be as prepared as they can to deal with this chronic and debilitating illness. Clinical Pain Assessment (nonverbal scale for nonverbal patients):  
[++++ Clinical Pain Assessment++++] [++++Pain Severity++++]: Pain: 0 
[++++Pain Character++++]: 
[++++Pain Duration++++]: 
[++++Pain Effect++++]: 
[++++Pain Factors++++]: 
[++++Pain Frequency++++]: 
[++++Pain Location++++]: 
[++++ Clinical Pain Assessment++++] FUNCTIONAL ASSESSMENT:  
 
Palliative Performance Scale (PPS): PPS: 70 PSYCHOSOCIAL/SPIRITUAL SCREENING:  
 
Any spiritual / Alevism concerns: 
[] Yes /  [x] No 
 
Caregiver Burnout: 
[] Yes /  [x] No /  [] No Caregiver Present Anticipatory grief assessment:  
[x] Normal  / [] Maladaptive ESAS Anxiety: Anxiety: 3 ESAS Depression: Depression: 6 REVIEW OF SYSTEMS:  
 
The following systems were [x] reviewed / [] unable to be reviewed Systems: constitutional, ears/nose/mouth/throat, respiratory, gastrointestinal, genitourinary, musculoskeletal, integumentary, neurologic, psychiatric, endocrine. Positive findings noted below. Modified ESAS Completed by: provider Fatigue: 3 Drowsiness: 4 Depression: 6 Pain: 0 Anxiety: 3 Nausea: 0 Anorexia: 2 Dyspnea: 8 Constipation: Yes Other Problem (Comment): 0 PHYSICAL EXAM:  
 
Wt Readings from Last 3 Encounters:  
06/08/20 175 lb 14.8 oz (79.8 kg) 02/28/20 189 lb 6.4 oz (85.9 kg) 01/02/20 188 lb 6.4 oz (85.5 kg) There were no vitals taken for this visit. Last bowel movement: See Nursing Note Constitutional   
[x] Appears well-developed and well-nourished in no apparent distress   
[] Abnormal: 
Mental status [x] Alert and awake [x] Oriented to person/place/time 
[x] Able to follow commands 
[] Abnormal:  
Eyes 
[x] EOM normal  
[x] Sclera normal  
[x] No visible ocular discharge 
[] Abnormal:  
HENT 
 [x] Normocephalic, atraumatic [x] Mouth/Throat: Moist mucous membranes  
[x] External Ears normal 
[] Abnormal: 
Neck [x] No visualized mass 
[] Abnormal: 
Pulmonary/Chest  
[x] Respiratory effort normal 
[x] No visualized signs of difficulty breathing or respiratory distress 
[] Abnormal: Musculoskeletal 
[x] Normal gait with no signs of ataxia [x] Normal range of motion of neck [] Abnormal: 
Neurological:  
[x] No facial asymmetry (Cranial nerve 7 motor function) [x] No gaze palsy 
[] Abnormal:  
Skin 
[x] No significant exanthematous lesions or discoloration noted on facial skin 
[] Abnormal: Psychiatric [x] Normal affect [x] No hallucinations [] Abnormal: 
 
Other pertinent observable physical exam findings: 
 
Due to this being a TeleHealth evaluation, many elements of the physical examination are unable to be assessed. HISTORY:  
 
Past Medical History:  
Diagnosis Date  [facility] small AAA  Arthritis shoulders and spine 3/1/2010  Asthma 3/1/2010 Dr. Michele Denise Dr. Call  BPH (benign prostatic hypertrophy)  Broken nose 3/1/2010  Bronchitis 02/2017  CAD (coronary artery disease) Dr. Jessica Shaw  Cancer ([facility] 2000  
 basal cell chest  
 Chronic bronchitis ([facility] 3/1/2010  Contact dermatitis and other eczema, due to unspecified cause Dr. Juan Ramon De La Garza  COPD  Depression 3/1/2010  GERD (gastroesophageal reflux disease)  Hypercholesterolemia 9/29/2010  Hypertension  IPF (idiopathic pulmonary fibrosis) ([facility] 2007  Pneumonia 3/1/2010  Testosterone deficiency 3/24/2011 Past Surgical History:  
Procedure Laterality Date  CABG, ARTERIAL, THREE  10/2003  CARDIAC SURG PROCEDURE UNLIST  10/2003 CABGx3 vessel  COLONOSCOPY N/A 7/14/2017 COLONOSCOPY performed by Ashley Merino MD at [facility] ENDOSCOPY  ENDOSCOPY, COLON, DIAGNOSTIC Dr. Tom High  HX APPENDECTOMY  HX CHOLECYSTECTOMY  2008  
 laparoscopic  HX COLECTOMY  07  
 sigmoid colectomy for volvulus in [place]  HX COLONOSCOPY  2014 Repeat in 3 years  HX HEENT  while in 1948  
 submucus resection sinus//resection sub mucus  44 [address]  
 right  HX ORTHOPAEDIC  2011  
 right hand, thumb surgery, plate inserted  HX ORTHOPAEDIC    
 broken nose  HX ORTHOPAEDIC    
 sub mucous resection  HX ORTHOPAEDIC  02/05/15  
 right hand surgery, ligament repair  HX OTHER SURGICAL    
 HX OTHER SURGICAL    
 hemorrhoids  HX OTHER SURGICAL  2015  
 basal cell carcinoma  HX SEPTOPLASTY    
 s/p broken nose   
 HX TONSIL AND ADENOIDECTOMY    
 HX TONSILLECTOMY    
 HX UROLOGICAL    
 vasectomy Family History Problem Relation Age of Onset  Cancer Mother   
     spine or abdomen  Stroke Father  Heart Attack Father 46s  Heart Disease Father  Cancer Brother   
     CLL, lung  Diabetes Brother  Cancer Brother   
     lung  Cancer Brother   
     lung  Cancer Brother   
     lung  Heart Disease Brother  Heart Disease Brother 58 CABG  
 Other Brother   
     shingles  Lung Disease Sister  High Cholesterol Son  Lung Disease Daughter Overactive Airways  No Known Problems Daughter History reviewed, no pertinent family history. Social History Tobacco Use  Smoking status: Former Smoker Packs/day: 1.00 Years: 15.00 Pack years: 15.00 Last attempt to quit: 1970 Years since quittin.5  Smokeless tobacco: Never Used Substance Use Topics  Alcohol use: Yes Alcohol/week: 2.0 standard drinks Types: 2 Glasses of wine per week Comment: rarely Allergies Allergen Reactions  Celexa [Citalopram] Other (comments) agitation  Cephalexin Other (comments) GI upset.  Demerol [Meperidine] Unknown (comments)  Mold Extracts Unknown (comments)  Niaspan [Niacin] Nausea Only  Other Medication Unknown (comments) Grass smut, standardized mite mix, weed mix, dogs, white pam, white hickory, red mulberry, barley, cottonseed, malt, rice, rye, black pepper, navy bean  Percocet [Oxycodone-Acetaminophen] Unknown (comments) [address]  Sulfa (Sulfonamide Antibiotics) Nausea Only Current Outpatient Medications Medication Sig  tamsulosin (FLOMAX) 0.4 mg capsule TAKE 1 CAPSULE EVERY NIGHT  solifenacin (VESICARE) 5 mg tablet Take 5 mg by mouth daily.  omeprazole (PRILOSEC) 40 mg capsule TAKE ONE CAPSULE BY MOUTH TWICE A DAY  buPROPion XL (WELLBUTRIN XL) 300 mg XL tablet TAKE 1 TABLET EVERY MORNING  
 ketoconazole (NIZORAL) 2 % topical cream APPLY TO AFFECTED AREA(S) DAILY. USE ON SKIN LESIONS EVERY COUPLE OF DAYS AS DIRECTED.  sucralfate (CARAFATE) 1 gram tablet Take 1 g by mouth as needed for Other (stomach pain).  budesonide-formoterol (SYMBICORT) 160-4.5 mcg/actuation HFAA Take 2 Puffs by inhalation two (2) times a day.  ketoconazole (NIZORAL) 2 % shampoo Apply  to affected area two (2) times a week.  albuterol (ACCUNEB) 1.25 mg/3 mL nebu 3 mL by Nebulization route every six (6) hours as needed.  atorvastatin (LIPITOR) 80 mg tablet Take 80 mg by mouth daily.  montelukast (SINGULAIR) 10 mg tablet Take 1 Tab by mouth daily (after dinner).  NEBULIZER by Does Not Apply route.  nitroglycerin (NITROQUICK) 0.4 mg SL tablet 1 Tab by SubLINGual route as needed.  albuterol (PROVENTIL HFA, VENTOLIN HFA) 90 mcg/Actuation inhaler Take 2 Puffs by inhalation every four (4) hours as needed for Wheezing.  multivitamin (ONE-A-DAY MENS) tablet Take 1 Tab by mouth daily.  metoprolol (LOPRESSOR) 25 mg tablet Take 25 mg by mouth two (2) times a day. PT INSTRUCTED TO TAKE AM DOS PER ANESTHESIA PROTOCOL  ezetimibe (ZETIA) 10 mg tablet Take 10 mg by mouth every morning.  pantoprazole (PROTONIX) 40 mg tablet Take 1 Tab by mouth two (2) times a day.  cefdinir (OMNICEF) 300 mg capsule Take 300 mg by mouth two (2) times a day. Pt reported d/t lung condition starts taking if has a fever & notifies Pulmonary.  Azelastine (ASTEPRO) 0.15 % (205.5 mcg) nasal spray 2 Sprays by Both Nostrils route two (2) times a day. No current facility-administered medications for this visit. LAB DATA REVIEWED:  
 
Lab Results Component Value Date/Time WBC 9.4 06/07/2020 04:41 AM  
 HGB 11.6 (L) 06/07/2020 04:41 AM  
 PLATELET 187 51/91/5461 04:41 AM  
 
Lab Results Component Value Date/Time Sodium 139 06/17/2020 08:49 AM  
 Potassium 4.9 06/17/2020 08:49 AM  
 Chloride 101 06/17/2020 08:49 AM  
 CO2 28 06/17/2020 08:49 AM  
 BUN 14 06/17/2020 08:49 AM  
 Creatinine 1.13 06/17/2020 08:49 AM  
 Calcium 9.6 06/17/2020 08:49 AM  
 Magnesium 2.0 03/01/2016 04:23 AM  
  
Lab Results Component Value Date/Time Alk. phosphatase 84 06/17/2020 08:49 AM  
 Protein, total 6.7 06/17/2020 08:49 AM  
 Albumin 3.4 (L) 06/17/2020 08:49 AM  
 Globulin 4.2 (H) 03/05/2018 01:54 PM  
 
No results found for: INR, PTMR, PTP, PT1, PT2, APTT, INREXT Lab Results Component Value Date/Time Iron 62 08/28/2019 09:48 AM  
 TIBC 247 (L) 08/28/2019 09:48 AM  
 Iron % saturation 25 08/28/2019 09:48 AM  
 Ferritin 192 08/28/2019 09:48 AM  
  
 
 
 CONTROLLED SUBSTANCES SAFETY ASSESSMENT (IF ON CONTROLLED SUBSTANCES):  
 
Reviewed opioid safety handout:  [] Yes   [] No 
24 hour opioid dose >150mg morphine equivalent/day:  [] Yes   [] No 
Benzodiazepines:  [] Yes   [] No 
Sleep apnea:  [] Yes   [] No 
Urine Toxicology Testing within last 6 months:  [] Yes   [] No 
History of or new aberrant medication taking behaviors:  [] Yes   [] No 
Has Narcan been prescribed [] Yes   [] No 
 
   
 
Total time: 70 minutes Counseling / coordination time: 60 minutes 
> 50% counseling / coordination?:  Yes Consent: 
He and/or health care decision maker is aware that that he may receive a bill for this telehealth service, depending on his insurance coverage, and has provided verbal consent to proceed: YES 
 
CPT Codes 52910-92992 for Established Patients may apply to this Telehealth VisitTime-based coding, delete if not needed: I spent at least 60 minutes with this new patient, and >50% of the time was spent counseling and/or coordinating care regarding Identification of goals of care, disease discussion Pursuant to the emergency declaration under the [facility], [facility] waiver authority and the [employer] Act, this Virtual  Visit was conducted, with patient's consent, to reduce the patient's risk of exposure to COVID-19 and provide continuity of care for an established patient. Services were provided through a video synchronous discussion virtually to substitute for in-person clinic visit.

## 2020-07-09 NOTE — TELEPHONE ENCOUNTER
Chintan Castillo (wife) called and has a question for Enriqueta about the oxygen tanks that they received through [employer].     Best contact: [phone]

## 2020-07-09 NOTE — TELEPHONE ENCOUNTER
V/m left for Mariah to return call ,call placed to patient who was in home resting and hydrating reports he feels better when he rests, wife had concerns about current oxygen delivery and requesting a home fill system

## 2020-07-09 NOTE — TELEPHONE ENCOUNTER
Patient needs storm tanks and portable tanks and bag, wanted to get home fill system but patient would have to go with a new company who could not provide portable oxygen concentrator     Call to [employer] [facility] who will deliver small portable tanks, storm tank and 10L concentrator to patients home on Monday

## 2020-07-10 NOTE — TELEPHONE ENCOUNTER
Returned call to Yadi Fenton who informs to disregard call back all equiment will be sent out to patients home today

## 2020-07-14 NOTE — PROGRESS NOTES
Music Therapy Assessment      [facility] [id number]     1940  68 y.o.  male    Patient Telephone Number: [phone] (home)   Mandaeism Affiliation: [facility]   Language: English   Patient Active Problem List    Diagnosis Date Noted    Chronic respiratory failure ([facility] 06/06/2020    Pneumomediastinum ([address] 06/06/2020    Interstitial lung disease ([address] 06/06/2020    Chest pain 06/05/2020    Controlled type 2 diabetes mellitus without complication, without long-term current use of insulin ([address] 02/28/2020    SEAN (obstructive sleep apnea) 03/15/2019    Interstitial pulmonary fibrosis ([facility] 10/09/2013    HTN (hypertension) 10/09/2013    A-fib ([facility] 10/09/2013    AAA (abdominal aortic aneurysm) ([facility] 10/09/2013    Benign prostatic hyperplasia     GERD (gastroesophageal reflux disease)     CAD (coronary artery disease)     S/P CABG (coronary artery bypass graft) 06/20/2011    Testosterone deficiency 03/24/2011    Hypercholesterolemia 09/29/2010    Depression 03/01/2010    Arthritis shoulders and spine 03/01/2010        Date: 7/14/2020            Total Time (in minutes): 1          [facility] PASTORAL CARE    Mental Status:   [ X ] Alert [  ] Satira Davi [  ]  Confused  [  ] Minimally responsive  [  ] Sleeping  Clinical Problem addressed: Relaxation  Session Observations: Mr. Ami John declined music therapy at this time. He reported that music was more appreciated by his wife, and that he enjoys his grandchildren. He thanked this therapist for her time. Thank you for the opportunity to offer music therapy to Mr. Ami John.   Ayala Lanes, [place], [facility]   Music [address] Certified  Spiritual Care Services  Referral- based service

## 2020-07-20 NOTE — TELEPHONE ENCOUNTER
Patient aware that further refills for oxygen tank needs can be completed with [employer] and they could call when needed for refills

## 2020-07-20 NOTE — TELEPHONE ENCOUNTER
Patient calling to speak to nurse. States he does not think [employer] delivered the oxygen that MD ordered. States they delivered 4 medium sized tanks. Advised nurse would call him back to discuss.

## 2020-07-23 NOTE — PROGRESS NOTES
Patient has graduated from the Transitions of Care Coordination  program on 7/23/20. Patient/family has the ability to self-manage at this time Care management goals have been completed. Patient was not referred to the [facility] team for further management. Goals Addressed                 This Visit's Progress     COMPLETED: Transition of Care- collaboration & coordination of care to prevent complications post hospitalization. (pt-stated)        7/23/20- attended PCP 6/11/20 Virtual appt. Next appt 9/1/20. Attended 6/11/20 Pul w/ Dr Timur Yanez. Next appt 8/6/20. Pt reported \"breathing doing ok\". Cont O2 via NC 4-5L/min. Wearing CPAP. Next Sleep Med appt @ [facility] 9/3/20 w/ NP Shirley Sep. Palliative Med appt  w/ Dr Kristin Bailey 7/6/20. Next appt 9/1/20. BS HH SN/PT/OT continues. \"It's going well\". Plan- pt commended for engaged self management & continuation encouraged. ALEJANDRA Episode resolved-ID.    6/9/20- pt reported \"feeling much better\". Resp status @ baseline. Using O2 via NC cont 3-4L/min during day & @ 3L/min during night. Afebrile, no cough. CPAP use on hold x 1 week as per Thoracic Surg instruction. Has Nebulizer. Hasn't needed in a while. No new meds @ d/c. +Med adherence w/ routine meds. Appetite good. No problem w/ bladder/bowel functions. Amb w/o any AD. Red flags & action plan reviewed. CDC COVID-19 Guidelines reviewed. Patient advised providers on call 24 hours a day / 7 days a week (M-F 5 PM to 8 AM, and from Friday 5 PM until Monday 8 AM for the weekend) should the patient have questions or concerns. [facility] information provided. PCP virtual f/u 6/11/20. Pt/wife to call [facility] to schedule f/u appt w/ Dr Juana Sweeney. Plan- pt to continue med adherence. Pt to continue O2 use as prescribed. Pt to hold CPAP use x1 week as instructed. Pt to call Pul to schedule f/u appt/. Pt to attend scheduled appts. Pt to continue CDC COVID-19 Guidelines.  CTN to collaborate w/ pt/wife, PCP, & other Care Team Members PRN to coordinate care. Pt agreeable w/ CTN f/u ~ 2 weeks-ID. Patient has Care Transition Nurse's contact information for any further questions, concerns, or needs.   Patients upcoming visits:    Future Appointments   Date Time Provider David Romero   7/27/2020 10:00 AM Merna Lazaro [facility]   7/27/2020 10:00 AM Marlin [facility]   7/29/2020 To Be Determined Elveria Bare, LPN [facility]   7/30/2020 To Be Determined Valerie Imus OT [address] [facility]   7/30/2020 To Be Determined Slaly Devan [place] [id number]   8/5/2020 To Be Determined Elveria Bare, LPN [address] [facility]   8/12/2020 To Be Determined Elveria Bare, LPN [address] [facility]   8/19/2020 To Be Determined Elveria Bare, LPN [address] [facility]   8/26/2020 To Be Determined Elveria Bare, LPN [address] [facility]   9/1/2020 10:30 AM Kimberly Rowland MD [address] [facility]   9/1/2020  1:30 PM Lolly Erazo MD [facility]   9/2/2020 To Be Determined Aries Espino [facility]

## 2020-08-20 NOTE — PROGRESS NOTES
Please call patient back about results  Pt inscrease risk for slow thyroid  For now monitor tft closely    If feeling fatigued and would like trial synthroid could do 25mcg daily  Repeat tsh in 6 weeks--can discuss this at his f/u

## 2020-08-25 NOTE — PROGRESS NOTES
Called, spoke to pt. Two pt identifiers confirmed. Pt informed per Dr. Kathryn Herndon he's at an inscreased risk for slow thyroid. Pt informed per Dr. Kathryn Herndon if would like trial synthroid, can do 25mcg daily--pt agreed/pended. Pt informed per Dr. Kathryn Herndon to repeat tsh in 6 weeks--Labs ordered and mailed to pt. Pt verbalized understanding of information discussed w/ no further questions at this time.

## 2020-08-26 NOTE — PROGRESS NOTES
HISTORY OF PRESENT ILLNESS Krzysztof Lopez is a 68 y.o. male. HPI Pt last vv 6/11/20. Here for routine f/u Here with wife who helps provide hx 
   
BP today is *114/69 BP at home 110-120/76 No longer on avapro 75 mg  
States he coordinated this with dr Lyn Corea Currently just on metoprolol 25 mg BID Recall norvasc caused edema  
   
No BS readings Discussed checking once a week His DM is diet-controlled currently he is not currently on prednisone 
  
Wt is 176 lbs- down 13 lbs x lov  
Discussed low carb diet and w/l Discussed losing wt is common with lung disease and side effect of wellbutrin Discussed not skipping meals 
   
Reviewed labs 8/20 Ordered labs 
  
 
Pt follows with Dr. Helen Das ( palliative) Reviewed note 7/6/20: 1. Severe shortness of breath associated with pulmonary fibrosis 
-You are on 4 L of oxygen continuously, you struggle with severe, debilitating low blood oxygen levels even with simple exertion such as going to the bathroom, walking 2-4 steps, etc.  You feel fairly okay at rest but unable to talk for a long time without taking a break. 
-This is impacted your quality of life significantly. You understand that this is part of pulmonary fibrosis and expected to get worse in the future. 
-At this time, you are only using oxygen delivered via nasal cannula from a concentrator. You do not have any portable oxygen tanks. Some patients have shared that oxygen from an oxygen tank is more helpful than the concentrator at the time of exertion. We will work with Normal to make sure you get some oxygen tanks and try using that oxygen especially at the time of exertion and see how you feel. When you exert yourself, your oxygen levels drop from high 60s to high 80s from your resting oxygen level of 95%. That level of drop is hard to manage and I understand how you feel exhausted after such an episode.   You also breathe oxygen through the mouth, having an extra set of nasal cannula which you can put in your mouth also helps. -Please continue using CPAP at night 
-Please continue following with Dr. Nima malone. 2.  Goals of care - You wanted to establish care with a palliative medicine physician to start talking about what death is going to look like and plan for end-of-life care. You and your family have very good understanding of pulmonary fibrosis, we talked about the normal trajectory of this illness and the expected symptoms in the future such as worsening shortness of breath, life limiting debility, dependence on others for daily care needs, etc.  We talked about the use of opioids and other medications to help alleviate the panic that comes with shortness of breath. You are very interested in trying these medicines in the near future but not right now. We also talked about increasing care needs in the future, we identified that moving in with your daughter Gloria Almazan is a possibility. Currently, your elderly wife is your primary caregiver and she is doing a fantastic job of that. 
-You are a  but never established care with the [place]. I highly recommend that you establish care with the [place] so we can get physical therapy and Occupational Therapy assessment for you in order to get an electric wheelchair. This will be very helpful in minimizing your exertion and allowing independence. Getting a stair lift in the future may also be easier through the [place]. 
-You have an AMD naming your wife is your primary medical power of  and daughter Gloria Almazan is the secondary medical power of . 
-We talked about the importance of making a decision regarding CPR and intubation. You do not think you want attempts at CPR and intubation if a good outcome cannot be guaranteed.   I shared that given your age and pulmonary fibrosis, resuscitative efforts may be futile and intubation is expected to be very difficult and harmful. I will send the book \"hard choices for loving people \"to your home, please read the book and share your thoughts with your family. We agreed that we will talk more about this during her next visit. You are inclining towards a DNR but very afraid to make that commitment at this time. You wanted a copy of the DNR and POST form in the mail to read through it as well. Pt follows with Dr. Rio Stewart (derm) Pt had a basal cell carcinoma removed from his R arm Last visit was 10/22/19 and he had a squamous cell carcinoma removed from his arm  
   
Pt follows with Dr. Brissa Fierro (pulm) routinely q3 months for ILD he is now seeing palliative care for this as well Last visit was 8/20 Off prednisone currently  continues on ahead inhaler see below 
  
Recall Has rx for , prednisone and omnicef if he gets sick prn pulm dr Pierre  pulm rehab 
  
Continues on symbicort bid and albuterol prn (not daily) Pt is regularly supplemental now up to 4L O2 baseline, up to 4-6 L during exercise class Recall esprit caused GI upset  
   
Pt follows with Dr. Gavino Cooley (Kath[facility] Patella) annually in August 
Last there 5/20-had PSA checked in April or May 2020  
he is on flomax per uro 
 on vesicare rather than oxybutin he is also on Avodart He stopped proscar which could cause this enlargement --gynecomastia--now he is having more freq urination however  
  
  
Pt follows with Dr. Renny Fothergill (cardio) for h/o remote afib and cad Last visit was 8/20 
bp meds reduced still on metoprolol bid no medication changes were made No longer on avapro  
Had neg stress test in 2/19  
  
  
Pt follows with Dr. Santacruz (allergy) Continues singulair for allergies - controlled  
Will f/u prn  
   
Pt follows with Dr. Chela Wong (vasc surg) for AAA Last visit was 7/20 - stable per pt  
  Follows for AAA annually  
  
Pt follows with Dr. Guillermo Turner (GI) for gerd Last visit was 7/2/18 Continues on prilosec - changed back from protonix this is controlling his symptoms he is not taking sucralfate either See above to current sx 
  
Pt saw Dr Jessy Barr (sleep) has sleep apnea Last visit was  2/19 Next visit, next visit9/20 Pt is compliant with CPAP nightly 9/20 This is helping his sleep, tolerating well, also keeps O2 at 4L at night  
  
Pt saw Dr Van Babb (neuro) for a tremor Last visit was 10/18/18 Not bothering him too much  
  
  
Pt is on wellbutrin 300 mg for depression SHERRELL killed April 30th in a truck accident He is doing okay but has nl grief and not more down He has been short tempered and gets frustrated easily just more irritable in general would like to adjust medication if possible he mostly struggles with the nature of his medical illness and inability to do things he used to do Discussed zoloft will be better for irritability Will start zoloft 25 mg Recall celexa caused irritability in the past. Pt tolerated wellbutrin in the past. 
   
Continues claritin for allergies, which works well 
   
Continues on lipitor 80mg, max dose, daily for cholesterol He also takes zetia daily He has been taking synthroid 25 mcgs for thyroid due to last labs his TSH had been elevated or borderline he had a positive thyroid antibody we discussed whether or not he wanted to do a trial of Synthroid and he decided to go ahead and take it No issues he is tolerating it well without side effect Recall previously he had pneumomediastinum was thought related to CPAP he is back on CPAP now without problem 
  
  
ACP on file. SDM is his wife.  
  
PREVENTIVE:   
Colonoscopy: 7/14/17,  repeat in 5 years, Dr. Guillermo Turner EGD: 7/14/17, Dr. Guillermo Turner PSA: 8/18 per Dr. Gavino Cooley, 8/19 per dr Merlin Marie 
AAA screen: CT 7/12 - 3.2mm, Chela kumar, repeat in 10/13 and 2/15, 7/18 Tdap: will check cost at pharmacy, advised to get at his pharmacy --still due Pneumovax: 2/2020 Kcivuxt60: 10/13/2015 Zostavax: 2008 Shingrix: both rounds completed  
Flu shot: 20 Microalbumin:  Foot exam: 19  
A1c:    6.2,  6.0,  6.3,  6.2,  6.2, 3/19 6.0,  POC 6.1   6.6  5.9   6.1 Eye exam: [facility]. Kayleigh Mullins at [facility], pt may getting cataract surg sometime in future  
Lipids:  LDL 63 EK/10/17  
Hepatitis C screen: , negative Patient Active Problem List  
 Diagnosis Date Noted  Chronic respiratory failure ([facility] 2020  Pneumomediastinum ([facility] 2020  Interstitial lung disease ([facility] 2020  Chest pain 2020  Controlled type 2 diabetes mellitus without complication, without long-term current use of insulin ([facility] 2020  SEAN (obstructive sleep apnea) 03/15/2019  Interstitial pulmonary fibrosis ([facility] 10/09/2013  
 HTN (hypertension) 10/09/2013  A-fib ([facility] 10/09/2013  AAA (abdominal aortic aneurysm) ([facility] 10/09/2013  Benign prostatic hyperplasia  GERD (gastroesophageal reflux disease)  CAD (coronary artery disease)  S/P CABG (coronary artery bypass graft) 2011  Testosterone deficiency 2011  Hypercholesterolemia 2010  Depression 2010  Arthritis shoulders and spine 2010 Current Outpatient Medications Medication Sig Dispense Refill  sertraline (ZOLOFT) 25 mg tablet Take 1 Tab by mouth daily. 30 Tab 2  
 dutasteride (AVODART) 0.5 mg capsule Take 0.5 mg by mouth daily.  levothyroxine (SYNTHROID) 25 mcg tablet Take 1 Tab by mouth Daily (before breakfast). 30 Tab 1  
 tamsulosin (FLOMAX) 0.4 mg capsule TAKE 1 CAPSULE EVERY NIGHT 90 Cap 0  
 OXYGEN-AIR DELIVERY SYSTEMS 4 L by Nasal route continuous.  Cetirizine 10 mg cap Take 1 Tab by mouth daily.     
 omeprazole (PRILOSEC) 40 mg capsule TAKE ONE CAPSULE BY MOUTH TWICE A  Cap 0  
 buPROPion XL (WELLBUTRIN XL) 300 mg XL tablet TAKE 1 TABLET EVERY MORNING 90 Tab 1  
  atorvastatin (LIPITOR) 80 mg tablet Take 80 mg by mouth daily.  metoprolol (LOPRESSOR) 25 mg tablet Take 25 mg by mouth two (2) times a day. PT INSTRUCTED TO TAKE AM DOS PER ANESTHESIA PROTOCOL  lactobacillus rhamnosus gg 10 billion cell (Culturelle) 10 billion cell capsule Take 1 Cap by mouth daily.  GRAPE SEED EXTRACT PO Take 400 mg by mouth daily.  guaiFENesin ER (MUCINEX) 600 mg ER tablet Take 600 mg by mouth daily.  glucosam-chond-hyalu-CF borate (Move Free ItsGoinOn [facility]) 750 mg-100 mg- 1.65 mg-108 mg tab Take 2 Tabs by mouth daily.  solifenacin (VESICARE) 5 mg tablet Take 5 mg by mouth daily.  cefdinir (OMNICEF) 300 mg capsule Take 300 mg by mouth two (2) times a day. Pt reported d/t lung condition starts taking if has a fever & notifies Pulmonary.  ketoconazole (NIZORAL) 2 % topical cream APPLY TO AFFECTED AREA(S) DAILY. USE ON SKIN LESIONS EVERY COUPLE OF DAYS AS DIRECTED. 1 Tube 0  
 sucralfate (CARAFATE) 1 gram tablet Take 1 g by mouth as needed for Other (stomach pain).  budesonide-formoterol (SYMBICORT) 160-4.5 mcg/actuation HFAA Take 2 Puffs by inhalation two (2) times a day.  Azelastine (ASTEPRO) 0.15 % (205.5 mcg) nasal spray 2 Sprays by Both Nostrils route two (2) times a day.  ketoconazole (NIZORAL) 2 % shampoo Apply  to affected area two (2) times a week.  0  
 albuterol (ACCUNEB) 1.25 mg/3 mL nebu 3 mL by Nebulization route every six (6) hours as needed. (Patient taking differently: 3 mL by Nebulization route every six (6) hours as needed for Respiratory Distress. ) 1 Each 1  
 montelukast (SINGULAIR) 10 mg tablet Take 1 Tab by mouth daily (after dinner). 90 Tab 3  
 NEBULIZER by Does Not Apply route.  nitroglycerin (NITROQUICK) 0.4 mg SL tablet 1 Tab by SubLINGual route as needed. 25 Tab prn  albuterol (PROVENTIL HFA, VENTOLIN HFA) 90 mcg/Actuation inhaler Take 2 Puffs by inhalation every four (4) hours as needed for Wheezing. 1 Inhaler 2  
 multivitamin (ONE-A-DAY MENS) tablet Take 1 Tab by mouth daily.  ezetimibe (ZETIA) 10 mg tablet Take 10 mg by mouth every morning. Past Surgical History:  
Procedure Laterality Date  CABG, ARTERIAL, THREE  10/2003  CARDIAC SURG PROCEDURE UNLIST  10/2003 CABGx3 vessel  COLONOSCOPY N/A 7/14/2017 COLONOSCOPY performed by Walker García MD at [facility] ENDOSCOPY  ENDOSCOPY, COLON, DIAGNOSTIC Dr. Jalil Butler  HX APPENDECTOMY  HX CHOLECYSTECTOMY  11/22/2008  
 laparoscopic  HX COLECTOMY  12/29/07  
 sigmoid colectomy for volvulus in [place]  HX COLONOSCOPY  06/17/2014 Repeat in 3 years  HX HEENT  while in 20's 1948  
 submucus resection sinus//resection sub mucus 1962 [address] [place]  
 right  HX ORTHOPAEDIC  June 20, 2011  
 right hand, thumb surgery, plate inserted  HX ORTHOPAEDIC  1946  
 broken nose  HX ORTHOPAEDIC  1962  
 sub mucous resection  HX ORTHOPAEDIC  02/05/15  
 right hand surgery, ligament repair  HX OTHER SURGICAL    
 HX OTHER SURGICAL    
 hemorrhoids  HX OTHER SURGICAL  8/2015  
 basal cell carcinoma  HX SEPTOPLASTY  1962  
 s/p broken nose 1946  
 HX TONSIL AND ADENOIDECTOMY  1949  
 HX TONSILLECTOMY  1949  
 HX UROLOGICAL  1990  
 vasectomy Lab Results Component Value Date/Time WBC 9.4 06/07/2020 04:41 AM  
 HGB 11.6 (L) 06/07/2020 04:41 AM  
 HCT 36.2 (L) 06/07/2020 04:41 AM  
 PLATELET 969 61/26/7431 04:41 AM  
 MCV 95.8 06/07/2020 04:41 AM  
 
Lab Results Component Value Date/Time Cholesterol, total 123 06/17/2020 08:49 AM  
 HDL Cholesterol 40 06/17/2020 08:49 AM  
 LDL, calculated 63 06/17/2020 08:49 AM  
 Triglyceride 101 06/17/2020 08:49 AM  
 CHOL/HDL Ratio 3.4 09/29/2010 08:28 AM  
 
Lab Results Component Value Date/Time  GFR est non-AA 62 06/17/2020 08:49 AM  
 GFR est AA 72 06/17/2020 08:49 AM  
 Creatinine 1.13 06/17/2020 08:49 AM  
 BUN 14 06/17/2020 08:49 AM  
 Sodium 139 06/17/2020 08:49 AM  
 Potassium 4.9 06/17/2020 08:49 AM  
 Chloride 101 06/17/2020 08:49 AM  
 CO2 28 06/17/2020 08:49 AM  
 Magnesium 2.0 03/01/2016 04:23 AM  
  
 
Review of Systems Respiratory: Negative for shortness of breath. Cardiovascular: Negative for chest pain. Physical Exam 
Constitutional:   
   General: He is not in acute distress. Appearance: Normal appearance. He is not ill-appearing, toxic-appearing or diaphoretic. HENT:  
   Head: Normocephalic and atraumatic. Right Ear: External ear normal.  
   Left Ear: External ear normal.  
Eyes:  
   General:     
   Right eye: No discharge. Left eye: No discharge. Conjunctiva/sclera: Conjunctivae normal.  
   Pupils: Pupils are equal, round, and reactive to light. Neck: Musculoskeletal: Normal range of motion and neck supple. Cardiovascular:  
   Rate and Rhythm: Normal rate and regular rhythm. Pulses: Normal pulses. Heart sounds: Murmur (2/6) present. No friction rub. No gallop. Pulmonary:  
   Effort: No respiratory distress. Breath sounds: Rales present. No wheezing. Comments: Stable crackles Chest:  
   Chest wall: No tenderness. Musculoskeletal: Normal range of motion. Right lower leg: No edema. Left lower leg: No edema. Skin: 
   General: Skin is warm and dry. Neurological:  
   Mental Status: He is alert and oriented to person, place, and time. Mental status is at baseline. Coordination: Coordination normal.  
   Gait: Gait normal.  
Psychiatric:     
   Mood and Affect: Mood normal.     
   Behavior: Behavior normal.  
 
 
 
ASSESSMENT and PLAN 
  ICD-10-CM ICD-9-CM 1. Essential hypertension Well-controlled on metoprolol continue no change to dose I10 401.9 2. Controlled type 2 diabetes mellitus without complication, without long-term current use of insulin ([address] Diet controlled encouraged home monitoring once a week of blood sugar E11.9 250.00   
3. Interstitial lung disease ([facility] This is an end-stage process there is no reversible treatment he is currently following with palliative care I suspect his weight loss is related to his progressive lung disease He is on Symbicort He is on home oxygen now 4 L He is up-to-date with pulmonary he is not currently on prednisone His lung exam is stable J84.9 515   
4. SEAN (obstructive sleep apnea) Compliant with CPAP G47.33 327.23   
5. Coronary artery disease involving native coronary artery of native heart without angina pectoris Medically managed up-to-date with Dr. Avtar Francis no active signs or symptoms of CAD I25.10 414.01   
6. Hypercholesterolemia Controlled on Lipitor and Zetia E78.00 272.0 7. Benign prostatic hyperplasia without lower urinary tract symptoms On Flomax and Avodart up-to-date with urology N40.0 600.00   
8. Interstitial pulmonary fibrosis ([facility] See above J84.10 515   
9. Abdominal aortic aneurysm (AAA) without rupture ([facility] Saw vascular this summer has a stable aneurysm I71.4 441.4 10. Reactive depression Improved with Wellbutrin 300 mg daily However he has a lot of irritability and is short tempered a lot We will start Zoloft 25 mg daily and will have a 6-week virtual follow-up and adjust dose further as needed F32.9 300.4 11. Acquired hypothyroidism Now on Synthroid 25 MCG's daily we will repeat TSH in 6 weeks and adjust medication further as needed E03.9 244.9 12. Gastroesophageal reflux disease without esophagitis Back on Prilosec tolerating well symptoms controlled K21.9 530.81 Scribed by Pedro Packer, as dictated by Dr. Fito Fields. Current diagnosis and concerns discussed with pt at length.  Pt understands risks and benefits or current treatment plan and medications, and accepts the treatment and medication with any possible risks. Pt asks appropriate questions, which were answered. Pt was instructed to call with any concerns or problems. I have reviewed the note documented by the scribe. The services provided are my own. The documentation is accurate

## 2020-09-01 NOTE — PATIENT INSTRUCTIONS
Dear Krzysztof Lopez , It was a pleasure seeing you today in your home along with your wife We will see you again in towards the end of October If labs or imaging tests have been ordered for you today, please call the office  at [phone] 48 hours after completion to obtain the results. Your stated goal: - To live well and long as possible 
-To die with dignity and comfort when your time comes Your described symptoms were: Fatigue: 4 Drowsiness: 4 Depression: 5 Pain: 3 Anxiety: 3 Nausea: 2 Anorexia: 2 Dyspnea: 7 Best Well-Bein Constipation: Yes Other Problem (Comment): 0 This is the plan we talked about: 1. Severe shortness of breath associated with pulmonary fibrosis -Since our previous visit, you are starting to experience some episodes of cough which is very difficult for you to recover from. These episodes are not happening every day but more frequently now than before. We agreed to watch this for now and if you continue to have worsening cough, we may have to consider starting small doses of pain medicine to help you with this. You are using Tylene Lisa for now with some benefit. Your baseline 
-You are on 4 L of oxygen continuously, you struggle with severe, debilitating low blood oxygen levels even with simple exertion such as going to the bathroom, walking 2-4 steps, etc.  You feel fairly okay at rest but unable to talk for a long time without taking a break. 
-This is impacted your quality of life significantly. You understand that this is part of pulmonary fibrosis and expected to get worse in the future. 
-At this time, you are only using oxygen delivered via nasal cannula from a concentrator. You do not have any portable oxygen tanks. Some patients have shared that oxygen from an oxygen tank is more helpful than the concentrator at the time of exertion.   We will work with Yumiko Cerrato to make sure you get some oxygen tanks and try using that oxygen especially at the time of exertion and see how you feel. When you exert yourself, your oxygen levels drop from high 60s to high 80s from your resting oxygen level of 95%. That level of drop is hard to manage and I understand how you feel exhausted after such an episode. You also breathe oxygen through the mouth, having an extra set of nasal cannula which you can put in your mouth also helps. -Please continue using CPAP at night 
-Please continue following with Dr. Martín Chaparro closely. 2.  Goals of care 
-You received a material be sent but have not had a chance to review them in detail yet. You want to review them with your daughter and have a family meeting during our next visit to make decisions about CODE STATUS. ----- Previous visit - You wanted to establish care with a palliative medicine physician to start talking about what death is going to look like and plan for end-of-life care. You and your family have very good understanding of pulmonary fibrosis, we talked about the normal trajectory of this illness and the expected symptoms in the future such as worsening shortness of breath, life limiting debility, dependence on others for daily care needs, etc.  We talked about the use of opioids and other medications to help alleviate the panic that comes with shortness of breath. You are very interested in trying these medicines in the near future but not right now. We also talked about increasing care needs in the future, we identified that moving in with your daughter Ashley Diaz is a possibility. Currently, your elderly wife is your primary caregiver and she is doing a fantastic job of that. 
-You are a  but never established care with the [facility].   I highly recommend that you establish care with the [facility] so we can get physical therapy and Occupational Therapy assessment for you in order to get an electric wheelchair. This will be very helpful in minimizing your exertion and allowing independence. Getting a stair lift in the future may also be easier through the [facility]. 
 
-You have an AMD naming your wife is your primary medical power of  and daughter Josi is the secondary medical power of . 
-We talked about the importance of making a decision regarding CPR and intubation. You do not think you want attempts at CPR and intubation if a good outcome cannot be guaranteed. I shared that given your age and pulmonary fibrosis, resuscitative efforts may be futile and intubation is expected to be very difficult and harmful. I will send the book \"hard choices for loving people \"to your home, please read the book and share your thoughts with your family. We agreed that we will talk more about this during her next visit. You are inclining towards a DNR but very afraid to make that commitment at this time. You wanted a copy of the DNR and POST form in the mail to read through it as well. 3.  Hypothyroidism 
-You have been diagnosed with hypothyroidism and started on levothyroxine 3 drugs ago. You feel a bit more energetic since starting the medicine. You are following up with your PCP for this. 4.  You have enjoyed working with physical therapy and Occupational Therapy since we placed a referral.  You are following there tips and recommendations every day and feeling some benefit from it. 
-You are going to establish care with the [facility] on September 17. I am hoping that you can switch your physical therapy to the [facility] service in order to benefit from DME supply. This is what you have shared with us about Advance Care Planning: 
 
  Primary Decision Maker: Marshall Savanah - Spouse - [phone] Secondary Decision Maker: Anastacia Spence - Daughter - [phone] Advance Care Planning 9/1/2020 Patient's Healthcare Decision Maker is: Named in scanned ACP document Primary Decision Maker Name -  
Primary Decision Maker Phone Number -  
Primary Decision Maker Relationship to Patient - Secondary Decision Maker Name - Secondary Decision Maker Phone Number - Secondary Decision Maker Relationship to Patient -  
Confirm Advance Directive Yes, on file The Palliative Medicine Team is here to support you and your family.   
 
 
Sincerely, 
 
 
Daisy Reyes MD and the Palliative Medicine Team

## 2020-09-01 NOTE — PROGRESS NOTES
Palliative Medicine Office Visit Palliative Medicine Nurse Check In 
[phone] Patient Name: Phani Rockwell YOB: 1940 Date of Office Visit:  
 
Patient states: \"  \" 
 
From Check In Sheet (scanned in Media): 
Has a medical provider talked with you about cardiopulmonary resuscitation (CPR)? [] Yes   [] No   [] Unable to obtain Nurse reminder to complete or update ACP FlowSheet: 
 
Is ACP on the Problem List?    [x] Yes    [] No 
IF ACP Document is ON FILE; Nurse to place ACP on Problem List  
 
Is there an ACP Note in Chart Review/Note? [x] Yes    [] No  
If NO: ALERT PROVIDER Primary Decision Maker: Autumn Wheeler - Spouse - [phone] Secondary Decision Maker: German Danielle - Daughter - [phone] Advance Care Planning 9/1/2020 Patient's Healthcare Decision Maker is: Named in scanned ACP document Primary Decision Maker Name -  
Primary Decision Maker Phone Number -  
Primary Decision Maker Relationship to Patient - Secondary Decision Maker Name - Secondary Decision Maker Phone Number - Secondary Decision Maker Relationship to Patient -  
Confirm Advance Directive Yes, on file Is there anything that we should know about you as a person in order to provide you the best care possible? Have you been to the ER, urgent care clinic since your last visit? [] Yes   [x] No   [] Unable to obtain Have you been hospitalized since your last visit? [] Yes   [x] No   [] Unable to obtain Have you seen or consulted any other health care providers outside of the [address] [place] [address] since your last visit? [] Yes   [x] No   [] Unable to obtain Functional status (describe):  
 
 
 
Last BM: 9/1/2020  accessed (date): 9/1/2020 Bottle review (for opioid pain medication): 
Medication 1:  
Date filled:  
Directions:  
# filled: # left: # pills taking per day: 
Last dose taken: 
 
Medication 2:  
Date filled:  
Directions: # filled: # left: # pills taking per day: 
Last dose taken: 
 
Medication 3:  
Date filled:  
Directions:  
# filled: # left: # pills taking per day: 
Last dose taken: 
 
Medication 4:  
Date filled:  
Directions:  
# filled: # left: # pills taking per day: 
Last dose taken:

## 2020-09-01 NOTE — PROGRESS NOTES
Charlotte Jenkins is a 68 y.o. male who presents for routine immunizations. He denies any symptoms , reactions or allergies that would exclude them from being immunized today. Risks and adverse reactions were discussed and the VIS was given to them. All questions were addressed. He was observed for 5 min post injection. There were no reactions observed. Christal Kelsey

## 2020-09-01 NOTE — PROGRESS NOTES
Palliative Medicine Outpatient Services [place]: [phone]) Patient Name: Mahi Pagan YOB: 1940 Date of Current Visit: 09/01/20 Location of Current Visit:   
[] [facility] Office 
[] [facility] Office [] [facility] Office 
[] Home 
[x]Synchronous A/V virtual visit Date of Initial Visit: 7/6/2020 Referral from: Self Primary Care Physician: Jovita Bain MD 
  
 SUMMARY:  
Mahi Pagan is a 68y.o. year old with a  history of DM 2, GERD,, interstitial lung disease diagnosed in 2007 but did not develop symptoms until 2012, currently on continuous 4 L oxygen, who was referred to Palliative Medicine by patient and his daughter for maximizing quality of life and identification of goals of care. 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 He has 3 children, Trenton Adams is a nurse at [employer], lives in [address], daughter Yani Cole lives with the patient, son lives out of state. Initial Referral Intake note reviewed PALLIATIVE DIAGNOSES:  
 
  ICD-10-CM ICD-9-CM 1. Chronic respiratory failure with hypoxia (HCC)  J96.11 518.83   
  799.02   
2. Interstitial lung disease ([facility]  J84.9 515 3. Debility  R53.81 799.3 4. SEAN (obstructive sleep apnea)  G47.33 327.23   
5. Palliative care by specialist  Z51.5 V66.7 PLAN:  
 
Patient Instructions Dear Mahi Pagan , It was a pleasure seeing you today in your home along with your wife We will see you again in towards the end of October If labs or imaging tests have been ordered for you today, please call the office  at [phone] 48 hours after completion to obtain the results. Your stated goal: - To live well and long as possible 
-To die with dignity and comfort when your time comes Your described symptoms were: Fatigue: 4 Drowsiness: 4 Depression: 5 Pain: 3 Anxiety: 3 Nausea: 2 Anorexia: 2 Dyspnea: 7 Best Well-Bein Constipation: Yes Other Problem (Comment): 0 This is the plan we talked about: 1. Severe shortness of breath associated with pulmonary fibrosis -Since our previous visit, you are starting to experience some episodes of cough which is very difficult for you to recover from. These episodes are not happening every day but more frequently now than before. We agreed to watch this for now and if you continue to have worsening cough, we may have to consider starting small doses of pain medicine to help you with this. You are using [place] Don for now with some benefit. Your baseline 
-You are on 4 L of oxygen continuously, you struggle with severe, debilitating low blood oxygen levels even with simple exertion such as going to the bathroom, walking 2-4 steps, etc.  You feel fairly okay at rest but unable to talk for a long time without taking a break. 
-This is impacted your quality of life significantly. You understand that this is part of pulmonary fibrosis and expected to get worse in the future. 
-At this time, you are only using oxygen delivered via nasal cannula from a concentrator. You do not have any portable oxygen tanks. Some patients have shared that oxygen from an oxygen tank is more helpful than the concentrator at the time of exertion. We will work with [facility] to make sure you get some oxygen tanks and try using that oxygen especially at the time of exertion and see how you feel. When you exert yourself, your oxygen levels drop from high 60s to high 80s from your resting oxygen level of 95%. That level of drop is hard to manage and I understand how you feel exhausted after such an episode. You also breathe oxygen through the mouth, having an extra set of nasal cannula which you can put in your mouth also helps. -Please continue using CPAP at night 
-Please continue following with Dr. Shona Bond closely. 2. Goals of care 
-You received a material be sent but have not had a chance to review them in detail yet. You want to review them with your daughter and have a family meeting during our next visit to make decisions about CODE STATUS. ----- Previous visit - You wanted to establish care with a palliative medicine physician to start talking about what death is going to look like and plan for end-of-life care. You and your family have very good understanding of pulmonary fibrosis, we talked about the normal trajectory of this illness and the expected symptoms in the future such as worsening shortness of breath, life limiting debility, dependence on others for daily care needs, etc.  We talked about the use of opioids and other medications to help alleviate the panic that comes with shortness of breath. You are very interested in trying these medicines in the near future but not right now. We also talked about increasing care needs in the future, we identified that moving in with your daughter Gissell Alcantar is a possibility. Currently, your elderly wife is your primary caregiver and she is doing a fantastic job of that. 
-You are a  but never established care with the [place]. I highly recommend that you establish care with the [place] so we can get physical therapy and Occupational Therapy assessment for you in order to get an electric wheelchair. This will be very helpful in minimizing your exertion and allowing independence. Getting a stair lift in the future may also be easier through the [place]. 
 
-You have an AMD naming your wife is your primary medical power of  and daughter Gissell Alcantar is the secondary medical power of . 
-We talked about the importance of making a decision regarding CPR and intubation. You do not think you want attempts at CPR and intubation if a good outcome cannot be guaranteed.   I shared that given your age and pulmonary fibrosis, resuscitative efforts may be futile and intubation is expected to be very difficult and harmful. I will send the book \"hard choices for loving people \"to your home, please read the book and share your thoughts with your family. We agreed that we will talk more about this during her next visit. You are inclining towards a DNR but very afraid to make that commitment at this time. You wanted a copy of the DNR and POST form in the mail to read through it as well. 3.  Hypothyroidism 
-You have been diagnosed with hypothyroidism and started on levothyroxine 3 drugs ago. You feel a bit more energetic since starting the medicine. You are following up with your PCP for this. 4.  You have enjoyed working with physical therapy and Occupational Therapy since we placed a referral.  You are following there tips and recommendations every day and feeling some benefit from it. 
-You are going to establish care with the [facility] on September 17. I am hoping that you can switch your physical therapy to the [facility] service in order to benefit from DME supply. This is what you have shared with us about Advance Care Planning: 
 
  Primary Decision Maker: Hope Mosquera - Spouse - [phone] Secondary Decision Maker: Fiordaliza Cortés - Daughter - [phone] Advance Care Planning 9/1/2020 Patient's Healthcare Decision Maker is: Named in scanned ACP document Primary Decision Maker Name -  
Primary Decision Maker Phone Number -  
Primary Decision Maker Relationship to Patient - Secondary Decision Maker Name - Secondary Decision Maker Phone Number - Secondary Decision Maker Relationship to Patient -  
Confirm Advance Directive Yes, on file The Palliative Medicine Team is here to support you and your family. Sincerely, 
 
 
Shayy Rojas MD and the Palliative Medicine Team 
 
 
 GOALS OF CARE / TREATMENT PREFERENCES:  
[====Goals of Care====] GOALS OF CARE: 
 Patient / health care proxy stated goals: See Patient Instructions / Summary TREATMENT PREFERENCES:  
Code Status:  [x] Attempt Resuscitation       [] Do Not Attempt Resuscitation Advance Care Planning: 
[x] The Pall Med Interdisciplinary Team has updated the ACP Navigator with Decision Maker and Patient Capacity Primary Decision Maker: Stevie Cavazos - Spouse - [phone] Secondary Decision Maker: Treva Ramirez - [phone] Advance Care Planning 9/1/2020 Patient's Healthcare Decision Maker is: Named in scanned ACP document Primary Decision Maker Name -  
Primary Decision Maker Phone Number -  
Primary Decision Maker Relationship to Patient - Secondary Decision Maker Name - Secondary Decision Maker Phone Number - Secondary Decision Maker Relationship to Patient -  
Confirm Advance Directive Yes, on file Other: 
(If patient appropriate for POST, consider using PALLPOST smart phrase here) The palliative care team has discussed with patient / health care proxy about goals of care / treatment preferences for patient. 
[====Goals of Care====] PRESCRIPTIONS GIVEN:  
No orders of the defined types were placed in this encounter. FOLLOW UP: Future Appointments Date Time Provider David Romero 9/1/2020  1:30 PM Josesito Deleon MD [facility] BS AMB  
9/3/2020 To Be Determined [facility] [place] [address] PHYSICIANS INVOLVED IN CARE:  
Patient Care Team: 
Josesito Deleon MD as PCP - General (Internal Medicine) Josesito Deleon MD as PCP - [facility] Brenda Danielson MD (Pulmonary Disease) Barrett Trotter, RN Go Jefferson MD (Cardiology) Mariah Andrew MD (Dermatology) Heather Hobson MD (Gastroenterology) Kristin Gagnon MD (Urology) 
call (Allergy) Dea Triana MD as Surgeon (General Surgery) Alfonso Helm MD (Pulmonary Disease) Apolinar Kirkland MD (Vascular Surgery) Dorothy Stovall MD as Palliative Care (Palliative Medicine) HISTORY:  
Reviewed patient-completed ESAS and advance care planning form. Reviewed patient record in prescription monitoring program. 
 
CHIEF COMPLAINT: No chief complaint on file. HPI/SUBJECTIVE: The patient is: [x] Verbal / [] Nonverbal  
 
Patient seen today along with his wife. There were some technical difficulties with his phone but we eventually figured it out. He catches me up with what happened in the last 2 months since her visit. He feels at ease knowing that he is now establish care with palliative medicine. He is enjoyed physical therapy and Occupational Therapy referrals. Only new complaint is frequent cough. He was started on [employer] which helps somewhat but he drops his oxygen saturation to mid 70s when he coughs and it takes him a while to recover. We talked about starting opioids in the future for more frequent cough. 
 
 
----------- Initial visit Patient seen today virtually along with his wife and daughter Omero Telles. Pleasant gentleman, talks about his life, his business with his wife and how much they both enjoyed it. They gave it up several years ago for several reasons including the fact that Mr. Darrick Wong was starting to develop significant shortness of breath. He was diagnosed with ILD in 2007 but did not develop significant symptoms until 2012. He has been on increasing amount of oxygen since then with decreasing functionality. He had a complicated hospitalization in June with pneumomediastinum which scared him quite a bit. The family has also 
undergone recent tragedy where his daughter Alonso Duncan was to be  in early May but her fiancee was killed in a taiwo accident in late April. The family remains traumatized by this loss but they are trying to move on. Alonso Duncan has now moved in with the patient and his wife.   Their daughter Elizabeth Rose, is a registered nurse at [employer] and very involved in their care and a big advocate for palliative medicine. She wants comfort oriented care for her father and support for her mother. The patient and family wants to be as prepared as they can to deal with this chronic and debilitating illness. Clinical Pain Assessment (nonverbal scale for nonverbal patients):  
[++++ Clinical Pain Assessment++++] [++++Pain Severity++++]: Pain: 3 
[++++Pain Character++++]: 
[++++Pain Duration++++]: 
[++++Pain Effect++++]: 
[++++Pain Factors++++]: 
[++++Pain Frequency++++]: 
[++++Pain Location++++]: 
[++++ Clinical Pain Assessment++++] FUNCTIONAL ASSESSMENT:  
 
Palliative Performance Scale (PPS): PPS: 70 PSYCHOSOCIAL/SPIRITUAL SCREENING:  
 
Any spiritual / Mu-ism concerns: 
[] Yes /  [x] No 
 
Caregiver Burnout: 
[] Yes /  [x] No /  [] No Caregiver Present Anticipatory grief assessment:  
[x] Normal  / [] Maladaptive ESAS Anxiety: Anxiety: 3 ESAS Depression: Depression: 5 REVIEW OF SYSTEMS:  
 
The following systems were [x] reviewed / [] unable to be reviewed Systems: constitutional, ears/nose/mouth/throat, respiratory, gastrointestinal, genitourinary, musculoskeletal, integumentary, neurologic, psychiatric, endocrine. Positive findings noted below. Modified ESAS Completed by: provider Fatigue: 4 Drowsiness: 4 Depression: 5 Pain: 3 Anxiety: 3 Nausea: 2 Anorexia: 2 Dyspnea: 7 Best Well-Bein Constipation: Yes Other Problem (Comment): 0 PHYSICAL EXAM:  
 
Wt Readings from Last 3 Encounters:  
07/10/20 181 lb (82.1 kg) 20 175 lb 14.8 oz (79.8 kg) 20 189 lb 6.4 oz (85.9 kg) There were no vitals taken for this visit. Last bowel movement: See Nursing Note Constitutional   
[x] Appears well-developed and well-nourished in no apparent distress   
[] Abnormal: 
Mental status [x] Alert and awake [x] Oriented to person/place/time [x] Able to follow commands 
[] Abnormal:  
Eyes 
[x] EOM normal  
[x] Sclera normal  
[x] No visible ocular discharge 
[] Abnormal:  
HENT [x] Normocephalic, atraumatic [x] Mouth/Throat: Moist mucous membranes  
[x] External Ears normal 
[] Abnormal: 
Neck [x] No visualized mass 
[] Abnormal: 
Pulmonary/Chest  
[x] Respiratory effort normal 
[x] No visualized signs of difficulty breathing or respiratory distress 
[] Abnormal: Musculoskeletal 
[x] Normal gait with no signs of ataxia [x] Normal range of motion of neck [] Abnormal: 
Neurological:  
[x] No facial asymmetry (Cranial nerve 7 motor function) [x] No gaze palsy 
[] Abnormal:  
Skin 
[x] No significant exanthematous lesions or discoloration noted on facial skin 
[] Abnormal: Psychiatric [x] Normal affect [x] No hallucinations [] Abnormal: 
 
Other pertinent observable physical exam findings: 
 
Due to this being a TeleHealth evaluation, many elements of the physical examination are unable to be assessed. HISTORY:  
 
Past Medical History:  
Diagnosis Date  Aneurysm ([facility] small AAA  Arthritis shoulders and spine 3/1/2010  Asthma 3/1/2010 Dr. Michele Denise,  Call  BPH (benign prostatic hypertrophy)  Broken nose 3/1/2010  Bronchitis 02/2017  CAD (coronary artery disease) Dr. Jessica Shaw  Cancer ([facility] 2000  
 basal cell chest  
 [facility] 3/1/2010  Contact dermatitis and other eczema, due to unspecified cause Dr. Juan Ramon De La Garza  COPD  Depression 3/1/2010  GERD (gastroesophageal reflux disease)  Hypercholesterolemia 9/29/2010  Hypertension  IPF (idiopathic pulmonary fibrosis) ([facility] 2007  Pneumonia 3/1/2010  Testosterone deficiency 3/24/2011 Past Surgical History:  
Procedure Laterality Date  CABG, ARTERIAL, THREE  10/2003  CARDIAC SURG PROCEDURE UNLIST  10/2003 CABGx3 vessel  COLONOSCOPY N/A 7/14/2017 COLONOSCOPY performed by Claude Yates MD at [facility] ENDOSCOPY  ENDOSCOPY, COLON, DIAGNOSTIC Dr. Edwar Fields  HX APPENDECTOMY  HX CHOLECYSTECTOMY  2008  
 laparoscopic  HX COLECTOMY  07  
 sigmoid colectomy for volvulus in [place]  HX COLONOSCOPY  2014 Repeat in 3 years  HX HEENT  while in 20's    
 submucus resection sinus//resection sub mucus  44 [address]  
 right  HX ORTHOPAEDIC  2011  
 right hand, thumb surgery, plate inserted  HX ORTHOPAEDIC    
 broken nose  HX ORTHOPAEDIC    
 sub mucous resection  HX ORTHOPAEDIC  02/05/15  
 right hand surgery, ligament repair  HX OTHER SURGICAL    
 HX OTHER SURGICAL    
 hemorrhoids  HX OTHER SURGICAL  2015  
 basal cell carcinoma  HX SEPTOPLASTY    
 s/p broken nose   
 HX TONSIL AND ADENOIDECTOMY    
 HX TONSILLECTOMY    
 HX UROLOGICAL    
 vasectomy Family History Problem Relation Age of Onset  Cancer Mother   
     spine or abdomen  Stroke Father  Heart Attack Father 46s  Heart Disease Father  Cancer Brother   
     CLL, lung  Diabetes Brother  Cancer Brother   
     lung  Cancer Brother   
     lung  Cancer Brother   
     lung  Heart Disease Brother  Heart Disease Brother 58 CABG  
 Other Brother   
     shingles  Lung Disease Sister  High Cholesterol Son  Lung Disease Daughter Overactive Airways  No Known Problems Daughter History reviewed, no pertinent family history. Social History Tobacco Use  Smoking status: Former Smoker Packs/day: 1.00 Years: 15.00 Pack years: 15.00 Last attempt to quit: 1970 Years since quittin.7  Smokeless tobacco: Never Used Substance Use Topics  Alcohol use: Yes Alcohol/week: 2.0 standard drinks Types: 2 Glasses of wine per week Comment: rarely Allergies Allergen Reactions  Celexa [Citalopram] Other (comments) agitation  Cephalexin Other (comments) GI upset.  Demerol [Meperidine] Unknown (comments)  Mold Extracts Unknown (comments)  Niaspan [Niacin] Nausea Only  Other Medication Unknown (comments) Grass smut, standardized mite mix, weed mix, dogs, white pam, white hickory, red mulberry, barley, cottonseed, malt, rice, rye, black pepper, navy bean  Percocet [Oxycodone-Acetaminophen] Unknown (comments) [address]  Sulfa (Sulfonamide Antibiotics) Nausea Only [address] Other (comments) Current Outpatient Medications Medication Sig  dutasteride (AVODART) 0.5 mg capsule Take 0.5 mg by mouth daily.  levothyroxine (SYNTHROID) 25 mcg tablet Take 1 Tab by mouth Daily (before breakfast).  tamsulosin (FLOMAX) 0.4 mg capsule TAKE 1 CAPSULE EVERY NIGHT  OXYGEN-AIR DELIVERY SYSTEMS 4 L by Nasal route continuous.  Cetirizine 10 mg cap Take 1 Tab by mouth daily.  lactobacillus rhamnosus gg 10 billion cell (Culturelle) 10 billion cell capsule Take 1 Cap by mouth daily.  GRAPE SEED EXTRACT PO Take 400 mg by mouth daily.  guaiFENesin ER (MUCINEX) 600 mg ER tablet Take 600 mg by mouth daily.  glucosam-chond-hyalu-CF borate (Move Free [facility]) 750 mg-100 mg- 1.65 mg-108 mg tab Take 2 Tabs by mouth daily.  solifenacin (VESICARE) 5 mg tablet Take 5 mg by mouth daily.  omeprazole (PRILOSEC) 40 mg capsule TAKE ONE CAPSULE BY MOUTH TWICE A DAY  buPROPion XL (WELLBUTRIN XL) 300 mg XL tablet TAKE 1 TABLET EVERY MORNING  
 ketoconazole (NIZORAL) 2 % topical cream APPLY TO AFFECTED AREA(S) DAILY. USE ON SKIN LESIONS EVERY COUPLE OF DAYS AS DIRECTED.  sucralfate (CARAFATE) 1 gram tablet Take 1 g by mouth as needed for Other (stomach pain).  budesonide-formoterol (SYMBICORT) 160-4.5 mcg/actuation HFAA Take 2 Puffs by inhalation two (2) times a day.  Azelastine (ASTEPRO) 0.15 % (205.5 mcg) nasal spray 2 Sprays by Both Nostrils route two (2) times a day.  ketoconazole (NIZORAL) 2 % shampoo Apply  to affected area two (2) times a week.  atorvastatin (LIPITOR) 80 mg tablet Take 80 mg by mouth daily.  montelukast (SINGULAIR) 10 mg tablet Take 1 Tab by mouth daily (after dinner).  NEBULIZER by Does Not Apply route.  nitroglycerin (NITROQUICK) 0.4 mg SL tablet 1 Tab by SubLINGual route as needed.  albuterol (PROVENTIL HFA, VENTOLIN HFA) 90 mcg/Actuation inhaler Take 2 Puffs by inhalation every four (4) hours as needed for Wheezing.  multivitamin (ONE-A-DAY MENS) tablet Take 1 Tab by mouth daily.  metoprolol (LOPRESSOR) 25 mg tablet Take 25 mg by mouth two (2) times a day. PT INSTRUCTED TO TAKE AM DOS PER ANESTHESIA PROTOCOL  ezetimibe (ZETIA) 10 mg tablet Take 10 mg by mouth every morning.  pantoprazole (PROTONIX) 40 mg tablet Take 1 Tab by mouth two (2) times a day.  cefdinir (OMNICEF) 300 mg capsule Take 300 mg by mouth two (2) times a day. Pt reported d/t lung condition starts taking if has a fever & notifies Pulmonary.  albuterol (ACCUNEB) 1.25 mg/3 mL nebu 3 mL by Nebulization route every six (6) hours as needed. (Patient taking differently: 3 mL by Nebulization route every six (6) hours as needed for Respiratory Distress.) No current facility-administered medications for this visit. LAB DATA REVIEWED:  
 
Lab Results Component Value Date/Time WBC 9.4 06/07/2020 04:41 AM  
 HGB 11.6 (L) 06/07/2020 04:41 AM  
 PLATELET 329 82/76/6773 04:41 AM  
 
Lab Results Component Value Date/Time Sodium 139 06/17/2020 08:49 AM  
 Potassium 4.9 06/17/2020 08:49 AM  
 Chloride 101 06/17/2020 08:49 AM  
 CO2 28 06/17/2020 08:49 AM  
 BUN 14 06/17/2020 08:49 AM  
 Creatinine 1.13 06/17/2020 08:49 AM  
 Calcium 9.6 06/17/2020 08:49 AM  
 Magnesium 2.0 03/01/2016 04:23 AM  
  
Lab Results Component Value Date/Time Alk. phosphatase 84 06/17/2020 08:49 AM  
 Protein, total 6.7 06/17/2020 08:49 AM  
 Albumin 3.4 (L) 06/17/2020 08:49 AM  
 Globulin 4.2 (H) 03/05/2018 01:54 PM  
 
No results found for: INR, PTMR, PTP, PT1, PT2, APTT, INREXT, INREXT Lab Results Component Value Date/Time Iron 62 08/28/2019 09:48 AM  
 TIBC 247 (L) 08/28/2019 09:48 AM  
 Iron % saturation 25 08/28/2019 09:48 AM  
 Ferritin 192 08/28/2019 09:48 AM  
  
 
 
 CONTROLLED SUBSTANCES SAFETY ASSESSMENT (IF ON CONTROLLED SUBSTANCES):  
 
Reviewed opioid safety handout:  [] Yes   [] No 
24 hour opioid dose >150mg morphine equivalent/day:  [] Yes   [] No 
Benzodiazepines:  [] Yes   [] No 
Sleep apnea:  [] Yes   [] No 
Urine Toxicology Testing within last 6 months:  [] Yes   [] No 
History of or new aberrant medication taking behaviors:  [] Yes   [] No 
Has Narcan been prescribed [] Yes   [] No 
 
   
 
Total time: 45 minutes Counseling / coordination time: 30 minutes 
> 50% counseling / coordination?:  Yes Consent: 
He and/or health care decision maker is aware that that he may receive a bill for this telehealth service, depending on his insurance coverage, and has provided verbal consent to proceed: Yes CPT Codes 14481-45957 for Established Patients may apply to this Telehealth VisitTime-based coding, delete if not needed: I spent at least 25 minutes with this established patient, and >50% of the time was spent counseling and/or coordinating care regarding Discussing disease trajectory, goals of care and symptom management Pursuant to the emergency declaration under the [facility] 1135 waiver authority and the [employer] Act, this Virtual  Visit was conducted, with patient's consent, to reduce the patient's risk of exposure to COVID-19 and provide continuity of care for an established patient. Services were provided through a video synchronous discussion virtually to substitute for in-person clinic visit.

## 2020-09-02 NOTE — TELEPHONE ENCOUNTER
Palliative Medicine  Nursing Note  [phone]  Fax [phone]      Telephone Call  Patient Name: Juan M Barry  YOB: 1940    9/2/2020        Primary Decision Maker: Abraham Loco - Spouse - [phone]    Secondary Decision Maker: Clif Howe - Daughter - [phone]   Advance Care Planning 9/1/2020   Patient's Healthcare Decision Maker is: Named in scanned ACP document   Primary Decision Maker Name -   Primary Decision Maker Phone Number -   Primary Decision Maker Relationship to Patient -   Secondary Decision Maker Name -   Secondary Decision Maker Phone Number -   Secondary Decision Maker Relationship to Patient -   Confirm Advance Directive Yes, on file       Called patient and LVM to follow up on his virtual visit from yesterday, 9/1/20 and to confirm that he received oxygen tanks as ordered.     AVS mailed to home address on file, and follow up visit schedule for 10/28/20     Information was shared with Dr. Vasyl Martin, RN  Palliative Medicine

## 2020-09-04 NOTE — TELEPHONE ENCOUNTER
Spoke to [employer] (HIPAA). Two pt identifiers confirmed. Pt c/o LLQ pain x2 days--Worse over past 2 days. Pain around umbilical area. Mainly across LLQ side. Adeline Francois states that pt's back pain (spasms) has stopped as of this morning. Dull aching pain constant. 2/10. Sharp pain-intermittent. 8/10. Pain aggravated by movement per pt (walking, flexing abd on L side)    Adeline Francois reports pt has no BM issues; had BM this morning-normal.     Not triggered by food. No N/V/decreased appetite. Has not taken anything for pain. [facility] not sure what pt should do given the holiday weekend is coming. [facility] informed Dr. Meg Zuluaga will be notified. Kylie verbalized understanding of information dishcussed w/ no further questions at this time.

## 2020-09-04 NOTE — TELEPHONE ENCOUNTER
#[phone]  Wife, Cade states pt has abdominal pain that goes around and radiates across his lower back. This has been for 2 days    She believes this could be bladder or kidneys and wants a call back as soon as possible as she thinks pt may need a CT Scan? Please call Cade.

## 2020-09-04 NOTE — TELEPHONE ENCOUNTER
Spoke to [employer] (HIPAA). Two pt identifiers confirmed. Merton Kanner informed per Dr. Nataliia Oswald to have pt seen at ED. Merton Kanner states that they forgot to mention that over the past couple days, pt has been lifting his O2 tank which is heavy for him. Merton Kanner states that she usually helps carry/move the O2 tank. Merton Kanner believes pt pulled his muscle. Merton Kanner will monitor sx and if they get worse, they will go to ED. Merton Kanner verbalized understanding of information discussed w/ no further questions at this time.

## 2020-10-07 NOTE — PROGRESS NOTES
HISTORY OF PRESENT ILLNESS Juan Thapa is a 68 y.o. male. HPI Pt last vv 9/1/20. Here for routine f/u This is an established visit completed with telemedicine was completed with video assist 
the patient acknowledges and agrees to this method of visitation deonte 
   
He c/o itching Denies rash Discussed moisturizer and avoid hot water showers BP at offices 130/70s No longer on avapro 75 mg  
Currently just on metoprolol 25 mg BID Recall norvasc caused edema  
   
BS at home 100-110 His DM is diet-controlled currently he is not currently on prednisone 
  
Wt was 176 lb lov Discussed low carb diet and w/l  
   
Reviewed labs  
  
Pt follows with Dr. Shivani Sellers ( palliative) Last visit 7/6/20 
  
Pt follows with Dr. 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  
Next apt 11/20 
   
Pt follows with Dr. Sandra Meyer (pulm) routinely q3 months for ILD he is now seeing palliative care for this as well Last visit was 8/20 Off prednisone currently  continues on ahead inhaler see below 
 Recall Has rx for , prednisone and omnicef if he gets sick prn pulm dr Pierre  pul rehab 
 Continues on symbicort bid and albuterol prn (not daily) Pt is regularly supplemental now up to 4L O2 baseline, up to 4-6 L during exercise class Recall esprit caused GI upset  
   
Pt follows with Dr. Rodolfo Jade ([facility]) annually in August 
Last there 10/20 - checked psa  
he is on flomax per uro 
 on vesicare rather than oxybutin he is also on Avodart He stopped proscar which could cause this enlargement --gynecomastia--now he is having more freq urination however  
He was having side effect with one med, but he states that the side effect was not as bad as the prob that would occur without the med  
  
Pt follows with Dr. Usman García (cardio) for h/o remote afib and cad Last visit was 8/20 No longer on avapro  
 Had neg stress test in 2/19   
  
Pt follows with Dr. Santacruz (allergy) Continues singulair for allergies - controlled  
Will f/u prn  
   
Pt follows with Dr. Maile Zepeda (vasc surg) for AAA Last visit was 7/20 - stable per pt  
  Follows for AAA annually  
  
Pt follows with Dr. Carisa Wang (GI) for gerd Last visit was 7/2/18 Continues on prilosec - no issues with gerd See above to current sx 
  
Pt saw Dr Gem Javier (sleep) has sleep apnea Last visit was 9/20 in same office Pt is compliant with CPAP nightly 10/20 This is helping his sleep, tolerating well, also keeps O2 at 4L at night  
  
Pt saw Dr Wil Gottlieb (neuro) for a tremor Last visit was 10/18/18 Not bothering him too much  
  
Pt is on wellbutrin 300 mg for depression SHERRELL killed April 30th in a truck accident He is doing okay but has nl grief and not more down Lov, started zoloft 25 mg due to increased irritability This has helped with his irritability and he is happy with dose Recall celexa caused irritability in the past. Pt tolerated wellbutrin in the past. 
   
Continues claritin for allergies, which works well 
   
Continues on lipitor 80mg, max dose, daily for cholesterol He also takes zetia daily 
  
He has been taking synthroid 25 mcgs for thyroid due to last labs his TSH had been elevated or borderline he had a positive thyroid antibody No issues he is tolerating it well without side effect He saw a Dr at [place] to see if he was eligible for chair lift and powered chair He is having eval later this week 10/20 
  
Recall previously he had pneumomediastinum was thought related to CPAP he is back on CPAP now without problem 
  
  
ACP on file. SDM is his wife.  
  
PREVENTIVE:   
Colonoscopy: 7/14/17,  repeat in 5 years, Dr. Carisa Wang EGD: 7/14/17, Dr. Carisa Wang PSA: 8/18 per Dr. Nayeli Omalley, 8/19 per dr Lisa Barrett: 6/20 benign AAA screen: CT 7/12 - 3.2mm, Breana kumar, repeat in 10/13 and 2/15, 7/18 Tdap: will check cost at pharmacy, advised to get at his pharmacy --still due Pneumovax: 2020 [id number]: 10/13/2015 Zostavax: 2008 Shingrix: both rounds completed  
Flu shot: 20 Microalbumin:  Foot exam: 19  
A1c:    6.2,  6.0,  6.3,  6.2,  6.2, 3/19 6.0,  POC 6.1   6.6  5.9   6.1 Eye exam: [facility]. Debbi Burger at [facility], , pt may getting cataract surg sometime in [address] EK/10/17  
Hepatitis C screen: , negative Patient Active Problem List  
 Diagnosis Date Noted  Chronic respiratory failure ([facility] 2020  Pneumomediastinum ([facility] 2020  Interstitial lung disease ([facility] 2020  Chest pain 2020  Controlled type 2 diabetes mellitus without complication, without long-term current use of insulin ([facility] 2020  SEAN (obstructive sleep apnea) 03/15/2019  Interstitial pulmonary fibrosis ([facility] 10/09/2013  
 HTN (hypertension) 10/09/2013  A-fib ([facility] 10/09/2013  AAA (abdominal aortic aneurysm) ([facility] 10/09/2013  Benign prostatic hyperplasia  GERD (gastroesophageal reflux disease)  CAD (coronary artery disease)  S/P CABG (coronary artery bypass graft) 2011  Testosterone deficiency 2011  Hypercholesterolemia 2010  Depression 2010  Arthritis shoulders and spine 2010 Current Outpatient Medications Medication Sig Dispense Refill  buPROPion XL (WELLBUTRIN XL) 300 mg XL tablet TAKE 1 TABLET EVERY MORNING 90 Tab 1  
 budesonide-formoterol (SYMBICORT) 160-4.5 mcg/actuation HFAA Take 2 Puffs by inhalation two (2) times a day.  albuterol (ACCUNEB) 1.25 mg/3 mL nebu 3 mL by Nebulization route every six (6) hours as needed. (Patient taking differently: 3 mL by Nebulization route every six (6) hours as needed for Respiratory Distress. ) 1 Each 1  
 atorvastatin (LIPITOR) 80 mg tablet Take 80 mg by mouth daily.  omeprazole (PRILOSEC) 40 mg capsule TAKE ONE CAPSULE BY MOUTH TWICE A  Cap 0  
 levothyroxine (SYNTHROID) 25 mcg tablet TAKE ONE TABLET BY MOUTH DAILY BEFORE BREAKFAST 30 Tab 0  
 sertraline (ZOLOFT) 25 mg tablet Take 1 Tab by mouth daily. 30 Tab 2  
 dutasteride (AVODART) 0.5 mg capsule Take 0.5 mg by mouth daily.  tamsulosin (FLOMAX) 0.4 mg capsule TAKE 1 CAPSULE EVERY NIGHT 90 Cap 0  
 OXYGEN-AIR DELIVERY SYSTEMS 4 L by Nasal route continuous.  Cetirizine 10 mg cap Take 1 Tab by mouth daily.  lactobacillus rhamnosus gg 10 billion cell (Culturelle) 10 billion cell capsule Take 1 Cap by mouth daily.  GRAPE SEED EXTRACT PO Take 400 mg by mouth daily.  guaiFENesin ER (MUCINEX) 600 mg ER tablet Take 600 mg by mouth daily.  glucosam-chond-hyalu-CF borate (Move Free Need) 750 mg-100 mg- 1.65 mg-108 mg tab Take 2 Tabs by mouth daily.  solifenacin (VESICARE) 5 mg tablet Take 5 mg by mouth daily.  cefdinir (OMNICEF) 300 mg capsule Take 300 mg by mouth two (2) times a day. Pt reported d/t lung condition starts taking if has a fever & notifies Pulmonary.  ketoconazole (NIZORAL) 2 % topical cream APPLY TO AFFECTED AREA(S) DAILY. USE ON SKIN LESIONS EVERY COUPLE OF DAYS AS DIRECTED. 1 Tube 0  
 sucralfate (CARAFATE) 1 gram tablet Take 1 g by mouth as needed for Other (stomach pain).  Azelastine (ASTEPRO) 0.15 % (205.5 mcg) nasal spray 2 Sprays by Both Nostrils route two (2) times a day.  ketoconazole (NIZORAL) 2 % shampoo Apply  to affected area two (2) times a week.  0  
 montelukast (SINGULAIR) 10 mg tablet Take 1 Tab by mouth daily (after dinner). 90 Tab 3  
 NEBULIZER by Does Not Apply route.  nitroglycerin (NITROQUICK) 0.4 mg SL tablet 1 Tab by SubLINGual route as needed. 25 Tab prn  albuterol (PROVENTIL HFA, VENTOLIN HFA) 90 mcg/Actuation inhaler Take 2 Puffs by inhalation every four (4) hours as needed for Wheezing.  1 Inhaler 2  
  multivitamin (ONE-A-DAY MENS) tablet Take 1 Tab by mouth daily.  metoprolol (LOPRESSOR) 25 mg tablet Take 25 mg by mouth two (2) times a day. PT INSTRUCTED TO TAKE AM DOS PER ANESTHESIA PROTOCOL  ezetimibe (ZETIA) 10 mg tablet Take 10 mg by mouth every morning. Past Surgical History:  
Procedure Laterality Date  CABG, ARTERIAL, THREE  10/2003  CARDIAC SURG PROCEDURE UNLIST  10/2003 CABGx3 vessel  COLONOSCOPY N/A 7/14/2017 COLONOSCOPY performed by Amelie Cr MD at [facility] ENDOSCOPY  ENDOSCOPY, COLON, DIAGNOSTIC Dr. Neal Massey  HX APPENDECTOMY  HX CHOLECYSTECTOMY  11/22/2008  
 laparoscopic  HX COLECTOMY  12/29/07  
 sigmoid colectomy for volvulus in [place]  HX COLONOSCOPY  06/17/2014 Repeat in 3 years  HX HEENT  while in 20's 1948  
 submucus resection sinus//resection sub mucus 1962 [address]  
 right  HX ORTHOPAEDIC  June 20, 2011  
 right hand, thumb surgery, plate inserted  HX ORTHOPAEDIC  1946  
 broken nose  HX ORTHOPAEDIC  1962  
 sub mucous resection  HX ORTHOPAEDIC  02/05/15  
 right hand surgery, ligament repair  HX OTHER SURGICAL    
 HX OTHER SURGICAL    
 hemorrhoids  HX OTHER SURGICAL  8/2015  
 basal cell carcinoma  HX SEPTOPLASTY  1962  
 s/p broken nose 1946  
 HX TONSIL AND ADENOIDECTOMY  1949  
 HX TONSILLECTOMY  1949  
 HX UROLOGICAL  1990  
 vasectomy Lab Results Component Value Date/Time WBC 9.4 06/07/2020 04:41 AM  
 HGB 11.6 (L) 06/07/2020 04:41 AM  
 HCT 36.2 (L) 06/07/2020 04:41 AM  
 PLATELET 473 84/03/5036 04:41 AM  
 MCV 95.8 06/07/2020 04:41 AM  
 
Lab Results Component Value Date/Time Cholesterol, total 123 06/17/2020 08:49 AM  
 HDL Cholesterol 40 06/17/2020 08:49 AM  
 LDL, calculated 63 06/17/2020 08:49 AM  
 Triglyceride 101 06/17/2020 08:49 AM  
 CHOL/HDL Ratio 3.4 09/29/2010 08:28 AM  
 
Lab Results Component Value Date/Time GFR est non-AA 62 06/17/2020 08:49 AM  
 GFR est AA 72 06/17/2020 08:49 AM  
 Creatinine 1.13 06/17/2020 08:49 AM  
 BUN 14 06/17/2020 08:49 AM  
 Sodium 139 06/17/2020 08:49 AM  
 Potassium 4.9 06/17/2020 08:49 AM  
 Chloride 101 06/17/2020 08:49 AM  
 CO2 28 06/17/2020 08:49 AM  
 Magnesium 2.0 03/01/2016 04:23 AM  
  
 
Review of Systems Respiratory: Negative for shortness of breath. Cardiovascular: Negative for chest pain. Skin: Positive for itching. Negative for rash. Physical Exam 
Constitutional:   
   General: He is not in acute distress. Appearance: Normal appearance. He is not ill-appearing, toxic-appearing or diaphoretic. HENT:  
   Head: Normocephalic and atraumatic. Eyes:  
   General:     
   Right eye: No discharge. Left eye: No discharge. Conjunctiva/sclera: Conjunctivae normal.  
Pulmonary:  
   Effort: No respiratory distress. Neurological:  
   Mental Status: He is alert and oriented to person, place, and time. Mental status is at baseline. Gait: Gait normal.  
Psychiatric:     
   Mood and Affect: Mood normal.     
   Behavior: Behavior normal.  
 
 
 
ASSESSMENT and PLAN 
  ICD-10-CM ICD-9-CM 1. Reactive depression Improved with addition of Zoloft 25 mg continues on Wellbutrin as well Feels much less irritable happy with dose continue F32.9 300.4 2. [facility], [facility] Remote history not on anticoagulant follows with Dr. Beronica Rothman I48.91 427.31   
3. Abdominal aortic aneurysm (AAA) without rupture ([facility] Up-to-date with vascular surgeon I71.4 441.4 4. Controlled type 2 diabetes mellitus without complication, without long-term current use of insulin ([facility] Currently diet controlled E11.9 250.00   
5. Chronic respiratory failure with hypoxia (HCC) On chronic oxygen stable J96.11 518.83   
  799.02   
6. Interstitial lung disease ([facility] Patient with end-stage pulmonary disease He is seeing palliative care He is on high flow oxygen 4 to 6 L On nebulizers as well Not currently on prednisone currently stable follows closely with pulmonary J84.9 515   
7. Interstitial pulmonary fibrosis ([facility] See above J84.10 515   
8. Pneumomediastinum ([facility] Currently resolved was admitted for this J98.2 518.1 9. Essential hypertension Controlled metoprolol twice daily I10 401.9 10. Coronary artery disease involving native coronary artery of native heart without angina pectoris Medically managed no active signs or symptoms of CAD I25.10 414.01   
11. SEAN (obstructive sleep apnea) Compliant with CPAP G47.33 327.23   
12. Benign prostatic hyperplasia without lower urinary tract symptoms On Flomax and Avodart Also on Vesicare Having gynecomastia from Avodart but due to significant BPH his urologist would like him to continue on this medication or he will have significant obstructive symptoms Symptoms are tolerable he will continue medication N40.0 600.00   
13. Gastroesophageal reflux disease 
 
 
 
 
 
 
 without esophagitis Control with Prilosec K21.9 530.81   
14. Hypercholesterolemia Controlled on Lipitor and Zetia E78.00 272.0 15. Acquired hypothyroidism TSH now at goal on Synthroid 25 mcg daily continue no change to dose E03.9 244.9 Scribed by Martita Chapman, as dictated by Dr. Glenn Maria. Current diagnosis and concerns discussed with pt at length. Pt understands risks and benefits or current treatment plan and medications, and accepts the treatment and medication with any possible risks. Pt asks appropriate questions, which were answered. Pt was instructed to call with any concerns or problems. I have reviewed the note documented by the scribe. The services provided are my own. The documentation is accurate Aicha Stuart, who was evaluated through a synchronous (real-time) audio-video encounter, and/or his healthcare decision maker, is aware that it is a billable service, with coverage as determined by his insurance carrier. He provided verbal consent to proceed: Yes, and patient identification was verified. It was conducted pursuant to the emergency declaration under the [address] [place] [address], [address] [place] [address] and the [employer] and [employer] General Act. A caregiver was present when appropriate. Ability to conduct physical exam was limited. I was at home. The patient was at home.

## 2020-10-28 NOTE — PATIENT INSTRUCTIONS
Dear Cece Douglass , It was a pleasure seeing you today in your home along with your wife We will see you again in towards the end of January or beginning of February If labs or imaging tests have been ordered for you today, please call the office  at [phone] 48 hours after completion to obtain the results. Your stated goal: - To live well and long as possible 
-To die with dignity and comfort when your time comes Your described symptoms were: Fatigue: 7 Drowsiness: 7 Depression: 5 Pain: 0 Anxiety: 4 Nausea: 2 Anorexia: 2 Dyspnea: 8 Best Well-Being: 3 Constipation: No  
Other Problem (Comment): 0 This is the plan we talked about: 1. Severe shortness of breath associated with pulmonary fibrosis -Since our previous visit, you are starting to experience some episodes of cough which is very difficult for you to recover from. These episodes are not happening every day but more frequently now than before. We agreed to watch this for now and if you continue to have worsening cough, we may have to consider starting small doses of pain medicine to help you with this. You are using Tessalon Perles for now with some benefit. 
-We talked about steam inhalation. You also use a humidifier with your CPAP. Your baseline 
-You are on 4 L of oxygen continuously, you struggle with severe, debilitating low blood oxygen levels even with simple exertion such as going to the bathroom, walking 2-4 steps, etc.  You feel fairly okay at rest but unable to talk for a long time without taking a break. 
-This has impacted your quality of life significantly. You understand that this is part of pulmonary fibrosis and expected to get worse in the future. -At this time, you are only using oxygen delivered via nasal cannula from a concentrator. You do not have any portable oxygen tanks. Some patients have shared that oxygen from an oxygen tank is more helpful than the concentrator at the time of exertion. We will work with [facility] Corley to make sure you get some oxygen tanks and try using that oxygen especially at the time of exertion and see how you feel. When you exert yourself, your oxygen levels drop from high 60s to high 80s from your resting oxygen level of 95%. That level of drop is hard to manage and I understand how you feel exhausted after such an episode. You also breathe oxygen through the mouth, having an extra set of nasal cannula which you can put in your mouth also helps. -Please continue using CPAP at night 
-Please continue following with Dr. Rikki Burgess closely. 2.  Goals of care 
-You received a material be sent and we discussed this in detail with your daughter Nneka Renner present. At the most, you would only desire BiPAP for shortness of breath, you would not want CPR or intubation. You want to sign a D DNR. We will arrange for you to sign it electronically. ----- Previous visit - You wanted to establish care with a palliative medicine physician to start talking about what death is going to look like and plan for end-of-life care. You and your family have very good understanding of pulmonary fibrosis, we talked about the normal trajectory of this illness and the expected symptoms in the future such as worsening shortness of breath, life limiting debility, dependence on others for daily care needs, etc.  We talked about the use of opioids and other medications to help alleviate the panic that comes with shortness of breath. You are very interested in trying these medicines in the near future but not right now. We also talked about increasing care needs in the future, we identified that moving in with your daughter Marcy Palacio is a possibility. Currently, your elderly wife is your primary caregiver and she is doing a fantastic job of that. 
-You are a  but never established care with the [place]. I highly recommend that you establish care with the [place] so we can get physical therapy and Occupational Therapy assessment for you in order to get an electric wheelchair. This will be very helpful in minimizing your exertion and allowing independence. Getting a stair lift in the future may also be easier through the [place]. 
 
-You have an AMD naming your wife is your primary medical power of  and daughter Marcy Palacio is the secondary medical power of . 3.  Hypothyroidism 
-You have been diagnosed with hypothyroidism and started on levothyroxine 3 drugs ago. You feel a bit more energetic since starting the medicine. You are following up with your PCP for this. 4.  I am so glad you have established care with the [place] PCP. You will be getting a chairlift for the stairs in your daughter's house. You would benefit from a power chair thereby limiting the cough and shortness of breath from exertion. Please call your PCPs office and get a referral for an occupational therapist within the [place] so they can help us with ordering a power chair. 5.  We talked about extra support for your family. Your wife Maite Neely would like to talk with our  to help with coping. I will have my  Nicole Oswald give you a call next week to set up a time. This is what you have shared with us about Advance Care Planning: 
 
  Primary Decision Maker: Valma Libman - Spouse - [phone] Secondary Decision Maker: Magno Sharma - Daughter - [phone] Advance Care Planning 10/28/2020 Patient's Healthcare Decision Maker is: Named in scanned ACP document Primary Decision Maker Name -  
Primary Decision Maker Phone Number -  
Primary Decision Maker Relationship to Patient - Secondary Decision Maker Name - Secondary Decision Maker Phone Number - Secondary Decision Maker Relationship to Patient -  
Confirm Advance Directive Yes, on file The Palliative Medicine Team is here to support you and your family.   
 
 
Sincerely, 
 
 
Suzie Cornelius MD and the Palliative Medicine Team

## 2020-10-28 NOTE — PROGRESS NOTES
Palliative Medicine Outpatient Services [place]: [phone] ([phone]) Patient Name: Laroy Schlatter YOB: 1940 Date of Current Visit: 10/28/20 Location of Current Visit:   
[] [facility] Office 
[] [facility] Office [] [id number] [facility] 
[] Home 
[x]Synchronous A/V virtual visit Date of Initial Visit: 7/6/2020 Referral from: Self Primary Care Physician: Ale Benedict MD 
  
 SUMMARY:  
Laroy Schlatter is a 68y.o. year old with a  history of DM 2, GERD,, interstitial lung disease diagnosed in 2007 but did not develop symptoms until 2012, currently on continuous 4 L oxygen, who was referred to Palliative Medicine by patient and his daughter for maximizing quality of life and identification of goals of care. 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 He has 3 children, Lorena Kincaid is a nurse at [address] [place] [address], lives in [place], [place], daughter Dilshad Barr lives with the patient, son lives out of state. PALLIATIVE DIAGNOSES:  
 
  ICD-10-CM ICD-9-CM 1. Chronic respiratory failure with hypoxia (HCC)  J96.11 518.83   
  799.02   
2. Interstitial lung disease ([facility] [address]  J84.9 515 3. Debility  R53.81 799.3 4. SEAN (obstructive sleep apnea)  G47.33 327.23   
5. Palliative care by specialist  Z51.5 V66.7 PLAN:  
 
Patient Instructions Dear Laroy Schlatter , It was a pleasure seeing you today in your home along with your wife We will see you again in towards the end of January or beginning of February If labs or imaging tests have been ordered for you today, please call the office  at [phone] 48 hours after completion to obtain the results. Your stated goal: - To live well and long as possible 
-To die with dignity and comfort when your time comes Your described symptoms were: Fatigue: 7 Drowsiness: 7 Depression: 5 Pain: 0 Anxiety: 4 Nausea: 2 Anorexia: 2 Dyspnea: 8 Best Well-Being: 3 Constipation: No  
Other Problem (Comment): 0 This is the plan we talked about: 1. Severe shortness of breath associated with pulmonary fibrosis -Since our previous visit, you are starting to experience some episodes of cough which is very difficult for you to recover from. These episodes are not happening every day but more frequently now than before. We agreed to watch this for now and if you continue to have worsening cough, we may have to consider starting small doses of pain medicine to help you with this. You are using Tessalon Perles for now with some benefit. 
-We talked about steam inhalation. You also use a humidifier with your CPAP. Your baseline 
-You are on 4 L of oxygen continuously, you struggle with severe, debilitating low blood oxygen levels even with simple exertion such as going to the bathroom, walking 2-4 steps, etc.  You feel fairly okay at rest but unable to talk for a long time without taking a break. 
-This has impacted your quality of life significantly. You understand that this is part of pulmonary fibrosis and expected to get worse in the future. 
-At this time, you are only using oxygen delivered via nasal cannula from a concentrator. You do not have any portable oxygen tanks. Some patients have shared that oxygen from an oxygen tank is more helpful than the concentrator at the time of exertion. We will work with [address] to make sure you get some oxygen tanks and try using that oxygen especially at the time of exertion and see how you feel. When you exert yourself, your oxygen levels drop from high 60s to high 80s from your resting oxygen level of 95%. That level of drop is hard to manage and I understand how you feel exhausted after such an episode. You also breathe oxygen through the mouth, having an extra set of nasal cannula which you can put in your mouth also helps. -Please continue using CPAP at night -Please continue following with Dr. Willard Woods closely. 2.  Goals of care 
-You received a material be sent and we discussed this in detail with your daughter Emperatriz Rojasr present. At the most, you would only desire BiPAP for shortness of breath, you would not want CPR or intubation. You want to sign a D DNR. We will arrange for you to sign it electronically. ----- Previous visit - You wanted to establish care with a palliative medicine physician to start talking about what death is going to look like and plan for end-of-life care. You and your family have very good understanding of pulmonary fibrosis, we talked about the normal trajectory of this illness and the expected symptoms in the future such as worsening shortness of breath, life limiting debility, dependence on others for daily care needs, etc.  We talked about the use of opioids and other medications to help alleviate the panic that comes with shortness of breath. You are very interested in trying these medicines in the near future but not right now. We also talked about increasing care needs in the future, we identified that moving in with your daughter Emperatriz Rojasr is a possibility. Currently, your elderly wife is your primary caregiver and she is doing a fantastic job of that. 
-You are a  but never established care with the [place]. I highly recommend that you establish care with the [place] so we can get physical therapy and Occupational Therapy assessment for you in order to get an electric wheelchair. This will be very helpful in minimizing your exertion and allowing independence. Getting a stair lift in the future may also be easier through the [place]. 
 
-You have an AMD naming your wife is your primary medical power of  and daughter Emperatriz Rojasr is the secondary medical power of . 3.  Hypothyroidism -You have been diagnosed with hypothyroidism and started on levothyroxine 3 drugs ago. You feel a bit more energetic since starting the medicine. You are following up with your PCP for this. 4.  I am so glad you have established care with the [place] PCP. You will be getting a chairlift for the stairs in your daughter's house. You would benefit from a power chair thereby limiting the cough and shortness of breath from exertion. Please call your PCPs office and get a referral for an occupational therapist within the [place] so they can help us with ordering a power chair. 5.  We talked about extra support for your family. Your wife Collin Helm would like to talk with our  to help with coping. I will have my  Marv Mendoza give you a call next week to set up a time. This is what you have shared with us about Advance Care Planning: 
 
  Primary Decision Maker: Iqra Mcgee - Spouse - [phone] Secondary Decision Maker: Deanna Sandy - Daughter - [phone] Advance Care Planning 10/28/2020 Patient's Healthcare Decision Maker is: Named in scanned ACP document Primary Decision Maker Name -  
Primary Decision Maker Phone Number -  
Primary Decision Maker Relationship to Patient - Secondary Decision Maker Name - Secondary Decision Maker Phone Number - Secondary Decision Maker Relationship to Patient -  
Confirm Advance Directive Yes, on file The Palliative Medicine Team is here to support you and your family. Sincerely, 
 
 
Lora Baig MD and the Palliative Medicine Team 
 
 
 GOALS OF CARE / TREATMENT PREFERENCES:  
[====Goals of Care====] GOALS OF CARE: 
Patient / health care proxy stated goals: See Patient Instructions / Summary TREATMENT PREFERENCES:  
Code Status:  [] Attempt Resuscitation       [x] Do Not Attempt Resuscitation Advance Care Planning: [x] The [email or web address] [facility] Interdisciplinary Team has updated the ACP Navigator with Decision Maker and Patient Capacity Primary Decision Maker: Pamela Gatica - Spouse - [phone] Secondary Decision Maker: Alessandra Curiel - Daughter - [phone] Advance Care Planning 10/28/2020 Patient's Healthcare Decision Maker is: Named in scanned ACP document Primary Decision Maker Name -  
Primary Decision Maker Phone Number -  
Primary Decision Maker Relationship to Patient - Secondary Decision Maker Name - Secondary Decision Maker Phone Number - Secondary Decision Maker Relationship to Patient -  
Confirm Advance Directive Yes, on file Other: 
(If patient appropriate for POST, consider using PALLPOST smart phrase here) The palliative care team has discussed with patient / health care proxy about goals of care / treatment preferences for patient. 
[====Goals of Care====] PRESCRIPTIONS GIVEN:  
No orders of the defined types were placed in this encounter. FOLLOW UP: Future Appointments Date Time Provider David Romero 2/16/2021 11:15 AM Cherri Colunga MD [facility] AMB PHYSICIANS INVOLVED IN CARE:  
Patient Care Team: 
Cherri Colunga MD as PCP - General (Internal Medicine) Cherri Colunga MD as PCP - [address] [place] [address] [facility] Yoli Ritchie MD (Pulmonary Disease) Leila Sigala, TENA Kerr MD (Cardiology) Daniel Blanco MD (Dermatology) Azalea John MD (Gastroenterology) Tommy Gilbert MD (Urology) 
call (Allergy) Carlos Díaz MD as Surgeon (General Surgery) Tiffany Kate MD (Pulmonary Disease) Vida Kawasaki, MD (Vascular Surgery) Gaston Abrams MD as Palliative Care (Palliative Medicine) Benjamin Cevallos MD (Ophthalmology) HISTORY:  
Reviewed patient-completed ESAS and advance care planning form.  
Reviewed patient record in prescription monitoring program. 
 
 CHIEF COMPLAINT: No chief complaint on file. HPI/SUBJECTIVE: The patient is: [x] Verbal / [] Nonverbal  
 
Patient seen today along with his wife and daughter Dottie Bowers. We talked extensively about his's of breath, expectations with shortness of breath with exertion. He continues to have cough and phlegm production. He has been using Mucinex with some benefit. We talked about increasing Mucinex at least once a week to 2 tablets at nighttime to help with relief of cough and thinning of phlegm. He will also be trying steam showers or steam inhalation to help with this. He currently has a CPAP with humidifier. His wife is coping fairly well but would like some support from . They are in the process of putting their house on the market and planning to move in with their daughter Dottie Bowers. They are pleased with establishing care with a PCP at the [place]. 
 
 
----------- Initial visit Patient seen today virtually along with his wife and daughter Dottie Bowers. Pleasant gentleman, talks about his life, his business with his wife and how much they both enjoyed it. They gave it up several years ago for several reasons including the fact that Mr. Merna Carcamo was starting to develop significant shortness of breath. He was diagnosed with ILD in 2007 but did not develop significant symptoms until 2012. He has been on increasing amount of oxygen since then with decreasing functionality. He had a complicated hospitalization in June with pneumomediastinum which scared him quite a bit. The family has also undergone recent tragedy where his daughter Maris Renteria was to be  in early May but her fiancee was killed in a taiwo accident in late April. The family remains traumatized by this loss but they are trying to move on. Maris Renteria has now moved in with the patient and his wife. Their daughter Tony Robb, is a registered nurse at [employer] and very involved in their care and a big advocate for palliative medicine. She wants comfort oriented care for her father and support for her mother. The patient and family wants to be as prepared as they can to deal with this chronic and debilitating illness. Clinical Pain Assessment (nonverbal scale for nonverbal patients):  
[++++ Clinical Pain Assessment++++] [++++Pain Severity++++]: Pain: 0 
[++++Pain Character++++]: 
[++++Pain Duration++++]: 
[++++Pain Effect++++]: 
[++++Pain Factors++++]: 
[++++Pain Frequency++++]: 
[++++Pain Location++++]: 
[++++ Clinical Pain Assessment++++] FUNCTIONAL ASSESSMENT:  
 
Palliative Performance Scale (PPS): PPS: 70 PSYCHOSOCIAL/SPIRITUAL SCREENING:  
 
Any spiritual / Tenriism concerns: 
[] Yes /  [x] No 
 
Caregiver Burnout: 
[] Yes /  [x] No /  [] No Caregiver Present Anticipatory grief assessment:  
[x] Normal  / [] Maladaptive ESAS Anxiety: Anxiety: 4 ESAS Depression: Depression: 5 REVIEW OF SYSTEMS:  
 
The following systems were [x] reviewed / [] unable to be reviewed Systems: constitutional, ears/nose/mouth/throat, respiratory, gastrointestinal, genitourinary, musculoskeletal, integumentary, neurologic, psychiatric, endocrine. Positive findings noted below. Modified ESAS Completed by: provider Fatigue: 7 Drowsiness: 7 Depression: 5 Pain: 0 Anxiety: 4 Nausea: 2 Anorexia: 2 Dyspnea: 8 Best Well-Being: 3 Constipation: No  
Other Problem (Comment): 0 PHYSICAL EXAM:  
 
Wt Readings from Last 3 Encounters:  
09/01/20 176 lb 3.2 oz (79.9 kg) 07/10/20 181 lb (82.1 kg) 06/08/20 175 lb 14.8 oz (79.8 kg) There were no vitals taken for this visit. Last bowel movement: See Nursing Note Constitutional   
[x] Appears well-developed and well-nourished in no apparent distress   
[] Abnormal: 
Mental status [x] Alert and awake [x] Oriented to person/place/time 
[x] Able to follow commands 
[] Abnormal:  
Eyes 
[x] EOM normal  
[x] Sclera normal  
[x] No visible ocular discharge 
[] Abnormal:  
HENT [x] Normocephalic, atraumatic [x] Mouth/Throat: Moist mucous membranes  
[x] External Ears normal 
[] Abnormal: 
Neck [x] No visualized mass 
[] Abnormal: 
Pulmonary/Chest  
[x] Respiratory effort normal 
[x] No visualized signs of difficulty breathing or respiratory distress 
[] Abnormal: Musculoskeletal 
[x] Normal gait with no signs of ataxia [x] Normal range of motion of neck [] Abnormal: 
Neurological:  
[x] No facial asymmetry (Cranial nerve 7 motor function) [x] No gaze palsy 
[] Abnormal:  
Skin 
[x] No significant exanthematous lesions or discoloration noted on facial skin 
[] Abnormal: Psychiatric [x] Normal affect [x] No hallucinations [] Abnormal: 
 
Other pertinent observable physical exam findings: 
 
Due to this being a TeleHealth evaluation, many elements of the physical examination are unable to be assessed. HISTORY:  
 
Past Medical History:  
Diagnosis Date  [facility]neur[facility] small AAA  Arthritis shoulders and spine 3/1/2010  Asthma 3/1/2010 Dr. Riri Mcgee Dr. Call  BPH (benign prostatic hypertrophy)  Broken nose 3/1/2010  Bronchitis 02/2017  CAD (coronary artery disease) Dr. Madera Reach  Cancer ([facility] 2000  
 basal cell chest  
 Chronic bronchitis ([facility] 3/1/2010  Contact dermatitis and other eczema, due to unspecified cause Dr. Geraldo Lowery  COPD  Depression 3/1/2010  GERD (gastroesophageal reflux disease)  Hypercholesterolemia 9/29/2010  Hypertension  IPF (idiopathic pulmonary fibrosis) ([facility] [address] 2007  Pneumonia 3/1/2010  Testosterone deficiency 3/24/2011 Past Surgical History:  
Procedure Laterality Date  CABG, ARTERIAL, THREE  10/2003  CARDIAC SURG PROCEDURE UNLIST  10/2003 CABGx3 vessel  COLONOSCOPY N/A 7/14/2017 COLONOSCOPY performed by [facility]der [facility]ce [facility]  ENDOSCOPY, COLON, DIAGNOSTIC Dr. Dave Vergara  HX APPENDECTOMY  HX CHOLECYSTECTOMY  11/22/2008  
 laparoscopic  HX COLECTOMY  12/29/07  
 sigmoid colectomy for volvulus in [place]  HX COLONOSCOPY  06/17/2014 Repeat in 3 years  HX HEENT  while in 22's 1948  
 submucus resection sinus//resection sub mucus 1962 [address]  
 right  HX ORTHOPAEDIC  June 20, 2011  
 right hand, thumb surgery, plate inserted  HX ORTHOPAEDIC  1946  
 broken nose  HX ORTHOPAEDIC  1962  
 sub mucous resection  HX ORTHOPAEDIC  02/05/15  
 right hand surgery, ligament repair  HX OTHER SURGICAL    
 HX OTHER SURGICAL    
 hemorrhoids  HX OTHER SURGICAL  8/2015  
 basal cell carcinoma  HX SEPTOPLASTY  1962  
 s/p broken nose 1946  
 HX TONSIL AND ADENOIDECTOMY  1949  
 HX TONSILLECTOMY  1949  
 HX UROLOGICAL  1990  
 vasectomy Family History Problem Relation Age of Onset  Cancer Mother   
     spine or abdomen  Stroke Father  Heart Attack Father 46s  Heart Disease Father  Cancer Brother   
     CLL, lung  Diabetes Brother  Cancer Brother   
     lung  Cancer Brother   
     lung  Cancer Brother   
     lung  Heart Disease Brother  Heart Disease Brother 58 CABG  
 Other Brother   
     shingles  Lung Disease Sister  High Cholesterol Son  Lung Disease Daughter Overactive Airways  No Known Problems Daughter History reviewed, no pertinent family history. Social History Tobacco Use  Smoking status: Former Smoker Packs/day: 1.00 Years: 15.00 Pack years: 15.00 Last attempt to quit: 1970 Years since quittin.8  Smokeless tobacco: Never Used Substance Use Topics  Alcohol use: Yes Alcohol/week: 2.0 standard drinks Types: 2 Glasses of wine per week Comment: rarely Allergies Allergen Reactions  Celexa [Citalopram] Other (comments) agitation  Cephalexin Other (comments) GI upset.  Demerol [Meperidine] Unknown (comments)  Mold Extracts Unknown (comments)  Niaspan [Niacin] Nausea Only  Other Medication Unknown (comments) Grass smut, standardized mite mix, weed mix, dogs, white pam, white hickory, red mulberry, barley, cottonseed, malt, rice, rye, black pepper, navy bean  Percocet [Oxycodone-Acetaminophen] Unknown (comments) [address]  Sulfa (Sulfonamide Antibiotics) Nausea Only [address] Other (comments) Current Outpatient Medications Medication Sig  levothyroxine (SYNTHROID) 25 mcg tablet TAKE ONE TABLET BY MOUTH EVERY MORNING BEFORE BREAKFAST  tamsulosin (FLOMAX) 0.4 mg capsule TAKE 1 CAPSULE EVERY NIGHT  buPROPion XL (WELLBUTRIN XL) 300 mg XL tablet TAKE 1 TABLET EVERY MORNING  
 omeprazole (PRILOSEC) 40 mg capsule TAKE ONE CAPSULE BY MOUTH TWICE A DAY  sertraline (ZOLOFT) 25 mg tablet Take 1 Tab by mouth daily.  dutasteride (AVODART) 0.5 mg capsule Take 0.5 mg by mouth daily.  OXYGEN-AIR DELIVERY SYSTEMS 4 L by Nasal route continuous.  Cetirizine 10 mg cap Take 1 Tab by mouth daily.  lactobacillus rhamnosus gg 10 billion cell (Culturelle) 10 billion cell capsule Take 1 Cap by mouth daily.  GRAPE SEED EXTRACT PO Take 400 mg by mouth daily.  guaiFENesin ER (MUCINEX) 600 mg ER tablet Take 600 mg by mouth daily.  glucosam-chond-hyalu-CF borate (Move Free Tunespeak) 750 mg-100 mg- 1.65 mg-108 mg tab Take 2 Tabs by mouth daily.  solifenacin (VESICARE) 5 mg tablet Take 5 mg by mouth daily.  cefdinir (OMNICEF) 300 mg capsule Take 300 mg by mouth two (2) times a day. Pt reported d/t lung condition starts taking if has a fever & notifies Pulmonary.  ketoconazole (NIZORAL) 2 % topical cream APPLY TO AFFECTED AREA(S) DAILY. USE ON SKIN LESIONS EVERY COUPLE OF DAYS AS DIRECTED.  sucralfate (CARAFATE) 1 gram tablet Take 1 g by mouth as needed for Other (stomach pain).  budesonide-formoterol (SYMBICORT) 160-4.5 mcg/actuation HFAA Take 2 Puffs by inhalation two (2) times a day.  Azelastine (ASTEPRO) 0.15 % (205.5 mcg) nasal spray 2 Sprays by Both Nostrils route two (2) times a day.  ketoconazole (NIZORAL) 2 % shampoo Apply  to affected area two (2) times a week.  albuterol (ACCUNEB) 1.25 mg/3 mL nebu 3 mL by Nebulization route every six (6) hours as needed. (Patient taking differently: 3 mL by Nebulization route every six (6) hours as needed for Respiratory Distress.)  atorvastatin (LIPITOR) 80 mg tablet Take 80 mg by mouth daily.  montelukast (SINGULAIR) 10 mg tablet Take 1 Tab by mouth daily (after dinner).  NEBULIZER by Does Not Apply route.  nitroglycerin (NITROQUICK) 0.4 mg SL tablet 1 Tab by SubLINGual route as needed.  albuterol (PROVENTIL HFA, VENTOLIN HFA) 90 mcg/Actuation inhaler Take 2 Puffs by inhalation every four (4) hours as needed for Wheezing.  multivitamin (ONE-A-DAY MENS) tablet Take 1 Tab by mouth daily.  metoprolol (LOPRESSOR) 25 mg tablet Take 25 mg by mouth two (2) times a day. PT INSTRUCTED TO TAKE AM DOS PER ANESTHESIA PROTOCOL  ezetimibe (ZETIA) 10 mg tablet Take 10 mg by mouth every morning. No current facility-administered medications for this visit. LAB DATA REVIEWED:  
 
Lab Results Component Value Date/Time WBC 9.4 06/07/2020 04:41 AM  
 HGB 11.6 (L) 06/07/2020 04:41 AM  
 PLATELET 842 40/82/9742 04:41 AM  
 
Lab Results Component Value Date/Time Sodium 139 06/17/2020 08:49 AM  
 Potassium 4.9 06/17/2020 08:49 AM  
 Chloride 101 06/17/2020 08:49 AM  
 CO2 28 06/17/2020 08:49 AM  
 BUN 14 06/17/2020 08:49 AM  
 Creatinine 1.13 06/17/2020 08:49 AM  
 Calcium 9.6 06/17/2020 08:49 AM  
 Magnesium 2.0 03/01/2016 04:23 AM  
  
Lab Results Component Value Date/Time Alk. phosphatase 84 06/17/2020 08:49 AM  
 Protein, total 6.7 06/17/2020 08:49 AM  
 Albumin 3.4 (L) 06/17/2020 08:49 AM  
 Globulin 4.2 (H) 03/05/2018 01:54 PM  
 
No results found for: INR, PTMR, PTP, PT1, PT2, APTT, INREXT, INREXT Lab Results Component Value Date/Time Iron 62 08/28/2019 09:48 AM  
 TIBC 247 (L) 08/28/2019 09:48 AM  
 Iron % saturation 25 08/28/2019 09:48 AM  
 Ferritin 192 08/28/2019 09:48 AM  
  
 
 
 CONTROLLED SUBSTANCES SAFETY ASSESSMENT (IF ON CONTROLLED SUBSTANCES):  
 
Reviewed opioid safety handout:  [] Yes   [] No 
24 hour opioid dose >150mg morphine equivalent/day:  [] Yes   [] No 
Benzodiazepines:  [] Yes   [] No 
Sleep apnea:  [] Yes   [] No 
Urine Toxicology Testing within last 6 months:  [] Yes   [] No 
History of or new aberrant medication taking behaviors:  [] Yes   [] No 
Has Narcan been prescribed [] Yes   [] No 
 
   
 
Total time: 45 minutes Counseling / coordination time: 30 minutes 
> 50% counseling / coordination?:  Yes Consent: 
He and/or health care decision maker is aware that that he may receive a bill for this telehealth service, depending on his insurance coverage, and has provided verbal consent to proceed: Yes CPT Codes 07908-84622 for Established Patients may apply to this Telehealth VisitTime-based coding, delete if not needed: I spent at least 25 minutes with this established patient, and >50% of the time was spent counseling and/or coordinating care regarding Discussing disease trajectory, goals of care and symptom management Pursuant to the emergency declaration under the [facility] waiver authority and the [employer] Act, this Virtual  Visit was conducted, with patient's consent, to reduce the patient's risk of exposure to COVID-19 and provide continuity of care for an established patient. Services were provided through a video synchronous discussion virtually to substitute for in-person clinic visit.

## 2020-10-28 NOTE — PROGRESS NOTES
Palliative Medicine Office Visit Palliative Medicine Nurse Check In 
[phone]) Patient Name: Anamaria Young YOB: 1940 Date of Office Visit: 10/28/2020 Patient states: \"  \" 
 
From Check In Sheet (scanned in Media): 
Has a medical provider talked with you about cardiopulmonary resuscitation (CPR)? [x] Yes   [] No   [] Unable to obtain Nurse reminder to complete or update ACP FlowSheet: 
 
Is ACP on the Problem List?    [] Yes    [] No 
IF ACP Document is ON FILE; Nurse to place ACP on Problem List  
 
Is there an ACP Note in Chart Review/Note? [] Yes    [] No  
If NO: ALERT PROVIDER Primary Decision Maker: Bonifacio Mejia - Spouse - [phone] Secondary Decision Maker: Tommy Allen - Daughter - [phone] Advance Care Planning 10/28/2020 Patient's Healthcare Decision Maker is: Named in scanned ACP document Primary Decision Maker Name -  
Primary Decision Maker Phone Number -  
Primary Decision Maker Relationship to Patient - Secondary Decision Maker Name - Secondary Decision Maker Phone Number - Secondary Decision Maker Relationship to Patient -  
Confirm Advance Directive Yes, on file Is there anything that we should know about you as a person in order to provide you the best care possible? Have you been to the ER, urgent care clinic since your last visit? [] Yes   [x] No   [] Unable to obtain Have you been hospitalized since your last visit? [] Yes   [x] No   [] Unable to obtain Have you seen or consulted any other health care providers outside of the [address] [place] [address] since your last visit? [] Yes   [x] No   [] Unable to obtain Functional status (describe):  
 
 
 
Last BM: 10/28/2020  accessed (date): 10/28/2020 Bottle review (for opioid pain medication): 
Medication 1:  
Date filled:  
Directions:  
# filled: # left: # pills taking per day: 
Last dose taken: 
 
Medication 2:  
Date filled: Directions:  
# filled: # left: # pills taking per day: 
Last dose taken: 
 
Medication 3:  
Date filled:  
Directions:  
# filled: # left: # pills taking per day: 
Last dose taken: 
 
Medication 4:  
Date filled:  
Directions:  
# filled: # left: # pills taking per day: 
Last dose taken:

## 2020-11-03 NOTE — TELEPHONE ENCOUNTER
"Contraceptives Protocol Passed   norethindrone-ethinyl estradiol (JUNEL FE 1/20) 1-20 MG-MCG tablet   Rerun Protocol (11/11/2019 11:35 AM)      Patient is not a current smoker if age is 35 or older         Recent (12 mo) or future (30 days) visit within the authorizing provider's specialty      Patient has had an office visit with the authorizing provider or a provider within the authorizing providers department within the previous 12 mos or has a future within next 30 days. See \"Patient Info\" tab in inbasket, or \"Choose Columns\" in Meds & Orders section of the refill encounter.              Medication is active on med list         No active pregnancy on record         No positive pregnancy test in past 12 months               Last written prescription date: 11/20/2018        Last fill quantity: 84, # refills: 3        Last office visit: 11/20/18        Future office visit: Not scheduled. Patient was seen by FP Dr. Boss for annual exam in April 2019. Per OV note on 4/29/19- Amenorrhea:  Reassured patient it is within normal expectations to not have menstruation return until a year after IUD removal. Recommended she restart Junel at any time, discussed back up contraception in the meantime (until 2 weeks after restart).     Zora Oneal RN on 11/11/2019 at 12:05 PM    " [facility]  Palliative Social Work  Telephone Call      Time in: 8:45am  Time out: 9:30am    Note:  SW received call back from patient's spouse Raymundo Woods) on this day. Spouse explained to SW her stressors, SW validated her experience and provided reflective listening. Spouse indicated surprised regarding referral and uncertainty around counseling because she is coping well, SW described the role of palliative medicine SW and strengths-based solution-focused counseling. Spouse described difficulties with sleep and indicated openness to receiving resources to improve sleep. SW offered CBT-I intervention, referral to the [employer], as well as the CBT-ACT book \"End the Insomnia Struggle\" by Araceli Dinero, PhD. Spouse interested in continuing contact with this SW to work on sleep and reinforce her strengths in this stressful time. Plan:  SW to follow up in 2 weeks for psychosocial support.          ZENON Otto   Palliative Medicine   Supervisee in Social Work  [facility] [address] [place] [address]  [phone]

## 2020-11-03 NOTE — TELEPHONE ENCOUNTER
[facility]  Telephone Call        Note:  SAE called patient's wife Katharine Norman) this morning to offer caregiver support. Vanessa Al unable to answer, SAE left message. Plan:  SAE to await call back today or try again tomorrow.            Juanita Esteves, [facility]   Palliative Medicine   Supervisee in [facility]  [phone]

## 2020-11-17 NOTE — TELEPHONE ENCOUNTER
[facility]  Palliative Social Work  Telephone Call      Time in: 8:30am  Time out: 8:45am    Note:  SW called patient's wife Ryamundo Woods) in follow up for psychosocial support and referral to CBT-I. Wife endorses interest in CBT-I and will pursue independently. Wife expressed stressors and frustrations alongside experience of coping well. SW provided reflective listening, validation, and emotional support. SW reinforced own availability and contact information for patient and wife. Plan:  SW to follow as needed.            ZENON Otto   Palliative Medicine   Supervisee in Social Work  [facility] [address] [place] [address]  [phone]

## 2020-11-20 NOTE — TELEPHONE ENCOUNTER
Returned call to patient who reports he will be moving with daughter , but will be splitting his time with another daughter during the day and would like another concentrator fo her home as well, advised patient insurance will only cover one device, patient informs he will reach out to [place] to see if they will be able to help him acquire the second device.

## 2020-11-20 NOTE — TELEPHONE ENCOUNTER
Patient wants to speak to nurse. States he wants to get a 2nd oxygen concentrator. 1 for his house and 1 for his daughters home. Advised nurse would call him back.

## 2021-01-01 ENCOUNTER — TELEPHONE (OUTPATIENT)
Dept: PALLATIVE CARE | Age: 81
End: 2021-01-01

## 2021-01-01 ENCOUNTER — VIRTUAL VISIT (OUTPATIENT)
Dept: INTERNAL MEDICINE CLINIC | Age: 81
End: 2021-01-01
Payer: MEDICARE

## 2021-01-01 ENCOUNTER — VIRTUAL VISIT (OUTPATIENT)
Dept: PALLATIVE CARE | Age: 81
End: 2021-01-01
Payer: MEDICARE

## 2021-01-01 ENCOUNTER — DOCUMENTATION ONLY (OUTPATIENT)
Dept: PALLATIVE CARE | Age: 81
End: 2021-01-01

## 2021-01-01 DIAGNOSIS — G47.33 OSA (OBSTRUCTIVE SLEEP APNEA): ICD-10-CM

## 2021-01-01 DIAGNOSIS — J84.9 INTERSTITIAL LUNG DISEASE (HCC): ICD-10-CM

## 2021-01-01 DIAGNOSIS — R06.02 SOB (SHORTNESS OF BREATH): Primary | ICD-10-CM

## 2021-01-01 DIAGNOSIS — R06.02 SOB (SHORTNESS OF BREATH): ICD-10-CM

## 2021-01-01 DIAGNOSIS — E11.9 CONTROLLED TYPE 2 DIABETES MELLITUS WITHOUT COMPLICATION, WITHOUT LONG-TERM CURRENT USE OF INSULIN (HCC): ICD-10-CM

## 2021-01-01 DIAGNOSIS — J44.9 END STAGE COPD (HCC): ICD-10-CM

## 2021-01-01 DIAGNOSIS — I71.40 ABDOMINAL AORTIC ANEURYSM (AAA) WITHOUT RUPTURE: ICD-10-CM

## 2021-01-01 DIAGNOSIS — R53.81 DEBILITY: ICD-10-CM

## 2021-01-01 DIAGNOSIS — J84.9 INTERSTITIAL LUNG DISEASE (HCC): Primary | ICD-10-CM

## 2021-01-01 DIAGNOSIS — R05.9 COUGH: Primary | ICD-10-CM

## 2021-01-01 DIAGNOSIS — R05.9 COUGH: ICD-10-CM

## 2021-01-01 DIAGNOSIS — I48.91 ATRIAL FIBRILLATION, UNSPECIFIED TYPE (HCC): ICD-10-CM

## 2021-01-01 DIAGNOSIS — K21.9 GASTROESOPHAGEAL REFLUX DISEASE WITHOUT ESOPHAGITIS: ICD-10-CM

## 2021-01-01 DIAGNOSIS — J96.11 CHRONIC RESPIRATORY FAILURE WITH HYPOXIA (HCC): ICD-10-CM

## 2021-01-01 DIAGNOSIS — I10 ESSENTIAL HYPERTENSION: ICD-10-CM

## 2021-01-01 DIAGNOSIS — J96.11 CHRONIC RESPIRATORY FAILURE WITH HYPOXIA (HCC): Primary | ICD-10-CM

## 2021-01-01 DIAGNOSIS — I25.10 CORONARY ARTERY DISEASE INVOLVING NATIVE CORONARY ARTERY OF NATIVE HEART WITHOUT ANGINA PECTORIS: ICD-10-CM

## 2021-01-01 DIAGNOSIS — J84.10 INTERSTITIAL PULMONARY FIBROSIS (HCC): ICD-10-CM

## 2021-01-01 DIAGNOSIS — E78.00 HYPERCHOLESTEROLEMIA: ICD-10-CM

## 2021-01-01 DIAGNOSIS — Z51.5 PALLIATIVE CARE BY SPECIALIST: ICD-10-CM

## 2021-01-01 DIAGNOSIS — F32.9 REACTIVE DEPRESSION: ICD-10-CM

## 2021-01-01 PROCEDURE — 99215 OFFICE O/P EST HI 40 MIN: CPT | Performed by: INTERNAL MEDICINE

## 2021-01-01 PROCEDURE — G8756 NO BP MEASURE DOC: HCPCS | Performed by: INTERNAL MEDICINE

## 2021-01-01 PROCEDURE — 1101F PT FALLS ASSESS-DOCD LE1/YR: CPT | Performed by: INTERNAL MEDICINE

## 2021-01-01 PROCEDURE — G9717 DOC PT DX DEP/BP F/U NT REQ: HCPCS | Performed by: INTERNAL MEDICINE

## 2021-01-01 PROCEDURE — G8427 DOCREV CUR MEDS BY ELIG CLIN: HCPCS | Performed by: INTERNAL MEDICINE

## 2021-01-01 PROCEDURE — 99214 OFFICE O/P EST MOD 30 MIN: CPT | Performed by: INTERNAL MEDICINE

## 2021-01-01 RX ORDER — MORPHINE SULFATE ORAL SOLUTION 10 MG/5ML
2.5 SOLUTION ORAL
Qty: 30 ML | Refills: 0 | Status: SHIPPED | OUTPATIENT
Start: 2021-01-01 | End: 2021-01-01 | Stop reason: SDUPTHER

## 2021-01-01 RX ORDER — MORPHINE SULFATE ORAL SOLUTION 10 MG/5ML
2.5 SOLUTION ORAL
Qty: 60 ML | Refills: 0 | Status: SHIPPED | OUTPATIENT
Start: 2021-01-01 | End: 2021-01-01

## 2021-01-01 RX ORDER — LEVOTHYROXINE SODIUM 25 UG/1
TABLET ORAL
Qty: 30 TAB | Refills: 0 | Status: SHIPPED | OUTPATIENT
Start: 2021-01-01 | End: 2021-01-01

## 2021-01-01 RX ORDER — MORPHINE SULFATE ORAL SOLUTION 10 MG/5ML
2.5 SOLUTION ORAL
Qty: 60 ML | Refills: 0 | Status: SHIPPED | OUTPATIENT
Start: 2021-01-01 | End: 2021-01-01 | Stop reason: SDUPTHER

## 2021-01-01 RX ORDER — LEVALBUTEROL TARTRATE 45 UG/1
1 AEROSOL, METERED ORAL
Qty: 1 INHALER | Refills: 0 | Status: SHIPPED | OUTPATIENT
Start: 2021-01-01

## 2021-01-01 RX ORDER — MORPHINE SULFATE ORAL SOLUTION 10 MG/5ML
2.5 SOLUTION ORAL
Qty: 10 ML | Refills: 0 | Status: SHIPPED | OUTPATIENT
Start: 2021-01-01 | End: 2021-01-01 | Stop reason: SDUPTHER

## 2021-01-01 RX ORDER — BENZONATATE 100 MG/1
100 CAPSULE ORAL
Qty: 30 CAP | Refills: 0 | Status: SHIPPED | OUTPATIENT
Start: 2021-01-01 | End: 2021-01-01

## 2021-01-26 NOTE — TELEPHONE ENCOUNTER
Calling patient to advise of Virtual Visit with Dr. Anamaria Irwin on   1/28/2021   . A nurse will call you between the hours of 9:00am and 1:30pm.  If you have any questions, please call [phone]. No answer so left voicemail. Called Ms. Edmund Ervin , she confirmed appt. States to call [phone] for appt.

## 2021-01-28 NOTE — PROGRESS NOTES
Palliative Medicine Office Visit Palliative Medicine Nurse Check In 
[phone]) Patient Name: Parveen Hernandez YOB: 1940 Date of Office Visit: 1/28/2021 Patient states: \"  \" 
 
From Check In Sheet (scanned in Media): 
Has a medical provider talked with you about cardiopulmonary resuscitation (CPR)? [x] Yes   [] No   [] Unable to obtain Nurse reminder to complete or update ACP FlowSheet: 
 
Is ACP on the Problem List?    [x] Yes    [] No 
IF ACP Document is ON FILE; Nurse to place ACP on Problem List  
 
Is there an ACP Note in Chart Review/Note? [x] Yes    [] No  
If NO: ALERT PROVIDER Is there anything that we should know about you as a person in order to provide you the best care possible? Have you been to the ER, urgent care clinic since your last visit? [] Yes   [x] No   [] Unable to obtain Have you been hospitalized since your last visit? [] Yes   [x] No   [] Unable to obtain Have you seen or consulted any other health care providers outside of the [address] [place] [address] since your last visit? [] Yes   [x] No   [] Unable to obtain Functional status (describe):  
 
 
 
Last BM: 1/28/2021  accessed (date): 1/28/2021 Bottle review (for opioid pain medication): 
Medication 1:  
Date filled:  
Directions:  
# filled: # left: # pills taking per day: 
Last dose taken: 
 
Medication 2:  
Date filled:  
Directions:  
# filled: # left: # pills taking per day: 
Last dose taken: 
 
Medication 3:  
Date filled:  
Directions:  
# filled: # left: # pills taking per day: 
Last dose taken: 
 
Medication 4:  
Date filled:  
Directions:  
# filled: # left: # pills taking per day: 
Last dose taken:

## 2021-01-28 NOTE — PATIENT INSTRUCTIONS
Dear Nilam Barrera , It was a pleasure seeing you today in your home along with your wife and daughter We will see you again in 2 months If labs or imaging tests have been ordered for you today, please call the office  at [phone] 48 hours after completion to obtain the results. Your stated goal: - To live well and long as possible 
-To die with dignity and comfort when your time comes Your described symptoms were: Fatigue: 5 Drowsiness: 5 Depression: 2 Pain: 0 Anxiety: 3 Nausea: 1 Anorexia: 2 Dyspnea: 8 Best Well-Bein Constipation: Yes Other Problem (Comment): 0 This is the plan we talked about: 1. Severe shortness of breath associated with pulmonary fibrosis -Since our previous visit, you are starting to experience some episodes of cough which is very difficult for you to recover from. These episodes are not happening every day but more frequently now than before. We agreed to watch this for now and if you continue to have worsening cough, we may have to consider starting small doses of pain medicine to help you with this. You are using [employer] for now with some benefit. I am providing you with refills 
-We talked about steam inhalation. You also use a humidifier with your CPAP. -We discussed the utilization of low-dose opioids to help with severe shortness of breath and cough. I think it would be useful to have small dose of liquid morphine in the house for future use if need be for severe shortness of breath. Natalya Wilkerson is agreeable to this and will give liquid morphine 2.5 mg by mouth every 4 hours as needed for severe shortness of breath. You can call our office to get more guidance on this. Again, we talked about using this only when you have severe shortness of breath which you are not able to recover from easily by resting. Your baseline -You are on 4 L of oxygen continuously, you struggle with severe, debilitating low blood oxygen levels even with simple exertion such as going to the bathroom, walking 2-4 steps, etc.  You feel fairly okay at rest but unable to talk for a long time without taking a break. 
-This has impacted your quality of life significantly. You understand that this is part of pulmonary fibrosis and expected to get worse in the future. 
-At this time, you are only using oxygen delivered via nasal cannula from a concentrator. You do not have any portable oxygen tanks. Some patients have shared that oxygen from an oxygen tank is more helpful than the concentrator at the time of exertion. We will work with [address] to make sure you get some oxygen tanks and try using that oxygen especially at the time of exertion and see how you feel. When you exert yourself, your oxygen levels drop from high 60s to high 80s from your resting oxygen level of 95%. That level of drop is hard to manage and I understand how you feel exhausted after such an episode. You also breathe oxygen through the mouth, having an extra set of nasal cannula which you can put in your mouth also helps. -Please continue using CPAP at night 
-Please continue following with Dr. Bernadine Tyson closely. 2.  Goals of care 
-You received a material be sent and we discussed this in detail with your daughter Juan Hightower present. At the most, you would only desire BiPAP for shortness of breath, you would not want CPR or intubation. You want to sign a D DNR. You sent as an electronic copy of the DNR which I will sign and resend to you. 
----- Previous visit - You wanted to establish care with a palliative medicine physician to start talking about what death is going to look like and plan for end-of-life care. You and your family have very good understanding of pulmonary fibrosis, we talked about the normal trajectory of this illness and the expected symptoms in the future such as worsening shortness of breath, life limiting debility, dependence on others for daily care needs, etc.  We talked about the use of opioids and other medications to help alleviate the panic that comes with shortness of breath. You are very interested in trying these medicines in the near future but not right now. We also talked about increasing care needs in the future, we identified that moving in with your daughter Carie Willoughby is a possibility. Currently, your elderly wife is your primary caregiver and she is doing a fantastic job of that. 
-You are a  but never established care with the [facility]. I highly recommend that you establish care with the [facility] so we can get physical therapy and Occupational Therapy assessment for you in order to get an electric wheelchair. This will be very helpful in minimizing your exertion and allowing independence. Getting a stair lift in the future may also be easier through the [facility]. 
 
-You have an AMD naming your wife is your primary medical power of  and daughter Carie Willoughby is the secondary medical power of . 3.  Hypothyroidism 
-You have been diagnosed with hypothyroidism and started on levothyroxine 3 drugs ago. You feel a bit more energetic since starting the medicine. You are following up with your PCP for this. 4.  I am so glad you have established care with the [place] PCP. You will be getting a chairlift for the stairs in your daughter's house. You would benefit from a power chair thereby limiting the cough and shortness of breath from exertion. Please call your PCPs office and get a referral for an occupational therapist within the [place] so they can help us with ordering a power chair. 5.  We talked about extra support for your family. Your wife Nancy Cormier would like to talk with our  to help with coping. You have establish care with our  [facility] Dy[facility]. This is what you have shared with us about Advance Care Planning: 
 
  Primary Decision Maker: Carlo Acosta - Spouse - [phone] Secondary Decision Maker: Steven Kessler - Daughter - [phone] Advance Care Planning 1/28/2021 Patient's Healthcare Decision Maker is: Named in scanned ACP document Primary Decision Maker Name -  
Primary Decision Maker Phone Number -  
Primary Decision Maker Relationship to Patient - Secondary Decision Maker Name - Secondary Decision Maker Phone Number - Secondary Decision Maker Relationship to Patient -  
Confirm Advance Directive Yes, on file The Palliative Medicine Team is here to support you and your family.   
 
 
Sincerely, 
 
 
Donnie Harp MD and the Palliative Medicine Team

## 2021-02-01 NOTE — PROGRESS NOTES
Palliative Medicine Outpatient Services [place]: [phone] Patient Name: Cato Bamberger YOB: 1940 Date of Current Visit: 02/01/21 Location of Current Visit:   
[] [facility] Office 
[] [facility] Office [] [id number] [address] [facility] Office 
[] Home 
[x]Synchronous A/V virtual visit Date of Initial Visit: 7/6/2020 Referral from: Self Primary Care Physician: Indu Mooney MD 
  
 SUMMARY:  
Cato Bamberger is a 68y.o. year old with a  history of DM 2, GERD,, interstitial lung disease diagnosed in 2007 but did not develop symptoms until 2012, currently on continuous 4 L oxygen, who was referred to Palliative Medicine by patient and his daughter for maximizing quality of life and identification of goals of care. 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 He has 3 children, Jayce Cottrell is a nurse at [employer], lives in [place], [place], daughter Marlon Gregory lives with the patient, son lives out of state. PALLIATIVE DIAGNOSES:  
 
  ICD-10-CM ICD-9-CM 1. Chronic respiratory failure with hypoxia (HCC)  J96.11 518.83 morphine, 10 mg/5 mL, 10 mg/5 mL oral solution  
  799.02 DO NOT RESUSCITATE 2. Interstitial lung disease (HCC)  J84.9 515 morphine, 10 mg/5 mL, 10 mg/5 mL oral solution 3. Debility  R53.81 799.3 4. Palliative care by specialist  Z51.5 V66.7 5. Cough  R05 786.2 benzonatate (TESSALON) 100 mg capsule PLAN:  
 
Patient Instructions Dear Cato Bamberger , It was a pleasure seeing you today in your home along with your wife and daughter We will see you again in 2 months If labs or imaging tests have been ordered for you today, please call the office  at [phone] 48 hours after completion to obtain the results. Your stated goal: - To live well and long as possible 
-To die with dignity and comfort when your time comes Your described symptoms were: Fatigue: 5 Drowsiness: 5 Depression: 2 Pain: 0 Anxiety: 3 Nausea: 1 Anorexia: 2 Dyspnea: 8 Best Well-Bein Constipation: Yes Other Problem (Comment): 0 This is the plan we talked about: 1. Severe shortness of breath associated with pulmonary fibrosis -Since our previous visit, you are starting to experience some episodes of cough which is very difficult for you to recover from. These episodes are not happening every day but more frequently now than before. We agreed to watch this for now and if you continue to have worsening cough, we may have to consider starting small doses of pain medicine to help you with this. You are using Jaxon Moores for now with some benefit. I am providing you with refills 
-We talked about steam inhalation. You also use a humidifier with your CPAP. Your baseline 
-You are on 4 L of oxygen continuously, you struggle with severe, debilitating low blood oxygen levels even with simple exertion such as going to the bathroom, walking 2-4 steps, etc.  You feel fairly okay at rest but unable to talk for a long time without taking a break. 
-This has impacted your quality of life significantly. You understand that this is part of pulmonary fibrosis and expected to get worse in the future. -At this time, you are only using oxygen delivered via nasal cannula from a concentrator. You do not have any portable oxygen tanks. Some patients have shared that oxygen from an oxygen tank is more helpful than the concentrator at the time of exertion. We will work with [address] to make sure you get some oxygen tanks and try using that oxygen especially at the time of exertion and see how you feel. When you exert yourself, your oxygen levels drop from high 60s to high 80s from your resting oxygen level of 95%. That level of drop is hard to manage and I understand how you feel exhausted after such an episode. You also breathe oxygen through the mouth, having an extra set of nasal cannula which you can put in your mouth also helps. -Please continue using CPAP at night 
-Please continue following with Dr. Reta Savage closely. 2.  Goals of care 
-You received a material be sent and we discussed this in detail with your daughter Kate Alves present. At the most, you would only desire BiPAP for shortness of breath, you would not want CPR or intubation. You want to sign a D DNR. You sent as an electronic copy of the DNR which I will sign and resend to you. 
----- Previous visit - You wanted to establish care with a palliative medicine physician to start talking about what death is going to look like and plan for end-of-life care. You and your family have very good understanding of pulmonary fibrosis, we talked about the normal trajectory of this illness and the expected symptoms in the future such as worsening shortness of breath, life limiting debility, dependence on others for daily care needs, etc.  We talked about the use of opioids and other medications to help alleviate the panic that comes with shortness of breath. You are very interested in trying these medicines in the near future but not right now. We also talked about increasing care needs in the future, we identified that moving in with your daughter Senia Wallace is a possibility. Currently, your elderly wife is your primary caregiver and she is doing a fantastic job of that. 
-You are a  but never established care with the [address] [place] [address]. I highly recommend that you establish care with the [address] [place] [address] so we can get physical therapy and Occupational Therapy assessment for you in order to get an electric wheelchair. This will be very helpful in minimizing your exertion and allowing independence. Getting a stair lift in the future may also be easier through the [address] [place] [address]. 
 
-You have an AMD naming your wife is your primary medical power of  and daughter Senia Wallace is the secondary medical power of . 3.  Hypothyroidism 
-You have been diagnosed with hypothyroidism and started on levothyroxine 3 drugs ago. You feel a bit more energetic since starting the medicine. You are following up with your PCP for this. 4.  I am so glad you have established care with the [place] PCP. You will be getting a chairlift for the stairs in your daughter's house. You would benefit from a power chair thereby limiting the cough and shortness of breath from exertion. Please call your PCPs office and get a referral for an occupational therapist within the [place] so they can help us with ordering a power chair. 5.  We talked about extra support for your family. Your wife Romero Oneil would like to talk with our  to help with coping. You have establish care with our  Stevie Santiago. This is what you have shared with us about Advance Care Planning: 
 
  Primary Decision Maker: Efren Sharp - Spouse - [phone] Secondary Decision Maker: Zach Fairchild Daughter - [phone] Advance Care Planning 1/28/2021 Patient's Healthcare Decision Maker is: Named in scanned ACP document Primary Decision Maker Name -  
Primary Decision Maker Phone Number -  
Primary Decision Maker Relationship to Patient - Secondary Decision Maker Name - Secondary Decision Maker Phone Number - Secondary Decision Maker Relationship to Patient -  
Confirm Advance Directive Yes, on file The Palliative Medicine Team is here to support you and your family. Sincerely, 
 
 
Cassius Short MD and the Palliative Medicine Team 
 
 
 GOALS OF CARE / TREATMENT PREFERENCES:  
[====Goals of Care====] GOALS OF CARE: 
Patient / health care proxy stated goals: See Patient Instructions / Summary TREATMENT PREFERENCES:  
Code Status:  [] Attempt Resuscitation       [x] Do Not Attempt Resuscitation Advance Care Planning: 
[x] The [facility] Interdisciplinary Team has updated the ACP Navigator with Decision Maker and Patient Capacity Primary Decision Maker: Efren Sharp - Spouse - [phone] Secondary Decision Maker: Zach Ramirez - [phone] Advance Care Planning 1/28/2021 Patient's Healthcare Decision Maker is: Named in scanned ACP document Primary Decision Maker Name -  
Primary Decision Maker Phone Number -  
Primary Decision Maker Relationship to Patient - Secondary Decision Maker Name - Secondary Decision Maker Phone Number - Secondary Decision Maker Relationship to Patient -  
Confirm Advance Directive Yes, on file Other: 
(If patient appropriate for POST, consider using PALLPOST smart phrase here) The palliative care team has discussed with patient / health care proxy about goals of care / treatment preferences for patient. 
[====Goals of Care====] PRESCRIPTIONS GIVEN:  
 
Medications Ordered Today Medications  benzonatate (TESSALON) 100 mg capsule Sig: Take 1 Cap by mouth three (3) times daily as needed for Cough for up to 10 days. Dispense:  30 Cap Refill:  0  
 morphine, 10 mg/5 mL, 10 mg/5 mL oral solution Sig: Take 1.25 mL by mouth every four (4) hours as needed for Pain for up to 3 days. Max Daily Amount: 15 mg. Dispense:  10 mL Refill:  0  
  
 
 
 FOLLOW UP: Future Appointments Date Time Provider David Heather 2/16/2021 11:15 AM Wilver Dumont MD [facility] BS AMB  
3/4/2021 11:30 AM China Wallace MD [facility] BS AMB PHYSICIANS INVOLVED IN CARE:  
Patient Care Team: 
Wilver Dumont MD as PCP - General (Internal Medicine) Wilver Dumont MD as PCP - REHABILITATION [facility] Empaneled Provider Liza Roman MD (Pulmonary Disease) Kristyn Cantor RN Analia Barajas MD (Cardiology) Dasia Arshad MD (Dermatology) Adiel Gutierrez MD (Gastroenterology) Maksim Cheung MD (Urology) 
call (Allergy) Ramez Morse MD as Surgeon (General Surgery) Juanpablo Pratt MD (Pulmonary Disease) Kevan Nina MD (Vascular Surgery) China Wallace MD as Palliative Care (Palliative Medicine) Tylor Christine MD (Ophthalmology)  HISTORY:  
 Reviewed patient-completed ESAS and advance care planning form. Reviewed patient record in prescription monitoring program. 
 
CHIEF COMPLAINT: No chief complaint on file. HPI/SUBJECTIVE: The patient is: [x] Verbal / [] Nonverbal  
 
Patient seen today along with his wife and daughter Radha Babb. He is now in the process of completely removing to his daughter's house. They needed to oxygen concentrators while he was living part-time in his home and then at his daughter's. They were able to rent an oxygen concentrator to help with this. Continues to have some cough for which he uses [employer]. We reviewed his goals of care again in detail and he now wants to sign a DNR. 
 
 
----------- Initial visit Patient seen today virtually along with his wife and daughter Radha Babb. Pleasant gentleman, talks about his life, his business with his wife and how much they both enjoyed it. They gave it up several years ago for several reasons including the fact that Mr. Dilshad Singh was starting to develop significant shortness of breath. He was diagnosed with ILD in 2007 but did not develop significant symptoms until 2012. He has been on increasing amount of oxygen since then with decreasing functionality. He had a complicated hospitalization in June with pneumomediastinum which scared him quite a bit. The family has also undergone recent tragedy where his daughter Mimi Zepeda was to be  in early May but her fiancee was killed in a taiwo accident in late April. The family remains traumatized by this loss but they are trying to move on. Mimi Zepeda has now moved in with the patient and his wife. Their daughter Eden García, is a registered nurse at [address] [place] [address] and very involved in their care and a big advocate for palliative medicine. She wants comfort oriented care for her father and support for her mother. The patient and family wants to be as prepared as they can to deal with this chronic and debilitating illness. Clinical Pain Assessment (nonverbal scale for nonverbal patients):  
[++++ Clinical Pain Assessment++++] [++++Pain Severity++++]: Pain: 0 
[++++Pain Character++++]: 
[++++Pain Duration++++]: 
[++++Pain Effect++++]: 
[++++Pain Factors++++]: 
[++++Pain Frequency++++]: 
[++++Pain Location++++]: 
[++++ Clinical Pain Assessment++++] FUNCTIONAL ASSESSMENT:  
 
Palliative Performance Scale (PPS): PPS: 70 PSYCHOSOCIAL/SPIRITUAL SCREENING:  
 
Any spiritual / Nondenominational concerns: 
[] Yes /  [x] No 
 
Caregiver Burnout: 
[] Yes /  [x] No /  [] No Caregiver Present Anticipatory grief assessment:  
[x] Normal  / [] Maladaptive ESAS Anxiety: Anxiety: 3 ESAS Depression: Depression: 2 REVIEW OF SYSTEMS:  
 
The following systems were [x] reviewed / [] unable to be reviewed Systems: constitutional, ears/nose/mouth/throat, respiratory, gastrointestinal, genitourinary, musculoskeletal, integumentary, neurologic, psychiatric, endocrine. Positive findings noted below. Modified ESAS Completed by: provider Fatigue: 5 Drowsiness: 5 Depression: 2 Pain: 0 Anxiety: 3 Nausea: 1 Anorexia: 2 Dyspnea: 8 Best Well-Bein Constipation: Yes Other Problem (Comment): 0 PHYSICAL EXAM:  
 
Wt Readings from Last 3 Encounters:  
20 176 lb 3.2 oz (79.9 kg) 07/10/20 181 lb (82.1 kg) 06/08/20 175 lb 14.8 oz (79.8 kg) There were no vitals taken for this visit. Last bowel movement: See Nursing Note Constitutional   
[x] Appears well-developed and well-nourished in no apparent distress   
[] Abnormal: 
Mental status [x] Alert and awake [x] Oriented to person/place/time 
[x] Able to follow commands 
[] Abnormal:  
Eyes 
[x] EOM normal  
[x] Sclera normal  
[x] No visible ocular discharge 
[] Abnormal:  
HENT [x] Normocephalic, atraumatic [x] Mouth/Throat: Moist mucous membranes  
[x] External Ears normal 
[] Abnormal: 
Neck [x] No visualized mass 
[] Abnormal: 
Pulmonary/Chest  
[x] Respiratory effort normal 
[x] No visualized signs of difficulty breathing or respiratory distress 
[] Abnormal: Musculoskeletal 
[x] Normal gait with no signs of ataxia [x] Normal range of motion of neck [] Abnormal: 
Neurological:  
[x] No facial asymmetry (Cranial nerve 7 motor function) [x] No gaze palsy 
[] Abnormal:  
Skin 
[x] No significant exanthematous lesions or discoloration noted on facial skin 
[] Abnormal: Psychiatric [x] Normal affect [x] No hallucinations [] Abnormal: 
 
Other pertinent observable physical exam findings: 
 
Due to this being a TeleHealth evaluation, many elements of the physical examination are unable to be assessed. HISTORY:  
 
Past Medical History:  
Diagnosis Date  [facility] small AAA  Arthritis shoulders and spine 3/1/2010  Asthma 3/1/2010 Dr. Je Hardy,  Call  BPH (benign prostatic hypertrophy)  Broken nose 3/1/2010  Bronchitis 02/2017  CAD (coronary artery disease) Dr. Harjinder Yuan  Cancer ([facility] 2000  
 basal cell chest  
 Chronic bronchitis ([facility] 3/1/2010  Contact dermatitis and other eczema, due to unspecified cause Dr. Lj Arce  COPD  Depression 3/1/2010  GERD (gastroesophageal reflux disease)  Hypercholesterolemia 9/29/2010  Hypertension  IPF (idiopathic pulmonary fibrosis) ([facility] 2007  Pneumonia 3/1/2010  Testosterone deficiency 3/24/2011 Past Surgical History:  
Procedure Laterality Date  CABG, ARTERIAL, THREE  10/2003  CARDIAC SURG PROCEDURE UNLIST  10/2003 CABGx3 vessel  COLONOSCOPY N/A 7/14/2017 COLONOSCOPY performed by Neli Dave MD at [facility] ENDOSCOPY  ENDOSCOPY, COLON, DIAGNOSTIC Dr. Ronny Ramos  HX APPENDECTOMY  HX CHOLECYSTECTOMY  11/22/2008  
 laparoscopic  HX COLECTOMY  12/29/07  
 sigmoid colectomy for volvulus in [place]  HX COLONOSCOPY  06/17/2014 Repeat in 3 years  HX HEENT  while in 20's 1948  
 submucus resection sinus//resection sub mucus 1962 [address]  
 right  HX ORTHOPAEDIC  June 20, 2011  
 right hand, thumb surgery, plate inserted  HX ORTHOPAEDIC  1946  
 broken nose  HX ORTHOPAEDIC  1962  
 sub mucous resection  HX ORTHOPAEDIC  02/05/15  
 right hand surgery, ligament repair  HX OTHER SURGICAL    
 HX OTHER SURGICAL    
 hemorrhoids  HX OTHER SURGICAL  8/2015  
 basal cell carcinoma  HX SEPTOPLASTY  1962  
 s/p broken nose 1946  
 HX TONSIL AND ADENOIDECTOMY  1949  
 HX TONSILLECTOMY  1949  
 HX UROLOGICAL  1990  
 vasectomy Family History Problem Relation Age of Onset  Cancer Mother   
     spine or abdomen  Stroke Father  Heart Attack Father 46s  Heart Disease Father  Cancer Brother   
     CLL, lung  Diabetes Brother  Cancer Brother   
     lung  Cancer Brother   
     lung  Cancer Brother   
     lung  Heart Disease Brother  Heart Disease Brother 58 CABG  
 Other Brother   
     shingles  Lung Disease Sister  High Cholesterol Son  Lung Disease Daughter Overactive Airways  No Known Problems Daughter History reviewed, no pertinent family history. Social History Tobacco Use  Smoking status: Former Smoker Packs/day: 1.00 Years: 15.00 Pack years: 15.00 Quit date: 1970 Years since quittin.1  Smokeless tobacco: Never Used Substance Use Topics  Alcohol use: Yes Alcohol/week: 2.0 standard drinks Types: 2 Glasses of wine per week Comment: rarely Allergies Allergen Reactions  Celexa [Citalopram] Other (comments) agitation  Cephalexin Other (comments) GI upset.  Demerol [Meperidine] Unknown (comments)  Mold Extracts Unknown (comments)  Niaspan [Niacin] Nausea Only  Other Medication Unknown (comments) Grass smut, standardized mite mix, weed mix, dogs, white pam, white hickory, red mulberry, barley, cottonseed, malt, rice, rye, black pepper, navy bean  Percocet [Oxycodone-Acetaminophen] Unknown (comments) [address]  Sulfa (Sulfonamide Antibiotics) Nausea Only [address] Other (comments) Current Outpatient Medications Medication Sig  
 benzonatate (TESSALON) 100 mg capsule Take 1 Cap by mouth three (3) times daily as needed for Cough for up to 10 days.  levothyroxine (SYNTHROID) 25 mcg tablet TAKE ONE TABLET BY MOUTH EVERY MORNING BEFORE BREAKFAST  sertraline (ZOLOFT) 25 mg tablet TAKE ONE TABLET BY MOUTH DAILY  tamsulosin (FLOMAX) 0.4 mg capsule TAKE 1 CAPSULE EVERY NIGHT  buPROPion XL (WELLBUTRIN XL) 300 mg XL tablet TAKE 1 TABLET EVERY MORNING  
 omeprazole (PRILOSEC) 40 mg capsule TAKE ONE CAPSULE BY MOUTH TWICE A DAY  dutasteride (AVODART) 0.5 mg capsule Take 0.5 mg by mouth daily.  OXYGEN-AIR DELIVERY SYSTEMS 4 L by Nasal route continuous.  Cetirizine 10 mg cap Take 1 Tab by mouth daily.  lactobacillus rhamnosus gg 10 billion cell (Culturelle) 10 billion cell capsule Take 1 Cap by mouth daily.  GRAPE SEED EXTRACT PO Take 400 mg by mouth daily.  guaiFENesin ER (MUCINEX) 600 mg ER tablet Take 600 mg by mouth daily.  glucosam-chond-hyalu-CF borate (Move Free [facility]) 750 mg-100 mg- 1.65 mg-108 mg tab Take 2 Tabs by mouth daily.  solifenacin (VESICARE) 5 mg tablet Take 5 mg by mouth daily.  cefdinir (OMNICEF) 300 mg capsule Take 300 mg by mouth two (2) times a day. Pt reported d/t lung condition starts taking if has a fever & notifies Pulmonary.  ketoconazole (NIZORAL) 2 % topical cream APPLY TO AFFECTED AREA(S) DAILY. USE ON SKIN LESIONS EVERY COUPLE OF DAYS AS DIRECTED.  sucralfate (CARAFATE) 1 gram tablet Take 1 g by mouth as needed for Other (stomach pain).  budesonide-formoterol (SYMBICORT) 160-4.5 mcg/actuation HFAA Take 2 Puffs by inhalation two (2) times a day.  Azelastine (ASTEPRO) 0.15 % (205.5 mcg) nasal spray 2 Sprays by Both Nostrils route two (2) times a day.  ketoconazole (NIZORAL) 2 % shampoo Apply  to affected area two (2) times a week.  albuterol (ACCUNEB) 1.25 mg/3 mL nebu 3 mL by Nebulization route every six (6) hours as needed. (Patient taking differently: 3 mL by Nebulization route every six (6) hours as needed for Respiratory Distress.)  atorvastatin (LIPITOR) 80 mg tablet Take 80 mg by mouth daily.  montelukast (SINGULAIR) 10 mg tablet Take 1 Tab by mouth daily (after dinner).  NEBULIZER by Does Not Apply route.  nitroglycerin (NITROQUICK) 0.4 mg SL tablet 1 Tab by SubLINGual route as needed.  albuterol (PROVENTIL HFA, VENTOLIN HFA) 90 mcg/Actuation inhaler Take 2 Puffs by inhalation every four (4) hours as needed for Wheezing.  multivitamin (ONE-A-DAY MENS) tablet Take 1 Tab by mouth daily.  metoprolol (LOPRESSOR) 25 mg tablet Take 25 mg by mouth two (2) times a day. PT INSTRUCTED TO TAKE AM DOS PER ANESTHESIA PROTOCOL  ezetimibe (ZETIA) 10 mg tablet Take 10 mg by mouth every morning. No current facility-administered medications for this visit. LAB DATA REVIEWED:  
 
Lab Results Component Value Date/Time WBC 9.4 06/07/2020 04:41 AM  
 HGB 11.6 (L) 06/07/2020 04:41 AM  
 PLATELET 184 07/08/5455 04:41 AM  
 
Lab Results Component Value Date/Time Sodium 139 06/17/2020 08:49 AM  
 Potassium 4.9 06/17/2020 08:49 AM  
 Chloride 101 06/17/2020 08:49 AM  
 CO2 28 06/17/2020 08:49 AM  
 BUN 14 06/17/2020 08:49 AM  
 Creatinine 1.13 06/17/2020 08:49 AM  
 Calcium 9.6 06/17/2020 08:49 AM  
 Magnesium 2.0 03/01/2016 04:23 AM  
  
Lab Results Component Value Date/Time Alk. phosphatase 84 06/17/2020 08:49 AM  
 Protein, total 6.7 06/17/2020 08:49 AM  
 Albumin 3.4 (L) 06/17/2020 08:49 AM  
 Globulin 4.2 (H) 03/05/2018 01:54 PM  
 
No results found for: INR, PTMR, PTP, PT1, PT2, APTT, INREXT, INREXT Lab Results Component Value Date/Time Iron 62 08/28/2019 09:48 AM  
 TIBC 247 (L) 08/28/2019 09:48 AM  
 Iron % saturation 25 08/28/2019 09:48 AM  
 Ferritin 192 08/28/2019 09:48 AM  
  
 
 
 CONTROLLED SUBSTANCES SAFETY ASSESSMENT (IF ON CONTROLLED SUBSTANCES):  
 
Reviewed opioid safety handout:  [] Yes   [] No 
24 hour opioid dose >150mg morphine equivalent/day:  [] Yes   [] No 
Benzodiazepines:  [] Yes   [] No 
Sleep apnea:  [] Yes   [] No 
Urine Toxicology Testing within last 6 months:  [] Yes   [] No 
History of or new aberrant medication taking behaviors:  [] Yes   [] No 
Has Narcan been prescribed [] Yes   [] No 
 
   
 
Total time: 45 minutes Counseling / coordination time: 30 minutes 
> 50% counseling / coordination?:  Yes Consent: 
He and/or health care decision maker is aware that that he may receive a bill for this telehealth service, depending on his insurance coverage, and has provided verbal consent to proceed:  Yes 
 
 CPT Codes 82904-09418 for Established Patients may apply to this Telehealth VisitTime-based coding, delete if not needed: I spent at least 40 minutes with this established patient, and >50% of the time was spent counseling and/or coordinating care regarding Discussing goals of care, completing the DNR Pursuant to the emergency declaration under the [address] [place] [address] waiver authority and the [employer] Act, this Virtual  Visit was conducted, with patient's consent, to reduce the patient's risk of exposure to COVID-19 and provide continuity of care for an established patient. Services were provided through a video synchronous discussion virtually to substitute for in-person clinic visit.

## 2021-02-01 NOTE — TELEPHONE ENCOUNTER
Trenton Becker with [address] [place] [address] he received a request on Friday to fill script for Morphine. As it turns out, they are all out of it. He is asking to have it called in to the [address]. They have it in stock.

## 2021-02-02 NOTE — TELEPHONE ENCOUNTER
Rozina with [employer] calling stating that they deleted the morphine script yesterday by accident. They need the script resent. She also needs for nurse to call to state [place] to fill. Message left on voicemail.

## 2021-02-02 NOTE — TELEPHONE ENCOUNTER
Rozina with [facility] calling stating that they deleted the morphine script yesterday by accident. Requesting new RX escribed over for morphine, 10 mg/5 mL, 10 mg/5 mL, Take 1.25 mL by mouth every four (4) hours as needed for Pain, Dispensed 10 ml, Packaging comes in 100 ml , unable to break .  please advise if increase in quantity approved

## 2021-02-02 NOTE — TELEPHONE ENCOUNTER
Returned call to pharmacist to inform that increased quantity was not needed, When Ольга Rachel Pharmacist informed she does have a broken bottle and will dispense 10ml per request order will be resent .

## 2021-02-03 NOTE — TELEPHONE ENCOUNTER
Rozina with [employer] is asking for a new escript to be sent or faxed for Morphine. Can also call it in and do it verbally. FAX # [phone]. Her call back number is [phone].

## 2021-02-12 NOTE — PROGRESS NOTES
HISTORY OF PRESENT ILLNESS Stacey Matt is a 68 y.o. male. HPI Pt last vv 10/12/20. Here for routine f/u This is an established visit completed with telemedicine was completed with video assist 
the patient acknowledges and agrees to this method of visitation deonte 
   [id number] BP at home 120/70 No longer on avapro 75 mg  
Currently just on metoprolol 25 mg BID Recall norvasc caused edema  
   
, typically under 120 His DM is diet-controlled currently he is not currently on prednisone 
  
Wt was 176 lb lov Discussed low carb diet and w/l  
   
Reminded pt to complete labs  
  
Pt follows with Dr. Etta Horner ( palliative) for chronic resp failure Reviewed note 1/28/21:1. Severe shortness of breath associated with pulmonary fibrosis -Since our previous visit, you are starting to experience some episodes of cough which is very difficult for you to recover from. These episodes are not happening every day but more frequently now than before. We agreed to watch this for now and if you continue to have worsening cough, we may have to consider starting small doses of pain medicine to help you with this. You are using [employer] for now with some benefit. I am providing you with refills 
-We talked about steam inhalation. You also use a humidifier with your CPAP. Your baseline 
-You are on 4 L of oxygen continuously, you struggle with severe, debilitating low blood oxygen levels even with simple exertion such as going to the bathroom, walking 2-4 steps, etc.  You feel fairly okay at rest but unable to talk for a long time without taking a break. 
-This has impacted your quality of life significantly. You understand that this is part of pulmonary fibrosis and expected to get worse in the future. -At this time, you are only using oxygen delivered via nasal cannula from a concentrator. You do not have any portable oxygen tanks. Some patients have shared that oxygen from an oxygen tank is more helpful than the concentrator at the time of exertion. We will work with George Joseph to make sure you get some oxygen tanks and try using that oxygen especially at the time of exertion and see how you feel. When you exert yourself, your oxygen levels drop from high 60s to high 80s from your resting oxygen level of 95%. That level of drop is hard to manage and I understand how you feel exhausted after such an episode. You also breathe oxygen through the mouth, having an extra set of nasal cannula which you can put in your mouth also helps. -Please continue using CPAP at night 
-Please continue following with Dr. Zeny Robledo closely. 2.  Goals of care 
-You received a material be sent and we discussed this in detail with your daughter Barbara Forte present. At the most, you would only desire BiPAP for shortness of breath, you would not want CPR or intubation. You want to sign a D DNR. You sent as an electronic copy of the DNR which I will sign and resend to you. He was given morphine which helps his coughing this works quite well he feels better with this Pt follows with Dr. Parish Cedillo (derm) Pt had a basal cell carcinoma removed from his R arm Last visit was 10/22/19 Apt postponed to 4/21 
   
Pt follows with Dr. Nadia Schmid (pulm) routinely q3 months for [facility] is now seeing palliative care for this as well Last visit was 12/20 Off prednisone currently   
 Recall Has rx for , prednisone and omnicef if he gets sick prn pulscarlet Pierre  pulm rehab 
 Continues on symbicort bid and albuterol prn (not daily) Pt is regularly supplemental now up to 4L O2 baseline, up to 10-12 L during exertion Recall esprit caused GI upset  
He uses concentrator and supplemental O2 when he moves around    
 Pt follows with Dr. Albania Smith ([place] ) annually in August 
Reviewed note 10/20 -psa 1.37, on avodart and flomax, come back 1 year 
he is on flomax per uro 
 on vesicare rather than oxybutin he is also on Avodart He stopped proscar which could cause this enlargement --gynecomastia Will no longer be going back - I will fill prescription 
  
Pt follows with Dr. Mercedes Trotter (cardio) for h/o remote afib and cad Last visit was 8/20 No longer on avapro  
Had neg stress test in 2/19   
  
Pt follows with Dr. Santacruz (allergy) Continues singulair for allergies - controlled  
Will f/u prn  
   
Pt follows with Dr. Phoebe Felix (vasc surg) for AAA Last visit was 7/20 Follows for AAA annually  
Discussed he doesn't need to follow if nothing can be done due to his lungs - discussed talking to pulm about this 
  
Pt follows with Dr. Lala Sellers (GI) for gerd Last visit was 7/2/18 Continues on prilosec - no issues with gerd See above to current sx Only f/u prn 
  
Pt saw Dr Valarie Guerrero (sleep) has sleep apnea Last visit was 9/20 in same office Pt is compliant with CPAP nightly 2/21 This is helping his sleep, tolerating well, also keeps O2 at 4L at night  
  
Pt saw Dr Davonte Cedillo (neuro) for a tremor Last visit was 10/18/18 Not bothering him too much  
  
Pt is on wellbutrin 300 mg for depression  
SHERRELL killed April 30th in a truck accident He is taking zoloft 25 mg doing quite well on this regimen Recall celexa caused irritability in the past. Happy with dose doing well 2/21 
   
Continues claritin for allergies, which works well 
TRISH Chery on lipitor 80mg, max dose, daily for cholesterol He also takes zetia daily 
  
He has been taking synthroid 25 mcgs for thyroid  
  
He just got chair lift and powered chair Now moved in with daughter They are currently in the process of selling their house, need to buy a new car  
  
Recall previously he had pneumomediastinum was thought related to CPAP he is back on CPAP now without problem 
  
   
ACP on file. SDM is his wife.  
  
PREVENTIVE:   
Colonoscopy: 17,  repeat in 5 years, Dr. Marilyn Zelaya EGD: 17, Dr. Marilyn Zelaya PSA:  per Dr. Isabel Patton,  per  10/20 1.37 caruso  
Mammo:  benign AAA screen: CT  - 3.2mm, Breana kumar, repeat in 10/13 and 2/15,  Tdap: will check cost at pharmacy, advised to get at his pharmacy --still due Pneumovax: 2020 Lrcgplm12: 10/13/2015 Zostavax: 2008 Shingrix: both rounds completed  
Flu shot: 20 Microalbumin:  Foot exam: 19  
A1c:    6.2,  6.0,  6.3,  6.2,  6.2, 3/19 6.0,  POC 6.1   6.6  5.9   6.1 Eye exam: [facility]. Lillie [facility] at [facility], , pt may getting cataract surg sometime in [address] EK/10/17  
Hepatitis C screen: , negative Patient Active Problem List  
 Diagnosis Date Noted  Chronic respiratory failure ([address] 2020  Pneumomediastinum ([facility] 2020  Interstitial lung disease ([facility] 2020  Chest pain 2020  Controlled type 2 diabetes mellitus without complication, without long-term current use of insulin ([address] 2020  SEAN (obstructive sleep apnea) 03/15/2019  Interstitial pulmonary fibrosis ([address] 10/09/2013  
 HTN (hypertension) 10/09/2013  A-fib ([address] 10/09/2013  AAA (abdominal aortic aneurysm) ([facility] 10/09/2013  Benign prostatic hyperplasia  GERD (gastroesophageal reflux disease)  CAD (coronary artery disease)  S/P CABG (coronary artery bypass graft) 2011  Testosterone deficiency 2011  Hypercholesterolemia 2010  Depression 2010  Arthritis shoulders and spine 2010 Current Outpatient Medications Medication Sig Dispense Refill  morphine (PF) in sterile water (preservative free) injection Take  by mouth. Take 1.25ml per day to help with pain when coughing  levothyroxine (SYNTHROID) 25 mcg tablet TAKE ONE TABLET BY MOUTH EVERY MORNING BEFORE BREAKFAST 30 Tab 0  
 sertraline (ZOLOFT) 25 mg tablet TAKE ONE TABLET BY MOUTH DAILY 90 Tab 0  
 omeprazole (PRILOSEC) 40 mg capsule TAKE ONE CAPSULE BY MOUTH TWICE A  Cap 0  
 tamsulosin (FLOMAX) 0.4 mg capsule TAKE 1 CAPSULE EVERY NIGHT 90 Cap 0  
 buPROPion XL (WELLBUTRIN XL) 300 mg XL tablet TAKE 1 TABLET EVERY MORNING 90 Tab 1  
 dutasteride (AVODART) 0.5 mg capsule Take 0.5 mg by mouth daily.  OXYGEN-AIR DELIVERY SYSTEMS 4 L by Nasal route continuous.  Cetirizine 10 mg cap Take 1 Tab by mouth daily.  lactobacillus rhamnosus gg 10 billion cell (Culturelle) 10 billion cell capsule Take 1 Cap by mouth daily.  GRAPE SEED EXTRACT PO Take 400 mg by mouth daily.  guaiFENesin ER (MUCINEX) 600 mg ER tablet Take 600 mg by mouth daily.  glucosam-chond-hyalu-CF borate ([facility]) 750 mg-100 mg- 1.65 mg-108 mg tab Take 2 Tabs by mouth daily.  ketoconazole (NIZORAL) 2 % topical cream APPLY TO AFFECTED AREA(S) DAILY. USE ON SKIN LESIONS EVERY COUPLE OF DAYS AS DIRECTED. 1 Tube 0  
 budesonide-formoterol (SYMBICORT) 160-4.5 mcg/actuation HFAA Take 2 Puffs by inhalation two (2) times a day.  Azelastine (ASTEPRO) 0.15 % (205.5 mcg) nasal spray 2 Sprays by Both Nostrils route two (2) times a day.  ketoconazole (NIZORAL) 2 % shampoo Apply  to affected area two (2) times a week.  0  
 albuterol (ACCUNEB) 1.25 mg/3 mL nebu 3 mL by Nebulization route every six (6) hours as needed. (Patient taking differently: 3 mL by Nebulization route every six (6) hours as needed for Respiratory Distress. ) 1 Each 1  
 atorvastatin (LIPITOR) 80 mg tablet Take 80 mg by mouth daily.  montelukast (SINGULAIR) 10 mg tablet Take 1 Tab by mouth daily (after dinner). 90 Tab 3  
 NEBULIZER by Does Not Apply route.  nitroglycerin (NITROQUICK) 0.4 mg SL tablet 1 Tab by SubLINGual route as needed. 25 Tab prn  albuterol (PROVENTIL HFA, VENTOLIN HFA) 90 mcg/Actuation inhaler Take 2 Puffs by inhalation every four (4) hours as needed for Wheezing. 1 Inhaler 2  
 multivitamin (ONE-A-DAY MENS) tablet Take 1 Tab by mouth daily.  metoprolol (LOPRESSOR) 25 mg tablet Take 25 mg by mouth two (2) times a day. PT INSTRUCTED TO TAKE AM DOS PER ANESTHESIA PROTOCOL  ezetimibe (ZETIA) 10 mg tablet Take 10 mg by mouth every morning.  solifenacin (VESICARE) 5 mg tablet Take 5 mg by mouth daily.  cefdinir (OMNICEF) 300 mg capsule Take 300 mg by mouth two (2) times a day. Pt reported d/t lung condition starts taking if has a fever & notifies Pulmonary.  sucralfate (CARAFATE) 1 gram tablet Take 1 g by mouth as needed for Other (stomach pain). Past Surgical History:  
Procedure Laterality Date  CABG, ARTERIAL, THREE  10/2003  CARDIAC SURG PROCEDURE UNLIST  10/2003 CABGx3 vessel  COLONOSCOPY N/A 7/14/2017 COLONOSCOPY performed by Marlys Romero MD at [facility] ENDOSCOPY  ENDOSCOPY, COLON, DIAGNOSTIC Dr. Nuno Quintero  HX APPENDECTOMY  HX CHOLECYSTECTOMY  11/22/2008  
 laparoscopic  HX COLECTOMY  12/29/07  
 sigmoid colectomy for volvulus in [place]  HX COLONOSCOPY  06/17/2014 Repeat in 3 years  HX HEENT  while in 20's 1946  
 submucus resection sinus//resection sub mucus 1962 [address]  
 right  HX ORTHOPAEDIC  June 20, 2011  
 right hand, thumb surgery, plate inserted  HX ORTHOPAEDIC  1946  
 broken nose  HX ORTHOPAEDIC  1962  
 sub mucous resection  HX ORTHOPAEDIC  02/05/15  
 right hand surgery, ligament repair  HX OTHER SURGICAL    
 HX OTHER SURGICAL    
 hemorrhoids  HX OTHER SURGICAL  8/2015  
 basal cell carcinoma  HX SEPTOPLASTY  [id number]  
 s/p broken nose 1946  
 HX TONSIL AND ADENOIDECTOMY  1949  
 HX TONSILLECTOMY  [address]  
 vasectomy Lab Results Component Value Date/Time WBC 9.4 06/07/2020 04:41 AM  
 HGB 11.6 (L) 06/07/2020 04:41 AM  
 HCT 36.2 (L) 06/07/2020 04:41 AM  
 PLATELET 265 54/75/2618 04:41 AM  
 MCV 95.8 06/07/2020 04:41 AM  
 
Lab Results Component Value Date/Time Cholesterol, total 123 06/17/2020 08:49 AM  
 HDL Cholesterol 40 06/17/2020 08:49 AM  
 LDL, calculated 63 06/17/2020 08:49 AM  
 Triglyceride 101 06/17/2020 08:49 AM  
 CHOL/HDL Ratio 3.4 09/29/2010 08:28 AM  
 
Lab Results Component Value Date/Time GFR est non-AA 62 06/17/2020 08:49 AM  
 GFR est AA 72 06/17/2020 08:49 AM  
 Creatinine 1.13 06/17/2020 08:49 AM  
 BUN 14 06/17/2020 08:49 AM  
 Sodium 139 06/17/2020 08:49 AM  
 Potassium 4.9 06/17/2020 08:49 AM  
 Chloride 101 06/17/2020 08:49 AM  
 CO2 28 06/17/2020 08:49 AM  
 Magnesium 2.0 03/01/2016 04:23 AM  
  
 
Review of Systems Respiratory: Negative for shortness of breath. Cardiovascular: Negative for chest pain. Physical Exam 
Constitutional:   
   General: He is not in acute distress. Appearance: Normal appearance. He is not ill-appearing, toxic-appearing or diaphoretic. HENT:  
   Head: Normocephalic and atraumatic. Eyes:  
   General:     
   Right eye: No discharge. Left eye: No discharge. Conjunctiva/sclera: Conjunctivae normal.  
Pulmonary:  
   Effort: No respiratory distress. Neurological:  
   Mental Status: He is alert and oriented to person, place, and time. Mental status is at baseline. Gait: Gait normal.  
Psychiatric:     
   Mood and Affect: Mood normal.     
   Behavior: Behavior normal.  
 
 
 
ASSESSMENT and PLAN 
  ICD-10-CM ICD-9-CM 1. Interstitial lung disease ([facility] Patient with end-stage pulmonary fibrosis follows with pulmonary On chronic oxygen Requires 4 to 6 L of oxygen Desaturates quickly Has Symbicort twice daily and albuterol as needed No longer on prednisone Now seeing palliative care DNR and getting morphine for comfort J84.9 515   
2. Interstitial pulmonary fibrosis ([facility] See above up-to-date with pulmonary and palliative care J84.10 515 3. Controlled type 2 diabetes mellitus without complication, without long-term current use of insulin ([facility] Diet controlled check A1c 
 
)  E11.9 250.00   
4. Abdominal aortic aneurysm (AAA) without rupture ([facility] Follows annually with vascular and this summer around July Discussed if due to deteriorating pulmonary status he is not a candidate for any sort of intervention that he would no longer need to follow with vascular He will think about this I71.4 441.4 5. Atrial fibrillation, unspecified type ([facility] Remote history of A. fib no recent issues due to advanced pulmonary disease on no longer follow with cardiology I48.91 427.31   
6. Essential hypertension Controlled on metoprolol twice daily continue I10 401.9 7. Coronary artery disease involving native coronary artery of native heart without angina pectoris Medically managed we will no longer follow with cardiology due to advanced pulmonary disease 
 
 
 I25.10 414.01   
8. Gastroesophageal reflux disease without esophagitis Compliant with Prilosec works well K21.9 530.81   
9. Hypercholesterolemia Improved with Lipitor and Zetia E78.00 272.0   
10. SEAN (obstructive sleep apnea) Compliant with CPAP G47.33 327.23   
11. Reactive depression Happy with Wellbutrin and Zoloft F32.9 300.4 Scribed by Ventura Dowell, as dictated by Dr. Fior Galicia. Current diagnosis and concerns discussed with pt at length. Pt understands risks and benefits or current treatment plan and medications, and accepts the treatment and medication with any possible risks. Pt asks appropriate questions, which were answered. Pt was instructed to call with any concerns or problems. I have reviewed the note documented by the scribe. The services provided are my own. The documentation is accurate Colette Jackson, who was evaluated through a synchronous (real-time) audio-video encounter, and/or his healthcare decision maker, is aware that it is a billable service, with coverage as determined by his insurance carrier. He provided verbal consent to proceed: Yes, and patient identification was verified. It was conducted pursuant to the emergency declaration under the [address] [place] [address] authority and the [employer] Act. A caregiver was present when appropriate. Ability to conduct physical exam was limited. I was in the office. The patient was at home.

## 2021-03-04 NOTE — PROGRESS NOTES
Palliative Medicine Outpatient Services Michael: [phone] Patient Name: Juan Carlos Mendez YOB: 1940 Date of Current Visit: 21 Location of Current Visit:   
[] [facility] Office 
[] [facility] Office [] [facility] Office 
[] Home 
[x]Synchronous A/V virtual visit Date of Initial Visit: 2020 Referral from: Self Primary Care Physician: Bacilio Mcdaniel MD 
  
 SUMMARY:  
Juan Carlos Mendez is a 68y.o. year old with a  history of DM 2, GERD,, interstitial lung disease diagnosed in  but did not develop symptoms until , currently on continuous 4 L oxygen, who was referred to Palliative Medicine by patient and his daughter for maximizing quality of life and identification of goals of care. 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 PALLIATIVE DIAGNOSES:  
 
  ICD-10-CM ICD-9-CM 1. Chronic respiratory failure with hypoxia (HCC)  J96.11 518.83 morphine, 10 mg/5 mL, 10 mg/5 mL oral solution  
  799.02   
2. Interstitial lung disease (HCC)  J84.9 515 morphine, 10 mg/5 mL, 10 mg/5 mL oral solution PLAN:  
 
Patient Instructions Dear Juan Carlos Mendez , It was a pleasure seeing you today in your home along with your wife and daughter We will see you again in 2 months If labs or imaging tests have been ordered for you today, please call the office  at [phone] 48 hours after completion to obtain the results. Your stated goal: - To live well and long as possible 
-To die with dignity and comfort when your time comes Your described symptoms were: Fatigue: 8 Drowsiness: 7 Depression: 4 Pain: 2 Anxiety: 3 Nausea: 3 Anorexia: 3 Dyspnea: 9 Best Well-Bein Constipation: No  
 Other Problem (Comment): 0 This is the plan we talked about: 1. Severe shortness of breath associated with pulmonary fibrosis -Since our previous visit, you are starting to experience some episodes of cough which is very difficult for you to recover from. These episodes are not happening every day but more frequently now than before. We agreed to watch this for now and if you continue to have worsening cough, we may have to consider starting small doses of pain medicine to help you with this. You are using Magn[employer] Edy for now with some benefit. I am providing you with refills 
-We talked about steam inhalation. You also use a humidifier with your CPAP. -We discussed the utilization of low-dose opioids to help with severe shortness of breath and cough. I think it would be useful to have small dose of liquid morphine in the house for future use if need be for severe shortness of breath. Damon Gilmore is agreeable to this and will give liquid morphine 2.5 mg by mouth every 4 hours as needed for severe shortness of breath. You can call our office to get more guidance on this. Again, we talked about using this only when you have severe shortness of breath which you are not able to recover from easily by resting. Your baseline 
-You are on 4 L of oxygen continuously, you struggle with severe, debilitating low blood oxygen levels even with simple exertion such as going to the bathroom, walking 2-4 steps, etc.  You feel fairly okay at rest but unable to talk for a long time without taking a break. 
-This has impacted your quality of life significantly. You understand that this is part of pulmonary fibrosis and expected to get worse in the future. -At this time, you are only using oxygen delivered via nasal cannula from a concentrator. You do not have any portable oxygen tanks. Some patients have shared that oxygen from an oxygen tank is more helpful than the concentrator at the time of exertion. We will work with Normal to make sure you get some oxygen tanks and try using that oxygen especially at the time of exertion and see how you feel. When you exert yourself, your oxygen levels drop from high 60s to high 80s from your resting oxygen level of 95%. That level of drop is hard to manage and I understand how you feel exhausted after such an episode. You also breathe oxygen through the mouth, having an extra set of nasal cannula which you can put in your mouth also helps. -Please continue using CPAP at night 
-Please continue following with Dr. Dav Gonzalez closely. 2.  Goals of care 
-You received a material be sent and we discussed this in detail with your daughter Mona Winkler present. At the most, you would only desire BiPAP for shortness of breath, you would not want CPR or intubation. You want to sign a D DNR. You sent as an electronic copy of the DNR which I will sign and resend to you. 
----- Previous visit - You wanted to establish care with a palliative medicine physician to start talking about what death is going to look like and plan for end-of-life care. You and your family have very good understanding of pulmonary fibrosis, we talked about the normal trajectory of this illness and the expected symptoms in the future such as worsening shortness of breath, life limiting debility, dependence on others for daily care needs, etc.  We talked about the use of opioids and other medications to help alleviate the panic that comes with shortness of breath. You are very interested in trying these medicines in the near future but not right now. We also talked about increasing care needs in the future, we identified that moving in with your daughter [facility] is a possibility. Currently, your elderly wife is your primary caregiver and she is doing a fantastic job of that. 
-You are a  but never established care with the [facility]. I highly recommend that you establish care with the [facility] so we can get physical therapy and Occupational Therapy assessment for you in order to get an electric wheelchair. This will be very helpful in minimizing your exertion and allowing independence. Getting a stair lift in the future may also be easier through the [facility]. 
 
-You have an AMD naming your wife is your primary medical power of  and daughter [facility] is the secondary medical power of . 3.  Hypothyroidism 
-You have been diagnosed with hypothyroidism and started on levothyroxine 3 drugs ago. You feel a bit more energetic since starting the medicine. You are following up with your PCP for this. 4.  I am so glad you have established care with the [place] PCP. You will be getting a chairlift for the stairs in your daughter's house. You would benefit from a power chair thereby limiting the cough and shortness of breath from exertion. Please call your PCPs office and get a referral for an occupational therapist within the [place] so they can help us with ordering a power chair. 5.  We talked about extra support for your family. Your wife Sterling Hammond would like to talk with our  to help with coping. You have establish care with our  [facility]. This is what you have shared with us about Advance Care Planning: 
 
  Primary Decision Maker: Carlo Lopez - Spouse - [phone] Secondary Decision Maker: Fransisco Fairchild Daughter - [phone] Advance Care Planning 3/4/2021 Patient's Healthcare Decision Maker is: Named in scanned ACP document Primary Decision Maker Name -  
Primary Decision Maker Phone Number -  
Primary Decision Maker Relationship to Patient - Secondary Decision Maker Name - Secondary Decision Maker Phone Number - Secondary Decision Maker Relationship to Patient -  
Confirm Advance Directive Yes, on file Does the patient have other document types Do Not Resuscitate The Palliative Medicine Team is here to support you and your family. Sincerely, 
 
 
Arnol Sheridan MD and the Palliative Medicine Team 
 
 
 GOALS OF CARE / TREATMENT PREFERENCES:  
[====Goals of Care====] GOALS OF CARE: 
Patient / health care proxy stated goals: See Patient Instructions / Summary TREATMENT PREFERENCES:  
Code Status:  [] Attempt Resuscitation       [x] Do Not Attempt Resuscitation Advance Care Planning: 
[x] The [facility] Interdisciplinary Team has updated the ACP Navigator with Decision Maker and Patient Capacity Primary Decision Maker: Carlo Lopez - Spouse - [phone] Secondary Decision Maker: Fransisco Ramirez - [phone] Advance Care Planning 3/4/2021 Patient's Healthcare Decision Maker is: Named in scanned ACP document Primary Decision Maker Name -  
Primary Decision Maker Phone Number -  
Primary Decision Maker Relationship to Patient - Secondary Decision Maker Name - Secondary Decision Maker Phone Number - Secondary Decision Maker Relationship to Patient -  
Confirm Advance Directive Yes, on file Does the patient have other document types Do Not Resuscitate Other: 
(If patient appropriate for POST, consider using PALLPOST smart phrase here) The palliative care team has discussed with patient / health care proxy about goals of care / treatment preferences for patient. 
[====Goals of Care====] PRESCRIPTIONS GIVEN:  
 
No orders of the defined types were placed in this encounter. FOLLOW UP: Future Appointments Date Time Provider David Romero 3/4/2021  2:30 PM Luz Prieto MD [address] [facility]  
5/17/2021 10:00 AM Rozina Velázquez MD [facility] PHYSICIANS INVOLVED IN CARE:  
Patient Care Team: 
Rozina Velázquez MD as PCP - General (Internal Medicine) Rozina Velázquez MD as PCP - [address] [place] [address] Provider Al Portillo MD (Pulmonary Disease) Alethea Dancer, RN Sommer Felix MD (Cardiology) Sarahy Barillas MD (Dermatology) Barby Pizarro MD (Gastroenterology) Vincent Kowalski MD (Urology) 
call (Allergy) Carina Chin MD as Surgeon (General Surgery) Sterling Jane MD (Pulmonary Disease) Primo Casas MD (Vascular Surgery) Luz Prieto MD as Palliative Care (Palliative Medicine) Juarez Medina MD (Ophthalmology) HISTORY:  
Reviewed patient-completed ESAS and advance care planning form. Reviewed patient record in prescription monitoring program. 
 
CHIEF COMPLAINT: No chief complaint on file. HPI/SUBJECTIVE: The patient is: [x] Verbal / [] Nonverbal  
 
 Patient seen today along with his wife and daughter Keiry Martinez. Since her last visit, he has now started using morphine at least 3 times a day. He finds it beneficial and we talked about ways to make it even more beneficial.  He struggles at nighttime because he has to remove the CPAP and get out of bed to go to the bathroom. We talked about using a bedside urinal and finding positions that are more comfortable for him to be able to urinate into a urinal.  He continues to have severe shortness of breath where his O2 sats dropped to the 60s and 70s when he exerts himself. We reviewed inhalers and other things that he can do to make himself feel better. He is going to get a power chair soon and we agreed that he will request a PT and OT evaluation. 
 
 
----------- Initial visit Patient seen today virtually along with his wife and daughter Keiry Martinez. Pleasant gentleman, talks about his life, his business with his wife and how much they both enjoyed it. They gave it up several years ago for several reasons including the fact that Mr. Alisia Wang was starting to develop significant shortness of breath. He was diagnosed with ILD in 2007 but did not develop significant symptoms until 2012. He has been on increasing amount of oxygen since then with decreasing functionality. He had a complicated hospitalization in June with pneumomediastinum which scared him quite a bit. The family has also undergone recent tragedy where his daughter Pamela Medina was to be  in early May but her fiancee was killed in a taiwo accident in late April. The family remains traumatized by this loss but they are trying to move on. Pamela Medina has now moved in with the patient and his wife. Their daughter [facility], is a registered nurse at [address] [place] [address] and very involved in their care and a big advocate for palliative medicine. She wants comfort oriented care for her father and support for her mother. The patient and family wants to be as prepared as they can to deal with this chronic and debilitating illness. Clinical Pain Assessment (nonverbal scale for nonverbal patients):  
[++++ Clinical Pain Assessment++++] [++++Pain Severity++++]: Pain: 2 
[++++Pain Character++++]: 
[++++Pain Duration++++]: 
[++++Pain Effect++++]: 
[++++Pain Factors++++]: 
[++++Pain Frequency++++]: 
[++++Pain Location++++]: 
[++++ Clinical Pain Assessment++++] FUNCTIONAL ASSESSMENT:  
 
Palliative Performance Scale (PPS): PPS: 70 PSYCHOSOCIAL/SPIRITUAL SCREENING:  
 
Any spiritual / Sabianism concerns: 
[] Yes /  [x] No 
 
Caregiver Burnout: 
[] Yes /  [x] No /  [] No Caregiver Present Anticipatory grief assessment:  
[x] Normal  / [] Maladaptive ESAS Anxiety: Anxiety: 3 ESAS Depression: Depression: 4 REVIEW OF SYSTEMS:  
 
The following systems were [x] reviewed / [] unable to be reviewed Systems: constitutional, ears/nose/mouth/throat, respiratory, gastrointestinal, genitourinary, musculoskeletal, integumentary, neurologic, psychiatric, endocrine. Positive findings noted below. Modified ESAS Completed by: provider Fatigue: 8 Drowsiness: 7 Depression: 4 Pain: 2 Anxiety: 3 Nausea: 3 Anorexia: 3 Dyspnea: 9 Best Well-Bein Constipation: No  
Other Problem (Comment): 0 PHYSICAL EXAM:  
 
Wt Readings from Last 3 Encounters:  
20 176 lb 3.2 oz (79.9 kg) 07/10/20 181 lb (82.1 kg) 06/08/20 175 lb 14.8 oz (79.8 kg) There were no vitals taken for this visit. Last bowel movement: See Nursing Note Constitutional   
[x] Appears well-developed and well-nourished in no apparent distress   
[] Abnormal: 
Mental status [x] Alert and awake [x] Oriented to person/place/time 
[x] Able to follow commands 
[] Abnormal:  
Eyes 
[x] EOM normal  
[x] Sclera normal  
[x] No visible ocular discharge 
[] Abnormal:  
HENT [x] Normocephalic, atraumatic [x] Mouth/Throat: Moist mucous membranes  
[x] External Ears normal 
[] Abnormal: 
Neck [x] No visualized mass 
[] Abnormal: 
Pulmonary/Chest  
[x] Respiratory effort normal 
[x] No visualized signs of difficulty breathing or respiratory distress 
[] Abnormal: Musculoskeletal 
[x] Normal gait with no signs of ataxia [x] Normal range of motion of neck [] Abnormal: 
Neurological:  
[x] No facial asymmetry (Cranial nerve 7 motor function) [x] No gaze palsy 
[] Abnormal:  
Skin 
[x] No significant exanthematous lesions or discoloration noted on facial skin 
[] Abnormal: Psychiatric [x] Normal affect [x] No hallucinations [] Abnormal: 
 
Other pertinent observable physical exam findings: 
 
Due to this being a TeleHealth evaluation, many elements of the physical examination are unable to be assessed. HISTORY:  
 
Past Medical History:  
Diagnosis Date  Aneurysm ([facility] [address] small AAA  Arthritis shoulders and spine 3/1/2010  Asthma 3/1/2010 Dr. Pankaj Quan Dr. Call  BPH (benign prostatic hypertrophy)  Broken nose 3/1/2010  Bronchitis 02/2017  CAD (coronary artery disease) Dr. Garcia Favorite  Cancer ([facility] [address] 2000  
 basal cell chest  
 Chronic bronchitis ([facility] [address] 3/1/2010  Contact dermatitis and other eczema, due to unspecified cause Dr. Leah Pelayo  COPD  Depression 3/1/2010  GERD (gastroesophageal reflux disease)  Hypercholesterolemia 9/29/2010  Hypertension  IPF (idiopathic pulmonary fibrosis) ([facility] [address] 2007  Pneumonia 3/1/2010  Testosterone deficiency 3/24/2011 Past Surgical History:  
Procedure Laterality Date  CABG, ARTERIAL, THREE  10/2003  CARDIAC SURG PROCEDURE UNLIST  10/2003 CABGx3 vessel  COLONOSCOPY N/A 7/14/2017 COLONOSCOPY performed by Abel [facility]on MD at [facility] ENDOSCOPY  ENDOSCOPY, COLON, DIAGNOSTIC Dr. Merry Ac  HX APPENDECTOMY  HX CHOLECYSTECTOMY  11/22/2008  
 laparoscopic  HX COLONOSCOPY  06/17/2014 Repeat in 3 years  HX HEENT  while in 22's 1948  
 submucus resection sinus//resection sub mucus 1962 [address]  
 right  HX ORTHOPAEDIC  June 20, 2011  
 right hand, thumb surgery, plate inserted  HX ORTHOPAEDIC  1946  
 broken nose  HX ORTHOPAEDIC  1962  
 sub mucous resection  HX ORTHOPAEDIC  02/05/15  
 right hand surgery, ligament repair  HX OTHER SURGICAL    
 HX OTHER SURGICAL    
 hemorrhoids  HX OTHER SURGICAL  8/2015  
 basal cell carcinoma  HX SEPTOPLASTY  [id number]  
 s/p broken nose 1946  
 HX TONSIL AND ADENOIDECTOMY  1949  
 HX TONSILLECTOMY  1949  
 HX TOTAL COLECTOMY  12/29/07  
 sigmoid colectomy for volvulus in [address] pierce Family History Problem Relation Age of Onset  Cancer Mother   
     spine or abdomen  Stroke Father  Heart Attack Father 46s  Heart Disease Father  Cancer Brother   
     CLL, lung  Diabetes Brother  Cancer Brother   
     lung  Cancer Brother   
     lung  Cancer Brother   
     lung  Heart Disease Brother  Heart Disease Brother 58 CABG  
 Other Brother   
     shingles  Lung Disease Sister  High Cholesterol Son  Lung Disease Daughter Overactive Airways  No Known Problems Daughter History reviewed, no pertinent family history. Social History Tobacco Use  Smoking status: Former Smoker Packs/day: 1.00 Years: 15.00 Pack years: 15.00 Quit date: 1970 Years since quittin.2  Smokeless tobacco: Never Used Substance Use Topics  Alcohol use: Yes Alcohol/week: 2.0 standard drinks Types: 2 Glasses of wine per week Comment: rarely Allergies Allergen Reactions  Celexa [Citalopram] Other (comments) agitation  Cephalexin Other (comments) GI upset.  Demerol [Meperidine] Unknown (comments)  Mold Extracts Unknown (comments)  Niaspan [Niacin] Nausea Only  Other Medication Unknown (comments) Grass smut, standardized mite mix, weed mix, dogs, white pam, white hickory, red mulberry, barley, cottonseed, malt, rice, rye, black pepper, navy bean  Percocet [Oxycodone-Acetaminophen] Unknown (comments) [address]  Sulfa (Sulfonamide Antibiotics) Nausea Only [address] Other (comments) Current Outpatient Medications Medication Sig  morphine (PF) in sterile water (preservative free) injection Take  by mouth. Take 1.25ml per day to help with pain when coughing  levothyroxine (SYNTHROID) 25 mcg tablet TAKE ONE TABLET BY MOUTH EVERY MORNING BEFORE BREAKFAST  sertraline (ZOLOFT) 25 mg tablet TAKE ONE TABLET BY MOUTH DAILY  omeprazole (PRILOSEC) 40 mg capsule TAKE ONE CAPSULE BY MOUTH TWICE A DAY  tamsulosin (FLOMAX) 0.4 mg capsule TAKE 1 CAPSULE EVERY NIGHT  buPROPion XL (WELLBUTRIN XL) 300 mg XL tablet TAKE 1 TABLET EVERY MORNING  
 dutasteride (AVODART) 0.5 mg capsule Take 0.5 mg by mouth daily.  OXYGEN-AIR DELIVERY SYSTEMS 4 L by Nasal route continuous.  Cetirizine 10 mg cap Take 1 Tab by mouth daily.  lactobacillus rhamnosus gg 10 billion cell (Culturelle) 10 billion cell capsule Take 1 Cap by mouth daily.  GRAPE SEED EXTRACT PO Take 400 mg by mouth daily.  guaiFENesin ER (MUCINEX) 600 mg ER tablet Take 600 mg by mouth daily.  glucosam-chond-hyalu-CF borate ([facility]) 750 mg-100 mg- 1.65 mg-108 mg tab Take 2 Tabs by mouth daily.  solifenacin (VESICARE) 5 mg tablet Take 5 mg by mouth daily.  cefdinir (OMNICEF) 300 mg capsule Take 300 mg by mouth two (2) times a day. Pt reported d/t lung condition starts taking if has a fever & notifies Pulmonary.  ketoconazole (NIZORAL) 2 % topical cream APPLY TO AFFECTED AREA(S) DAILY. USE ON SKIN LESIONS EVERY COUPLE OF DAYS AS DIRECTED.  sucralfate (CARAFATE) 1 gram tablet Take 1 g by mouth as needed for Other (stomach pain).  budesonide-formoterol (SYMBICORT) 160-4.5 mcg/actuation HFAA Take 2 Puffs by inhalation two (2) times a day.  Azelastine (ASTEPRO) 0.15 % (205.5 mcg) nasal spray 2 Sprays by Both Nostrils route two (2) times a day.  ketoconazole (NIZORAL) 2 % shampoo Apply  to affected area two (2) times a week.  albuterol (ACCUNEB) 1.25 mg/3 mL nebu 3 mL by Nebulization route every six (6) hours as needed. (Patient taking differently: 3 mL by Nebulization route every six (6) hours as needed for Respiratory Distress.)  atorvastatin (LIPITOR) 80 mg tablet Take 80 mg by mouth daily.  montelukast (SINGULAIR) 10 mg tablet Take 1 Tab by mouth daily (after dinner).  NEBULIZER by Does Not Apply route.  nitroglycerin (NITROQUICK) 0.4 mg SL tablet 1 Tab by SubLINGual route as needed.  albuterol (PROVENTIL HFA, VENTOLIN HFA) 90 mcg/Actuation inhaler Take 2 Puffs by inhalation every four (4) hours as needed for Wheezing.  multivitamin (ONE-A-DAY MENS) tablet Take 1 Tab by mouth daily.  metoprolol (LOPRESSOR) 25 mg tablet Take 25 mg by mouth two (2) times a day. PT INSTRUCTED TO TAKE AM DOS PER ANESTHESIA PROTOCOL  ezetimibe (ZETIA) 10 mg tablet Take 10 mg by mouth every morning. No current facility-administered medications for this visit. LAB DATA REVIEWED:  
 
Lab Results Component Value Date/Time WBC 9.4 06/07/2020 04:41 AM  
 HGB 11.6 (L) 06/07/2020 04:41 AM  
 PLATELET 467 73/43/6745 04:41 AM  
 
Lab Results Component Value Date/Time Sodium 139 06/17/2020 08:49 AM  
 Potassium 4.9 06/17/2020 08:49 AM  
 Chloride 101 06/17/2020 08:49 AM  
 CO2 28 06/17/2020 08:49 AM  
 BUN 14 06/17/2020 08:49 AM  
 Creatinine 1.13 06/17/2020 08:49 AM  
 Calcium 9.6 06/17/2020 08:49 AM  
 Magnesium 2.0 03/01/2016 04:23 AM  
  
Lab Results Component Value Date/Time Alk. phosphatase 84 06/17/2020 08:49 AM  
 Protein, total 6.7 06/17/2020 08:49 AM  
 Albumin 3.4 (L) 06/17/2020 08:49 AM  
 Globulin 4.2 (H) 03/05/2018 01:54 PM  
 
No results found for: INR, PTMR, PTP, PT1, PT2, APTT, INREXT, INREXT Lab Results Component Value Date/Time Iron 62 08/28/2019 09:48 AM  
 TIBC 247 (L) 08/28/2019 09:48 AM  
 Iron % saturation 25 08/28/2019 09:48 AM  
 Ferritin 192 08/28/2019 09:48 AM  
  
 
 
 CONTROLLED SUBSTANCES SAFETY ASSESSMENT (IF ON CONTROLLED SUBSTANCES):  
 
Reviewed opioid safety handout:  [] Yes   [] No 
24 hour opioid dose >150mg morphine equivalent/day:  [] Yes   [] No 
Benzodiazepines:  [] Yes   [] No 
Sleep apnea:  [] Yes   [] No 
Urine Toxicology Testing within last 6 months:  [] Yes   [] No 
History of or new aberrant medication taking behaviors:  [] Yes   [] No 
Has Narcan been prescribed [] Yes   [] No 
 
   
 
Total time: 55 minutes Counseling / coordination time: 45 minutes 
> 50% counseling / coordination?:  Yes Consent: 
He and/or health care decision maker is aware that that he may receive a bill for this telehealth service, depending on his insurance coverage, and has provided verbal consent to proceed:  Yes 
 
 CPT Codes 96719-00626 for Established Patients may apply to this Telehealth VisitTime-based coding, delete if not needed: I spent at least 40 minutes with this established patient, and >50% of the time was spent counseling and/or coordinating care regarding Discussing goals of care, completing the DNR Pursuant to the emergency declaration under the [address] [place] [address], [facility] waiver authority and the [employer] Act, this Virtual  Visit was conducted, with patient's consent, to reduce the patient's risk of exposure to COVID-19 and provide continuity of care for an established patient. Services were provided through a video synchronous discussion virtually to substitute for in-person clinic visit.

## 2021-03-04 NOTE — PROGRESS NOTES
Palliative Medicine Office Visit Palliative Medicine Nurse Check In 
[phone] Patient Name: Chacorta Niño YOB: 1940 Date of Office Visit: 3/4/2021 Patient states: \"  \" 
 
From Check In Sheet (scanned in Media): 
Has a medical provider talked with you about cardiopulmonary resuscitation (CPR)? [x] Yes   [] No   [] Unable to obtain Nurse reminder to complete or update ACP FlowSheet: 
 
Is ACP on the Problem List?    [x] Yes    [] No 
IF ACP Document is ON FILE; Nurse to place ACP on Problem List  
 
Is there an ACP Note in Chart Review/Note? [x] Yes    [] No  
If NO: ALERT PROVIDER Primary Decision Maker: Gregorio Hood - Spouse - [phone] Secondary Decision Maker: Abner Zack - Daughter - [phone] Advance Care Planning 3/4/2021 Patient's Healthcare Decision Maker is: Named in scanned ACP document Primary Decision Maker Name -  
Primary Decision Maker Phone Number -  
Primary Decision Maker Relationship to Patient - Secondary Decision Maker Name - Secondary Decision Maker Phone Number - Secondary Decision Maker Relationship to Patient -  
Confirm Advance Directive Yes, on file Does the patient have other document types Do Not Resuscitate Is there anything that we should know about you as a person in order to provide you the best care possible? Have you been to the ER, urgent care clinic since your last visit? [] Yes   [x] No   [] Unable to obtain Have you been hospitalized since your last visit? [] Yes   [x] No   [] Unable to obtain Have you seen or consulted any other health care providers outside of the [address] [place] [address] since your last visit? [] Yes   [x] No   [] Unable to obtain Functional status (describe):  
 
 
 
Last BM: 3/3/2021  accessed (date): 3/4/2021 Bottle review (for opioid pain medication): 
Medication 1:  
Date filled:  
Directions:  
# filled: # left: # pills taking per day: 
Last dose taken: 
 
Medication 2:  
Date filled:  
Directions:  
# filled: # left: # pills taking per day: 
Last dose taken: 
 
Medication 3:  
Date filled:  
Directions:  
# filled: # left: # pills taking per day: 
Last dose taken: 
 
Medication 4:  
Date filled:  
Directions:  
# filled: # left: # pills taking per day: 
Last dose taken:

## 2021-03-04 NOTE — PATIENT INSTRUCTIONS
Dear Cherylene Hem , It was a pleasure seeing you today in your home along with your wife and daughter We will see you again in 2 months If labs or imaging tests have been ordered for you today, please call the office  at [phone] 48 hours after completion to obtain the results. Your stated goal: - To live well and long as possible 
-To die with dignity and comfort when your time comes Your described symptoms were: Fatigue: 8 Drowsiness: 7 Depression: 4 Pain: 2 Anxiety: 3 Nausea: 3 Anorexia: 3 Dyspnea: 9 Best Well-Bein Constipation: No  
Other Problem (Comment): 0 This is the plan we talked about: 1. Severe shortness of breath associated with pulmonary fibrosis - Update-you have now started using the morphine liquid but only take 0.2 mL and this has been very helpful for you, you take it about 3 times a day. We talked about ways to use this effectively given you get really short of breath with any exertion and going to a coughing fit. We talked about increasing the dose to may be 0.5 mL or 1 mL and it is safe for you to take every 3-4 hours as needed. 
-We talked about morphine inhalation, I will look into this and get back to you 
-We talked about steam inhalation. You also use a humidifier with your CPAP. -You are now using 8 L of oxygen continuously 
-You want us to prescribe your oxygen and take care of your needs through [address] instead of going through Dr. Wade Brady, I have agreed to do this, please contact my office to order what is necessary. 
-You are not on any rescue inhaler, let us start Xopenex every 4 hours as needed because plain albuterol causes anxiety for you 
-You currently use Symbicort 2 times a day, increase this to 3 times a day Your baseline -You are on 4 L of oxygen continuously, you struggle with severe, debilitating low blood oxygen levels even with simple exertion such as going to the bathroom, walking 2-4 steps, etc.  You feel fairly okay at rest but unable to talk for a long time without taking a break. 
-This has impacted your quality of life significantly. You understand that this is part of pulmonary fibrosis and expected to get worse in the future. 
-At this time, you are only using oxygen delivered via nasal cannula from a concentrator. You do not have any portable oxygen tanks. Some patients have shared that oxygen from an oxygen tank is more helpful than the concentrator at the time of exertion. We will work with Ailyn Monae to make sure you get some oxygen tanks and try using that oxygen especially at the time of exertion and see how you feel. When you exert yourself, your oxygen levels drop from high 60s to high 80s from your resting oxygen level of 95%. That level of drop is hard to manage and I understand how you feel exhausted after such an episode. You also breathe oxygen through the mouth, having an extra set of nasal cannula which you can put in your mouth also helps. -Please continue using CPAP at night 
-Please continue following with Dr. Karine Brown closely. 2.  Goals of care 
-You received a material be sent and we discussed this in detail with your daughter Arline Bone present. At the most, you would only desire BiPAP for shortness of breath, you would not want CPR or intubation. -D DNR completed 
----- Previous visit - You wanted to establish care with a palliative medicine physician to start talking about what death is going to look like and plan for end-of-life care. You and your family have very good understanding of pulmonary fibrosis, we talked about the normal trajectory of this illness and the expected symptoms in the future such as worsening shortness of breath, life limiting debility, dependence on others for daily care needs, etc.  We talked about the use of opioids and other medications to help alleviate the panic that comes with shortness of breath. You are very interested in trying these medicines in the near future but not right now. We also talked about increasing care needs in the future, we identified that moving in with your daughter Hiro Davis is a possibility. Currently, your elderly wife is your primary caregiver and she is doing a fantastic job of that. 
-You are a  but never established care with the [place]. I highly recommend that you establish care with the [place] so we can get physical therapy and Occupational Therapy assessment for you in order to get an electric wheelchair. This will be very helpful in minimizing your exertion and allowing independence. Getting a stair lift in the future may also be easier through the [place]. 
 
-You have an AMD naming your wife is your primary medical power of  and daughter Hiro Davis is the secondary medical power of . 3.  Hypothyroidism 
-You have been diagnosed with hypothyroidism and started on levothyroxine 3 drugs ago. You feel a bit more energetic since starting the medicine. You are following up with your PCP for this. 4.  I am so glad you have established care with the [place] PCP. You will be getting a chairlift for the stairs in your daughter's house. You would benefit from a power chair thereby limiting the cough and shortness of breath from exertion. Please call your PCPs office and get a referral for an occupational therapist within the [place] so they can help us with ordering a power chair. Please ask for PT and OT referral once you receive your new power chair 5. We talked about extra support for your family. Your wife Kristy Taylor would like to talk with our  to help with coping. You have establish care with our  [facility]. This is what you have shared with us about Advance Care Planning: 
 
  Primary Decision Maker: Alton Candelaria - Spouse - [phone] Secondary Decision Maker: Jael Khalil - Daughter - [phone] Advance Care Planning 3/4/2021 Patient's Healthcare Decision Maker is: Named in scanned ACP document Primary Decision Maker Name -  
Primary Decision Maker Phone Number -  
Primary Decision Maker Relationship to Patient - Secondary Decision Maker Name - Secondary Decision Maker Phone Number - Secondary Decision Maker Relationship to Patient -  
Confirm Advance Directive Yes, on file Does the patient have other document types Do Not Resuscitate The Palliative Medicine Team is here to support you and your family.   
 
 
Sincerely, 
 
 
Jany Moraes MD and the Palliative Medicine Team

## 2021-03-11 NOTE — TELEPHONE ENCOUNTER
Triage for Controlled Substance Refill Request    Pain Diagnosis: _Chronic respiratory failure with hypoxia    Last Outpatient Visit: _3/4/2021    Next Outpatient Visit: _4/29/2021    Reason for refill needed outside of office visit? -Appointment not scheduled prior to need for scheduled refill      Ph[facility]: [facility] [place], 10 mg/5 mL, 10 mg/5 mL oral solution  Dose and directions: Take 1.25 mL by mouth every four (4) hours as needed .   Number dispensed:30  Date filled ( or Pharmacy):3/4/2021  #left:9 ml      reviewed: _yes     Date of Urine Drug Screen:  _n/a    Opioid Safety Handout given:  _yes    Appropriate for refill:  _yes     Action:  _ please refill

## 2021-03-11 NOTE — TELEPHONE ENCOUNTER
The patient's spouse called to have Morphine refilled. He is down to 9 ml. To be called in to [facility] [phone].

## 2021-03-24 NOTE — TELEPHONE ENCOUNTER
Triage for Controlled Substance Refill Request    Pain Diagnosis: _Chronic respiratory failure with hypoxia. Last Outpatient Visit: _4/29/2021    Next Outpatient Visit: _3/4/2021    Reason for refill needed outside of office visit? -Appointment not scheduled prior to need for scheduled refill      Pharmacy: [facility] [id number] [place] [address] [facility], 10 mg/5 mL, 10 mg/5 mL oral solution  Dose and directions: Take 1.25 mL by mouth every four (4) hours as needed for Pain.   Number dispensed:30ml   Date filled ( or Pharmacy):3/12/2021  #left:     reviewed: _yes     Date of Urine Drug Screen:  _n/a    Opioid Safety Handout given:  _yes     Appropriate for refill:  _yes     Action:  _ please refill

## 2021-03-24 NOTE — TELEPHONE ENCOUNTER
The patient's daughter Houston Alvarez left a message on voicemail requesting a refill. The medication was not specified.  # [phone] or you can reach the wife Kinsey Atkins at [phone].

## 2021-03-29 NOTE — TELEPHONE ENCOUNTER
Returned call to confirm what patients current oxygen use is , patient currently on 8LPM on concentrator , along with 15 LPM on tanks during any exertion with a non rebreather

## 2021-03-29 NOTE — TELEPHONE ENCOUNTER
Patient wife will follow up with pulmonology tomorrow at 11:30 and contact our office back with doctor recommendations, patient has enough oxygen and a storm tank for now until further updated orders can be given

## 2021-03-29 NOTE — TELEPHONE ENCOUNTER
Ms. Evita Rubio is calling stating that patient needs an appt as soon as possible. States that he is having episodes of oxygen level dropping so low he almost passes out. Also states that [address] delivered 30 bottles of oxygen on Thursday afternoon and they have already used 23 bottles as of this morning and [address] will not deliver anymore with authorization from MD.     Advised nurse would call her back to discuss both issues.

## 2021-03-29 NOTE — TELEPHONE ENCOUNTER
Per Dr Gregory Ours Patient to contact Pulmonologist to advise, Orders for chest x ray to [facility]

## 2021-03-30 NOTE — TELEPHONE ENCOUNTER
Patient requesting to have recent Xray results faxed to Dr Jaquelin Alvarez Pulmonology,[phone], Patient placed on Prednisone and Decaron by pulmonology , Carafate restarted for GERD , esophagus and stomach concerns due to Morphine upsetting his stomach, Patient to get the second concentrator from daughters home and use that as the emergency oxygen with the re-breather, Pulmonology suggested home hospice , wife and daughter agreed , orders faxed to [facility].

## 2025-01-23 NOTE — BRIEF OP NOTE
Brief Postoperative Note Patient: Levon Mcgill YOB: 1940 MRN: [id number] Date of Procedure: 6/7/2020 Pre-Op Diagnosis: subcutaneous emphysema, pneumomediastinum Post-Op Diagnosis: 1: Subcutaneous emphysema  2: Pneumomediastinum  3:  Tracheomalacia Procedure(s): DIAGNOSTIC BRONCHOSCOPY Surgeon(s): 
Finn Vasquez MD 
 
Surgical Assistant: None Anesthesia: General  
 
Estimated Blood Loss (mL): Minimal 
 
Complications: None Specimens: * No specimens in log * Implants: * No implants in log * Drains: * No LDAs found * Findings: no injury noted in the tracheobronchial tree, tracheomalacia noted, subcutaneous emphysema stable to improved this morning, no need for Rivera slits Condition: stable Disposition: to pacu Electronically Signed by Herve Wolf MD on 6/7/2020 at 11:11 AM 
 
 

Performed

## (undated) DEVICE — SOLIDIFIER MEDC 1200ML -- CONVERT TO 356117

## (undated) DEVICE — Device

## (undated) DEVICE — (D)SYR 10ML 1/5ML GRAD NSAF -- PKGING CHANGE USE ITEM 338027

## (undated) DEVICE — SOLUTION IV 1000ML 0.9% SOD CHL

## (undated) DEVICE — ENDO CARRY-ON PROCEDURE KIT INCLUDES ENZYMATIC SPONGE, GAUZE, BIOHAZARD LABEL, TRAY, LUBRICANT, DIRTY SCOPE LABEL, WATER LABEL, TRAY, DRAWSTRING PAD, AND DEFENDO 4-PIECE KIT.: Brand: ENDO CARRY-ON PROCEDURE KIT

## (undated) DEVICE — SYR 3ML LL TIP 1/10ML GRAD --

## (undated) DEVICE — BAG SPEC BIOHZRD 10 X 10 IN --

## (undated) DEVICE — TRAP,MUCUS SPECIMEN, 80CC: Brand: MEDLINE

## (undated) DEVICE — CATH IV AUTOGRD BC PNK 20GA 25 -- INSYTE

## (undated) DEVICE — GAUZE,SPONGE,FLUFF,6"X6.75",STRL,5/TRAY: Brand: MEDLINE

## (undated) DEVICE — BASIN EMESIS 500CC ROSE 250/CS 60/PLT: Brand: MEDEGEN MEDICAL PRODUCTS, LLC

## (undated) DEVICE — INFECTION CONTROL KIT SYS

## (undated) DEVICE — KENDALL RADIOLUCENT FOAM MONITORING ELECTRODE RECTANGULAR SHAPE: Brand: KENDALL

## (undated) DEVICE — SYR ASSEMB INFL BLLN 60ML --

## (undated) DEVICE — Z DISCONTINUED PER MEDLINE LINE GAS SAMPLING O2/CO2 LNG AD 13 FT NSL W/ TBNG FILTERLINE

## (undated) DEVICE — Device: Brand: MEDEX

## (undated) DEVICE — SET ADMIN 16ML TBNG L100IN 2 Y INJ SITE IV PIGGY BK DISP

## (undated) DEVICE — CONTAINER SPEC 20 ML LID NEUT BUFF FORMALIN 10 % POLYPR STS

## (undated) DEVICE — BLOCK BITE ENDOSCP AD 21 MM W/ DIL BLU LF DISP

## (undated) DEVICE — STRAP,POSITIONING,KNEE/BODY,FOAM,4X60": Brand: MEDLINE

## (undated) DEVICE — NEEDLE HYPO 18GA L1.5IN PNK S STL HUB POLYPR SHLD REG BVL

## (undated) DEVICE — 1200 GUARD II KIT W/5MM TUBE W/O VAC TUBE: Brand: GUARDIAN

## (undated) DEVICE — FORCEPS BX L160CM DIA8MM GRSP DISECT CUP TIP NONLOCKING ROT

## (undated) DEVICE — FORCEPS BX L240CM JAW DIA2.8MM L CAP W/ NDL MIC MESH TOOTH

## (undated) DEVICE — MARKER,SKIN,WI/RULER AND LABELS: Brand: MEDLINE

## (undated) DEVICE — NEONATAL-ADULT SPO2 SENSOR: Brand: NELLCOR

## (undated) DEVICE — STERILE POLYISOPRENE POWDER-FREE SURGICAL GLOVES WITH EMOLLIENT COATING: Brand: PROTEXIS

## (undated) DEVICE — ADAPTER TBNG DIA15MM SWVL FBROPT BRONCHSCP TERM 2 AXIS PEEP

## (undated) DEVICE — TOWEL 4 PLY TISS 19X30 SUE WHT

## (undated) DEVICE — TUBING, SUCTION, 1/4" X 12', STRAIGHT: Brand: MEDLINE

## (undated) DEVICE — SYR 10ML SLIP TIP 1/5ML GRAD --

## (undated) DEVICE — MEDI-VAC YANK SUCT HNDL W/TPRD BULBOUS TIP: Brand: CARDINAL HEALTH

## (undated) DEVICE — GARMENT,MEDLINE,DVT,INT,CALF,MED, GEN2: Brand: MEDLINE